# Patient Record
Sex: MALE | Race: WHITE | NOT HISPANIC OR LATINO | URBAN - METROPOLITAN AREA
[De-identification: names, ages, dates, MRNs, and addresses within clinical notes are randomized per-mention and may not be internally consistent; named-entity substitution may affect disease eponyms.]

---

## 2017-03-23 ENCOUNTER — APPOINTMENT (RX ONLY)
Dept: URBAN - METROPOLITAN AREA CLINIC 24 | Facility: CLINIC | Age: 77
Setting detail: DERMATOLOGY
End: 2017-03-23

## 2017-03-23 DIAGNOSIS — D22 MELANOCYTIC NEVI: ICD-10-CM

## 2017-03-23 DIAGNOSIS — L57.0 ACTINIC KERATOSIS: ICD-10-CM

## 2017-03-23 DIAGNOSIS — L82.1 OTHER SEBORRHEIC KERATOSIS: ICD-10-CM

## 2017-03-23 DIAGNOSIS — L81.4 OTHER MELANIN HYPERPIGMENTATION: ICD-10-CM

## 2017-03-23 DIAGNOSIS — D18.0 HEMANGIOMA: ICD-10-CM

## 2017-03-23 PROBLEM — E13.9 OTHER SPECIFIED DIABETES MELLITUS WITHOUT COMPLICATIONS: Status: ACTIVE | Noted: 2017-03-23

## 2017-03-23 PROBLEM — H91.90 UNSPECIFIED HEARING LOSS, UNSPECIFIED EAR: Status: ACTIVE | Noted: 2017-03-23

## 2017-03-23 PROBLEM — I25.10 ATHEROSCLEROTIC HEART DISEASE OF NATIVE CORONARY ARTERY WITHOUT ANGINA PECTORIS: Status: ACTIVE | Noted: 2017-03-23

## 2017-03-23 PROBLEM — D22.5 MELANOCYTIC NEVI OF TRUNK: Status: ACTIVE | Noted: 2017-03-23

## 2017-03-23 PROBLEM — I10 ESSENTIAL (PRIMARY) HYPERTENSION: Status: ACTIVE | Noted: 2017-03-23

## 2017-03-23 PROBLEM — E78.5 HYPERLIPIDEMIA, UNSPECIFIED: Status: ACTIVE | Noted: 2017-03-23

## 2017-03-23 PROBLEM — L85.3 XEROSIS CUTIS: Status: ACTIVE | Noted: 2017-03-23

## 2017-03-23 PROBLEM — D18.01 HEMANGIOMA OF SKIN AND SUBCUTANEOUS TISSUE: Status: ACTIVE | Noted: 2017-03-23

## 2017-03-23 PROCEDURE — 17003 DESTRUCT PREMALG LES 2-14: CPT

## 2017-03-23 PROCEDURE — 99214 OFFICE O/P EST MOD 30 MIN: CPT | Mod: 25

## 2017-03-23 PROCEDURE — ? LIQUID NITROGEN

## 2017-03-23 PROCEDURE — ? COUNSELING

## 2017-03-23 PROCEDURE — 17000 DESTRUCT PREMALG LESION: CPT

## 2017-03-23 ASSESSMENT — LOCATION DETAILED DESCRIPTION DERM
LOCATION DETAILED: LEFT SUPERIOR HELIX
LOCATION DETAILED: LEFT MID-UPPER BACK
LOCATION DETAILED: RIGHT TRIANGULAR FOSSA
LOCATION DETAILED: RIGHT SUPERIOR HELIX
LOCATION DETAILED: LEFT CENTRAL MALAR CHEEK
LOCATION DETAILED: RIGHT SUPERIOR CENTRAL MALAR CHEEK
LOCATION DETAILED: LEFT INFERIOR UPPER BACK
LOCATION DETAILED: EPIGASTRIC SKIN
LOCATION DETAILED: LEFT LATERAL ZYGOMA
LOCATION DETAILED: LEFT POSTERIOR SHOULDER
LOCATION DETAILED: LEFT SUPERIOR CENTRAL MALAR CHEEK
LOCATION DETAILED: RIGHT MID-UPPER BACK
LOCATION DETAILED: PERIUMBILICAL SKIN
LOCATION DETAILED: LEFT MEDIAL INFERIOR CHEST
LOCATION DETAILED: RIGHT POSTERIOR SHOULDER

## 2017-03-23 ASSESSMENT — LOCATION SIMPLE DESCRIPTION DERM
LOCATION SIMPLE: ABDOMEN
LOCATION SIMPLE: LEFT UPPER BACK
LOCATION SIMPLE: LEFT CHEEK
LOCATION SIMPLE: RIGHT EAR
LOCATION SIMPLE: RIGHT UPPER BACK
LOCATION SIMPLE: CHEST
LOCATION SIMPLE: LEFT ZYGOMA
LOCATION SIMPLE: LEFT EAR
LOCATION SIMPLE: RIGHT SHOULDER
LOCATION SIMPLE: LEFT SHOULDER
LOCATION SIMPLE: RIGHT CHEEK

## 2017-03-23 ASSESSMENT — LOCATION ZONE DERM
LOCATION ZONE: TRUNK
LOCATION ZONE: EAR
LOCATION ZONE: ARM
LOCATION ZONE: FACE

## 2017-05-08 ENCOUNTER — HOSPITAL ENCOUNTER (OUTPATIENT)
Dept: PHYSICAL THERAPY | Age: 77
Discharge: HOME | End: 2017-05-08
Payer: MEDICARE

## 2017-05-08 ENCOUNTER — HOSPITAL ENCOUNTER (OUTPATIENT)
Dept: SURGERY | Age: 77
Discharge: HOME | End: 2017-05-08
Payer: MEDICARE

## 2017-05-08 PROBLEM — R06.83 SNORING: Status: ACTIVE | Noted: 2017-05-08

## 2017-05-08 PROBLEM — R06.81 WITNESSED EPISODE OF APNEA: Status: ACTIVE | Noted: 2017-05-08

## 2017-05-08 LAB
ANION GAP BLD CALC-SCNC: 6 MMOL/L (ref 7–16)
APPEARANCE UR: CLEAR
APTT PPP: 25.7 SEC (ref 23.5–31.7)
BACTERIA SPEC CULT: ABNORMAL
BACTERIA URNS QL MICRO: 0 /HPF
BASOPHILS # BLD AUTO: 0.1 K/UL (ref 0–0.2)
BASOPHILS # BLD: 1 % (ref 0–2)
BILIRUB UR QL: NEGATIVE
BUN SERPL-MCNC: 20 MG/DL (ref 8–23)
CALCIUM SERPL-MCNC: 9.2 MG/DL (ref 8.3–10.4)
CASTS URNS QL MICRO: 0 /LPF
CHLORIDE SERPL-SCNC: 104 MMOL/L (ref 98–107)
CO2 SERPL-SCNC: 32 MMOL/L (ref 21–32)
COLOR UR: YELLOW
CREAT SERPL-MCNC: 1.38 MG/DL (ref 0.8–1.5)
DIFFERENTIAL METHOD BLD: ABNORMAL
EOSINOPHIL # BLD: 0.5 K/UL (ref 0–0.8)
EOSINOPHIL NFR BLD: 4 % (ref 0.5–7.8)
EPI CELLS #/AREA URNS HPF: 0 /HPF
ERYTHROCYTE [DISTWIDTH] IN BLOOD BY AUTOMATED COUNT: 13.2 % (ref 11.9–14.6)
GLUCOSE SERPL-MCNC: 115 MG/DL (ref 65–100)
GLUCOSE UR STRIP.AUTO-MCNC: NEGATIVE MG/DL
HCT VFR BLD AUTO: 51.1 % (ref 41.1–50.3)
HGB BLD-MCNC: 16.7 G/DL (ref 13.6–17.2)
HGB UR QL STRIP: NEGATIVE
IMM GRANULOCYTES # BLD: 0 K/UL (ref 0–0.5)
IMM GRANULOCYTES NFR BLD AUTO: 0.4 % (ref 0–5)
INR PPP: 0.9 (ref 0.9–1.2)
KETONES UR QL STRIP.AUTO: NEGATIVE MG/DL
LEUKOCYTE ESTERASE UR QL STRIP.AUTO: NEGATIVE
LYMPHOCYTES # BLD AUTO: 23 % (ref 13–44)
LYMPHOCYTES # BLD: 2.6 K/UL (ref 0.5–4.6)
MCH RBC QN AUTO: 29.6 PG (ref 26.1–32.9)
MCHC RBC AUTO-ENTMCNC: 32.7 G/DL (ref 31.4–35)
MCV RBC AUTO: 90.6 FL (ref 79.6–97.8)
MONOCYTES # BLD: 1.3 K/UL (ref 0.1–1.3)
MONOCYTES NFR BLD AUTO: 12 % (ref 4–12)
NEUTS SEG # BLD: 6.7 K/UL (ref 1.7–8.2)
NEUTS SEG NFR BLD AUTO: 60 % (ref 43–78)
NITRITE UR QL STRIP.AUTO: NEGATIVE
PH UR STRIP: 5 [PH] (ref 5–9)
PLATELET # BLD AUTO: 189 K/UL (ref 150–450)
PMV BLD AUTO: 11.2 FL (ref 10.8–14.1)
POTASSIUM SERPL-SCNC: 4.4 MMOL/L (ref 3.5–5.1)
PROT UR STRIP-MCNC: 100 MG/DL
PROTHROMBIN TIME: 10 SEC (ref 9.6–12)
RBC # BLD AUTO: 5.64 M/UL (ref 4.23–5.67)
RBC #/AREA URNS HPF: 0 /HPF
SERVICE CMNT-IMP: ABNORMAL
SODIUM SERPL-SCNC: 142 MMOL/L (ref 136–145)
SP GR UR REFRACTOMETRY: 1.01 (ref 1–1.02)
UROBILINOGEN UR QL STRIP.AUTO: 0.2 EU/DL (ref 0.2–1)
WBC # BLD AUTO: 11.2 K/UL (ref 4.3–11.1)
WBC URNS QL MICRO: 0 /HPF

## 2017-05-08 PROCEDURE — 81001 URINALYSIS AUTO W/SCOPE: CPT | Performed by: PHYSICIAN ASSISTANT

## 2017-05-08 PROCEDURE — 85025 COMPLETE CBC W/AUTO DIFF WBC: CPT | Performed by: PHYSICIAN ASSISTANT

## 2017-05-08 PROCEDURE — 85610 PROTHROMBIN TIME: CPT | Performed by: PHYSICIAN ASSISTANT

## 2017-05-08 PROCEDURE — 97161 PT EVAL LOW COMPLEX 20 MIN: CPT

## 2017-05-08 PROCEDURE — G8979 MOBILITY GOAL STATUS: HCPCS

## 2017-05-08 PROCEDURE — G8978 MOBILITY CURRENT STATUS: HCPCS

## 2017-05-08 PROCEDURE — G8980 MOBILITY D/C STATUS: HCPCS

## 2017-05-08 PROCEDURE — 87641 MR-STAPH DNA AMP PROBE: CPT | Performed by: PHYSICIAN ASSISTANT

## 2017-05-08 PROCEDURE — 80048 BASIC METABOLIC PNL TOTAL CA: CPT | Performed by: PHYSICIAN ASSISTANT

## 2017-05-08 PROCEDURE — 85730 THROMBOPLASTIN TIME PARTIAL: CPT | Performed by: PHYSICIAN ASSISTANT

## 2017-05-08 PROCEDURE — 36415 COLL VENOUS BLD VENIPUNCTURE: CPT | Performed by: PHYSICIAN ASSISTANT

## 2017-05-08 PROCEDURE — 77030027138 HC INCENT SPIROMETER -A

## 2017-05-08 RX ORDER — AMOXICILLIN 500 MG/1
500 CAPSULE ORAL
COMMUNITY
End: 2017-05-30

## 2017-05-08 NOTE — PERIOP NOTES
Recent Results (from the past 12 hour(s))   CBC WITH AUTOMATED DIFF    Collection Time: 05/08/17 10:45 AM   Result Value Ref Range    WBC 11.2 (H) 4.3 - 11.1 K/uL    RBC 5.64 4.23 - 5.67 M/uL    HGB 16.7 13.6 - 17.2 g/dL    HCT 51.1 (H) 41.1 - 50.3 %    MCV 90.6 79.6 - 97.8 FL    MCH 29.6 26.1 - 32.9 PG    MCHC 32.7 31.4 - 35.0 g/dL    RDW 13.2 11.9 - 14.6 %    PLATELET 081 744 - 326 K/uL    MPV 11.2 10.8 - 14.1 FL    DF AUTOMATED      NEUTROPHILS 60 43 - 78 %    LYMPHOCYTES 23 13 - 44 %    MONOCYTES 12 4.0 - 12.0 %    EOSINOPHILS 4 0.5 - 7.8 %    BASOPHILS 1 0.0 - 2.0 %    IMMATURE GRANULOCYTES 0.4 0.0 - 5.0 %    ABS. NEUTROPHILS 6.7 1.7 - 8.2 K/UL    ABS. LYMPHOCYTES 2.6 0.5 - 4.6 K/UL    ABS. MONOCYTES 1.3 0.1 - 1.3 K/UL    ABS. EOSINOPHILS 0.5 0.0 - 0.8 K/UL    ABS. BASOPHILS 0.1 0.0 - 0.2 K/UL    ABS. IMM.  GRANS. 0.0 0.0 - 0.5 K/UL   PROTHROMBIN TIME + INR    Collection Time: 05/08/17 10:45 AM   Result Value Ref Range    Prothrombin time 10.0 9.6 - 12.0 sec    INR 0.9 0.9 - 1.2     PTT    Collection Time: 05/08/17 10:45 AM   Result Value Ref Range    aPTT 25.7 23.5 - 04.1 SEC   METABOLIC PANEL, BASIC    Collection Time: 05/08/17 10:45 AM   Result Value Ref Range    Sodium 142 136 - 145 mmol/L    Potassium 4.4 3.5 - 5.1 mmol/L    Chloride 104 98 - 107 mmol/L    CO2 32 21 - 32 mmol/L    Anion gap 6 (L) 7 - 16 mmol/L    Glucose 115 (H) 65 - 100 mg/dL    BUN 20 8 - 23 MG/DL    Creatinine 1.38 0.8 - 1.5 MG/DL    GFR est AA >60 >60 ml/min/1.73m2    GFR est non-AA 53 (L) >60 ml/min/1.73m2    Calcium 9.2 8.3 - 10.4 MG/DL   URINALYSIS W/ RFLX MICROSCOPIC    Collection Time: 05/08/17 10:45 AM   Result Value Ref Range    Color YELLOW      Appearance CLEAR      Specific gravity 1.011 1.001 - 1.023      pH (UA) 5.0 5.0 - 9.0      Protein 100 (A) NEG mg/dL    Glucose NEGATIVE  mg/dL    Ketone NEGATIVE  NEG mg/dL    Bilirubin NEGATIVE  NEG      Blood NEGATIVE  NEG      Urobilinogen 0.2 0.2 - 1.0 EU/dL    Nitrites NEGATIVE NEG      Leukocyte Esterase NEGATIVE  NEG      WBC 0 0 /hpf    RBC 0 0 /hpf    Epithelial cells 0 0 /hpf    Bacteria 0 0 /hpf    Casts 0 0 /lpf

## 2017-05-08 NOTE — PROGRESS NOTES
Marion Barroso  : (73 y.o.) [address] [facility] [place] [address]  [phone]       Physical Therapy Prehab Plan of Treatment and Evaluation Summary:2017    ICD-10: Treatment Diagnosis:   · Pain in Right Knee (M25.561)  · Stiffness of Right Knee, Not elsewhere classified (M25.661)  · Difficulty in walking, Not elsewhere classified (R26.2)  · Other abnormalities of gait and mobility (R26.89)  Precautions/Allergies:   Poison ivy extract  MEDICAL/REFERRING DIAGNOSIS:  Unilateral primary osteoarthritis, right knee [M17.11]  REFERRING PHYSICIAN: Vale Luke., *  DATE OF SURGERY: 17   Assessment:   Comments:  Pain in right knee joint with decreased independence with functional mobility. Pt plans to return home following hospital stay. Pt's wife present and supportive. PROBLEM LIST (Impacting functional limitations):  Mr. Isatu Yeager presents with the following right lower extremity(s) problems:  1. Transfers  2. Gait  3. Strength  4. Range of Motion  5. Pain   INTERVENTIONS PLANNED:  1. Home Exercise Program  2. Educational Discussion     TREATMENT PLAN: Effective Dates: 2017 TO 2017. Frequency/Duration: Patient to continue to perform home exercise program at least twice per day up until his surgery. GOALS: (Goals have been discussed and agreed upon with patient.)  Discharge Goals: Time Frame: 1 Day  1. Patient will demonstrate independence with a home exercise program designed to increase strength, range of motion and pain control to minimize functional deficits and optimize patient for total joint replacement. Rehabilitation Potential For Stated Goals: Good  Regarding Nathalie Desir's therapy, I certify that the treatment plan above will be carried out by a therapist or under their direction.   Thank you for this referral,  Josh Bryant PT               HISTORY:   Present Symptoms:  Pain Intensity 1: 0   History of Present Injury/Illness (Reason for Referral):  Medical/Referring Diagnosis: Unilateral primary osteoarthritis, right knee [M17.11]   Past Medical History/Comorbidities:   Mr. Elliott Jin  has a past medical history of Aortic valve replaced (12/2014); Arrhythmia; Arthritis; CAD (coronary artery disease); Carotid artery stenosis; Chronic kidney disease; Chronic pain; Coagulation disorder ([address] Coronary atherosclerosis of native coronary vessel; Dyslipidemia (12/5/2016); Former smoker; GERD (gastroesophageal reflux disease); Hypertension; Left anterior fascicular block; Platelet inhibition due to Plavix; and Pre-diabetes. He also has no past medical history of Adverse effect of anesthesia; Aneurysm ([address] Asthma; Autoimmune disease ([address] Cancer ([address] Chronic obstructive pulmonary disease ([address] Diabetes ([address] Difficult intubation; Heart failure ([address] Liver disease; Malignant hyperthermia due to anesthesia; Nausea & vomiting; Pseudocholinesterase deficiency; Psychiatric disorder; PUD (peptic ulcer disease); Seizures ([address] Stroke [facility]); Thromboembolus ([address] Thyroid disease; Unspecified adverse effect of anesthesia; or Unspecified sleep apnea. Mr. Elliott Jin  has a past surgical history that includes bunionectomy (Bilateral); vascular surgery procedure unlist; carpal tunnel release (Bilateral); cataract removal (Bilateral); coronary artery bypass graft (10/14/2014); heart catheterization; and colonoscopy.   Social History/Living Environment:   Home Environment: Private residence  # Steps to Enter: 2  One/Two Story Residence: Two story, live on 1st floor  # of Interior Steps: 14  Living Alone: No  Support Systems: Spouse/Significant Other/Partner  Patient Expects to be Discharged to[de-identified] Private residence  Current DME Used/Available at Home: Shower chair  Tub or Shower Type: Shower  Work/Activity:  retired  Dominant Side:  RIGHT  Current Medications:  See Pre-assessment nursing note   Number of Personal Factors/Comorbidities that affect the Plan of Care: 0: LOW COMPLEXITY   EXAMINATION:   ADLs (Current Functional Status):   Ambulation:  [x] Independent  [] Walk Indoors Only  [] Walk Outdoors  [] Use Assistive Device  [] Use Wheelchair Only Dressing:  [x] Independent  Requires Assistance from Someone for:  [] Sock/Shoes  [] Pants  [] Everything   Bathing/Showering:   [x] Independent  [] Requires Assistance from Someone  [] Sponge Bath Only Household Activities:  [x] Routine house and yard work  [] Light Housework Only  [] None   Observation/Orthostatic Postural Assessment:   Within defined limits   ROM/Flexibility:   Gross Assessment: Yes  AROM: Generally decreased, functional                LLE Assessment  LLE Assessment (WDL): Within defined limits      RLE AROM  R Knee Flexion: 85  R Knee Extension: 15   Strength:   Gross Assessment: Yes  Strength: Generally decreased, functional                  Functional Mobility:    Gross Assessment: Yes  Coordination: Generally decreased, functional    Stand to Sit: Independent  Sit to Stand: Independent  Distance (ft): 1000 Feet (ft)  Ambulation - Level of Assistance: Independent  Gait Abnormalities: Antalgic          Balance:    Sitting: Intact  Standing: Intact   Body Structures Involved:  1. Bones  2. Joints  3. Muscles Body Functions Affected:  1. Neuromusculoskeletal  2. Movement Related Activities and Participation Affected:  1. Mobility   Number of elements that affect the Plan of Care: 4+: HIGH COMPLEXITY   CLINICAL PRESENTATION:   Presentation: Stable and uncomplicated: LOW COMPLEXITY   CLINICAL DECISION MAKING:   Outcome Measure: Tool Used: Lower Extremity Functional Scale (LEFS)  Score:  Initial: 36/80 Most Recent: X/80 (Date: -- )   Interpretation of Score: 20 questions each scored on a 5 point scale with 0 representing \"extreme difficulty or unable to perform\" and 4 representing \"no difficulty\". The lower the score, the greater the functional disability. 80/80 represents no disability. Minimal detectable change is 9 points. Score 80 79-65 64-49 48-33 32-17 16-1 0   Modifier CH CI CJ CK CL CM CN     ? Mobility - Walking and Moving Around:     - CURRENT STATUS: CK - 40%-59% impaired, limited or restricted    - GOAL STATUS: CK - 40%-59% impaired, limited or restricted    - D/C STATUS:  CK - 40%-59% impaired, limited or restricted  Medical Necessity:   · Mr. Desir is expected to optimize his lower extremity strength and ROM in preparation for joint replacement surgery. Reason for Services/Other Comments:  · Achieve baseline assesment of musculoskeletal system, functional mobility and home environment. , educate in PT HEP in preparation for surgery, educate in hospital plan of care. Use of outcome tool(s) and clinical judgement create a POC that gives a: Clear prediction of patient's progress: LOW COMPLEXITY   TREATMENT:   Treatment/Session Assessment:  Patient was instructed in PT- HEP to increase strength and ROM in LEs. Answered all questions. · Post session pain:  3  · Compliance with Program/Exercises: Will assess as treatment progresses.   Total Treatment Duration:  PT Patient Time In/Time Out  Time In: 1100  Time Out: Lux Biggs PT

## 2017-05-08 NOTE — PERIOP NOTES
Patient verified name, , and surgery as listed in [facility]. Type 3 surgery, PAT joint assessment complete. Labs per surgeon: cbc, bmp, ua, pt/inr, ptt, mrsa/mssa swab, T&S DOS; results within anesthesia limits. Abnormal WBC and UA results routed via [facility] to Dr. Gerald Murray, no new orders received at this time. Bob Vázquez NP made aware via phone of abnormal lab results; no new orders received at this time. Labs per anesthesia protocol: All required lab work included in surgeon's orders. EKG: completed 16; results within anesthesia limits. Previous Echo (17), Cardiology note (16), and Telephone encounter with clearance to hold Plavix (17), and Previous Vascular note (16) placed on chart for anesthesia reference. Hibiclens and instructions given per hospital policy. Nasal Swab collected per MD order and instructions for Mupirocin nasal ointment if required. Patient provided with handouts including Guide to Surgery, Pain Management, Hand Hygiene, Blood Transfusion Education, and [place] Anesthesia Brochure. Patient answered medical/surgical history questions at their best of ability. All prior to admission medications documented in [facility]. Original medication prescription bottle NOT visualized during patient appointment. Patient instructed to hold all vitamins 7 days prior to surgery and NSAIDS 5 days prior to surgery. Medications to be held prior to surgery: Glucosamine, Aleve, and Plavix. Patient instructed to continue previous medications as prescribed prior to surgery and to take the following medications the day of surgery according to anesthesia guidelines with a small sip of water: Zantac. Patient taught back and verbalized understanding.

## 2017-05-08 NOTE — ADVANCED PRACTICE NURSE
Total Joint Surgery Preoperative Chart Review      Patient ID:  Franky Ramos  [id number]  21 y.o.  1940  Surgeon: Dr. Chandrika Wen  Date of Surgery: 5/26/2017  Procedure: Total Right Knee Arthroplasty  Primary Care Physician: [address], [phone]  Specialty Physician(s):      Subjective: Franky Ramos is a 76 y.o. WHITE OR  male who presents for preoperative evaluation for Total Right Knee arthroplasty. This is a preoperative chart review note based on data collected by the nurse at the surgical Pre-Assessment visit. Past Medical History:   Diagnosis Date    Aortic valve replaced 12/2014    Daily Plavix and 81 mg ASA  nightly     Arrhythmia     Hx of Atrial fibrillation-Post op after AVR/CABG. Treated with Amiodarone and Coumadin. NO recurrence. Medication stopped 2/2/15    Arthritis     OA    CAD (coronary artery disease)     Carotid artery stenosis     without cerebral infarction. Left carotid stent in May 2014 as part of the [place] Trial.     Chronic kidney disease     Chronic pain     knee    Coagulation disorder ([address]     Coronary atherosclerosis of native coronary vessel     Cardiac cath times 2 with 1 stent. 1) PCI of OM with 2.5 mm Cypher MARIUM (1/2008) and 2) 2 vessel CABG 10/14/14: LIMA to LAD. SVG to OM.      Dyslipidemia 12/5/2016    GERD (gastroesophageal reflux disease)     PRN Zantac     Hypertension     Left anterior fascicular block     Per Dr. Danae Carlos note (12/8/16) \"Chronic\"     Platelet inhibition due to Plavix     Pre-diabetes     Last A1C 6.4 on 3/22/17      Past Surgical History:   Procedure Laterality Date    HX BUNIONECTOMY Bilateral     HX CARPAL TUNNEL RELEASE Bilateral     HX CATARACT REMOVAL Bilateral     HX COLONOSCOPY      HX CORONARY ARTERY BYPASS GRAFT  10/14/2014    x2 - Dr. Noa Wood Aortic valve replacement     HX HEART CATHETERIZATION      x2-one stent placed in 2014   [address] left carotid stent in May 2014 as part of the [place] Trial     Family History   Problem Relation Age of Onset    Cancer Father 80     LIVER CANCER     Heart Disease Mother     Arthritis-osteo Sister     Heart Disease Brother     Arthritis-osteo Brother       Social History   Substance Use Topics    Smoking status: Former Smoker     Packs/day: 1.00     Years: 30.00     Types: Cigarettes     Quit date: 3/25/1980    Smokeless tobacco: Never Used    Alcohol use 3.5 oz/week     4 Glasses of wine, 3 Cans of beer per week       Prior to Admission medications    Medication Sig Start Date End Date Taking? Authorizing Provider   amoxicillin (AMOXIL) 500 mg capsule Take 500 mg by mouth. Prior to dental appointment   Yes Historical Provider   clopidogrel (PLAVIX) 75 mg tab Take 1 Tab by mouth every morning. 12/8/16  Yes Brinda Espinal MD   losartan-hydroCHLOROthiazide [facility]) 100-12.5 mg per tablet Take 1 Tab by mouth every morning. Indications: Hypertension 12/8/16  Yes Brinda Espinal MD   metoprolol succinate (TOPROL XL) 25 mg XL tablet Take 1 Tab by mouth every morning. Indications: unknown why takes  Patient taking differently: Take 25 mg by mouth nightly. Indications: unknown why takes 12/8/16  Yes Brinda Espinal MD   rosuvastatin (CRESTOR) 20 mg tablet Take 1 Tab by mouth nightly. Indications: hypercholesterolemia 12/8/16  Yes Brinda Espinal MD   aspirin delayed-release 81 mg tablet Take 81 mg by mouth nightly. Yes Historical Provider   Glucosamine &Chondroit-MV-Min3 [id number] mg tab Take  by mouth every morning. 2 tablets every morning     Stop seven days prior to surgery per anesthesia protocol. Yes Historical Provider   naproxen sodium (ALEVE) 220 mg cap Take 440 mg by mouth every morning. Yes Historical Provider   ranitidine (ZANTAC) 150 mg tablet Take 150 mg by mouth as needed for Indigestion. Take day of surgery per anesthesia protocol. Non-Formulary.  Instructed to bring to the hospital on the DOS, in the original bottle, and give to nurse. Indications: gastroesophageal reflux disease   Yes Historical Provider     Allergies   Allergen Reactions    Poison Ivy Extract Hives and Itching          Objective:     Physical Exam:   Patient Vitals for the past 24 hrs:   Temp Pulse Resp SpO2   05/08/17 1202 96.9 °F (36.1 °C) (!) 55 18 95 %       ECG:    EKG Results     None          Data Review:   Labs:   Recent Results (from the past 24 hour(s))   CBC WITH AUTOMATED DIFF    Collection Time: 05/08/17 10:45 AM   Result Value Ref Range    WBC 11.2 (H) 4.3 - 11.1 K/uL    RBC 5.64 4.23 - 5.67 M/uL    HGB 16.7 13.6 - 17.2 g/dL    HCT 51.1 (H) 41.1 - 50.3 %    MCV 90.6 79.6 - 97.8 FL    MCH 29.6 26.1 - 32.9 PG    MCHC 32.7 31.4 - 35.0 g/dL    RDW 13.2 11.9 - 14.6 %    PLATELET 708 529 - 306 K/uL    MPV 11.2 10.8 - 14.1 FL    DF AUTOMATED      NEUTROPHILS 60 43 - 78 %    LYMPHOCYTES 23 13 - 44 %    MONOCYTES 12 4.0 - 12.0 %    EOSINOPHILS 4 0.5 - 7.8 %    BASOPHILS 1 0.0 - 2.0 %    IMMATURE GRANULOCYTES 0.4 0.0 - 5.0 %    ABS. NEUTROPHILS 6.7 1.7 - 8.2 K/UL    ABS. LYMPHOCYTES 2.6 0.5 - 4.6 K/UL    ABS. MONOCYTES 1.3 0.1 - 1.3 K/UL    ABS. EOSINOPHILS 0.5 0.0 - 0.8 K/UL    ABS. BASOPHILS 0.1 0.0 - 0.2 K/UL    ABS. IMM.  GRANS. 0.0 0.0 - 0.5 K/UL   PROTHROMBIN TIME + INR    Collection Time: 05/08/17 10:45 AM   Result Value Ref Range    Prothrombin time 10.0 9.6 - 12.0 sec    INR 0.9 0.9 - 1.2     PTT    Collection Time: 05/08/17 10:45 AM   Result Value Ref Range    aPTT 25.7 23.5 - 96.0 SEC   METABOLIC PANEL, BASIC    Collection Time: 05/08/17 10:45 AM   Result Value Ref Range    Sodium 142 136 - 145 mmol/L    Potassium 4.4 3.5 - 5.1 mmol/L    Chloride 104 98 - 107 mmol/L    CO2 32 21 - 32 mmol/L    Anion gap 6 (L) 7 - 16 mmol/L    Glucose 115 (H) 65 - 100 mg/dL    BUN 20 8 - 23 MG/DL    Creatinine 1.38 0.8 - 1.5 MG/DL    GFR est AA >60 >60 ml/min/1.73m2    GFR est non-AA 53 (L) >60 ml/min/1.73m2    Calcium 9.2 8.3 - 10.4 MG/DL   URINALYSIS W/ RFLX MICROSCOPIC    Collection Time: 05/08/17 10:45 AM   Result Value Ref Range    Color YELLOW      Appearance CLEAR      Specific gravity 1.011 1.001 - 1.023      pH (UA) 5.0 5.0 - 9.0      Protein 100 (A) NEG mg/dL    Glucose NEGATIVE  mg/dL    Ketone NEGATIVE  NEG mg/dL    Bilirubin NEGATIVE  NEG      Blood NEGATIVE  NEG      Urobilinogen 0.2 0.2 - 1.0 EU/dL    Nitrites NEGATIVE  NEG      Leukocyte Esterase NEGATIVE  NEG      WBC 0 0 /hpf    RBC 0 0 /hpf    Epithelial cells 0 0 /hpf    Bacteria 0 0 /hpf    Casts 0 0 /lpf         Problem List:  )  Patient Active Problem List   Diagnosis Code    Carotid artery stenosis without cerebral infarction I65.29    Coronary atherosclerosis of native coronary vessel I25.10    S/P AVR Z95.2    S/P CABG (coronary artery bypass graft) Z95.1    H/O aortic valve replacement Z95.2    CKD (chronic kidney disease) stage 3, GFR 30-59 ml/min N18.3    Elevated hemoglobin A1c R73.09    Thrombocytopenia due to extra corporeal by-pass circulation D69.59    Arthritis M19.90    Chronic pain G89.29    Atrial fibrillation (HCC) I48.91    Dyslipidemia E78.5    Benign hypertensive heart disease without CHF I11.9    Abnormal EKG R94.31    Snoring R06.83    Witnessed episode of apnea R06.81       Total Joint Surgery Pre-Assessment Recommendations:           Patient reports snoring and fatigue. Recommend overnight oximetry study during hospitalization. Renal protocol initiated. The patient's chart will be flagged renal risk. Renal RisK Alerts:  1. Caution with use of muscle relaxants and sedatives with reduced renal function  2. Caution with total amount of narcotics used   3. Avoid morphine if patient has reduced renal function due to accumulations of the highly active metabolite, morphine-6-glucuronide, which can lead to sedation and respiratory depression  4. Avoid nephrotoxic drugs such as GALVIN inhibitors  5. Consider volume status monitoring in addition to Enciso  6.  Ensure hand-off to hospitalist for appropriate perioperative IV fluid management        Signed By: Duc Simons NP-C    May 8, 2017

## 2017-05-08 NOTE — PERIOP NOTES
WBC 11.2 (H) 4.3 - 11.1 K/uL Final     5/8/2017 11:53 AM - Joe, [facility]   Component Results   Component Value Flag Ref Range Units Status   Color YELLOW     Final   Appearance CLEAR     Final   Specific gravity 1.011  1.001 - 1.023   Final   pH (UA) 5.0  5.0 - 9.0   Final   Protein 100 (A) NEG mg/dL Final   Glucose NEGATIVE    mg/dL Final   Ketone NEGATIVE   NEG mg/dL Final   Bilirubin NEGATIVE   NEG   Final   Blood NEGATIVE   NEG   Final   Urobilinogen 0.2  0.2 - 1.0 EU/dL Final   Nitrites NEGATIVE   NEG   Final   Leukocyte Esterase NEGATIVE   NEG   Final   WBC 0  0 /hpf Final   RBC 0  0 /hpf Final   Epithelial cells 0  0 /hpf Final   Bacteria 0  0 /hpf Final   Casts 0  0 /lpf Final

## 2017-05-08 NOTE — PERIOP NOTES
Labs dated 5/8/17 routed via [facility] to patients PCP, Dr. Mio Olson, per Dr. iDlan Bloom' request.

## 2017-05-09 VITALS
RESPIRATION RATE: 18 BRPM | TEMPERATURE: 96.9 F | SYSTOLIC BLOOD PRESSURE: 142 MMHG | DIASTOLIC BLOOD PRESSURE: 77 MMHG | HEIGHT: 71 IN | OXYGEN SATURATION: 95 % | WEIGHT: 267 LBS | HEART RATE: 55 BPM | BODY MASS INDEX: 37.38 KG/M2

## 2017-05-09 RX ORDER — MUPIROCIN 20 MG/G
OINTMENT TOPICAL 2 TIMES DAILY
COMMUNITY
Start: 2017-05-21 | End: 2017-05-28

## 2017-05-09 NOTE — PROGRESS NOTES
17 1030   Oxygen Therapy   O2 Sat (%) 96 %   Pulse via Oximetry 56 beats per minute   O2 Device Room air   Pre-Treatment   Breath Sounds Bilateral Clear   Pre FEV1 (liters) 2.1 liters   % Predicted 69     Initial respiratory Assessment completed with pt. Pt was interviewed and evaluated in Joint camp prior to surgery. Patient ID:  Akbar Pittman  [id number]  76 y.o.  1940  Surgeon: Dr. Ervin Abbott  Date of Surgery: The linked surgery was not found. Please check manually. Procedure: Total Right Knee Arthroplasty  Primary Care Physician: Lelo Llanos [phone]  Specialists:                                  Pt instructed in the use of Incentive Spirometry. Pt instructed to bring Incentive Spirometer back on date of surgery & to start using Is upon return to pt room. Pt taught proper cough technique      History of smokin PPD FOR 30 YEARS     Quit date:    Secondhand smoke:      Past procedures with Oxygen desaturation:HX OF POST OP HYPOXIA     Past Medical History:   Diagnosis Date    Aortic valve replaced 2014    Daily Plavix and 81 mg ASA  nightly     Arrhythmia     Hx of Atrial fibrillation-Post op after AVR/CABG. Treated with Amiodarone and Coumadin. NO recurrence. Medication stopped 2/2/15    Arthritis     OA    CAD (coronary artery disease)     Carotid artery stenosis     without cerebral infarction. Left carotid stent in May 2014 as part of the [address] Trial. Per cardiology note (16) \"CT of Carotids (24/3/73): LICA 68%. RAISA 30-40%. Carotid stenting of left carotid (). \"    Chronic kidney disease     per cardiology note (16) \"stage 3, GFR 30-59 ml/min. \" GFR 53 on 17.  Chronic pain     knee    Coagulation disorder (HCC)     Daily Plavix     Coronary atherosclerosis of native coronary vessel     Cardiac cath times 2 with 1 stent. 1) PCI of OM with 2.5 mm Cypher MARIUM (2008) and 2) 2 vessel CABG 10/14/14: LIMA to LAD. SVG to OM.      Dyslipidemia 2016    Former smoker     GERD (gastroesophageal reflux disease)     PRN Zantac     Hypertension     Left anterior fascicular block     Per Dr. Daniel Just note (16) \"Chronic\"     Platelet inhibition due to Plavix     Pre-diabetes     Last A1C 6.4 on 3/22/17                                    ENT:SINUS                                                                                                                      Respiratory history:POST OP HYPOXIA, ATELECTASIS, PNA                                 SOB  ON EXERTION  DENIES SOB                                  Respiratory meds:                                                         FAMILY PRESENT:             WIFE                                        PAST SLEEP STUDY:               NO  HX OF KAYCEE:                          NO                                     KAYCEE assessment:     HX OF A-FIB                                          SLEEPS ON SIDE                                                    PHYSICAL EXAM   Body mass index is 37.24 kg/(m^2).    Visit Vitals    /77    Pulse (!) 55    Temp 96.9 °F (36.1 °C)    Resp 18    Ht 5' 11\" (1.803 m)    Wt 121.1 kg (267 lb)    SpO2 95%    BMI 37.24 kg/m2     Neck circumference: 46.5     cm    Loud snoring:YES      Witnessed apnea or wakening gasping or choking:, APNEA    Awakens with headaches:YES    Morning or daytime tiredness/ sleepiness: DENIES  - BUT NAPS WHEN HE SITS DOWN    Dry mouth or sore throat in morning: DENIES    Freidman stage:3    SACS score:58    STOP/BAN      Sleepiness Scale:     Sitting and reading 2    Watching TV 2    Sitting inactive in a public place 1    As a passenger in a car for an hour without a break 0    Lying down to rest in the afternoon when circumstances Permits 1    Sitting and talking to someone 0    Sitting quietly after lunch without alcohol 2    In a car, while stopped for a few minutes 0    Total :  8 CPAP:NONE               CONT SAT HS      JAMIL- PT AGREEABLE TO FOLLOW UP IF DESATS  AGGRESSIVE IS  Referrals:    Pt. Phone Number:

## 2017-05-09 NOTE — PERIOP NOTES
Nasal swab results reviewed:   Culture result:  (A)    Final   MRSA target DNA not detected, SA target DNA detected.   A MRSA negative, SA positive test result does not preclude MRSA nasal colonization         Called pt, left message to return call to PAT re: lab results    Instructed:Called Mupirocin Rx to Loc.  Pt to apply to ea nostril intranasally twice a day for 5 days beginning :5/21/17

## 2017-05-24 RX ORDER — HYDROMORPHONE HYDROCHLORIDE 1 MG/ML
1 INJECTION, SOLUTION INTRAMUSCULAR; INTRAVENOUS; SUBCUTANEOUS
Status: CANCELLED | OUTPATIENT
Start: 2017-05-24

## 2017-05-24 RX ORDER — HYDROMORPHONE HYDROCHLORIDE 2 MG/1
2 TABLET ORAL
Status: CANCELLED | OUTPATIENT
Start: 2017-05-24

## 2017-05-24 RX ORDER — SODIUM CHLORIDE 0.9 % (FLUSH) 0.9 %
5-10 SYRINGE (ML) INJECTION EVERY 8 HOURS
Status: CANCELLED | OUTPATIENT
Start: 2017-05-24

## 2017-05-24 RX ORDER — DEXAMETHASONE SODIUM PHOSPHATE 100 MG/10ML
10 INJECTION INTRAMUSCULAR; INTRAVENOUS ONCE
Status: CANCELLED | OUTPATIENT
Start: 2017-05-25 | End: 2017-05-25

## 2017-05-24 RX ORDER — SODIUM CHLORIDE 9 MG/ML
100 INJECTION, SOLUTION INTRAVENOUS CONTINUOUS
Status: CANCELLED | OUTPATIENT
Start: 2017-05-24 | End: 2017-05-25

## 2017-05-24 RX ORDER — ACETAMINOPHEN 500 MG
1000 TABLET ORAL EVERY 6 HOURS
Status: CANCELLED | OUTPATIENT
Start: 2017-05-25

## 2017-05-24 RX ORDER — DIPHENHYDRAMINE HCL 25 MG
25 CAPSULE ORAL
Status: CANCELLED | OUTPATIENT
Start: 2017-05-24

## 2017-05-24 RX ORDER — CELECOXIB 200 MG/1
200 CAPSULE ORAL EVERY 12 HOURS
Status: CANCELLED | OUTPATIENT
Start: 2017-05-24

## 2017-05-24 RX ORDER — SODIUM CHLORIDE 0.9 % (FLUSH) 0.9 %
5-10 SYRINGE (ML) INJECTION AS NEEDED
Status: CANCELLED | OUTPATIENT
Start: 2017-05-24

## 2017-05-24 RX ORDER — NALOXONE HYDROCHLORIDE 0.4 MG/ML
.2-.4 INJECTION, SOLUTION INTRAMUSCULAR; INTRAVENOUS; SUBCUTANEOUS
Status: CANCELLED | OUTPATIENT
Start: 2017-05-24

## 2017-05-24 RX ORDER — CEFAZOLIN SODIUM IN 0.9 % NACL 2 G/50 ML
2 INTRAVENOUS SOLUTION, PIGGYBACK (ML) INTRAVENOUS EVERY 8 HOURS
Status: CANCELLED | OUTPATIENT
Start: 2017-05-24 | End: 2017-05-24

## 2017-05-24 RX ORDER — ACETAMINOPHEN 10 MG/ML
1000 INJECTION, SOLUTION INTRAVENOUS ONCE
Status: CANCELLED | OUTPATIENT
Start: 2017-05-24 | End: 2017-05-24

## 2017-05-24 RX ORDER — AMOXICILLIN 250 MG
2 CAPSULE ORAL DAILY
Status: CANCELLED | OUTPATIENT
Start: 2017-05-25

## 2017-05-24 RX ORDER — ASPIRIN 325 MG
325 TABLET, DELAYED RELEASE (ENTERIC COATED) ORAL EVERY 12 HOURS
Status: CANCELLED | OUTPATIENT
Start: 2017-05-24

## 2017-05-24 RX ORDER — ONDANSETRON 2 MG/ML
4 INJECTION INTRAMUSCULAR; INTRAVENOUS
Status: CANCELLED | OUTPATIENT
Start: 2017-05-24

## 2017-05-25 ENCOUNTER — ANESTHESIA EVENT (OUTPATIENT)
Dept: SURGERY | Age: 77
DRG: 470 | End: 2017-05-25
Payer: MEDICARE

## 2017-05-25 NOTE — H&P
[id number] [facility]  Pre Operative History and Physical Exam    Patient ID:  Reinaldo Georges  [id number]  74 y.o.  1940    Today: May 25, 2017       Assessment:   1. Arthritis of the right knee      Plan:    1. Proceed with scheduled Procedure(s) (LRB):  RIGHT KNEE ARTHROPLASTY TOTAL / FNB / MADI (Right)            CC:  Right knee pain    HPI:   The patient has end stage arthritis of the right knee. The patient was evaluated and examined during a consultation prior to this office visit. There have been no changes to the patient's orthopedic condition since the initial consultation. The patient has failed previous conservative treatment for this condition including antiinflammatories , and lifestyle modifications. The necessity for joint replacement is present. The patient will be admitted the day of surgery for Procedure(s) (LRB):  RIGHT KNEE ARTHROPLASTY TOTAL / FNB / MADI (Right)      Past Medical/Surgical History:  Past Medical History:   Diagnosis Date    Aortic valve replaced 12/2014    Daily Plavix and 81 mg ASA  nightly     Arrhythmia     Hx of Atrial fibrillation-Post op after AVR/CABG. Treated with Amiodarone and Coumadin. NO recurrence. Medication stopped 2/2/15    Arthritis     OA    CAD (coronary artery disease)     Carotid artery stenosis     without cerebral infarction. Left carotid stent in May 2014 as part of the [address] Trial. Per cardiology note (12/8/16) \"CT of Carotids (70/9/15): LICA 15%. RAISA 30-40%. Carotid stenting of left carotid (5/14). \"    Chronic kidney disease     per cardiology note (12/8/16) \"stage 3, GFR 30-59 ml/min. \" GFR 53 on 5/8/17.  Chronic pain     knee    Coagulation disorder (HCC)     Daily Plavix     Coronary atherosclerosis of native coronary vessel     Cardiac cath times 2 with 1 stent. 1) PCI of OM with 2.5 mm Cypher MARIUM (1/2008) and 2) 2 vessel CABG 10/14/14: LIMA to LAD. SVG to OM.      Dyslipidemia 12/5/2016    Former smoker     GERD (gastroesophageal reflux disease)     PRN Zantac     Hypertension     Left anterior fascicular block     Per Dr. Moscoso So note (12/8/16) \"Chronic\"     Platelet inhibition due to Plavix     Pre-diabetes     Last A1C 6.4 on 3/22/17     Past Surgical History:   Procedure Laterality Date    HX BUNIONECTOMY Bilateral     HX CARPAL TUNNEL RELEASE Bilateral     HX CATARACT REMOVAL Bilateral     HX COLONOSCOPY      HX CORONARY ARTERY BYPASS GRAFT  10/14/2014    x2 - Dr. Matt Cuba Aortic valve replacement     HX HEART CATHETERIZATION      x2-one stent placed in 2014   [address]      left carotid stent in May 2014 as part of the [facility] Trial        Allergies: Allergies   Allergen Reactions    Poison Ivy Extract Hives and Itching        Objective:                    HEENT: NC/AT                   Lungs:  Unlabored respirations, clear breath sounds                    Heart:   RRR                    Abdomen: soft                   Extremities:  Pain with ROM of the right knee    Meds:   No current facility-administered medications for this encounter. Current Outpatient Prescriptions   Medication Sig    mupirocin (BACTROBAN) 2 % ointment by Both Nostrils route two (2) times a day. 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 Prior to dental appointment    clopidogrel (PLAVIX) 75 mg tab Take 1 Tab by mouth every morning.  losartan-hydroCHLOROthiazide (HYZAAR) 100-12.5 mg per tablet Take 1 Tab by mouth every morning. Indications: Hypertension    metoprolol succinate (TOPROL XL) 25 mg XL tablet Take 1 Tab by mouth every morning. Indications: unknown why takes (Patient taking differently: Take 25 mg by mouth nightly. Indications: unknown why takes)    rosuvastatin (CRESTOR) 20 mg tablet Take 1 Tab by mouth nightly.  Indications: hypercholesterolemia    aspirin delayed-release 81 mg tablet Take 81 mg by mouth nightly.  Glucosamine &Chondroit-MV-Min3 243-257-94-0.5 mg tab Take  by mouth every morning. 2 tablets every morning     Stop seven days prior to surgery per anesthesia protocol.  naproxen sodium (ALEVE) 220 mg cap Take 440 mg by mouth every morning.  ranitidine (ZANTAC) 150 mg tablet Take 150 mg by mouth as needed for Indigestion. Take day of surgery per anesthesia protocol. Non-Formulary. Instructed to bring to the hospital on the DOS, in the original bottle, and give to nurse. Indications: gastroesophageal reflux disease         Labs:  Hospital Outpatient Visit on 05/08/2017   Component Date Value Ref Range Status    WBC 05/08/2017 11.2* 4.3 - 11.1 K/uL Final    CONFIRMED ON TWO INSTRUMENTS    RBC 05/08/2017 5.64  4.23 - 5.67 M/uL Final    HGB 05/08/2017 16.7  13.6 - 17.2 g/dL Final    HCT 05/08/2017 51.1* 41.1 - 50.3 % Final    MCV 05/08/2017 90.6  79.6 - 97.8 FL Final    MCH 05/08/2017 29.6  26.1 - 32.9 PG Final    MCHC 05/08/2017 32.7  31.4 - 35.0 g/dL Final    RDW 05/08/2017 13.2  11.9 - 14.6 % Final    PLATELET 16/73/3567 517  150 - 450 K/uL Final    MPV 05/08/2017 11.2  10.8 - 14.1 FL Final    DF 05/08/2017 AUTOMATED    Final    NEUTROPHILS 05/08/2017 60  43 - 78 % Final    LYMPHOCYTES 05/08/2017 23  13 - 44 % Final    MONOCYTES 05/08/2017 12  4.0 - 12.0 % Final    EOSINOPHILS 05/08/2017 4  0.5 - 7.8 % Final    BASOPHILS 05/08/2017 1  0.0 - 2.0 % Final    IMMATURE GRANULOCYTES 05/08/2017 0.4  0.0 - 5.0 % Final    ABS. NEUTROPHILS 05/08/2017 6.7  1.7 - 8.2 K/UL Final    ABS. LYMPHOCYTES 05/08/2017 2.6  0.5 - 4.6 K/UL Final    ABS. MONOCYTES 05/08/2017 1.3  0.1 - 1.3 K/UL Final    ABS. EOSINOPHILS 05/08/2017 0.5  0.0 - 0.8 K/UL Final    ABS. BASOPHILS 05/08/2017 0.1  0.0 - 0.2 K/UL Final    ABS. IMM.  GRANS. 05/08/2017 0.0  0.0 - 0.5 K/UL Final    Prothrombin time 05/08/2017 10.0  9.6 - 12.0 sec Final    INR 05/08/2017 0.9  0.9 - 1.2   Final    Comment: Suggested therapeutic INR range:  Venous thrombosis and embolus  2.0-3.0  Prosthetic heart valve         2.5-3.5      aPTT 05/08/2017 25.7  23.5 - 31.7 SEC Final    Heparin Therapeutic Range = 56.6-81.7 secs    Sodium 05/08/2017 142  136 - 145 mmol/L Final    Potassium 05/08/2017 4.4  3.5 - 5.1 mmol/L Final    Chloride 05/08/2017 104  98 - 107 mmol/L Final    CO2 05/08/2017 32  21 - 32 mmol/L Final    Anion gap 05/08/2017 6* 7 - 16 mmol/L Final    Glucose 05/08/2017 115* 65 - 100 mg/dL Final    Comment: 47 - 60 mg/dl Consistent with, but not fully diagnostic of hypoglycemia. 101 - 125 mg/dl Impaired fasting glucose/consistent with pre-diabetes mellitus  > 126 mg/dl Fasting glucose consistent with overt diabetes mellitus      BUN 05/08/2017 20  8 - 23 MG/DL Final    Creatinine 05/08/2017 1.38  0.8 - 1.5 MG/DL Final    GFR est AA 05/08/2017 >60  >60 ml/min/1.73m2 Final    GFR est non-AA 05/08/2017 53* >60 ml/min/1.73m2 Final    Comment: (NOTE)  Estimated GFR is calculated using the Modification of Diet in Renal   Disease (MDRD) Study equation, reported for both  Americans   (GFRAA) and non- Americans (GFRNA), and normalized to 1.73m2   body surface area. The physician must decide which value applies to   the patient. The MDRD study equation should only be used in   individuals age 25 or older. It has not been validated for the   following: pregnant women, patients with serious comorbid conditions,   or on certain medications, or persons with extremes of body size,   muscle mass, or nutritional status.       Calcium 05/08/2017 9.2  8.3 - 10.4 MG/DL Final    Color 05/08/2017 YELLOW    Final    Appearance 05/08/2017 CLEAR    Final    Specific gravity 05/08/2017 1.011  1.001 - 1.023   Final    pH (UA) 05/08/2017 5.0  5.0 - 9.0   Final    Protein 05/08/2017 100* NEG mg/dL Final    Glucose 05/08/2017 NEGATIVE   mg/dL Final    Ketone 05/08/2017 NEGATIVE   NEG mg/dL Final    Bilirubin 05/08/2017 NEGATIVE   NEG   Final    Blood 05/08/2017 NEGATIVE   NEG   Final    Urobilinogen 05/08/2017 0.2  0.2 - 1.0 EU/dL Final    Nitrites 05/08/2017 NEGATIVE   NEG   Final    Leukocyte Esterase 05/08/2017 NEGATIVE   NEG   Final    WBC 05/08/2017 0  0 /hpf Final    RBC 05/08/2017 0  0 /hpf Final    Epithelial cells 05/08/2017 0  0 /hpf Final    Bacteria 05/08/2017 0  0 /hpf Final    Casts 05/08/2017 0  0 /lpf Final    Special Requests: 05/08/2017 NO SPECIAL REQUESTS    Final    Culture result: 05/08/2017 MRSA target DNA not detected, SA target DNA detected. A MRSA negative, SA positive test result does not preclude MRSA nasal colonization. *   Final                 Patient Active Problem List   Diagnosis Code    Carotid artery stenosis without cerebral infarction I65.29    Coronary atherosclerosis of native coronary vessel I25.10    S/P AVR Z95.2    S/P CABG (coronary artery bypass graft) Z95.1    H/O aortic valve replacement Z95.2    CKD (chronic kidney disease) stage 3, GFR 30-59 ml/min N18.3    Elevated hemoglobin A1c R73.09    Thrombocytopenia due to extra corporeal by-pass circulation D69.59    Arthritis M19.90    Chronic pain G89.29    Atrial fibrillation (HCC) I48.91    Dyslipidemia E78.5    Benign hypertensive heart disease without CHF I11.9    Abnormal EKG R94.31    Snoring R06.83    Witnessed episode of apnea R06.81         Signed By: Ludwig Dunham MD  May 25, 2017

## 2017-05-25 NOTE — H&P
Date of Service: 2017-01-05  Work Status:  ????? Allergies:????? Medications:Clopidogrel Bisulfate (75 MG); Losartan Potassium-HCTZ (50-12.5 MG);Metoprolol Succinate ER (50 MG); Rosuvastatin Calcium (20 MG)    CC: Right Knee Pain    History:  The patient presents today as a new patient complaining of Right knee pain that has been going on for 3 years but has gradually gotten worse. They describe the pain as a general ache with periods of sharp pains. They have pain going up and down stairs and pain with prolong walking. They rate the pain as a 4 out of 10. The patient is limited in their activities because of this knee pain. They have tried OTC anti-inflammatories with minimal relief. He has been using a knee brace as he knee gives way with hip at times. He takes Plavix for a carotid artery bypass and has a filter placed. He sees Dr. Judith Moncada as his cardiologist. He does not use a walker or cane. He has no groin pain and no back pain. They have no other complaints or concerns. PMH/ROS/FH/MEDS/ALLERGIES: POA Patient Health Form has been filled out, reviewed, signed and included in the chart. Pertinent positive and negative findings on ROS related to musculoskeletal problems were discussed with the patient. Patient advised to discuss non-orthopedic complaints with primary care physician. Physical Exam:   General:  On exam the patient is a pleasant 64 yr. old in no acute distress, A&O x 3. Ambulates with a  mild antalgic gait. Psych: Normal affect and mood. HEENT: Normal cephalic, Atraumatic. PERRL  Lungs: Respirations are even and unlabored  Back: On upright standing, pelvis is level. There is notenderness to palpation over the lower lumbar spine. SLR is negative bilaterally. Hips: On exam of both hips, skin is intact bilaterally. No lymphadenopathy, No redness, No rashes bilaterally. No effusion. No point tenderness bilaterally. Good Strength with both hip flexors.   ROM both hips are good with no pain on ROM.  Knees:  No lower extremity redness, rashes or lymphadenopathy with either lower extremity. No effusion. ROM is 0-125 degrees on the Right and 0-125 degrees on the Left. No instability to varus/valgus stress, no A/P instability on either extremity. Quad strength is Good on both extremities. There is no extensor lag. Negative Dick's test. Calves are soft and non-tender. Vascular: No distal edema or clubbing, Distal pulses are intact  Neurologic: Normal. Normal reflexes bilateral lower extremities. X-rays: A/P, Lateral, Sunrise and Skiers views of the Right Knee demonstrate severe bone on bone narrowing of the medial compartment. There are periarticular osteophytes present and eburnation of bone. No acute bony abnormality. X-ray Diagnosis: Severe osteoarthritis of the Right Knee  Impression and Plan:  The patient was seen and examined with Dr. Robe Christensen who feels that the patient has severe OA of the right knee. His recommendation is a total knee replacement. He discussed the risks and benefits of surgery with the patient and answered all of their questions. The patient has elected to proceed with surgery. This will be scheduled. The patient will be sent for preoperative lab work and to joint camp. The patient will be given a total knee information booklet. They will be given a handicap parking sticker if they do not already have one. Will follow up as scheduled or sooner if needed.               Tor Cabello PA-C      [employer]

## 2017-05-26 ENCOUNTER — ANESTHESIA (OUTPATIENT)
Dept: SURGERY | Age: 77
DRG: 470 | End: 2017-05-26
Payer: MEDICARE

## 2017-05-26 ENCOUNTER — HOSPITAL ENCOUNTER (INPATIENT)
Age: 77
LOS: 2 days | Discharge: HOME HEALTH | DRG: 470 | End: 2017-05-28
Attending: ORTHOPAEDIC SURGERY | Admitting: ORTHOPAEDIC SURGERY
Payer: MEDICARE

## 2017-05-26 DIAGNOSIS — Z96.651 STATUS POST TOTAL RIGHT KNEE REPLACEMENT: Primary | ICD-10-CM

## 2017-05-26 LAB
ABO + RH BLD: NORMAL
BLOOD GROUP ANTIBODIES SERPL: NORMAL
GLUCOSE BLD STRIP.AUTO-MCNC: 162 MG/DL (ref 65–100)
HGB BLD-MCNC: 15.3 G/DL (ref 13.6–17.2)
SPECIMEN EXP DATE BLD: NORMAL

## 2017-05-26 PROCEDURE — 77030018836 HC SOL IRR NACL ICUM -A: Performed by: ORTHOPAEDIC SURGERY

## 2017-05-26 PROCEDURE — 74011250636 HC RX REV CODE- 250/636

## 2017-05-26 PROCEDURE — 74011000250 HC RX REV CODE- 250: Performed by: ORTHOPAEDIC SURGERY

## 2017-05-26 PROCEDURE — 77030013727 HC IRR FAN PULSVC ZIMM -B: Performed by: ORTHOPAEDIC SURGERY

## 2017-05-26 PROCEDURE — 77030007880 HC KT SPN EPDRL BBMI -B: Performed by: NURSE ANESTHETIST, CERTIFIED REGISTERED

## 2017-05-26 PROCEDURE — 77030012935 HC DRSG AQUACEL BMS -B: Performed by: ORTHOPAEDIC SURGERY

## 2017-05-26 PROCEDURE — 76210000016 HC OR PH I REC 1 TO 1.5 HR: Performed by: ORTHOPAEDIC SURGERY

## 2017-05-26 PROCEDURE — 77030011640 HC PAD GRND REM COVD -A: Performed by: ORTHOPAEDIC SURGERY

## 2017-05-26 PROCEDURE — 86580 TB INTRADERMAL TEST: CPT | Performed by: ORTHOPAEDIC SURGERY

## 2017-05-26 PROCEDURE — 74011250637 HC RX REV CODE- 250/637: Performed by: ANESTHESIOLOGY

## 2017-05-26 PROCEDURE — 77030002912 HC SUT ETHBND J&J -A: Performed by: ORTHOPAEDIC SURGERY

## 2017-05-26 PROCEDURE — 74011000302 HC RX REV CODE- 302: Performed by: ORTHOPAEDIC SURGERY

## 2017-05-26 PROCEDURE — 77030020782 HC GWN BAIR PAWS FLX 3M -B: Performed by: NURSE ANESTHETIST, CERTIFIED REGISTERED

## 2017-05-26 PROCEDURE — 86900 BLOOD TYPING SEROLOGIC ABO: CPT | Performed by: PHYSICIAN ASSISTANT

## 2017-05-26 PROCEDURE — 82962 GLUCOSE BLOOD TEST: CPT

## 2017-05-26 PROCEDURE — 77030006789 HC BLD SAW OSC STRY -C: Performed by: ORTHOPAEDIC SURGERY

## 2017-05-26 PROCEDURE — 74011250636 HC RX REV CODE- 250/636: Performed by: ORTHOPAEDIC SURGERY

## 2017-05-26 PROCEDURE — 94762 N-INVAS EAR/PLS OXIMTRY CONT: CPT

## 2017-05-26 PROCEDURE — 77030018846 HC SOL IRR STRL H20 ICUM -A: Performed by: ORTHOPAEDIC SURGERY

## 2017-05-26 PROCEDURE — 36415 COLL VENOUS BLD VENIPUNCTURE: CPT | Performed by: ORTHOPAEDIC SURGERY

## 2017-05-26 PROCEDURE — 77010033678 HC OXYGEN DAILY

## 2017-05-26 PROCEDURE — 76942 ECHO GUIDE FOR BIOPSY: CPT | Performed by: ORTHOPAEDIC SURGERY

## 2017-05-26 PROCEDURE — 0SRC0J9 REPLACEMENT OF RIGHT KNEE JOINT WITH SYNTHETIC SUBSTITUTE, CEMENTED, OPEN APPROACH: ICD-10-PCS | Performed by: ORTHOPAEDIC SURGERY

## 2017-05-26 PROCEDURE — 77030003665 HC NDL SPN BBMI -A: Performed by: NURSE ANESTHETIST, CERTIFIED REGISTERED

## 2017-05-26 PROCEDURE — 97161 PT EVAL LOW COMPLEX 20 MIN: CPT

## 2017-05-26 PROCEDURE — 74011000250 HC RX REV CODE- 250

## 2017-05-26 PROCEDURE — 76010010054 HC POST OP PAIN BLOCK: Performed by: ORTHOPAEDIC SURGERY

## 2017-05-26 PROCEDURE — 77030019557 HC ELECTRD VES SEAL MEDT -F: Performed by: ORTHOPAEDIC SURGERY

## 2017-05-26 PROCEDURE — 76010000171 HC OR TIME 2 TO 2.5 HR INTENSV-TIER 1: Performed by: ORTHOPAEDIC SURGERY

## 2017-05-26 PROCEDURE — 94760 N-INVAS EAR/PLS OXIMETRY 1: CPT

## 2017-05-26 PROCEDURE — 77030003602 HC NDL NRV BLK BBMI -B: Performed by: NURSE ANESTHETIST, CERTIFIED REGISTERED

## 2017-05-26 PROCEDURE — 77030006720 HC BLD PAT RMR ZIMM -B: Performed by: ORTHOPAEDIC SURGERY

## 2017-05-26 PROCEDURE — C1776 JOINT DEVICE (IMPLANTABLE): HCPCS | Performed by: ORTHOPAEDIC SURGERY

## 2017-05-26 PROCEDURE — 76060000035 HC ANESTHESIA 2 TO 2.5 HR: Performed by: ORTHOPAEDIC SURGERY

## 2017-05-26 PROCEDURE — 65270000029 HC RM PRIVATE

## 2017-05-26 PROCEDURE — 85018 HEMOGLOBIN: CPT | Performed by: ORTHOPAEDIC SURGERY

## 2017-05-26 PROCEDURE — 74011000258 HC RX REV CODE- 258: Performed by: ORTHOPAEDIC SURGERY

## 2017-05-26 PROCEDURE — 74011000250 HC RX REV CODE- 250: Performed by: ANESTHESIOLOGY

## 2017-05-26 PROCEDURE — 74011250636 HC RX REV CODE- 250/636: Performed by: ANESTHESIOLOGY

## 2017-05-26 PROCEDURE — 77030032490 HC SLV COMPR SCD KNE COVD -B

## 2017-05-26 PROCEDURE — 77030008467 HC STPLR SKN COVD -B: Performed by: ORTHOPAEDIC SURGERY

## 2017-05-26 PROCEDURE — 77030031139 HC SUT VCRL2 J&J -A: Performed by: ORTHOPAEDIC SURGERY

## 2017-05-26 PROCEDURE — 74011250637 HC RX REV CODE- 250/637: Performed by: ORTHOPAEDIC SURGERY

## 2017-05-26 PROCEDURE — 77030012890

## 2017-05-26 PROCEDURE — 77030034849: Performed by: ORTHOPAEDIC SURGERY

## 2017-05-26 PROCEDURE — 97165 OT EVAL LOW COMPLEX 30 MIN: CPT

## 2017-05-26 PROCEDURE — 77030002966 HC SUT PDS J&J -A: Performed by: ORTHOPAEDIC SURGERY

## 2017-05-26 DEVICE — COMPONENT PAT SZ A40 W44MM DIA40MM THK11MM MED LAT KNEE: Type: IMPLANTABLE DEVICE | Site: KNEE | Status: FUNCTIONAL

## 2017-05-26 DEVICE — BASEPLT TIB PC TRITNM SZ 7 -- TRIATHLON: Type: IMPLANTABLE DEVICE | Site: KNEE | Status: FUNCTIONAL

## 2017-05-26 DEVICE — IMPLANTABLE DEVICE: Type: IMPLANTABLE DEVICE | Site: KNEE | Status: FUNCTIONAL

## 2017-05-26 RX ORDER — SODIUM CHLORIDE 9 MG/ML
100 INJECTION, SOLUTION INTRAVENOUS CONTINUOUS
Status: DISPENSED | OUTPATIENT
Start: 2017-05-26 | End: 2017-05-27

## 2017-05-26 RX ORDER — NALOXONE HYDROCHLORIDE 0.4 MG/ML
0.1 INJECTION, SOLUTION INTRAMUSCULAR; INTRAVENOUS; SUBCUTANEOUS
Status: DISCONTINUED | OUTPATIENT
Start: 2017-05-26 | End: 2017-05-26 | Stop reason: HOSPADM

## 2017-05-26 RX ORDER — OXYCODONE HYDROCHLORIDE 5 MG/1
10 TABLET ORAL
Status: DISCONTINUED | OUTPATIENT
Start: 2017-05-26 | End: 2017-05-26 | Stop reason: HOSPADM

## 2017-05-26 RX ORDER — PROCHLORPERAZINE EDISYLATE 5 MG/ML
10 INJECTION INTRAMUSCULAR; INTRAVENOUS
Status: DISCONTINUED | OUTPATIENT
Start: 2017-05-26 | End: 2017-05-28 | Stop reason: HOSPADM

## 2017-05-26 RX ORDER — MIDAZOLAM HYDROCHLORIDE 1 MG/ML
INJECTION, SOLUTION INTRAMUSCULAR; INTRAVENOUS AS NEEDED
Status: DISCONTINUED | OUTPATIENT
Start: 2017-05-26 | End: 2017-05-26 | Stop reason: HOSPADM

## 2017-05-26 RX ORDER — PROPOFOL 10 MG/ML
INJECTION, EMULSION INTRAVENOUS
Status: DISCONTINUED | OUTPATIENT
Start: 2017-05-26 | End: 2017-05-26 | Stop reason: HOSPADM

## 2017-05-26 RX ORDER — FENTANYL CITRATE 50 UG/ML
100 INJECTION, SOLUTION INTRAMUSCULAR; INTRAVENOUS ONCE
Status: COMPLETED | OUTPATIENT
Start: 2017-05-26 | End: 2017-05-26

## 2017-05-26 RX ORDER — CELECOXIB 200 MG/1
200 CAPSULE ORAL ONCE
Status: COMPLETED | OUTPATIENT
Start: 2017-05-26 | End: 2017-05-26

## 2017-05-26 RX ORDER — MORPHINE SULFATE 10 MG/ML
INJECTION, SOLUTION INTRAMUSCULAR; INTRAVENOUS AS NEEDED
Status: DISCONTINUED | OUTPATIENT
Start: 2017-05-26 | End: 2017-05-26 | Stop reason: HOSPADM

## 2017-05-26 RX ORDER — BUPIVACAINE HYDROCHLORIDE 7.5 MG/ML
INJECTION, SOLUTION INTRASPINAL AS NEEDED
Status: DISCONTINUED | OUTPATIENT
Start: 2017-05-26 | End: 2017-05-26 | Stop reason: HOSPADM

## 2017-05-26 RX ORDER — NEOMYCIN AND POLYMYXIN B SULFATES 40; 200000 MG/ML; [USP'U]/ML
SOLUTION IRRIGATION AS NEEDED
Status: DISCONTINUED | OUTPATIENT
Start: 2017-05-26 | End: 2017-05-26 | Stop reason: HOSPADM

## 2017-05-26 RX ORDER — METOPROLOL SUCCINATE 25 MG/1
25 TABLET, EXTENDED RELEASE ORAL
Status: CANCELLED | OUTPATIENT
Start: 2017-05-26

## 2017-05-26 RX ORDER — DIPHENHYDRAMINE HYDROCHLORIDE 50 MG/ML
INJECTION, SOLUTION INTRAMUSCULAR; INTRAVENOUS AS NEEDED
Status: DISCONTINUED | OUTPATIENT
Start: 2017-05-26 | End: 2017-05-26 | Stop reason: HOSPADM

## 2017-05-26 RX ORDER — MIDAZOLAM HYDROCHLORIDE 1 MG/ML
2 INJECTION, SOLUTION INTRAMUSCULAR; INTRAVENOUS
Status: DISCONTINUED | OUTPATIENT
Start: 2017-05-26 | End: 2017-05-26 | Stop reason: HOSPADM

## 2017-05-26 RX ORDER — ONDANSETRON 2 MG/ML
INJECTION INTRAMUSCULAR; INTRAVENOUS AS NEEDED
Status: DISCONTINUED | OUTPATIENT
Start: 2017-05-26 | End: 2017-05-26 | Stop reason: HOSPADM

## 2017-05-26 RX ORDER — ONDANSETRON 8 MG/1
4 TABLET, ORALLY DISINTEGRATING ORAL
Status: DISCONTINUED | OUTPATIENT
Start: 2017-05-26 | End: 2017-05-28 | Stop reason: HOSPADM

## 2017-05-26 RX ORDER — OXYCODONE HYDROCHLORIDE 5 MG/1
5 TABLET ORAL
Status: DISCONTINUED | OUTPATIENT
Start: 2017-05-26 | End: 2017-05-26 | Stop reason: HOSPADM

## 2017-05-26 RX ORDER — SODIUM CHLORIDE, SODIUM LACTATE, POTASSIUM CHLORIDE, CALCIUM CHLORIDE 600; 310; 30; 20 MG/100ML; MG/100ML; MG/100ML; MG/100ML
75 INJECTION, SOLUTION INTRAVENOUS CONTINUOUS
Status: DISCONTINUED | OUTPATIENT
Start: 2017-05-26 | End: 2017-05-26 | Stop reason: HOSPADM

## 2017-05-26 RX ORDER — SODIUM CHLORIDE, SODIUM LACTATE, POTASSIUM CHLORIDE, CALCIUM CHLORIDE 600; 310; 30; 20 MG/100ML; MG/100ML; MG/100ML; MG/100ML
INJECTION, SOLUTION INTRAVENOUS
Status: DISCONTINUED | OUTPATIENT
Start: 2017-05-26 | End: 2017-05-26 | Stop reason: HOSPADM

## 2017-05-26 RX ORDER — ASPIRIN 325 MG
325 TABLET, DELAYED RELEASE (ENTERIC COATED) ORAL EVERY 12 HOURS
Status: DISCONTINUED | OUTPATIENT
Start: 2017-05-26 | End: 2017-05-28 | Stop reason: HOSPADM

## 2017-05-26 RX ORDER — CEFAZOLIN SODIUM IN 0.9 % NACL 2 G/50 ML
2 INTRAVENOUS SOLUTION, PIGGYBACK (ML) INTRAVENOUS EVERY 8 HOURS
Status: COMPLETED | OUTPATIENT
Start: 2017-05-26 | End: 2017-05-26

## 2017-05-26 RX ORDER — ACETAMINOPHEN 500 MG
1000 TABLET ORAL ONCE
Status: DISCONTINUED | OUTPATIENT
Start: 2017-05-26 | End: 2017-05-26 | Stop reason: HOSPADM

## 2017-05-26 RX ORDER — ACETAMINOPHEN 500 MG
1000 TABLET ORAL EVERY 6 HOURS
Status: DISCONTINUED | OUTPATIENT
Start: 2017-05-27 | End: 2017-05-28 | Stop reason: HOSPADM

## 2017-05-26 RX ORDER — DIPHENHYDRAMINE HYDROCHLORIDE 50 MG/ML
12.5 INJECTION, SOLUTION INTRAMUSCULAR; INTRAVENOUS
Status: DISCONTINUED | OUTPATIENT
Start: 2017-05-26 | End: 2017-05-26 | Stop reason: HOSPADM

## 2017-05-26 RX ORDER — HYDROMORPHONE HYDROCHLORIDE 1 MG/ML
1 INJECTION, SOLUTION INTRAMUSCULAR; INTRAVENOUS; SUBCUTANEOUS
Status: DISCONTINUED | OUTPATIENT
Start: 2017-05-26 | End: 2017-05-28 | Stop reason: HOSPADM

## 2017-05-26 RX ORDER — AMOXICILLIN 250 MG
2 CAPSULE ORAL DAILY
Status: DISCONTINUED | OUTPATIENT
Start: 2017-05-27 | End: 2017-05-28 | Stop reason: HOSPADM

## 2017-05-26 RX ORDER — HYDROMORPHONE HYDROCHLORIDE 2 MG/1
2 TABLET ORAL
Status: DISCONTINUED | OUTPATIENT
Start: 2017-05-26 | End: 2017-05-28 | Stop reason: HOSPADM

## 2017-05-26 RX ORDER — ROPIVACAINE HYDROCHLORIDE 2 MG/ML
INJECTION, SOLUTION EPIDURAL; INFILTRATION; PERINEURAL AS NEEDED
Status: DISCONTINUED | OUTPATIENT
Start: 2017-05-26 | End: 2017-05-26 | Stop reason: HOSPADM

## 2017-05-26 RX ORDER — SODIUM CHLORIDE 0.9 % (FLUSH) 0.9 %
5-10 SYRINGE (ML) INJECTION EVERY 8 HOURS
Status: DISCONTINUED | OUTPATIENT
Start: 2017-05-26 | End: 2017-05-28 | Stop reason: HOSPADM

## 2017-05-26 RX ORDER — DIPHENHYDRAMINE HCL 25 MG
25 CAPSULE ORAL
Status: DISCONTINUED | OUTPATIENT
Start: 2017-05-26 | End: 2017-05-28 | Stop reason: HOSPADM

## 2017-05-26 RX ORDER — ENOXAPARIN SODIUM 100 MG/ML
40 INJECTION SUBCUTANEOUS DAILY
Status: DISCONTINUED | OUTPATIENT
Start: 2017-05-27 | End: 2017-05-26

## 2017-05-26 RX ORDER — SODIUM CHLORIDE 0.9 % (FLUSH) 0.9 %
5-10 SYRINGE (ML) INJECTION AS NEEDED
Status: DISCONTINUED | OUTPATIENT
Start: 2017-05-26 | End: 2017-05-28 | Stop reason: HOSPADM

## 2017-05-26 RX ORDER — CELECOXIB 200 MG/1
200 CAPSULE ORAL EVERY 12 HOURS
Status: DISCONTINUED | OUTPATIENT
Start: 2017-05-26 | End: 2017-05-26

## 2017-05-26 RX ORDER — DEXAMETHASONE SODIUM PHOSPHATE 100 MG/10ML
10 INJECTION INTRAMUSCULAR; INTRAVENOUS ONCE
Status: ACTIVE | OUTPATIENT
Start: 2017-05-27 | End: 2017-05-28

## 2017-05-26 RX ORDER — LIDOCAINE HYDROCHLORIDE 10 MG/ML
0.1 INJECTION INFILTRATION; PERINEURAL AS NEEDED
Status: DISCONTINUED | OUTPATIENT
Start: 2017-05-26 | End: 2017-05-26 | Stop reason: HOSPADM

## 2017-05-26 RX ORDER — DEXAMETHASONE SODIUM PHOSPHATE 4 MG/ML
INJECTION, SOLUTION INTRA-ARTICULAR; INTRALESIONAL; INTRAMUSCULAR; INTRAVENOUS; SOFT TISSUE AS NEEDED
Status: DISCONTINUED | OUTPATIENT
Start: 2017-05-26 | End: 2017-05-26 | Stop reason: HOSPADM

## 2017-05-26 RX ORDER — HYDROMORPHONE HYDROCHLORIDE 2 MG/ML
0.5 INJECTION, SOLUTION INTRAMUSCULAR; INTRAVENOUS; SUBCUTANEOUS
Status: DISCONTINUED | OUTPATIENT
Start: 2017-05-26 | End: 2017-05-26 | Stop reason: HOSPADM

## 2017-05-26 RX ORDER — MIDAZOLAM HYDROCHLORIDE 1 MG/ML
2 INJECTION, SOLUTION INTRAMUSCULAR; INTRAVENOUS ONCE
Status: COMPLETED | OUTPATIENT
Start: 2017-05-26 | End: 2017-05-26

## 2017-05-26 RX ORDER — NALOXONE HYDROCHLORIDE 0.4 MG/ML
.2-.4 INJECTION, SOLUTION INTRAMUSCULAR; INTRAVENOUS; SUBCUTANEOUS
Status: DISCONTINUED | OUTPATIENT
Start: 2017-05-26 | End: 2017-05-28 | Stop reason: HOSPADM

## 2017-05-26 RX ORDER — FLUMAZENIL 0.1 MG/ML
0.2 INJECTION INTRAVENOUS AS NEEDED
Status: DISCONTINUED | OUTPATIENT
Start: 2017-05-26 | End: 2017-05-26 | Stop reason: HOSPADM

## 2017-05-26 RX ORDER — HYDROMORPHONE HYDROCHLORIDE 2 MG/1
2 TABLET ORAL
Qty: 40 TAB | Refills: 0 | Status: SHIPPED | OUTPATIENT
Start: 2017-05-26 | End: 2017-08-23

## 2017-05-26 RX ADMIN — PROCHLORPERAZINE EDISYLATE 10 MG: 5 INJECTION INTRAMUSCULAR; INTRAVENOUS at 15:41

## 2017-05-26 RX ADMIN — CEFAZOLIN 2 G: 1 INJECTION, POWDER, FOR SOLUTION INTRAMUSCULAR; INTRAVENOUS; PARENTERAL at 14:00

## 2017-05-26 RX ADMIN — SODIUM CHLORIDE, SODIUM LACTATE, POTASSIUM CHLORIDE, AND CALCIUM CHLORIDE 1000 ML: 600; 310; 30; 20 INJECTION, SOLUTION INTRAVENOUS at 06:09

## 2017-05-26 RX ADMIN — SODIUM CHLORIDE 100 ML/HR: 900 INJECTION, SOLUTION INTRAVENOUS at 11:00

## 2017-05-26 RX ADMIN — SODIUM CHLORIDE, SODIUM LACTATE, POTASSIUM CHLORIDE, CALCIUM CHLORIDE: 600; 310; 30; 20 INJECTION, SOLUTION INTRAVENOUS at 07:31

## 2017-05-26 RX ADMIN — ONDANSETRON 4 MG: 8 TABLET, ORALLY DISINTEGRATING ORAL at 14:52

## 2017-05-26 RX ADMIN — HYDROMORPHONE HYDROCHLORIDE 2 MG: 2 TABLET ORAL at 17:47

## 2017-05-26 RX ADMIN — FENTANYL CITRATE 50 MCG: 50 INJECTION, SOLUTION INTRAMUSCULAR; INTRAVENOUS at 06:59

## 2017-05-26 RX ADMIN — HYDROMORPHONE HYDROCHLORIDE 1 MG: 1 INJECTION, SOLUTION INTRAMUSCULAR; INTRAVENOUS; SUBCUTANEOUS at 12:39

## 2017-05-26 RX ADMIN — CEFAZOLIN 3 G: 1 INJECTION, POWDER, FOR SOLUTION INTRAMUSCULAR; INTRAVENOUS; PARENTERAL at 07:31

## 2017-05-26 RX ADMIN — SODIUM CHLORIDE 100 ML/HR: 900 INJECTION, SOLUTION INTRAVENOUS at 21:47

## 2017-05-26 RX ADMIN — HYDROMORPHONE HYDROCHLORIDE 2 MG: 2 TABLET ORAL at 21:34

## 2017-05-26 RX ADMIN — TUBERCULIN PURIFIED PROTEIN DERIVATIVE 5 UNITS: 5 INJECTION, SOLUTION INTRADERMAL at 06:09

## 2017-05-26 RX ADMIN — MIDAZOLAM HYDROCHLORIDE 2 MG: 1 INJECTION, SOLUTION INTRAMUSCULAR; INTRAVENOUS at 07:31

## 2017-05-26 RX ADMIN — DEXAMETHASONE SODIUM PHOSPHATE 10 MG: 4 INJECTION, SOLUTION INTRA-ARTICULAR; INTRALESIONAL; INTRAMUSCULAR; INTRAVENOUS; SOFT TISSUE at 07:46

## 2017-05-26 RX ADMIN — DIPHENHYDRAMINE HYDROCHLORIDE 12.5 MG: 50 INJECTION, SOLUTION INTRAMUSCULAR; INTRAVENOUS at 08:01

## 2017-05-26 RX ADMIN — ASPIRIN 325 MG: 325 TABLET, DELAYED RELEASE ORAL at 21:34

## 2017-05-26 RX ADMIN — CEFAZOLIN 2 G: 1 INJECTION, POWDER, FOR SOLUTION INTRAMUSCULAR; INTRAVENOUS; PARENTERAL at 21:34

## 2017-05-26 RX ADMIN — BUPIVACAINE HYDROCHLORIDE 2 ML: 7.5 INJECTION, SOLUTION INTRASPINAL at 07:40

## 2017-05-26 RX ADMIN — LIDOCAINE HYDROCHLORIDE 0.1 ML: 10 INJECTION, SOLUTION INFILTRATION; PERINEURAL at 06:09

## 2017-05-26 RX ADMIN — ONDANSETRON 4 MG: 2 INJECTION INTRAMUSCULAR; INTRAVENOUS at 07:46

## 2017-05-26 RX ADMIN — PROPOFOL 25 MCG/KG/MIN: 10 INJECTION, EMULSION INTRAVENOUS at 07:38

## 2017-05-26 RX ADMIN — MIDAZOLAM HYDROCHLORIDE 2 MG: 1 INJECTION, SOLUTION INTRAMUSCULAR; INTRAVENOUS at 06:59

## 2017-05-26 RX ADMIN — CELECOXIB 200 MG: 200 CAPSULE ORAL at 06:14

## 2017-05-26 NOTE — PERIOP NOTES
BETADINE LAVAGE: 17.5cc of betadine lot # [id number]  , exp 09/18  In 500cc 0.9NS lot # [id number] , exp [id number]

## 2017-05-26 NOTE — ANESTHESIA PROCEDURE NOTES
Peripheral Block    Start time: 5/26/2017 7:00 AM  End time: 5/26/2017 7:04 AM  Performed by: Bunny De Souza  Authorized by: Bunny De Souza       Pre-procedure: Indications: at surgeon's request and post-op pain management    Preanesthetic Checklist: patient identified, risks and benefits discussed, site marked, timeout performed, anesthesia consent given and patient being monitored    Timeout Time: 06:59          Block Type:   Block Type:   Adductor canal  Laterality:  Right  Monitoring:  Standard ASA monitoring, continuous pulse ox, frequent vital sign checks, heart rate, oxygen and responsive to questions  Injection Technique:  Single shot  Procedures: ultrasound guided    Patient Position: supine  Prep: chlorhexidine    Location:  Mid thigh  Needle Type:  Stimuplex  Needle Gauge:  22 G  Needle Localization:  Ultrasound guidance  Medication Injected:  0.2%  Adds:  Epi 1:200K  Volume (mL):  20    Assessment:    Injection Assessment:  Incremental injection every 5 mL, local visualized surrounding nerve on ultrasound, negative aspiration for blood, no intravascular symptoms, no paresthesia and ultrasound image on chart  Patient tolerance:  Patient tolerated the procedure well with no immediate complications

## 2017-05-26 NOTE — CONSULTS
HOSPITALIST Consult  NAME:  Ines Chance   Age:  76 y.o.  :   1940   MRN:   [id number]  PCP: Shena Wright MD  Treatment Team: Attending Provider: Frank Lyn MD; Consulting Provider: Lord Mehdi MD; Consulting Provider: Madison Cardenas MD; Consulting Provider: Roro Hernandez MD  HPI:   Mr. Britt Glover is a 77 yo male who underwent a right TKA. Hospitalist consulted for medical management. Pt has hx of HTN, afib on plavix. Denies hx of DVT, PE. Denies CP, SOB. Results summary of Diagnostic Studies/Procedures copied from within [facility] EMR:       Complete ROS done and is as stated in HPI or otherwise negative  Past Medical History:   Diagnosis Date    Aortic valve replaced 2014    Daily Plavix and 81 mg ASA  nightly     Arrhythmia     Hx of Atrial fibrillation-Post op after AVR/CABG. Treated with Amiodarone and Coumadin. NO recurrence. Medication stopped 2/2/15    Arthritis     OA    CAD (coronary artery disease)     Carotid artery stenosis     without cerebral infarction. Left carotid stent in May 2014 as part of the [address] Trial. Per cardiology note (16) \"CT of Carotids (): LICA 02%. RAISA 30-40%. Carotid stenting of left carotid (). \"    Chronic kidney disease     per cardiology note (16) \"stage 3, GFR 30-59 ml/min. \" GFR 53 on 17.  Chronic pain     knee    Coagulation disorder (HCC)     Daily Plavix     Coronary atherosclerosis of native coronary vessel     Cardiac cath times 2 with 1 stent. 1) PCI of OM with 2.5 mm Cypher MARIUM (2008) and 2) 2 vessel CABG 10/14/14: LIMA to LAD. SVG to OM.      Dyslipidemia 2016    Former smoker     GERD (gastroesophageal reflux disease)     PRN Zantac     Hypertension     Left anterior fascicular block     Per Dr. Larios Ricardo note (16) \"Chronic\"     Platelet inhibition due to Plavix     Pre-diabetes     Last A1C 6.4 on 3/22/17      Past Surgical History:   Procedure Laterality Date    HX BUNIONECTOMY Bilateral     HX CARPAL TUNNEL RELEASE Bilateral     HX CATARACT REMOVAL Bilateral     HX COLONOSCOPY      HX CORONARY ARTERY BYPASS GRAFT  10/14/2014    x2 - Dr. Liam Duncan Aortic valve replacement     HX HEART CATHETERIZATION      x2-one stent placed in     VASCULAR SURGERY PROCEDURE UNLIST      left carotid stent in May 2014 as part of the [facility] Trial      Allergies   Allergen Reactions    Poison Ivy Extract Hives and Itching      Social History   Substance Use Topics    Smoking status: Former Smoker     Packs/day: 1.00     Years: 30.00     Types: Cigarettes     Quit date: 3/25/1980    Smokeless tobacco: Never Used    Alcohol use 3.5 oz/week     4 Glasses of wine, 3 Cans of beer per week      Family History   Problem Relation Age of Onset    Cancer Father 80     LIVER CANCER     Heart Disease Mother     Arthritis-osteo Sister     Heart Disease Brother     Arthritis-osteo Brother         Objective:     Visit Vitals    /58    Pulse 69    Temp 96.7 °F (35.9 °C)    Resp 16    Ht 5' 11\" (1.803 m)    Wt 121.1 kg (267 lb)    SpO2 97%    BMI 37.24 kg/m2      Temp (24hrs), Av.6 °F (36.4 °C), Min:96.7 °F (35.9 °C), Max:98.2 °F (36.8 °C)    Oxygen Therapy  O2 Sat (%): 97 % (17 1314)  Pulse via Oximetry: 82 beats per minute (17 1314)  O2 Device: Nasal cannula (weaned to RA.) (17 1314)  O2 Flow Rate (L/min): 2 l/min (17 1036)  Physical Exam:  General:    Alert, cooperative, no distress, appears stated age. Head:   Normocephalic, without obvious abnormality, atraumatic. Nose:  Nares normal. No drainage or sinus tenderness. Lungs:   CTA, resp even and nonlabored  Heart:  S1S2 present without murmurs rubs gallops. RRR. No edema  Abdomen:   Soft, non-tender. Not distended. Bowel sounds normal. No masses  Extremities: No cyanosis. Limited ROM RLE  Skin:     Texture, turgor normal. No rashes or lesions.   Not Jaundiced  Neurologic: Alert and oriented X4. No focal deficits    Data Review:   Recent Results (from the past 24 hour(s))   TYPE & SCREEN    Collection Time: 05/26/17  6:00 AM   Result Value Ref Range    Crossmatch Expiration 05/29/2017     ABO/Rh(D) Katya Suggs POSITIVE     Antibody screen NEG    GLUCOSE, POC    Collection Time: 05/26/17  6:31 AM   Result Value Ref Range    Glucose (POC) 162 (H) 65 - 100 mg/dL       Assessment and Plan: Active Hospital Problems    Diagnosis Date Noted    Status post total right knee replacement 05/26/2017         HTN: chronic, controlled, continue current regimen    Afib: continue current regimen, defer to Ortho to restart Plavix    Notes, labs, VS, diagnostic testing reviewed  Case discussed with pt, care team, Dr. Lucila Ivey  Time spent with pt 30 min  Thank you for this consult. We will sign off. Call with questions.      Solis Haynes NP

## 2017-05-26 NOTE — IP AVS SNAPSHOT
[address] 
[phone] Patient: Jaimie Estrada MRN: [id number] IHK:[id number]/45/0075 You are allergic to the following Allergen Reactions Poison Ivy Extract Hives Itching Immunizations Administered for This Admission Name Date  
 TB Skin Test (PPD) Intradermal 5/26/2017 Recent Documentation Height Weight BMI Smoking Status 1.803 m 121.1 kg 37.24 kg/m2 Former Smoker Unresulted Labs Order Current Status PLEASE READ & DOCUMENT PPD TEST IN 24 HRS Preliminary result Emergency Contacts Name Discharge Info Relation Home Work Mobile Tora Scheuermann  Spouse [3] [phone]  [phone] About your hospitalization You were admitted on:  May 26, 2017 You last received care in the:  [facility] You were discharged on:  May 28, 2017 Unit phone number:  [phone] Why you were hospitalized Your primary diagnosis was:  Status Post Total Right Knee Replacement Providers Seen During Your Hospitalizations Provider Role Specialty Primary office phone Vega Lowe MD Attending Provider Orthopedic Surgery [phone] Your Primary Care Physician (PCP) Primary Care Physician Office Phone Office Fax [address] [employer] [place] [address] [phone] Follow-up Information Follow up With Details Comments Contact Info [address]  Tita Fox 
[phone] [address]  Will change dressing, and help with therapy  [facility] [address] [facility] [address] 
[phone] Vega Lowe MD  Keep follow up appointment  [address] [employer] [facility] [address] [phone] Current Discharge Medication List  
  
 START taking these medications Dose & Instructions Dispensing Information Comments Morning Noon Evening Bedtime HYDROmorphone 2 mg tablet Commonly known as:  DILAUDID Your last dose was: Your next dose is:    
   
   
 Dose:  2 mg Take 1 Tab by mouth every four (4) hours as needed. Max Daily Amount: 12 mg. Quantity:  40 Tab Refills:  0 CONTINUE these medications which have CHANGED Dose & Instructions Dispensing Information Comments Morning Noon Evening Bedtime  
 metoprolol succinate 25 mg XL tablet Commonly known as:  TOPROL XL What changed:  when to take this Your last dose was: Your next dose is:    
   
   
 Dose:  25 mg Take 1 Tab by mouth every morning. Indications: unknown why takes Quantity:  90 Tab Refills:  3 CONTINUE these medications which have NOT CHANGED Dose & Instructions Dispensing Information Comments Morning Noon Evening Bedtime  
 amoxicillin 500 mg capsule Commonly known as:  AMOXIL Your last dose was: Your next dose is:    
   
   
 Dose:  500 mg Take 500 mg by mouth. Prior to dental appointment Refills:  0  
     
   
   
   
  
 aspirin delayed-release 81 mg tablet Your last dose was: Your next dose is:    
   
   
 Dose:  81 mg Take 81 mg by mouth nightly. Refills:  0  
     
   
   
   
  
 clopidogrel 75 mg Tab Commonly known as:  PLAVIX Your last dose was: Your next dose is:    
   
   
 Dose:  75 mg Take 1 Tab by mouth every morning. Quantity:  90 Tab Refills:  3 Glucosamine &Chondroit-MV-Min3 [id number] mg Tab Your last dose was: Your next dose is: Take  by mouth every morning. 2 tablets every morning   Stop seven days prior to surgery per anesthesia protocol. Refills:  0 losartan-hydroCHLOROthiazide 100-12.5 mg per tablet Commonly known as:  HYZAAR Your last dose was: Your next dose is:    
   
   
 Dose:  1 Tab Take 1 Tab by mouth every morning. Indications: Hypertension Quantity:  90 Tab Refills:  3  
     
   
   
   
  
 rosuvastatin 20 mg tablet Commonly known as:  CRESTOR Your last dose was: Your next dose is:    
   
   
 Dose:  20 mg Take 1 Tab by mouth nightly. Indications: hypercholesterolemia Quantity:  90 Tab Refills:  3 ZANTAC 150 mg tablet Generic drug:  raNITIdine Your last dose was: Your next dose is:    
   
   
 Dose:  150 mg Take 150 mg by mouth as needed for Indigestion. Take day of surgery per anesthesia protocol. Non-Formulary. Instructed to bring to the hospital on the DOS, in the original bottle, and give to nurse. Indications: gastroesophageal reflux disease Refills:  0 STOP taking these medications ALEVE 220 mg Cap Generic drug:  naproxen sodium  
   
  
 mupirocin 2 % ointment Commonly known as:  [facility] Where to Get Your Medications Information on where to get these meds will be given to you by the nurse or doctor. ! Ask your nurse or doctor about these medications HYDROmorphone 2 mg tablet Discharge Instructions Total Knee Replacement: What to Expect at Home Your Recovery When you leave the hospital, you should be able to move around with a walker or crutches. But you will need someone to help you at home for the next few weeks or until you have more energy and can move around better. If there is no one to help you at home, you may go to a rehabilitation center. You will go home with a bandage and stitches or staples. Change the bandage as your doctor tells you to.  Your doctor will remove your stitches or staples 10 to 21 days after your surgery. You may still have some mild pain, and the area may be swollen for 3 to 6 months after surgery. Your knee will continue to improve for 6 to 12 months. You will probably use a walker for 1 to 3 weeks and then use crutches. When you are ready, you can use a cane. You will probably be able to walk on your own in 4 to 8 weeks. You will need to do months of physical rehabilitation (rehab) after a knee replacement. Rehab will help you strengthen the muscles of the knee and help you regain movement. After you recover, your artificial knee will allow you to do normal daily activities with less pain or no pain at all. You may be able to hike, dance, ride a bike, and play golf. Talk to your doctor about whether you can do more strenuous activities. Always tell your caregivers that you have an artificial knee. How long it will take to walk on your own, return to normal activities, and go back to work depends on your health and how well your rehabilitation (rehab) program goes. The better you do with your rehab exercises, the quicker you will get your strength and movement back. This care sheet gives you a general idea about how long it will take for you to recover. But each person recovers at a different pace. Follow the steps below to get better as quickly as possible. How can you care for yourself at home? Activity · Rest when you feel tired. You may take a nap, but do not stay in bed all day. When you sit, use a chair with arms. You can use the arms to help you stand up. · Work with your physical therapist to find the best way to exercise. You may be able to take frequent, short walks using crutches or a walker. What you can do as your knee heals will depend on whether your new knee is cemented or uncemented. You may not be able to do certain things for a while if your new knee is uncemented.  
· After your knee has healed enough, you can do more strenuous activities with caution. ¨ You can golf, but use a golf cart, and do not wear shoes with spikes. ¨ You can bike on a flat road or on a stationary bike. Avoid biking up hills. ¨ Your doctor may suggest that you stay away from activities that put stress on your knee. These include tennis or badminton, squash or racquetball, contact sports like football, jumping (such as in basketball), jogging, or running. ¨ Avoid activities where you might fall. These include horseback riding, skiing, and mountain biking. · Do not sit for more than 1 hour at a time. Get up and walk around for a while before you sit again. If you must sit for a long time, prop up your leg with a chair or footstool. This will help you avoid swelling. · Ask your doctor when you can shower. You may need to take sponge baths until your stitches or staples have been removed. · Ask your doctor when you can drive again. It may take up to 8 weeks after knee replacement surgery before it is safe for you to drive. · When you get into a car, sit on the edge of the seat. Then pull in your legs, and turn to face the front. · You should be able to do many everyday activities 3 to 6 weeks after your surgery. You will probably need to take 4 to 16 weeks off from work. When you can go back to work depends on the type of work you do and how you feel. · Ask your doctor when it is okay for you to have sex. · Do not lift anything heavier than 10 pounds and do not lift weights for 12 weeks. Diet · By the time you leave the hospital, you should be eating your normal diet. If your stomach is upset, try bland, low-fat foods like plain rice, broiled chicken, toast, and yogurt. Your doctor may suggest that you take iron and vitamin supplements. · Drink plenty of fluids (unless your doctor tells you not to). · Eat healthy foods, and watch your portion sizes. Try to stay at your ideal weight. Too much weight puts more stress on your new knee. · You may notice that your bowel movements are not regular right after your surgery. This is common. Try to avoid constipation and straining with bowel movements. You may want to take a fiber supplement every day. If you have not had a bowel movement after a couple of days, ask your doctor about taking a mild laxative. Medicines · Your doctor will tell you if and when you can restart your medicines. He or she will also give you instructions about taking any new medicines. · If you take blood thinners, such as warfarin (Coumadin), clopidogrel (Plavix), or aspirin, be sure to talk to your doctor. He or she will tell you if and when to start taking those medicines again. Make sure that you understand exactly what your doctor wants you to do. · Your doctor may give you a blood-thinning medicine to prevent blood clots. If you take a blood thinner, be sure you get instructions about how to take your medicine safely. Blood thinners can cause serious bleeding problems. This medicine could be in pill form or as a shot (injection). If a shot is necessary, your doctor will tell you how to do this. · Be safe with medicines. Take pain medicines exactly as directed. ¨ If the doctor gave you a prescription medicine for pain, take it as prescribed. ¨ If you are not taking a prescription pain medicine, ask your doctor if you can take an over-the-counter medicine. ¨ Plan to take your pain medicine 30 minutes before exercises. It is easier to prevent pain before it starts than to stop it once it has started. · If you think your pain medicine is making you sick to your stomach: 
¨ Take your medicine after meals (unless your doctor has told you not to). ¨ Ask your doctor for a different pain medicine. · If your doctor prescribed antibiotics, take them as directed. Do not stop taking them just because you feel better. You need to take the full course of antibiotics. Incision care · You will have a bandage over the cut (incision) and staples or stitches. Take the bandage off when your doctor says it is okay. · Your doctor will remove the staples or stitches 10 days to 3 weeks after the surgery and replace them with strips of tape. Leave the tape on for a week or until it falls off. Exercise · Your rehab program will give you a number of exercises to do to help you get back your knee's range of motion and strength. Always do them as your therapist tells you. Ice and elevation · For pain and swelling, put ice or a cold pack on the area for 10 to 20 minutes at a time. Put a thin cloth between the ice and your skin. Other instructions · Continue to wear your support stockings as your doctor says. These help to prevent blood clots. The length of time that you will have to wear them depends on your activity level and the amount of swelling. · Wear medical alert jewelry that says you may need antibiotics before any procedure, including dental work. You can buy this at most [employer]. · You have metal pieces in your knee. These may set off some airport metal detectors. Carry a medical alert card that says you have an artificial joint, just in case. Follow-up care is a key part of your treatment and safety. Be sure to make and go to all appointments, and call your doctor if you are having problems. It's also a good idea to know your test results and keep a list of the medicines you take. When should you call for help? Call 911 anytime you think you may need emergency care. For example, call if: 
· You passed out (lost consciousness). · You have severe trouble breathing. · You have sudden chest pain and shortness of breath, or you cough up blood. Call your doctor now or seek immediate medical care if: 
· You have signs of infection, such as: 
¨ Increased pain, swelling, warmth, or redness. ¨ Red streaks leading from the incision. ¨ Pus draining from the incision. ¨ A fever. · You have signs of a blood clot, such as: 
¨ Pain in your calf, back of the knee, thigh, or groin. ¨ Redness and swelling in your leg or groin. · Your incision comes open and begins to bleed, or the bleeding increases. · You have pain that does not get better after you take pain medicine. Watch closely for changes in your health, and be sure to contact your doctor if: 
· You do not have a bowel movement after taking a laxative. Where can you learn more? Go to http://joann-drake.info/. Enter E051 in the search box to learn more about \"Total Knee Replacement: What to Expect at Home. \" Current as of: 2016 Content Version: 11.2 © 1867-6357 [employer]. Care instructions adapted under license by [employer] (which disclaims liability or warranty for this information). If you have questions about a medical condition or this instruction, always ask your healthcare professional. [place] [address] any warranty or liability for your use of this information. [address] [place] [address] Patient Discharge Instructions Fito Paget / [id number] : 1940 Admitted 2017 Discharged: 2017 IF YOU HAVE ANY PROBLEMS ONCE YOU ARE AT HOME CALL THE FOLLOWING NUMBERS:  
Main office number: [phone] Medications · The medications you are to continue on are listed on the medication reconciliation sheet. · Narcotic pain medications as well as supplemental iron can cause constipation. If this occurs try stopping the narcotic pain medication and/or the iron. · It is important that you take the medication exactly as they are prescribed. · Medications which increase your risk of blood clots are listed to stop for 5 weeks after surgery as well as medications or supplements which increase your risk of bleeding complications.   
· Keep your medication in the bottles provided by the pharmacist and keep a list of the medication names, dosages, and times to be taken in your wallet. · Do not take other medications without consulting your doctor. Important Information Do NOT smoke as this will greatly increase your risk of infection! Resume your prehospital diet. If you have excessive nausea or vomitting call your doctor's office Leg swelling and warmth is normal for 6 months after surgery. If you experience swelling in your leg elevate you leg while laying down with your toes above your heart. If you have sudden onset severe swelling with leg pain call our office. Use [place]o Hose stockings until we see you in the office for your follow up appointment. The stitches deep inside take approximately 6 months to dissolve. There will be sharp shooting, stinging and burning pain. This is normal and will resolve between 3-6 months after surgery. Difficulty sleeping is normal following total Knee and Hip replacement. You may try melatonin, an over-the-counter sleep aid or benadryl to help with sleep. Most patients will resume sleeping through the night 8 weeks after surgery. Home Physical Therapy is arranged. Home Health will contact you within 48 hrs of discharge that you have chosen. If you have not received a call within this time frame please contact that provider you chose. You should be given this information before you leave the hospital.  
 
You are at a risk for falls. Use the rolling walker when walking. Patients who have had a joint replacement should not drive if they are still taking narcotic pain mediation during the daytime hours. Most patients wean themselves off of pain medication within 2-5 weeks after surgery. When to Call the office - If you have a temperature greater then 101 
- Uncontrolled vomiting - Loose control of your bladder or bowel function - Are unable to bear any wieght  
- Need a pain medication refill Information obtained by : 
 I understand that if any problems occur once I am at home I am to contact my physician. I understand and acknowledge receipt of the instructions indicated above. Physician's or R.N.'s Signature                                                                  Date/Time Patient or Representative Signature                                                          Date/Time Discharge Orders Procedure Order Date Status Priority Quantity Spec Type Associated Dx CALL YOUR DOCTOR For: Temperature greater than 100.4., Severe uncontrolled pain. , Persistant nausea and vomiting., Persistant dizziness or light-headedness. , Hives, Difficulty breathing, headache, or visual disturbances. , Redness, tenderness, or s. .. 05/26/17 1434 Normal Routine 1  Status post total right knee replacement [[id number]] Questions: For:  Temperature greater than 100.4. For:  Severe uncontrolled pain. For:  Persistant nausea and vomiting. For:  Persistant dizziness or light-headedness. For:  Jennyfer Juniper For:  Difficulty breathing, headache, or visual disturbances. For:  Redness, tenderness, or signs of infection. ACTIVITY AFTER DISCHARGE Patient should: Restrict driving, Restrict lifting, Other (specify) 05/26/17 1434 Normal Routine 1  Status post total right knee replacement [[id number]] Questions: Patient should:  Restrict driving Patient should:  Restrict lifting Patient should: Other (specify) DRESSING, CHANGE SPECIFY 05/26/17 1434 Normal Routine 1  Status post total right knee replacement [[id number]] Comments:  Routine dressing changes. Notify if excessive drainage.  If staples are present they are to be removed 10 days post surgery and steri strips applied. REFERRAL TO HOME HEALTH 05/26/17 1434 Normal Routine 1  Status post total right knee replacement [[id number]] REFERRAL TO PHYSICAL THERAPY 05/26/17 1434 Normal Routine 1  Status post total right knee replacement [[id number]] Comments:  Referral to Home PT Introducing [facility]! Dear Oralia Matamoros: 
Thank you for requesting a [employer] account. Our records indicate that you already have an active [employer] account. You can access your account anytime at [email or web address] Did you know that you can access your hospital and ER discharge instructions at any time in [employer]? You can also review all of your test results from your hospital stay or ER visit. Additional Information If you have questions, please visit the Frequently Asked Questions section of the [employer] website at [email or web address] Remember, [employer] is NOT to be used for urgent needs. For medical emergencies, dial 911. Now available from your iPhone and Android! General Information Please provide this summary of care documentation to your next provider. Patient Signature:  ____________________________________________________________ Date:  ____________________________________________________________  
  
Manan Black Provider Signature:  ____________________________________________________________ Date:  ____________________________________________________________

## 2017-05-26 NOTE — CONSULTS
MD Lion   Medical Director  [address] [place] [address], [address] [facility]  Tel: [phone]     Physical Medicine & Rehabilitation Note-consult    Patient: Davia Gaucher MRN: [id number]  SSN: [id number]    YOB: 1940  Age: 76 y.o. Sex: male      Admit Date: 5/26/2017  Admitting Physician: Gael Parker MD    Medical Decision Making/Plan/Recommend: Gait impairment s/p R TKA. Patient is progressing well. He has no major barriers to progress. He is to continue PT, OT for active/assisted/passive ROM, strengthening, mobility, transfers, gait training. Will follow progress. Patient plans for home discharge. Continued rehab at home via [facility] PT would be reasonable and necessary. Chief Complaint : Gait dysfunction secondary to below. Admit Diagnosis: Primary osteoarthritis of right knee [M17.11]  right total knee arthroplasty  5/26/2017  Pain  DVT risk  Post op hemorrhagic anemia  Hx of Atrial fibrillation-Post op after AVR/CABG. CAD (coronary artery disease)  Arthritis  Acute Rehab Dx:  Gait impairment  Mobility and ambulation deficits  Self Care/ADL deficits    Medical Dx:  Past Medical History:   Diagnosis Date    Aortic valve replaced 12/2014    Daily Plavix and 81 mg ASA  nightly     Arrhythmia     Hx of Atrial fibrillation-Post op after AVR/CABG. Treated with Amiodarone and Coumadin. NO recurrence. Medication stopped 2/2/15    Arthritis     OA    CAD (coronary artery disease)     Carotid artery stenosis     without cerebral infarction. Left carotid stent in May 2014 as part of the [address] Trial. Per cardiology note (12/8/16) \"CT of Carotids (63/9/02): LICA 37%. RAISA 30-40%. Carotid stenting of left carotid (5/14). \"    Chronic kidney disease     per cardiology note (12/8/16) \"stage 3, GFR 30-59 ml/min. \" GFR 53 on 5/8/17.      Chronic pain     knee    Coagulation disorder (HCC)     Daily Plavix     Coronary atherosclerosis of native coronary vessel     Cardiac cath times 2 with 1 stent. 1) PCI of OM with 2.5 mm Cypher MARIUM (1/2008) and 2) 2 vessel CABG 10/14/14: LIMA to LAD. SVG to OM.  Dyslipidemia 12/5/2016    Former smoker     GERD (gastroesophageal reflux disease)     PRN Zantac     Hypertension     Left anterior fascicular block     Per Dr. Piter Olmedo note (12/8/16) \"Chronic\"     Platelet inhibition due to Plavix     Pre-diabetes     Last A1C 6.4 on 3/22/17     Subjective:     Date of Evaluation:  May 27, 2017    HPI: Erin Mcgraw is a 76 y.o. male patient at [address] [place] [address] who was admitted on 5/26/2017  by Rayne Gresham MD with below mentioned medical history, is being seen for Physical Medicine and Rehabilitation consult. Erin Mcgraw who had debilitating right knee pain and discomfort due to end stage DJD is s/p a right total knee arthroplasty per Dr. Rayne Gresham MD on 5/26/2017 after fasiling conservative treatment efforts. The patient's post operative course has been uncomplicated. We are consulted to assist with rehab needs and placement. Patient's weight bearing status is to be WBAT RLE. Patient is  Tolerating post op acute PT and OT well, and has no unusual barriers. He is at minimum assist level with ambulation and transfers presently. Erin Mcgraw is seen and examined today. Medical Records reviewed. He states his left knee is also with moderate DJD. Pt denies any other pre morbid functional deficits. He has been independent with ambulation, prior to admission, limited by right knee pain. Prior Level of Function/Work/Activity:     independent      Current Level of Function:  bed mobility - min A, transfers - min A, decreased balance , ambulation -  3 < 10' with RW and min A.          Family History   Problem Relation Age of Onset    Cancer Father 80     LIVER CANCER     Heart Disease Mother     Arthritis-osteo Sister     Heart Disease Brother     Arthritis-osteo Brother       Social History   Substance Use Topics    Smoking status: Former Smoker     Packs/day: 1.00     Years: 30.00     Types: Cigarettes     Quit date: 3/25/1980    Smokeless tobacco: Never Used    Alcohol use 3.5 oz/week     4 Glasses of wine, 3 Cans of beer per week     Past Surgical History:   Procedure Laterality Date    HX BUNIONECTOMY Bilateral     HX CARPAL TUNNEL RELEASE Bilateral     HX CATARACT REMOVAL Bilateral     HX COLONOSCOPY      HX CORONARY ARTERY BYPASS GRAFT  10/14/2014    x2 - Dr. Caden Ta Aortic valve replacement     HX HEART CATHETERIZATION      x2-one stent placed in 2014   [address]      left carotid stent in May 2014 as part of the [facility] Trial      Prior to Admission medications    Medication Sig Start Date End Date Taking? Authorizing Provider   HYDROmorphone (DILAUDID) 2 mg tablet Take 1 Tab by mouth every four (4) hours as needed. Max Daily Amount: 12 mg. 5/26/17  Yes SHILA Campos   losartan-hydroCHLOROthiazide [facility]) 100-12.5 mg per tablet Take 1 Tab by mouth every morning. Indications: Hypertension 12/8/16  Yes Nicholas Red MD   metoprolol succinate (TOPROL XL) 25 mg XL tablet Take 1 Tab by mouth every morning. Indications: unknown why takes  Patient taking differently: Take 25 mg by mouth nightly. Indications: unknown why takes 12/8/16  Yes Nicholas Red MD   rosuvastatin (CRESTOR) 20 mg tablet Take 1 Tab by mouth nightly. Indications: hypercholesterolemia 12/8/16  Yes Nicholas Red MD   aspirin delayed-release 81 mg tablet Take 81 mg by mouth nightly. Yes Historical Provider   Glucosamine &Chondroit-MV-Min3 205-121-75-0.5 mg tab Take  by mouth every morning. 2 tablets every morning     Stop seven days prior to surgery per anesthesia protocol. Yes Historical Provider   naproxen sodium (ALEVE) 220 mg cap Take 440 mg by mouth every morning.    Yes Historical Provider   ranitidine (ZANTAC) 150 mg tablet Take 150 mg by mouth as needed for Indigestion. Take day of surgery per anesthesia protocol. Non-Formulary. Instructed to bring to the hospital on the DOS, in the original bottle, and give to nurse. Indications: gastroesophageal reflux disease   Yes Historical Provider   amoxicillin (AMOXIL) 500 mg capsule Take 500 mg by mouth. Prior to dental appointment    Historical Provider   clopidogrel (PLAVIX) 75 mg tab Take 1 Tab by mouth every morning. 16   Nikhil Zavala MD     Allergies   Allergen Reactions    Poison Ivy Extract Hives and Itching        Review of Systems: +right knee pain, +antalgic gait. Denies chest pain, shortness of breath, cough, headache, visual problems, abdominal pain, dysurea, calf pain. Pertinent positives are as noted in the medical records and unremarkable otherwise. Objective:     Vitals:  Blood pressure 147/80, pulse 84, temperature 97.1 °F (36.2 °C), resp. rate 16, height 5' 11\" (1.803 m), weight 267 lb (121.1 kg), SpO2 94 %. Temp (24hrs), Av.1 °F (35.6 °C), Min:95.2 °F (35.1 °C), Max:97.1 °F (36.2 °C)    BMI (calculated): 37.3 (17 0646)   Intake and Output:   1901 -  0700  In: 5910 [P.O.:720; K.B.:0512]  Out: 1970 [Urine:1670]    Physical Exam:   General: Alert and age appropriately oriented. No acute cardio respiratory distress. HEENT: Normocephalic, no conjunctival pallor, no scleral icterus  Oral mucosa moist without cyanosis, no JVD   Lungs: Clear to auscultation bilaterally. Respiration even and unlabored   Heart: Regular rate and rhythm, S1, S2   No  murmurs, clicks, rub or gallops   Abdomen: Soft, non-tender, non-distended. Genitourinary: defered   Neuromuscular:      Grossly no focal motor deficits. Right knee extension strong  Right ankle dorsiflexion 5/5  Right ankle plantarflexion 5/5  No sensory deficits distally BLE to soft touch. Skin/extremity: No calf tenderness BLE. No rashes, no marginal erythema. Labs/Studies:  Recent Results (from the past 72 hour(s))   TYPE & SCREEN    Collection Time: 05/26/17  6:00 AM   Result Value Ref Range    Crossmatch Expiration 05/29/2017     ABO/Rh(D) O POSITIVE     Antibody screen NEG    GLUCOSE, POC    Collection Time: 05/26/17  6:31 AM   Result Value Ref Range    Glucose (POC) 162 (H) 65 - 100 mg/dL   HEMOGLOBIN    Collection Time: 05/26/17  6:53 PM   Result Value Ref Range    HGB 15.3 13.6 - 17.2 g/dL   HEMOGLOBIN    Collection Time: 05/27/17  5:15 AM   Result Value Ref Range    HGB 13.4 (L) 13.6 - 86.3 g/dL   METABOLIC PANEL, BASIC    Collection Time: 05/27/17  5:15 AM   Result Value Ref Range    Sodium 142 136 - 145 mmol/L    Potassium 5.1 3.5 - 5.1 mmol/L    Chloride 106 98 - 107 mmol/L    CO2 31 21 - 32 mmol/L    Anion gap 5 (L) 7 - 16 mmol/L    Glucose 166 (H) 65 - 100 mg/dL    BUN 27 (H) 8 - 23 MG/DL    Creatinine 1.40 0.8 - 1.5 MG/DL    GFR est AA >60 >60 ml/min/1.73m2    GFR est non-AA 52 (L) >60 ml/min/1.73m2    Calcium 8.1 (L) 8.3 - 10.4 MG/DL       Functional Assessment:  Reviewed participation and progress in therapies  Gross Assessment  AROM: Within functional limits (left LE) (05/26/17 1406)  Strength: Generally decreased, functional (left LE) (05/26/17 1406)   Bed Mobility  Supine to Sit: Minimum assistance; Additional time (05/26/17 1406)   Balance  Sitting: Intact (05/27/17 1123)  Standing: Pull to stand; With support (05/27/17 1123)   Grooming  Grooming Assistance: Supervision/set up (05/27/17 0945)  Washing Face: Supervision/set-up (05/27/17 0945)  Washing Hands: Supervision/set-up (05/27/17 0945)  Brushing Teeth: Supervision/set-up (05/27/17 0945)           Bed/Mat Mobility  Supine to Sit: Minimum assistance; Additional time (05/26/17 1406)  Sit to Stand: Minimum assistance (05/27/17 1123)  Bed to Chair: Minimum assistance (05/27/17 1123)     Ambulation:  Gait  Speed/Dinorah: Delayed (05/26/17 1406)  Step Length: Left shortened;Right shortened (05/26/17 1406)  Stance: Right decreased (05/26/17 1406)  Gait Abnormalities: Antalgic (05/27/17 1123)  Ambulation - Level of Assistance: Minimal assistance (05/27/17 1123)  Distance (ft): 25 Feet (ft) (05/27/17 1123)  Assistive Device: Walker, rolling (05/27/17 1123)  Interventions: Verbal cues (05/27/17 1123)    Impression/Plan:     Principal Problem:    Status post total right knee replacement (5/26/2017)        Current Facility-Administered Medications   Medication Dose Route Frequency Provider Last Rate Last Dose    0.9% sodium chloride infusion  100 mL/hr IntraVENous CONTINUOUS Kristina Kerr  mL/hr at 05/26/17 2147 100 mL/hr at 05/26/17 2147    sodium chloride (NS) flush 5-10 mL  5-10 mL IntraVENous Q8H Kristina Kerr MD        sodium chloride (NS) flush 5-10 mL  5-10 mL IntraVENous PRN Kristina Kerr MD        acetaminophen (TYLENOL) tablet 1,000 mg  1,000 mg Oral Q6H Kristina Kerr MD   1,000 mg at 05/27/17 0531    HYDROmorphone (DILAUDID) tablet 2 mg  2 mg Oral Q4H PRN Kristina Kerr MD   2 mg at 05/27/17 0531    HYDROmorphone (PF) (DILAUDID) injection 1 mg  1 mg IntraVENous Q3H PRN Kristina Kerr MD   1 mg at 05/26/17 1239    naloxone [facility]) injection 0.2-0.4 mg  0.2-0.4 mg IntraVENous Q10MIN PRN Kristina Kerr MD        dexamethasone (DECADRON) injection 10 mg  10 mg IntraVENous ONCE Kristina Kerr MD        ondansetron (ZOFRAN ODT) tablet 4 mg  4 mg Oral Q4H PRN Kristina Kerr MD   4 mg at 05/27/17 1982    diphenhydrAMINE (BENADRYL) capsule 25 mg  25 mg Oral Q4H PRN Kristina Kerr MD        senna-docusate (PERICOLACE) 8.6-50 mg per tablet 2 Tab  2 Tab Oral DAILY Kristina Kerr MD   2 Tab at 05/27/17 0840    aspirin delayed-release tablet 325 mg  325 mg Oral Q12H Kristina Kerr MD   325 mg at 05/27/17 0853    prochlorperazine (COMPAZINE) injection 10 mg  10 mg IntraVENous Q6H PRN Zbigniew Osborne MD   10 mg at 05/26/17 1541        Recommendations:  Plan for home discharge with [facility] PT. Continue Acute Rehab Program.  Coordination of rehab/medical care. Counseling of Physical Medicine & Rehab care issues management. Monitoring and management of rehab conditions per the plan of care/orders. Rehabilitation Management/ Medical Management: 1. Devices:Walkers, Type: Rolling Walker  2. Consult:Rehab team including PT, OT,  and . 3. Disposition Rehab-discussed with patient. 4. Thigh-high or knee-high ZAC's when out of bed. 5. DVT Prophylaxis - resume plavix. Aspirin bid x 30days at reduced dose? .  6. Incentive spirometer Q1H while awake  7. Post op hemorrhagic anemia-   hgb 13.4, monitor. 8. Activity: WBAT RLE  9. Planned Labs: CBC,BMP  10. Pain Control: Adequately controlled with scheduled tylenol, celebrex and  PRN meds. 11. Wound Care: Keep right TKA wound clean and dry and reinforce dressing PRN. May remove Aquacel 1 week post op ad replace with new one. Remove staples 12-14 post surgery, when incision appears appropriately closed and apply benzoin and 1/2\" steristrips. Follow up with Dr Rosaura Davalos  2 weeks after discharge from rehab. Follow up with ORTHO per instructions. Thank you for the opportunity to participate in the care of this patient.     Signed By: Meena Cook MD     May 27, 2017

## 2017-05-26 NOTE — PERIOP NOTES
TRANSFER - OUT REPORT:    Verbal report given to receiving nurse Chelsea(name) on Palmer Hendrickson  being transferred to [facility](unit) for routine progression of care       Report consisted of patients Situation, Background, Assessment and   Recommendations(SBAR). Information from the following report(s) OR Summary, Procedure Summary, Intake/Output and MAR was reviewed with the receiving nurse. Opportunity for questions and clarification was provided.       Patient transported with:   O2 @ 2 liters  Tech

## 2017-05-26 NOTE — PERIOP NOTES
TRANSFER - IN REPORT:    Verbal report received from Ayo Benoit RN on Prabhu Kang  being received from [facility] for routine progression of care      Report consisted of patients Situation, Background, Assessment and   Recommendations(SBAR). Information from the following report(s) SBAR, Kardex, Intake/Output, MAR and Recent Results was reviewed with the receiving nurse. Opportunity for questions and clarification was provided. Assessment completed upon patients arrival to unit and care assumed.

## 2017-05-26 NOTE — PROGRESS NOTES
Problem: Self Care Deficits Care Plan (Adult)  Goal: *Acute Goals and Plan of Care (Insert Text)  GOALS:   DISCHARGE GOALS (in preparation for going home/rehab): 3 days  1. Mr. Miri Darnell will perform one lower body dressing activity with minimal assistance required to demonstrate improved functional mobility and safety. 2. Mr. Miri Darnell will perform one lower body bathing activity with minimal assistance required to demonstrate improved functional mobility and safety. 3. Mr. Miri Darnell will perform toileting/toilet transfer with contact guard assistance to demonstrate improved functional mobility and safety. 4. Mr. Miri Darnell will perform shower transfer with contact guard assistance to demonstrate improved functional mobility and safety. JOINT CAMP OCCUPATIONAL THERAPY TKA: Initial Assessment and PM 5/26/2017  INPATIENT: Hospital Day: 1  [place] / [place] / Product Type: Medicare /      NAME/AGE/GENDER: Luzmaria Padilla is a 76 y.o. male    PRIMARY DIAGNOSIS:  Primary osteoarthritis of right knee [M17.11]              Procedure(s) and Anesthesia Type:     * RIGHT KNEE ARTHROPLASTY TOTAL - Spinal (Right)  ICD-10: Treatment Diagnosis:        · Pain in Right Knee (M25.561)  · Stiffness of Right Knee, Not elsewhere classified (M25.661)  · Other lack of cordination (R27.8)       ASSESSMENT:      Mr. Miri Darnell is s/p right TKA and presents with decreased weight bearing on right LE and decreased independence with functional mobility and activities of daily living. Patient would benefit from skilled Occupational Therapy to maximize independence and safety with self-care task and functional mobility. Pt would also benefit from education on adaptive equipment and safety precautions in preparation for going home or for recommendations for post-hospital rehab program.  Patient plans for further rehab at home with home health services and good family support .   OT reviewed therapy schedule and plan of care with patient. Patient was able to transfer and preform self care skills as charted below. Patient instructed to call for assistance when needing to get up from the bed and all needs in reach. Patient verbalized understanding of call light. This section established at most recent assessment   PROBLEM LIST (Impairments causing functional limitations):  1. Decreased Strength  2. Decreased ADL/Functional Activities  3. Decreased Transfer Abilities  4. Increased Pain  5. Increased Fatigue  6. Decreased Flexibility/Joint Mobility  7. Decreased Knowledge of Precautions    INTERVENTIONS PLANNED: (Benefits and precautions of occupational therapy have been discussed with the patient.)  1. Activities of daily living training  2. Adaptive equipment training  3. Balance training  4. Clothing management  5. Donning&doffing training  6. Theraputic activity      TREATMENT PLAN: Frequency/Duration: Follow patient 1 time to address above goals. Rehabilitation Potential For Stated Goals: GOOD      RECOMMENDED REHABILITATION/EQUIPMENT: (at time of discharge pending progress): Continue Skilled Therapy and Home Health: Physical Therapy. OCCUPATIONAL PROFILE AND HISTORY:   History of Present Injury/Illness (Reason for Referral): Pt presents this date s/p (right) TKA. Past Medical History/Comorbidities:   Mr. Britt Glover  has a past medical history of Aortic valve replaced (12/2014); Arrhythmia; Arthritis; CAD (coronary artery disease); Carotid artery stenosis; Chronic kidney disease; Chronic pain; Coagulation disorder ([address] Coronary atherosclerosis of native coronary vessel; Dyslipidemia (12/5/2016); Former smoker; GERD (gastroesophageal reflux disease); Hypertension; Left anterior fascicular block; Platelet inhibition due to Plavix; and Pre-diabetes. He also has no past medical history of Adverse effect of anesthesia; Aneurysm ([address] Asthma; Autoimmune disease ([address]  Cancer ([address] Chronic obstructive pulmonary disease ([facility] [facility] Diabetes [address] [address] [facility] Difficult intubation; Heart failure ([facility] [address] [facility] Liver disease; Malignant hyperthermia due to anesthesia; Nausea & vomiting; Pseudocholinesterase deficiency; Psychiatric disorder; PUD (peptic ulcer disease); Seizures ([facility] [address] Stroke [facility]); Thromboembolus ([facility] [address] Thyroid disease; Unspecified adverse effect of anesthesia; or Unspecified sleep apnea. Mr. Katie Alvarado  has a past surgical history that includes bunionectomy (Bilateral); vascular surgery procedure unlist; carpal tunnel release (Bilateral); cataract removal (Bilateral); coronary artery bypass graft (10/14/2014); heart catheterization; and colonoscopy. Social History/Living Environment:   Home Environment: Private residence  # Steps to Enter: 2 (2 steps)  One/Two Story Residence: Two story, live on 1st floor  # of Interior Steps: 14  Living Alone: No  Support Systems: Spouse/Significant Other/Partner  Patient Expects to be Discharged to[de-identified] Private residence  Current DME Used/Available at Home: Shower chair  Tub or Shower Type: Shower  Prior Level of Function/Work/Activity:  Modified indep with ADLs      Number of Personal Factors/Comorbidities that affect the Plan of Care: Brief history (0):  LOW COMPLEXITY   ASSESSMENT OF OCCUPATIONAL PERFORMANCE[de-identified]   Most Recent Physical Functioning:            Patient Vitals for the past 6 hrs:       BP BP Patient Position SpO2 O2 Flow Rate (L/min) Pulse   05/26/17 0936 110/56 Supine 96 % - 82   05/26/17 0941 131/59 - 99 % 3 l/min 79   05/26/17 0946 121/59 - 99 % 3 l/min 78   05/26/17 0951 134/58 - 98 % 3 l/min 61   05/26/17 1006 111/55 - 97 % 3 l/min 69   05/26/17 1021 121/58 - 98 % 3 l/min 82   05/26/17 1036 106/66 - 97 % 2 l/min 76   05/26/17 1105 111/58 - 95 % - 69   05/26/17 1314 - - 97 % - -                       Coordination  Fine Motor Skills-Upper: Left Intact; Right Intact  Gross Motor Skills-Upper: Left Intact; Right Intact           Mental Status  Neurologic State: Alert  Orientation Level: Oriented X4  Cognition: Appropriate decision making; Appropriate for age attention/concentration; Appropriate safety awareness; Follows commands  Perception: Appears intact  Perseveration: No perseveration noted  Safety/Judgement: Awareness of environment; Fall prevention                    Basic ADLs (From Assessment) Complex ADLs (From Assessment)   Basic ADL  Feeding: Setup  Oral Facial Hygiene/Grooming: Setup  Bathing: Moderate assistance  Upper Body Dressing: Supervision, Setup  Lower Body Dressing: Minimum assistance  Toileting: Total assistance     Grooming/Bathing/Dressing Activities of Daily Living     Cognitive Retraining  Safety/Judgement: Awareness of environment; Fall prevention                 Functional Transfers  Toilet Transfer : Minimum assistance;Assist x2  Shower Transfer: Minimum assistance;Assist x2                 Physical Skills Involved:  1. Range of Motion  2. Balance  3. Strength Cognitive Skills Affected (resulting in the inability to perform in a timely and safe manner): 1. none Psychosocial Skills Affected:  1. none   Number of elements that affect the Plan of Care: 1-3:  LOW COMPLEXITY   CLINICAL DECISION MAKIN [facility] Mobility Inpatient Short Form  How much help from another person does the patient currently need. .. Total A Lot A Little None   1. Putting on and taking off regular lower body clothing?   [ ] 1   [X] 2   [ ] 3   [ ] 4   2. Bathing (including washing, rinsing, drying)? [ ] 1   [X] 2   [ ] 3   [ ] 4   3. Toileting, which includes using toilet, bedpan or urinal?   [X] 1   [ ] 2   [ ] 3   [ ] 4   4. Putting on and taking off regular upper body clothing?   [ ] 1   [ ] 2   [X] 3   [ ] 4   5. Taking care of personal grooming such as brushing teeth? [ ] 1   [ ] 2   [X] 3   [ ] 4   6. Eating meals? [ ] 1   [ ] 2   [ ] 3   [X] 4   © , Trustees of [address] [place] [address] [address] [address], under license to [employer]. All rights reserved   Score:  Initial: 15 Most Recent: X (Date: -- )     Interpretation of Tool:  Represents activities that are increasingly more difficult (i.e. Bed mobility, Transfers, Gait). Score 24 23 22-20 19-15 14-10 9-7 6       Modifier CH CI CJ CK CL CM CN         · Self Care:               - CURRENT STATUS:    CK - 40%-59% impaired, limited or restricted               - GOAL STATUS:           CJ - 20%-39% impaired, limited or restricted               - D/C STATUS:                       ---------------To be determined---------------  [place] / [place] / Product Type: Medicare /       Medical Necessity:     · Patient is expected to demonstrate progress in self care and functional mobility to decrease assistance required with self care and functional mobility. Reason for Services/Other Comments:  · Patient continues to require skilled intervention due to decreased self care and functional mobility. Use of outcome tool(s) and clinical judgement create a POC that gives a: LOW COMPLEXITY                 TREATMENT:   (In addition to Assessment/Re-Assessment sessions the following treatments were rendered)      Pre-treatment Symptoms/Complaints:  none  Pain: Initial:   Pain Intensity 1: 0 0 Post Session:  0/10 iceman in place      Assessment/Reassessment only, no treatment provided today     Treatment/Session Assessment:         Response to Treatment:  Tolerated well.      Education:  [ ] Home Exercises  [X] Fall Precautions  [ ] Hip Precautions [ ] Going Home Video  [X] Knee/Hip Prosthesis Review  [X] Walker Management/Safety [X] Adaptive Equipment as Needed         Interdisciplinary Collaboration:   · Physical Therapist  · Occupational Therapist  · Registered Nurse     After treatment position/precautions:   · Up in chair  · Bed/Chair-wheels locked  · Caregiver at bedside  · Call light within reach  · RN notified  · Family at bedside     Compliance with Program/Exercises: compliant all of the time. Recommendations/Intent for next treatment session:  Treatment next visit will focus on increasing Mr. Desir's independence with self care and functional mobility modalities for pain, and patient education.        Total Treatment Duration:  OT Patient Time In/Time Out  Time In: 1320  Time Out: 100 Christina Guerra OT

## 2017-05-26 NOTE — PROGRESS NOTES
05/26/17 1314   Oxygen Therapy   O2 Sat (%) 97 %   Pulse via Oximetry 82 beats per minute   O2 Device Nasal cannula  (weaned to RA.)   Patient achieved    3000  Ml/sec on IS. Patient encouraged to do every hour while awake-patient agreed and demonstrated. No shortness of breath or distress noted. BS are clear b/l. Joint Camp notes reviewed- continuous sat ordered HS, alexandruinet # 6 placed in pt's room.

## 2017-05-26 NOTE — OP NOTES
[id number] [facility]  Total Knee Arthroplasty: Posterior Cruciate Retaining   Patient:Ty Desir   : 1940  Medical Record SHBOO  Pre-operative Diagnosis:  Primary osteoarthritis of right knee [M17.11]  Post-operative Diagnosis: <principal problem not specified>  Location: [address] [place] [address]  Surgeon: Nima Aaron MD   Assistant: Mallorie Breen    Anesthesia: Spinal and FNB    Procedure:Procedure(s) (LRB):  RIGHT KNEE ARTHROPLASTY TOTAL (Right)   The complexity of the total joint surgery requires the use of a first assistant for positioning, retraction and expertise in closure. Tourniquet Time: 0 minutes  EBL: 250 cc  Findings: severe degenerative arthritis, patellar osteophytes, posterior femoral osteophytes   BMI: Body mass index is 37.24 kg/(m^2). Akbar Pittman was brought to the operating room and positioned on the operating table. He was anesthestized with anesthesia. A turner catheter was placed preoperatively and IV antibiotics was administered. Prior to the incision being made a timeout was called identifying the patient, procedure ,operative side and surgeon The operative leg was prepped and draped in the usual sterile manner. An anterior longitudinal incision was accomplished just medial to the tibial tubercle and extending approximal 6 centimeters proximal to the superior pole of the patella. A medial parapatellar capsular incision was performed. The medial capsular flap was elevated around to the insertion of the semimembranous tendon. The patella was everted and the knee flexed and externally rotated. The medial and external menisci were excised. The lateral half of the fat pad excised and the patella femoral ligament was released. The anterior cruciate ligament was resect and the posterior cruciate ligament was retained. Using extramedullary instrumentation, the tibial cut was accomplished with appropriate posterior slope.       The distal femur was addressed. A drill hole was made above the intracondylar notch. Using appropriate intramedullary instrumentation,a five degree valgus distal cut was accomplished. The femur was sized. The anterior and posterior cuts were then made about the distal femur. The osteophytes were removed from the tibial and femoral surfaces. The flexion and extension gaps were assessed with the appropriate spacer blocks. Additional surgical procedures included: none. The flexion and extension gaps were deemed appropriately balanced. The appropriate cutting blocks were then utilized to perform the anterior, posterior and chamfer cuts, with appropriate lateral translation accomplished for the patellofemoral groove. Approximately 9 mm of bone was removed from the high side of the tibia. The tibia was sized. The tibial base plate was pinned into place with the appropriate external rotation and stem site prepared. A preliminary range of motion was accomplished with the trial components. Tthe patient was found to obtain full extension as well as appropriate flexion. The patient's ligaments were stable in flexion and extension to medial and lateral stressing and the alignment was through the appropriate mechanical axis. The patella was then everted. The bone was resect to accommodate the three peg patellar button. A trial reduction revealed appropriate tracking through the patellofemoral groove with no lateral retinacular release being accomplished. All trial components were removed. The real implants were opened: Sizes listed below. The knee was irrigated. There were no femoral deficiencies. There were no tibial deficiencies. No augmentation was utilized. Two packages of cement were mixed and the permanent Tibial and Femoral components were cemented into place. The patella component was then  cemented in place.     Davia Gaucher knee was placed through range of motion and noted to be stable as mentioned above with the trail components. The wound was dry, therefore no drain was used. The operative knee was injected with 60 cc of Naropin, 10 cc's of morphine and 1 cc of 30 mg of Toradol. The knee was then soaked with a diluted betadine solution for approximately 3 min. This was then thoroughly irrigated. The capsular layer was closed using a #1 PDS suture. Then, 1 gram (100 mg/ml) of Transexamic Acid was injected into the joint space. The subcutaneous layers were closed using 2-0 Stratafix. Finally the skin was closed using 3-0 Vicryl and skin staples, which were applied in occlusive fashion and sterile bandage applied. An Iceman cryo pad was applied on the operative leg. Sponge count and needle counts were correct. Aleta Guadalupe left the operating room     Implants:   Implant Name Type Inv.  Item Serial No.  Lot No. LRB No. Used   PAT ASYM MTL-BK 11MM SZ A40 -- TRIATHLON - [id number]  PAT ASYM MTL-BK 11MM SZ A40 -- TRIATHLON [id number] [employer] HOW [id number] Right 1   BASEPLT TIB PC TRITNM SZ 7 -- TRIATHLON - [id number]  BASEPLT TIB PC TRITNM SZ 7 -- TRIATHLON [id number] [employer] HOW [id number] Right 1   COMPNT FEM CR TRIATHLN 7 R PA --  - [id number]  COMPNT FEM CR TRIATHLN 7 R PA --  [id number] [employer] HOW [id number] Right 1   INSERT TIB CR TRIATHLN 7 13MM --  - [id number]   INSERT TIB CR TRIATHLN 7 13MM --  [id number] [employer] HOW [id number] Right 1         Signed By: Keron Vale MD   5/26/2017,  9:04 AM

## 2017-05-26 NOTE — PROGRESS NOTES
Pt had episode of vomitting and sweating after IV dilaudid and eating lunch. Zofran given, gown changed.  Pt taking sips of gingerale

## 2017-05-26 NOTE — ROUTINE PROCESS
TRANSFER - IN REPORT:    Verbal report received from Ayo RN(name) on [facility]  being received from AB Tasty) for routine post - op      Report consisted of patients Situation, Background, Assessment and   Recommendations(SBAR). Information from the following report(s) SBAR, Kardex, Procedure Summary, Intake/Output, MAR and Recent Results was reviewed with the receiving nurse. Opportunity for questions and clarification was provided. Assessment completed upon patients arrival to unit and care assumed.

## 2017-05-26 NOTE — ANESTHESIA PROCEDURE NOTES
Spinal Block    Start time: 5/26/2017 7:36 AM  End time: 5/26/2017 7:41 AM  Performed by: Berenice Abarca  Authorized by: Berenice Abarca     Pre-procedure:   Indications: primary anesthetic  Preanesthetic Checklist: patient identified, risks and benefits discussed, anesthesia consent, site marked, patient being monitored and timeout performed    Timeout Time: 07:35          Spinal Block:   Patient Position:  Seated  Prep Region:  Lumbar  Prep: chlorhexidine      Location:  L3-4  Technique:  Single shot  Local:  Lidocaine 1%  Local Dose (mL):  3    Needle:   Needle Type:  Quincke  Needle Gauge:  22 G  Attempts:  1      Events: CSF confirmed, no blood with aspiration and no paresthesia        Assessment:  Insertion:  Uncomplicated  Patient tolerance:  Patient tolerated the procedure well with no immediate complications

## 2017-05-26 NOTE — PERIOP NOTES
TRANSFER - OUT REPORT:    Verbal report given to Monalisa Lopez RN  on Penny Farmland  being transferred to block holding area for routine progression of care       Report consisted of patients Situation, Background, Assessment and   Recommendations(SBAR). Information from the following report(s) Kardex, MAR, Recent Results and Med Rec Status was reviewed with the receiving nurse. Lines:   Peripheral IV 05/26/17 Left Hand (Active)   Site Assessment Clean, dry, & intact 5/26/2017  6:22 AM   Phlebitis Assessment 0 5/26/2017  6:22 AM   Infiltration Assessment 0 5/26/2017  6:22 AM   Dressing Status Clean, dry, & intact 5/26/2017  6:22 AM   Dressing Type Tape;Transparent 5/26/2017  6:22 AM   Hub Color/Line Status Infusing 5/26/2017  6:22 AM   Action Taken Blood drawn 5/26/2017  6:22 AM        Opportunity for questions and clarification was provided.       Patient transported with:   Smart Voicemail

## 2017-05-26 NOTE — ANESTHESIA POSTPROCEDURE EVALUATION
Post-Anesthesia Evaluation and Assessment    Patient: Avel Paget MRN: [id number]  SSN: [id number]    YOB: 1940  Age: 76 y.o. Sex: male       Cardiovascular Function/Vital Signs  Visit Vitals    /66    Pulse 76    Temp 36.6 °C (97.9 °F)    Resp 15    Ht 5' 11\" (1.803 m)    Wt 121.1 kg (267 lb)    SpO2 97%    BMI 37.24 kg/m2       Patient is status post spinal anesthesia for Procedure(s):  RIGHT KNEE ARTHROPLASTY TOTAL. Nausea/Vomiting: None    Postoperative hydration reviewed and adequate. Pain:  Pain Scale 1: Numeric (0 - 10) (05/26/17 1021)  Pain Intensity 1: 0 (05/26/17 1021)   Managed    Neurological Status:   Neuro (WDL): Exceptions to WDL (05/26/17 0936)  Neuro  Neurologic State: Drowsy (05/26/17 4630)  Orientation Level: Oriented to person;Oriented to place;Oriented to situation (05/26/17 1021)  Speech: Clear (05/26/17 1021)  LUE Motor Response: Purposeful (05/26/17 0946)  LLE Motor Response: Pharmacologically paralyzed (05/26/17 0946)  RUE Motor Response: Purposeful (05/26/17 0946)  RLE Motor Response: Pharmacologically paralyzed (05/26/17 0946)   At baseline    Mental Status and Level of Consciousness: Arousable    Pulmonary Status:   O2 Device: Nasal cannula (05/26/17 0936)   Adequate oxygenation and airway patent    Complications related to anesthesia: None    Post-anesthesia assessment completed.  No concerns    Signed By: Vianca Crockett MD     May 26, 2017

## 2017-05-26 NOTE — PROGRESS NOTES
Problem: Mobility Impaired (Adult and Pediatric)  Goal: *Acute Goals and Plan of Care (Insert Text)  GOALS (1-4 days):  (1.)Mr. Desir will move from supine to sit and sit to supine in bed with STAND BY ASSIST.  (2.)Mr. Desir will transfer from bed to chair and chair to bed with STAND BY ASSIST using the least restrictive device. (3.)Mr. Desir will ambulate with STAND BY ASSIST for 200 feet with the least restrictive device. (4.)Mr. Desir will ambulate up/down 2 steps with left railing with CONTACT GUARD ASSIST with device if needed. (5.)Mr. Desir will increase right knee ROM to 5°-80°.   ________________________________________________________________________________________________      PHYSICAL THERAPY JOINT CAMP TKA: INITIAL ASSESSMENT, PM 5/26/2017  INPATIENT: Hospital Day: 1  [place] / Product Type: Medicare /      NAME/AGE/GENDER: Ellie Schmidt is a 76 y.o. male    PRIMARY DIAGNOSIS:  Primary osteoarthritis of right knee [M17.11]              Procedure(s) and Anesthesia Type:     * RIGHT KNEE ARTHROPLASTY TOTAL - Spinal (Right)  ICD-10: Treatment Diagnosis:        · Pain in Right Knee (M25.561)  · Stiffness of Right Knee, Not elsewhere classified (M25.661)  · Difficulty in walking, Not elsewhere classified (R26.2)       ASSESSMENT:      Mr. Dario Mcdonald presents with limited rom and strength of right LE as well as decreased functional mobility and gait s/p right tka. He plans to go home with HHPT. This section established at most recent assessment   PROBLEM LIST (Impairments causing functional limitations):  1. Decreased Strength  2. Decreased ADL/Functional Activities  3. Decreased Transfer Abilities  4. Decreased Ambulation Ability/Technique  5. Decreased Balance  6. Increased Pain  7. Decreased Activity Tolerance  8.  Decreased [place] with Home Exercise Program    INTERVENTIONS PLANNED: (Benefits and precautions of physical therapy have been discussed with the patient.)  1. Bed Mobility  2. Gait Training  3. Home Exercise Program (HEP)  4. Therapeutic Exercise/Strengthening  5. Transfer Training  6. Range of Motion: active/assisted/passive  7. Therapeutic Activities  8. Group Therapy      TREATMENT PLAN: Frequency/Duration: Follow patient BID   to address above goals. Rehabilitation Potential For Stated Goals: GOOD      RECOMMENDED REHABILITATION/EQUIPMENT: (at time of discharge pending progress): Continue Skilled Therapy and Home Health: Physical Therapy. HISTORY:   History of Present Injury/Illness (Reason for Referral):  S/p right tka  Past Medical History/Comorbidities:   Mr. Kathy Paige  has a past medical history of Aortic valve replaced (12/2014); Arrhythmia; Arthritis; CAD (coronary artery disease); Carotid artery stenosis; Chronic kidney disease; Chronic pain; Coagulation disorder ([address] Coronary atherosclerosis of native coronary vessel; Dyslipidemia (12/5/2016); Former smoker; GERD (gastroesophageal reflux disease); Hypertension; Left anterior fascicular block; Platelet inhibition due to Plavix; and Pre-diabetes. He also has no past medical history of Adverse effect of anesthesia; Aneurysm ([address] Asthma; Autoimmune disease ([address] Cancer ([address] Chronic obstructive pulmonary disease ([address] Diabetes ([address] Difficult intubation; Heart failure ([address] Liver disease; Malignant hyperthermia due to anesthesia; Nausea & vomiting; Pseudocholinesterase deficiency; Psychiatric disorder; PUD (peptic ulcer disease); Seizures ([address] Stroke [facility]); Thromboembolus ([address] Thyroid disease; Unspecified adverse effect of anesthesia; or Unspecified sleep apnea. Mr. Kathy Paige  has a past surgical history that includes bunionectomy (Bilateral); vascular surgery procedure unlist; carpal tunnel release (Bilateral); cataract removal (Bilateral); coronary artery bypass graft (10/14/2014); heart catheterization; and colonoscopy.   Social History/Living Environment:   Home Environment: Private residence  # Steps to Enter: 2 (2 steps)  One/Two Story Residence: Two story, live on 1st floor  # of Interior Steps: 14  Living Alone: No  Support Systems: Spouse/Significant Other/Partner  Patient Expects to be Discharged to[de-identified] Private residence  Current DME Used/Available at Home: [id number] chair  Tub or Shower Type: Shower  Prior Level of Function/Work/Activity:  independent   Number of Personal Factors/Comorbidities that affect the Plan of Care: 1-2: MODERATE COMPLEXITY   EXAMINATION:   Most Recent Physical Functioning:      Gross Assessment  AROM: Within functional limits (left LE)  Strength: Generally decreased, functional (left LE)         RLE AROM  R Knee Flexion: 60  R Knee Extension: 15              Bed Mobility  Supine to Sit: Minimum assistance; Additional time     Transfers  Sit to Stand: Additional time;Minimum assistance  Stand to Sit: Additional time;Minimum assistance  Bed to Chair: Additional time;Minimum assistance     Balance  Sitting: Intact  Standing: Pull to stand; With support    Posture  Posture (WDL): Exceptions to WDL  Posture Assessment: Rounded shoulders           Weight Bearing Status  Right Side Weight Bearing: As tolerated  Distance (ft): 20 Feet (ft)  Ambulation - Level of Assistance: Additional time;Minimal assistance  Assistive Device: Walker, rolling  Speed/Dinorah: Delayed  Step Length: Left shortened;Right shortened  Stance: Right decreased  Gait Abnormalities: Antalgic  Interventions: Verbal cues; Tactile cues; Safety awareness training;Manual cues      Braces/Orthotics:      Right Knee Cold  Type: Cryocuff       Body Structures Involved:  1. Bones  2. Joints  3. Muscles  4. Ligaments Body Functions Affected:  1. Movement Related Activities and Participation Affected:  1.  Mobility   Number of elements that affect the Plan of Care: 1-2: LOW COMPLEXITY   CLINICAL PRESENTATION:   Presentation: Stable and uncomplicated: LOW COMPLEXITY   CLINICAL DECISION MAKIN[address] [place] [address] AM-PAC 6 Clicks   Basic Mobility Inpatient Short Form  How much difficulty does the patient currently have. .. Unable A Lot A Little None   1. Turning over in bed (including adjusting bedclothes, sheets and blankets)? [ ] 1   [ ] 2   [X] 3   [ ] 4   2. Sitting down on and standing up from a chair with arms ( e.g., wheelchair, bedside commode, etc.)   [ ] 1   [ ] 2   [X] 3   [ ] 4   3. Moving from lying on back to sitting on the side of the bed? [ ] 1   [ ] 2   [X] 3   [ ] 4   How much help from another person does the patient currently need. .. Total A Lot A Little None   4. Moving to and from a bed to a chair (including a wheelchair)? [ ] 1   [ ] 2   [X] 3   [ ] 4   5. Need to walk in hospital room? [ ] 1   [ ] 2   [X] 3   [ ] 4   6. Climbing 3-5 steps with a railing? [ ] 1   [ ] 2   [X] 3   [ ] 4   © 2007, Trustees of [address] [place] [id number], under license to [employer]. All rights reserved       Score:  Initial: 18 Most Recent: X (Date: -- )     Interpretation of Tool:  Represents activities that are increasingly more difficult (i.e. Bed mobility, Transfers, Gait). Score 24 23 22-20 19-15 14-10 9-7 6       Modifier CH CI CJ CK CL CM CN         · Mobility - Walking and Moving Around:               - CURRENT STATUS:    CK - 40%-59% impaired, limited or restricted               - GOAL STATUS:           CJ - 20%-39% impaired, limited or restricted               - D/C STATUS:                       ---------------To be determined---------------  [place] / Product Type: Medicare /       Medical Necessity:     · Patient is expected to demonstrate progress in strength, range of motion and balance to decrease assistance required with theraputic exercises and functional mobility.   Reason for Services/Other Comments:  · Patient continues to require present interventions due to patient's inability to perform theraputic exercises and functional mobility independently. Use of outcome tool(s) and clinical judgement create a POC that gives a: Clear prediction of patient's progress: LOW COMPLEXITY                 TREATMENT:   (In addition to Assessment/Re-Assessment sessions the following treatments were rendered)      Pre-treatment Symptoms/Complaints:  none  Pain: Initial:      Post Session:  0      Assessment/Reassessment only, no treatment provided today        Date:    Date:    Date:      ACTIVITY/EXERCISE AM PM AM PM AM PM   GROUP THERAPY  [ ]  [ ]  [ ]  [ ]  [ ]  [ ]   Ankle Pumps               Quad Sets               Gluteal Sets               Hip ABd/ADduction               Straight Leg Raises               Knee Slides               Short Arc Quads               Long Arc Quads               Chair Slides                               B = bilateral; AA = active assistive; A = active; P = passive       Treatment/Session Assessment:         Response to Treatment:  Pt. Did well. He ambulated in the room and got in the chair. Education:  [X] Home Exercises  [X] Fall Precautions  [ ] Hip Precautions [ ] Going Home Video  [X] Knee/Hip Prosthesis Review  [X] Walker Management/Safety [ ] Adaptive Equipment as Needed         Interdisciplinary Collaboration:   · Occupational Therapist  · Registered Nurse     After treatment position/precautions:   · Up in chair  · Bed/Chair-wheels locked  · Bed in low position  · Caregiver at bedside  · Call light within reach  · Family at bedside     Compliance with Program/Exercises: Will assess as treatment progresses. No questions. Recommendations/Intent for next treatment session:  Treatment next visit will focus on increasing Mr. Desir's independence with bed mobility, transfers, gait training, strength/ROM exercises, modalities for pain, and patient education.        Total Treatment Duration:  PT Patient Time In/Time Out  Time In: 1310  Time Out: 1320 Carlos Thompson, PT

## 2017-05-26 NOTE — PERIOP NOTES
Akanksha Gonzalez, wife is here with patient this AM. Best phone number to reach her at is [phone].

## 2017-05-26 NOTE — ANESTHESIA PREPROCEDURE EVALUATION
Anesthetic History   No history of anesthetic complications            Review of Systems / Medical History  Patient summary reviewed, nursing notes reviewed and pertinent labs reviewed    Pulmonary  Within defined limits                 Neuro/Psych   Within defined limits           Cardiovascular    Hypertension: well controlled        Dysrhythmias (LAFB, RBBB)   CAD, cardiac stents ([facility] 2014 - Plavix held x 7d, remains on bASA), CABG (s/p CABG 2014) and hyperlipidemia    Exercise tolerance: >4 METS     GI/Hepatic/Renal     GERD: well controlled    Renal disease (Cr 1.4): CRI       Endo/Other        Arthritis  Pertinent negatives: Diabetes: \"Prediabetic\"   Other Findings              Physical Exam    Airway  Mallampati: II  TM Distance: > 6 cm  Neck ROM: normal range of motion   Mouth opening: Normal     Cardiovascular    Rhythm: regular  Rate: normal         Dental    Dentition: Full lower dentures and Full upper dentures     Pulmonary  Breath sounds clear to auscultation               Abdominal         Other Findings            Anesthetic Plan    ASA: 3  Anesthesia type: spinal      Post-op pain plan if not by surgeon: peripheral nerve block single      Anesthetic plan and risks discussed with: Patient

## 2017-05-26 NOTE — H&P
The patient has end stage arthritis of the right knee. The patient was see and examined and there are no changes to the patient's orthopedic condition. They have tried conservative treatment for this condition; including antiinflammatories and lifestyle modifications and have failed. The necessity for the joint replacement is still present, and the H&P from the office is still current.  The patient will be admitted today for Procedure(s) (LRB):  RIGHT KNEE ARTHROPLASTY TOTAL (Right)

## 2017-05-27 LAB
ANION GAP BLD CALC-SCNC: 5 MMOL/L (ref 7–16)
BUN SERPL-MCNC: 27 MG/DL (ref 8–23)
CALCIUM SERPL-MCNC: 8.1 MG/DL (ref 8.3–10.4)
CHLORIDE SERPL-SCNC: 106 MMOL/L (ref 98–107)
CO2 SERPL-SCNC: 31 MMOL/L (ref 21–32)
CREAT SERPL-MCNC: 1.4 MG/DL (ref 0.8–1.5)
GLUCOSE SERPL-MCNC: 166 MG/DL (ref 65–100)
HGB BLD-MCNC: 13.4 G/DL (ref 13.6–17.2)
MM INDURATION POC: 0 MM (ref 0–5)
POTASSIUM SERPL-SCNC: 5.1 MMOL/L (ref 3.5–5.1)
PPD POC: NORMAL NEGATIVE
SODIUM SERPL-SCNC: 142 MMOL/L (ref 136–145)

## 2017-05-27 PROCEDURE — 77010033678 HC OXYGEN DAILY

## 2017-05-27 PROCEDURE — 97110 THERAPEUTIC EXERCISES: CPT

## 2017-05-27 PROCEDURE — 65270000029 HC RM PRIVATE

## 2017-05-27 PROCEDURE — 36415 COLL VENOUS BLD VENIPUNCTURE: CPT | Performed by: ORTHOPAEDIC SURGERY

## 2017-05-27 PROCEDURE — 97535 SELF CARE MNGMENT TRAINING: CPT

## 2017-05-27 PROCEDURE — 97116 GAIT TRAINING THERAPY: CPT

## 2017-05-27 PROCEDURE — 94762 N-INVAS EAR/PLS OXIMTRY CONT: CPT

## 2017-05-27 PROCEDURE — 74011250637 HC RX REV CODE- 250/637: Performed by: ORTHOPAEDIC SURGERY

## 2017-05-27 PROCEDURE — 80048 BASIC METABOLIC PNL TOTAL CA: CPT | Performed by: ORTHOPAEDIC SURGERY

## 2017-05-27 PROCEDURE — 85018 HEMOGLOBIN: CPT | Performed by: ORTHOPAEDIC SURGERY

## 2017-05-27 RX ADMIN — HYDROMORPHONE HYDROCHLORIDE 2 MG: 2 TABLET ORAL at 21:13

## 2017-05-27 RX ADMIN — ONDANSETRON 4 MG: 8 TABLET, ORALLY DISINTEGRATING ORAL at 08:53

## 2017-05-27 RX ADMIN — ACETAMINOPHEN 1000 MG: 500 TABLET, FILM COATED ORAL at 12:28

## 2017-05-27 RX ADMIN — ASPIRIN 325 MG: 325 TABLET, DELAYED RELEASE ORAL at 08:53

## 2017-05-27 RX ADMIN — ACETAMINOPHEN 1000 MG: 500 TABLET, FILM COATED ORAL at 01:35

## 2017-05-27 RX ADMIN — SENNOSIDES AND DOCUSATE SODIUM 2 TABLET: 8.6; 5 TABLET ORAL at 08:53

## 2017-05-27 RX ADMIN — HYDROMORPHONE HYDROCHLORIDE 2 MG: 2 TABLET ORAL at 17:01

## 2017-05-27 RX ADMIN — HYDROMORPHONE HYDROCHLORIDE 2 MG: 2 TABLET ORAL at 01:35

## 2017-05-27 RX ADMIN — ACETAMINOPHEN 1000 MG: 500 TABLET, FILM COATED ORAL at 17:01

## 2017-05-27 RX ADMIN — HYDROMORPHONE HYDROCHLORIDE 2 MG: 2 TABLET ORAL at 12:28

## 2017-05-27 RX ADMIN — HYDROMORPHONE HYDROCHLORIDE 2 MG: 2 TABLET ORAL at 05:31

## 2017-05-27 RX ADMIN — ASPIRIN 325 MG: 325 TABLET, DELAYED RELEASE ORAL at 21:13

## 2017-05-27 RX ADMIN — ACETAMINOPHEN 1000 MG: 500 TABLET, FILM COATED ORAL at 05:31

## 2017-05-27 RX ADMIN — ACETAMINOPHEN 1000 MG: 500 TABLET, FILM COATED ORAL at 23:38

## 2017-05-27 NOTE — PROGRESS NOTES
Problem: Mobility Impaired (Adult and Pediatric)  Goal: *Acute Goals and Plan of Care (Insert Text)  GOALS (1-4 days):  (1.)Mr. Desir will move from supine to sit and sit to supine in bed with STAND BY ASSIST.  (2.)Mr. Desir will transfer from bed to chair and chair to bed with STAND BY ASSIST using the least restrictive device. (3.)Mr. Desir will ambulate with STAND BY ASSIST for 200 feet with the least restrictive device. (4.)Mr. Deisr will ambulate up/down 2 steps with left railing with CONTACT GUARD ASSIST with device if needed. (5.)Mr. Desir will increase right knee ROM to 5°-80°.   ________________________________________________________________________________________________      PHYSICAL THERAPY JOINT CAMP TKA: Daily Note, Treatment Day: 1st and PM 5/27/2017  INPATIENT: Hospital Day: 2  [place] / Product Type: Medicare /      NAME/AGE/GENDER: Janetta Sandifer is a 76 y.o. male    PRIMARY DIAGNOSIS:  Primary osteoarthritis of right knee [M17.11]              Procedure(s) and Anesthesia Type:     * RIGHT KNEE ARTHROPLASTY TOTAL - Spinal (Right)  ICD-10: Treatment Diagnosis:        · Pain in Right Knee (M25.561)  · Stiffness of Right Knee, Not elsewhere classified (M25.661)  · Difficulty in walking, Not elsewhere classified (R26.2)       ASSESSMENT:      Mr. Elliot Stockton presents with limited rom and strength of right LE as well as decreased functional mobility and gait s/p right tka. He plans to go home with HHPT. Pt progressing with ambulation. Pt progressing with out of bed activity. This section established at most recent assessment   PROBLEM LIST (Impairments causing functional limitations):  1. Decreased Strength  2. Decreased ADL/Functional Activities  3. Decreased Transfer Abilities  4. Decreased Ambulation Ability/Technique  5. Decreased Balance  6. Increased Pain  7. Decreased Activity Tolerance  8.  Decreased [place] with Home Exercise Program    INTERVENTIONS PLANNED: (Benefits and precautions of physical therapy have been discussed with the patient.)  1. Bed Mobility  2. Gait Training  3. Home Exercise Program (HEP)  4. Therapeutic Exercise/Strengthening  5. Transfer Training  6. Range of Motion: active/assisted/passive  7. Therapeutic Activities  8. Group Therapy      TREATMENT PLAN: Frequency/Duration: Follow patient BID   to address above goals. Rehabilitation Potential For Stated Goals: GOOD      RECOMMENDED REHABILITATION/EQUIPMENT: (at time of discharge pending progress): Continue Skilled Therapy and Home Health: Physical Therapy. HISTORY:   History of Present Injury/Illness (Reason for Referral):  S/p right tka  Past Medical History/Comorbidities:   Mr. Ivette Lane  has a past medical history of Aortic valve replaced (12/2014); Arrhythmia; Arthritis; CAD (coronary artery disease); Carotid artery stenosis; Chronic kidney disease; Chronic pain; Coagulation disorder ([address] Coronary atherosclerosis of native coronary vessel; Dyslipidemia (12/5/2016); Former smoker; GERD (gastroesophageal reflux disease); Hypertension; Left anterior fascicular block; Platelet inhibition due to Plavix; and Pre-diabetes. He also has no past medical history of Adverse effect of anesthesia; Aneurysm ([address] Asthma; Autoimmune disease ([address] Cancer ([address] Chronic obstructive pulmonary disease ([address] Diabetes ([address] Difficult intubation; Heart failure ([address] Liver disease; Malignant hyperthermia due to anesthesia; Nausea & vomiting; Pseudocholinesterase deficiency; Psychiatric disorder; PUD (peptic ulcer disease); Seizures ([address] Stroke [facility]); Thromboembolus ([address] Thyroid disease; Unspecified adverse effect of anesthesia; or Unspecified sleep apnea.   Mr. Ivette Lane  has a past surgical history that includes bunionectomy (Bilateral); vascular surgery procedure unlist; carpal tunnel release (Bilateral); cataract removal (Bilateral); coronary artery bypass graft (10/14/2014); heart catheterization; and colonoscopy. Social History/Living Environment:   Home Environment: Private residence  # Steps to Enter: 2 (2 steps)  One/Two Story Residence: Two story, live on 1st floor  # of Interior Steps: 14  Living Alone: No  Support Systems: Spouse/Significant Other/Partner  Patient Expects to be Discharged to[de-identified] Private residence  Current DME Used/Available at Home: Shower chair  Tub or Shower Type: Shower  Prior Level of Function/Work/Activity:  independent   Number of Personal Factors/Comorbidities that affect the Plan of Care: 1-2: MODERATE COMPLEXITY   EXAMINATION:   Most Recent Physical Functioning:                RLE AROM  R Knee Flexion: 83  R Knee Extension: 10                    Transfers  Sit to Stand: Minimum assistance  Stand to Sit: Minimum assistance  Bed to Chair: Minimum assistance     Balance  Sitting: Intact  Standing: With support;Pull to stand                Weight Bearing Status  Right Side Weight Bearing: As tolerated  Distance (ft): 104 Feet (ft) (walked distance twice)  Ambulation - Level of Assistance: Minimal assistance  Assistive Device: Walker, rolling  Gait Abnormalities: Antalgic  Interventions: Verbal cues      Braces/Orthotics:      Right Knee Cold  Type: Cryocuff       Body Structures Involved:  1. Bones  2. Joints  3. Muscles  4. Ligaments Body Functions Affected:  1. Movement Related Activities and Participation Affected:  1. Mobility   Number of elements that affect the Plan of Care: 1-2: LOW COMPLEXITY   CLINICAL PRESENTATION:   Presentation: Stable and uncomplicated: LOW COMPLEXITY   CLINICAL DECISION MAKIN [facility] [address] [address] AM-PAC 6 Clicks   Basic Mobility Inpatient Short Form  How much difficulty does the patient currently have. .. Unable A Lot A Little None   1. Turning over in bed (including adjusting bedclothes, sheets and blankets)? [ ] 1   [ ] 2   [X] 3   [ ] 4   2.   Sitting down on and standing up from a chair with arms ( e.g., wheelchair, bedside commode, etc.)   [ ] 1   [ ] 2   [X] 3   [ ] 4   3. Moving from lying on back to sitting on the side of the bed? [ ] 1   [ ] 2   [X] 3   [ ] 4   How much help from another person does the patient currently need. .. Total A Lot A Little None   4. Moving to and from a bed to a chair (including a wheelchair)? [ ] 1   [ ] 2   [X] 3   [ ] 4   5. Need to walk in hospital room? [ ] 1   [ ] 2   [X] 3   [ ] 4   6. Climbing 3-5 steps with a railing? [ ] 1   [ ] 2   [X] 3   [ ] 4   © 2007, Trustees of [address] [place] [address] [address] [address], under license to [employer]. All rights reserved       Score:  Initial: 18 Most Recent: X (Date: -- )     Interpretation of Tool:  Represents activities that are increasingly more difficult (i.e. Bed mobility, Transfers, Gait). Score 24 23 22-20 19-15 14-10 9-7 6       Modifier CH CI CJ CK CL CM CN         · Mobility - Walking and Moving Around:               - CURRENT STATUS:    CK - 40%-59% impaired, limited or restricted               - GOAL STATUS:           CJ - 20%-39% impaired, limited or restricted               - D/C STATUS:                       ---------------To be determined---------------  [place] / Product Type: Medicare /       Medical Necessity:     · Patient is expected to demonstrate progress in strength, range of motion and balance to decrease assistance required with theraputic exercises and functional mobility. Reason for Services/Other Comments:  · Patient continues to require present interventions due to patient's inability to perform theraputic exercises and functional mobility independently.    Use of outcome tool(s) and clinical judgement create a POC that gives a: Clear prediction of patient's progress: LOW COMPLEXITY                 TREATMENT:   (In addition to Assessment/Re-Assessment sessions the following treatments were rendered)      Pre-treatment Symptoms/Complaints:  none  Pain: Initial:   Pain Intensity 1: 4  Pain Location 1: Knee  Pain Orientation 1: Right  Post Session:  0      Gait Training (  15 minutes):  Gait training to improve and/or restore physical functioning as related to mobility. Ambulated 104 Feet (ft) (walked distance twice) with Minimal assistance using a Walker, rolling     Therapeutic Exercise: (  15 minutes):  Exercises per grid below to improve mobility. Date:   5/27/17 Date:    Date:      ACTIVITY/EXERCISE AM PM AM PM AM PM   GROUP THERAPY  [ ]  [ ]  [ ]  [ ]  [ ]  [ ]   Ankle Pumps  10  10           Quad Sets  10  10           Gluteal Sets  10 10            Hip ABd/ADduction  10 10            Straight Leg Raises  10  10           Knee Slides  10  10           Short Arc Quads    10           Long Arc Quads               Chair Slides    10                           B = bilateral; AA = active assistive; A = active; P = passive       Treatment/Session Assessment:         Response to Treatment:  Pt progressing with ambulation. Education:  [X] Home Exercises  [X] Fall Precautions  [ ] Hip Precautions [ ] Going Home Video  [X] Knee/Hip Prosthesis Review  [X] Walker Management/Safety [ ] Adaptive Equipment as Needed         Interdisciplinary Collaboration:   · Physical Therapist, Registered Nurse and Rehabilitation Attendant     After treatment position/precautions:   · Up in chair, Bed/Chair-wheels locked, Bed in low position, Caregiver at bedside, Call light within reach and RN notified     Compliance with Program/Exercises: Will assess as treatment progresses. No questions. Recommendations/Intent for next treatment session:  Treatment next visit will focus on increasing Mr. Biggss independence with bed mobility, transfers, gait training, strength/ROM exercises, modalities for pain, and patient education.        Total Treatment Duration:  PT Patient Time In/Time Out  Time In: 1400  Time Out: [id number] Christy, PT

## 2017-05-27 NOTE — PROGRESS NOTES
Pt has small cut on hand from walker pinching his skin while trying to fold it.  Bandaid placed over cut

## 2017-05-27 NOTE — PROGRESS NOTES
Problem: Mobility Impaired (Adult and Pediatric)  Goal: *Acute Goals and Plan of Care (Insert Text)  GOALS (1-4 days):  (1.)Mr. Desir will move from supine to sit and sit to supine in bed with STAND BY ASSIST.  (2.)Mr. Desir will transfer from bed to chair and chair to bed with STAND BY ASSIST using the least restrictive device. (3.)Mr. Desir will ambulate with STAND BY ASSIST for 200 feet with the least restrictive device. (4.)Mr. Desir will ambulate up/down 2 steps with left railing with CONTACT GUARD ASSIST with device if needed. (5.)Mr. Desir will increase right knee ROM to 5°-80°.   ________________________________________________________________________________________________      PHYSICAL THERAPY JOINT CAMP TKA: Daily Note, Treatment Day: 1st and AM 5/27/2017  INPATIENT: Hospital Day: 2  [place] / Product Type: Medicare /      NAME/AGE/GENDER: Palmer Hendrickson is a 76 y.o. male    PRIMARY DIAGNOSIS:  Primary osteoarthritis of right knee [M17.11]              Procedure(s) and Anesthesia Type:     * RIGHT KNEE ARTHROPLASTY TOTAL - Spinal (Right)  ICD-10: Treatment Diagnosis:        · Pain in Right Knee (M25.561)  · Stiffness of Right Knee, Not elsewhere classified (M25.661)  · Difficulty in walking, Not elsewhere classified (R26.2)       ASSESSMENT:      Mr. Garry Hurd presents with limited rom and strength of right LE as well as decreased functional mobility and gait s/p right tka. He plans to go home with HHPT. Pt progressing with ambulation. This section established at most recent assessment   PROBLEM LIST (Impairments causing functional limitations):  1. Decreased Strength  2. Decreased ADL/Functional Activities  3. Decreased Transfer Abilities  4. Decreased Ambulation Ability/Technique  5. Decreased Balance  6. Increased Pain  7. Decreased Activity Tolerance  8.  Decreased [place] with Home Exercise Program    INTERVENTIONS PLANNED: (Benefits and precautions of physical therapy have been discussed with the patient.)  1. Bed Mobility  2. Gait Training  3. Home Exercise Program (HEP)  4. Therapeutic Exercise/Strengthening  5. Transfer Training  6. Range of Motion: active/assisted/passive  7. Therapeutic Activities  8. Group Therapy      TREATMENT PLAN: Frequency/Duration: Follow patient BID   to address above goals. Rehabilitation Potential For Stated Goals: GOOD      RECOMMENDED REHABILITATION/EQUIPMENT: (at time of discharge pending progress): Continue Skilled Therapy and Home Health: Physical Therapy. HISTORY:   History of Present Injury/Illness (Reason for Referral):  S/p right tka  Past Medical History/Comorbidities:   Mr. Jorje Wall  has a past medical history of Aortic valve replaced (12/2014); Arrhythmia; Arthritis; CAD (coronary artery disease); Carotid artery stenosis; Chronic kidney disease; Chronic pain; Coagulation disorder ([address] Coronary atherosclerosis of native coronary vessel; Dyslipidemia (12/5/2016); Former smoker; GERD (gastroesophageal reflux disease); Hypertension; Left anterior fascicular block; Platelet inhibition due to Plavix; and Pre-diabetes. He also has no past medical history of Adverse effect of anesthesia; Aneurysm ([address] Asthma; Autoimmune disease ([address] Cancer ([address] Chronic obstructive pulmonary disease ([address] Diabetes ([address] Difficult intubation; Heart failure ([address] Liver disease; Malignant hyperthermia due to anesthesia; Nausea & vomiting; Pseudocholinesterase deficiency; Psychiatric disorder; PUD (peptic ulcer disease); Seizures ([address] Stroke [facility]); Thromboembolus ([address] Thyroid disease; Unspecified adverse effect of anesthesia; or Unspecified sleep apnea.   Mr. Jorje Wall  has a past surgical history that includes bunionectomy (Bilateral); vascular surgery procedure unlist; carpal tunnel release (Bilateral); cataract removal (Bilateral); coronary artery bypass graft (10/14/2014); heart catheterization; and colonoscopy. Social History/Living Environment:   Home Environment: Private residence  # Steps to Enter: 2 (2 steps)  One/Two Story Residence: Two story, live on 1st floor  # of Interior Steps: 14  Living Alone: No  Support Systems: Spouse/Significant Other/Partner  Patient Expects to be Discharged to[de-identified] Private residence  Current DME Used/Available at Home: Shower chair  Tub or Shower Type: Shower  Prior Level of Function/Work/Activity:  independent   Number of Personal Factors/Comorbidities that affect the Plan of Care: 1-2: MODERATE COMPLEXITY   EXAMINATION:   Most Recent Physical Functioning:                                     Transfers  Sit to Stand: Minimum assistance  Stand to Sit: Minimum assistance  Bed to Chair: Minimum assistance     Balance  Sitting: Intact  Standing: Pull to stand; With support                Weight Bearing Status  Right Side Weight Bearing: As tolerated  Distance (ft): 25 Feet (ft)  Ambulation - Level of Assistance: Minimal assistance  Assistive Device: Walker, rolling  Gait Abnormalities: Antalgic  Interventions: Verbal cues      Braces/Orthotics:              Body Structures Involved:  1. Bones  2. Joints  3. Muscles  4. Ligaments Body Functions Affected:  1. Movement Related Activities and Participation Affected:  1. Mobility   Number of elements that affect the Plan of Care: 1-2: LOW COMPLEXITY   CLINICAL PRESENTATION:   Presentation: Stable and uncomplicated: LOW COMPLEXITY   CLINICAL DECISION MAKIN [facility] [address] [id number] AM-PAC 6 Clicks   Basic Mobility Inpatient Short Form  How much difficulty does the patient currently have. .. Unable A Lot A Little None   1. Turning over in bed (including adjusting bedclothes, sheets and blankets)? [ ] 1   [ ] 2   [X] 3   [ ] 4   2. Sitting down on and standing up from a chair with arms ( e.g., wheelchair, bedside commode, etc.)   [ ] 1   [ ] 2   [X] 3   [ ] 4   3.   Moving from lying on back to sitting on the side of the bed? [ ] 1   [ ] 2   [X] 3   [ ] 4   How much help from another person does the patient currently need. .. Total A Lot A Little None   4. Moving to and from a bed to a chair (including a wheelchair)? [ ] 1   [ ] 2   [X] 3   [ ] 4   5. Need to walk in hospital room? [ ] 1   [ ] 2   [X] 3   [ ] 4   6. Climbing 3-5 steps with a railing? [ ] 1   [ ] 2   [X] 3   [ ] 4   © 2007, Trustees of [address] [place] [address] [address] [id number], under license to [employer]. All rights reserved       Score:  Initial: 18 Most Recent: X (Date: -- )     Interpretation of Tool:  Represents activities that are increasingly more difficult (i.e. Bed mobility, Transfers, Gait). Score 24 23 22-20 19-15 14-10 9-7 6       Modifier CH CI CJ CK CL CM CN         · Mobility - Walking and Moving Around:               - CURRENT STATUS:    CK - 40%-59% impaired, limited or restricted               - GOAL STATUS:           CJ - 20%-39% impaired, limited or restricted               - D/C STATUS:                       ---------------To be determined---------------  [place] / [place] / Product Type: Medicare /       Medical Necessity:     · Patient is expected to demonstrate progress in strength, range of motion and balance to decrease assistance required with theraputic exercises and functional mobility. Reason for Services/Other Comments:  · Patient continues to require present interventions due to patient's inability to perform theraputic exercises and functional mobility independently.    Use of outcome tool(s) and clinical judgement create a POC that gives a: Clear prediction of patient's progress: LOW COMPLEXITY                 TREATMENT:   (In addition to Assessment/Re-Assessment sessions the following treatments were rendered)      Pre-treatment Symptoms/Complaints:  none  Pain: Initial:      Post Session:  0      Gait Training (  15 minutes):  Gait training to improve and/or restore physical functioning as related to mobility. Ambulated 25 Feet (ft) with Minimal assistance using a Walker, rolling     Therapeutic Exercise: (  8 minutes):  Exercises per grid below to improve mobility. Date:   5/27/17 Date:    Date:      ACTIVITY/EXERCISE AM PM AM PM AM PM   GROUP THERAPY  [ ]  [ ]  [ ]  [ ]  [ ]  [ ]   Ankle Pumps  10             Quad Sets  10             Gluteal Sets  10             Hip ABd/ADduction  10             Straight Leg Raises  10             Knee Slides  10             Short Arc Quads               Long Arc Quads               Chair Slides                               B = bilateral; AA = active assistive; A = active; P = passive       Treatment/Session Assessment:         Response to Treatment:  Pt progressing with ambulation. Education:  [X] Home Exercises  [X] Fall Precautions  [ ] Hip Precautions [ ] Going Home Video  [X] Knee/Hip Prosthesis Review  [X] Walker Management/Safety [ ] Adaptive Equipment as Needed         Interdisciplinary Collaboration:   · Physical Therapist, Occupational Therapist and Registered Nurse     After treatment position/precautions:   · Up in chair, Bed/Chair-wheels locked, Bed in low position, Caregiver at bedside, Call light within reach and RN notified     Compliance with Program/Exercises: Will assess as treatment progresses. No questions. Recommendations/Intent for next treatment session:  Treatment next visit will focus on increasing Mr. Desir's independence with bed mobility, transfers, gait training, strength/ROM exercises, modalities for pain, and patient education.        Total Treatment Duration:  PT Patient Time In/Time Out  Time In: 0910  Time Out: 0933     Deangelo Noble PT

## 2017-05-27 NOTE — PROGRESS NOTES
Shift assessment complete. Pt is post op today from a RTKA. A&O x 4. Purposeful movement in all 4 extremities. Good push/pulls. Dressing clean dry and intact. PIV infusing. Pt voiding straw, yellow urine from turner. No complaints or pain at this time. Call light with in reach. Bed low and locked.  Side rails x 3

## 2017-05-27 NOTE — PROGRESS NOTES
Orthopedic Joint Progress Note    May 27, 2017  Admit Date: 2017  Admit Diagnosis: Primary osteoarthritis of right knee [M17.11]    1 Day Post-Op    Subjective: Jaime Desir DOING WELL    Review of Systems:     Objective:     PT/OT:     PATIENT MOBILITY    Bed Mobility  Supine to Sit: Minimum assistance, Additional time  Transfers  Sit to Stand: Additional time, Minimum assistance  Stand to Sit: Additional time, Minimum assistance  Bed to Chair: Additional time, Minimum assistance      Gait  Speed/D[place]or[place]: Delayed  Step Length: Left shortened, Right shortened  Stance: Right decreased  Gait Abnormalities: Antalgic  Ambulation - Level of Assistance: Additional time, Minimal assistance  Distance (ft): 20 Feet (ft)  Assistive Device: Walker, rolling  Interventions: Verbal cues, Tactile cues, Safety awareness training, Manual cues   Weight Bearing Status  Right Side Weight Bearing: As tolerated        Vital Signs:    Blood pressure 127/69, pulse 73, temperature 96.2 °F (35.7 °C), resp. rate 16, height 5' 11\" (1.803 m), weight 121.1 kg (267 lb), SpO2 95 %.   Temp (24hrs), Av.3 °F (35.7 °C), Min:95.2 °F (35.1 °C), Max:97.9 °F (36.6 °C)      Pain Control:   Pain Assessment  Pain Scale 1: Numeric (0 - 10)  Pain Intensity 1: 2  Pain Onset 1: 4 yrs   Pain Location 1: Knee  Pain Orientation 1: Right  Pain Description 1: Sore  Pain Intervention(s) 1: Medication (see MAR)    Meds:  Current Facility-Administered Medications   Medication Dose Route Frequency    0.9% sodium chloride infusion  100 mL/hr IntraVENous CONTINUOUS    sodium chloride (NS) flush 5-10 mL  5-10 mL IntraVENous Q8H    sodium chloride (NS) flush 5-10 mL  5-10 mL IntraVENous PRN    acetaminophen (TYLENOL) tablet 1,000 mg  1,000 mg Oral Q6H    HYDROmorphone (DILAUDID) tablet 2 mg  2 mg Oral Q4H PRN    HYDROmorphone (PF) (DILAUDID) injection 1 mg  1 mg IntraVENous Q3H PRN    naloxone (NARCAN) injection 0.2-0.4 mg  0.2-0.4 mg IntraVENous Q10MIN PRN    dexamethasone (DECADRON) injection 10 mg  10 mg IntraVENous ONCE    ondansetron (ZOFRAN ODT) tablet 4 mg  4 mg Oral Q4H PRN    diphenhydrAMINE (BENADRYL) capsule 25 mg  25 mg Oral Q4H PRN    senna-docusate (PERICOLACE) 8.6-50 mg per tablet 2 Tab  2 Tab Oral DAILY    aspirin delayed-release tablet 325 mg  325 mg Oral Q12H    prochlorperazine (COMPAZINE) injection 10 mg  10 mg IntraVENous Q6H PRN        LAB:    Lab Results   Component Value Date/Time    INR 0.9 05/08/2017 10:45 AM    INR 1.8 11/13/2014 04:45 PM    INR 1.0 10/20/2014 04:00 AM     Lab Results   Component Value Date/Time    HGB 13.4 05/27/2017 05:15 AM    HGB 15.3 05/26/2017 06:53 PM    HGB 16.7 05/08/2017 10:45 AM       Wound Knee Right (Active)   DRESSING STATUS Clean, dry, and intact 5/26/2017  7:54 PM   DRESSING TYPE Other (Comment) 5/26/2017  7:54 PM   Drainage Amount  None 5/26/2017  7:54 PM   Number of days:1             Physical Exam:  No significant changes  Calves supple  Toes up and down  NVI distally  Dressing C/D/I    Assessment:      Principal Problem:    Status post total right knee replacement (5/26/2017)         Plan:     Continue PT/OT/Rehab  Consult:     Patient Expects to be Discharged to[de-identified] Private residence     Signed By: Jazmyn Mijares MD

## 2017-05-27 NOTE — PROGRESS NOTES
05/27/17 1145   Oxygen Therapy   O2 Sat (%) 92 %   Pulse via Oximetry 87 beats per minute   O2 Device Room air   Patient encouraged to do IS every hour while awake-patient agreed and demonstrated. No shortness of breath or distress noted. BS are clear b/l.

## 2017-05-27 NOTE — PROGRESS NOTES
Problem: Self Care Deficits Care Plan (Adult)  Goal: *Acute Goals and Plan of Care (Insert Text)  GOALS:   DISCHARGE GOALS (in preparation for going home/rehab): 3 days  1. Mr. Jorje Wall will perform one lower body dressing activity with minimal assistance required to demonstrate improved functional mobility and safety. GOAL MET 5/27/2017    2. Mr. Jorje Wall will perform one lower body bathing activity with minimal assistance required to demonstrate improved functional mobility and safety. GOAL MET 5/27/2017    3. Mr. Jorje Wall will perform toileting/toilet transfer with contact guard assistance to demonstrate improved functional mobility and safety. GOAL MET 5/27/2017    4. Mr. Jorje Wall will perform shower transfer with contact guard assistance to demonstrate improved functional mobility and safety. GOAL MET 5/27/2017        JOINT CAMP OCCUPATIONAL THERAPY TKA: Daily Note and AM 5/27/2017  INPATIENT: Hospital Day: 2  [place] / Product Type: Medicare /      NAME/AGE/GENDER: Fish Rodriguez is a 76 y.o. male    PRIMARY DIAGNOSIS:  Primary osteoarthritis of right knee [M17.11]              Procedure(s) and Anesthesia Type:     * RIGHT KNEE ARTHROPLASTY TOTAL - Spinal (Right)  ICD-10: Treatment Diagnosis:        · Pain in Right Knee (M25.561)  · Stiffness of Right Knee, Not elsewhere classified (M25.661)  · Other lack of cordination (R27.8)       ASSESSMENT:      Mr. Jorje Wall is s/p right TKA and presents with decreased weight bearing on right LE and decreased independence with functional mobility and activities of daily living. Patient completed shower and dressing as charter below in ADL grid and is ambulating with rolling walker and CGA to stand by assist.  Patient has met 4/4 goals and plans to return home with good family support. Family able to provide patient with appropriate level of assistance at this time.   OT reviewed safety precautions throughout session and therapy schedule for the remainder of today. Patient instructed to call for assistance when needing to get up from recliner and all needs in reach. Patient verbalized understanding of call light. This section established at most recent assessment   PROBLEM LIST (Impairments causing functional limitations):  1. Decreased Strength  2. Decreased ADL/Functional Activities  3. Decreased Transfer Abilities  4. Increased Pain  5. Increased Fatigue  6. Decreased Flexibility/Joint Mobility  7. Decreased Knowledge of Precautions    INTERVENTIONS PLANNED: (Benefits and precautions of occupational therapy have been discussed with the patient.)  1. Activities of daily living training  2. Adaptive equipment training  3. Balance training  4. Clothing management  5. Donning&doffing training  6. Theraputic activity      TREATMENT PLAN: Frequency/Duration: Follow patient 1 time to address above goals. Rehabilitation Potential For Stated Goals: GOOD      RECOMMENDED REHABILITATION/EQUIPMENT: (at time of discharge pending progress): Continue Skilled Therapy and Home Health: Physical Therapy. OCCUPATIONAL PROFILE AND HISTORY:   History of Present Injury/Illness (Reason for Referral): Pt presents this date s/p (right) TKA. Past Medical History/Comorbidities:   Mr. Jeanie Clark  has a past medical history of Aortic valve replaced (12/2014); Arrhythmia; Arthritis; CAD (coronary artery disease); Carotid artery stenosis; Chronic kidney disease; Chronic pain; Coagulation disorder ([address] Coronary atherosclerosis of native coronary vessel; Dyslipidemia (12/5/2016); Former smoker; GERD (gastroesophageal reflux disease); Hypertension; Left anterior fascicular block; Platelet inhibition due to Plavix; and Pre-diabetes. He also has no past medical history of Adverse effect of anesthesia; Aneurysm ([address] Asthma; Autoimmune disease ([address] Cancer ([address] Chronic obstructive pulmonary disease ([address]  Diabetes ([address] Difficult intubation; Heart failure ([facility] Liver disease; Malignant hyperthermia due to anesthesia; Nausea & vomiting; Pseudocholinesterase deficiency; Psychiatric disorder; PUD (peptic ulcer disease); Seizures ([facility] Stroke [facility]); Thromboembolus ([facility] Thyroid disease; Unspecified adverse effect of anesthesia; or Unspecified sleep apnea. Mr. Godfrey Pena  has a past surgical history that includes bunionectomy (Bilateral); vascular surgery procedure unlist; carpal tunnel release (Bilateral); cataract removal (Bilateral); coronary artery bypass graft (10/14/2014); heart catheterization; and colonoscopy. Social History/Living Environment:   Home Environment: Private residence  # Steps to Enter: 2 (2 steps)  One/Two Story Residence: Two story, live on 1st floor  # of Interior Steps: 14  Living Alone: No  Support Systems: Spouse/Significant Other/Partner  Patient Expects to be Discharged to[de-identified] Private residence  Current DME Used/Available at Home: Shower chair  Tub or Shower Type: Shower  Prior Level of Function/Work/Activity:  Modified indep with ADLs      Number of Personal Factors/Comorbidities that affect the Plan of Care: Brief history (0):  LOW COMPLEXITY   ASSESSMENT OF OCCUPATIONAL PERFORMANCE[de-identified]   Most Recent Physical Functioning:            Patient Vitals for the past 6 hrs:   BP BP Patient Position SpO2 Pulse   05/27/17 0736 127/69 At rest 95 % 73                                   Mental Status  Neurologic State: Alert  Orientation Level: Oriented X4  Cognition: Appropriate decision making; Appropriate for age attention/concentration; Appropriate safety awareness; Follows commands  Perception: Appears intact  Perseveration: No perseveration noted  Safety/Judgement: Awareness of environment; Fall prevention                    Basic ADLs (From Assessment) Complex ADLs (From Assessment)   Basic ADL  Feeding: Setup  Oral Facial Hygiene/Grooming: Setup  Bathing:  Moderate assistance  Upper Body Dressing: Supervision, Setup  Lower Body Dressing: Minimum assistance  Toileting: Total assistance     Grooming/Bathing/Dressing Activities of Daily Living   Grooming  Grooming Assistance: Supervision/set up  Washing Face: Supervision/set-up  Washing Hands: Supervision/set-up  Brushing Teeth: Supervision/set-up Cognitive Retraining  Safety/Judgement: Awareness of environment; Fall prevention   Upper Body Bathing  Bathing Assistance: Supervision/set-up  Position Performed: Seated in chair;Standing  Adaptive Equipment: Grab bar; Shower chair     Lower Body Bathing  Bathing Assistance: Minimum assistance  Perineal  : Supervision/set-up  Position Performed: Standing  Lower Body : Minimum assistance  Position Performed: Seated in chair     Upper Body Dressing Assistance  Dressing Assistance: Supervision/set-up  Pullover Shirt: Supervision/set-up Functional Transfers  Toilet Transfer : Stand-by asssistance;Contact guard assistance  Shower Transfer: Stand-by asssistance;Contact guard assistance   Lower Body Dressing Assistance  Dressing Assistance: Minimum assistance  Underpants: Minimum assistance  Pants With Elastic Waist: Minimum assistance  Socks: Moderate assistance  Antiembolitic Stockings: Maximum assistance             Physical Skills Involved:  1. Range of Motion  2. Balance  3. Strength Cognitive Skills Affected (resulting in the inability to perform in a timely and safe manner): 1. none Psychosocial Skills Affected:  1. none   Number of elements that affect the Plan of Care: 1-3:  LOW COMPLEXITY   CLINICAL DECISION MAKIN [facility] Mobility Inpatient Short Form  How much help from another person does the patient currently need. .. Total A Lot A Little None   1. Putting on and taking off regular lower body clothing?   [ ] 1   [X] 2   [ ] 3   [ ] 4   2. Bathing (including washing, rinsing, drying)? [ ] 1   [X] 2   [ ] 3   [ ] 4   3.   Toileting, which includes using toilet, bedpan or urinal?   [X] 1   [ ] 2   [ ] 3   [ ] 4   4. Putting on and taking off regular upper body clothing?   [ ] 1   [ ] 2   [X] 3   [ ] 4   5. Taking care of personal grooming such as brushing teeth? [ ] 1   [ ] 2   [X] 3   [ ] 4   6. Eating meals? [ ] 1   [ ] 2   [ ] 3   [X] 4   © 2007, Trustees of [address] [place] [address] [address] [address], under license to [employer]. All rights reserved   Score:  Initial: 15 Most Recent: X (Date: -- )     Interpretation of Tool:  Represents activities that are increasingly more difficult (i.e. Bed mobility, Transfers, Gait). Score 24 23 22-20 19-15 14-10 9-7 6       Modifier CH CI CJ CK CL CM CN         · Self Care:               - CURRENT STATUS:    CK - 40%-59% impaired, limited or restricted               - GOAL STATUS:           CJ - 20%-39% impaired, limited or restricted               - D/C STATUS:                       ---------------To be determined---------------  [place] / Product Type: Medicare /       Medical Necessity:     · Patient is expected to demonstrate progress in self care and functional mobility to decrease assistance required with self care and functional mobility. Reason for Services/Other Comments:  · Patient continues to require skilled intervention due to decreased self care and functional mobility. Use of outcome tool(s) and clinical judgement create a POC that gives a: LOW COMPLEXITY                 TREATMENT:   (In addition to Assessment/Re-Assessment sessions the following treatments were rendered)      Pre-treatment Symptoms/Complaints:  none  Pain: Initial:   Pain Intensity 1: 3  Pain Location 1: Knee  Pain Orientation 1: Right  Pain Intervention(s) 1: Shower  Post Session:  3/10 iceman in place      Self Care: (35): Procedure(s) (per grid) utilized to improve and/or restore self-care/home management as related to dressing, bathing, toileting and grooming.  Required minimal visual, verbal and tactile cueing to facilitate activities of daily living skills and compensatory activities. Treatment/Session Assessment:         Response to Treatment:  Tolerated well, plans to go home tomorrow     Education:  [ ] Home Exercises  [X] Fall Precautions  [ ] Hip Precautions [ ] [address] Video  [X] Knee/Hip Prosthesis Review  [X] Walker Management/Safety [X] Adaptive Equipment as Needed         Interdisciplinary Collaboration:   · Physical Therapist, Occupational Therapist and Registered Nurse     After treatment position/precautions:   · Up in chair, Bed/Chair-wheels locked, Bed in low position, Call light within reach, RN notified and Family at bedside     Compliance with Program/Exercises: compliant all of the time. Recommendations/Intent for next treatment session:  Treatment next visit will focus on increasing Mr. Desir's independence with self care and functional mobility modalities for pain, and patient education.        Total Treatment Duration:35  OT Patient Time In/Time Out  Time In: 0945  Time Out: [address], OT

## 2017-05-28 ENCOUNTER — HOME HEALTH ADMISSION (OUTPATIENT)
Dept: HOME HEALTH SERVICES | Facility: HOME HEALTH | Age: 77
End: 2017-05-28
Payer: MEDICARE

## 2017-05-28 VITALS
SYSTOLIC BLOOD PRESSURE: 142 MMHG | WEIGHT: 267 LBS | HEART RATE: 86 BPM | OXYGEN SATURATION: 91 % | DIASTOLIC BLOOD PRESSURE: 87 MMHG | BODY MASS INDEX: 37.38 KG/M2 | HEIGHT: 71 IN | RESPIRATION RATE: 18 BRPM | TEMPERATURE: 98.1 F

## 2017-05-28 LAB — HGB BLD-MCNC: 13.1 G/DL (ref 13.6–17.2)

## 2017-05-28 PROCEDURE — 85018 HEMOGLOBIN: CPT | Performed by: ORTHOPAEDIC SURGERY

## 2017-05-28 PROCEDURE — 74011250637 HC RX REV CODE- 250/637: Performed by: ORTHOPAEDIC SURGERY

## 2017-05-28 PROCEDURE — 97116 GAIT TRAINING THERAPY: CPT

## 2017-05-28 PROCEDURE — 36415 COLL VENOUS BLD VENIPUNCTURE: CPT | Performed by: ORTHOPAEDIC SURGERY

## 2017-05-28 PROCEDURE — 97150 GROUP THERAPEUTIC PROCEDURES: CPT

## 2017-05-28 RX ADMIN — SENNOSIDES AND DOCUSATE SODIUM 2 TABLET: 8.6; 5 TABLET ORAL at 08:08

## 2017-05-28 RX ADMIN — ACETAMINOPHEN 1000 MG: 500 TABLET, FILM COATED ORAL at 12:14

## 2017-05-28 RX ADMIN — HYDROMORPHONE HYDROCHLORIDE 2 MG: 2 TABLET ORAL at 12:14

## 2017-05-28 RX ADMIN — ASPIRIN 325 MG: 325 TABLET, DELAYED RELEASE ORAL at 08:08

## 2017-05-28 RX ADMIN — HYDROMORPHONE HYDROCHLORIDE 2 MG: 2 TABLET ORAL at 02:44

## 2017-05-28 RX ADMIN — HYDROMORPHONE HYDROCHLORIDE 2 MG: 2 TABLET ORAL at 08:08

## 2017-05-28 RX ADMIN — ACETAMINOPHEN 1000 MG: 500 TABLET, FILM COATED ORAL at 05:12

## 2017-05-28 NOTE — PROGRESS NOTES
Shift assessment complete. Pt is post op day 1 from a RTKA. A&O x 4. Purposeful movement in all 4 extremities. Good push/pulls. Dressing clean dry and intact. PIV capped. Pt voiding straw, yellow urine. No complaints at this time. Call light with in reach. Bed low and locked.  Side rails x 3

## 2017-05-28 NOTE — PROGRESS NOTES
Discharge instructions reviewed. Prescriptions given to pt. Pt discharged home with wife, taken down by wheelchair.

## 2017-05-28 NOTE — PROGRESS NOTES
Shift assessment complete. Bed low to ground, Bed rails up x 3. Pt resting in bed. Pt able to dorsi/plantar flex bilaterally with + 2 pedal pulses. Pain well controlled. No neurovascular problems at this time.

## 2017-05-28 NOTE — PROGRESS NOTES
[facility] - INPATIENT  Face to Face Encounter    Patients Name: Fish Rodriguez    YOB: 1940    Ordering Physician: Joselyn Gordon      Primary Diagnosis: RTKA    Date of Face to Face:   5/28/2017                                  Face to Face Encounter findings are related to primary reason for home care:   yes. 1. I certify that the patient needs intermittent care as follows: physical therapy: strengthening    2. I certify that this patient is homebound, that is: 1) patient requires the use of a walker device, special transportation, or assistance of another to leave the home; or 2) patient's condition makes leaving the home medically contraindicated; and 3) patient has a normal inability to leave the home and leaving the home requires considerable and taxing effort. Patient may leave the home for infrequent and short duration for medical reasons, and occasional absences for non-medical reasons. Homebound status is due to the following functional limitations: Patient with strength deficits limiting the performance of all ADL's without caregiver assistance or the use of an assistive device. 3. I certify that this patient is under my care and that I, or a nurse practitioner or  [id number], or clinical nurse specialist, or certified nurse midwife, working with me, had a Face-to-Face Encounter that meets the physician Face-to-Face Encounter requirements. The following are the clinical findings from the [address] [place] [address] encounter that support the need for skilled services and is a summary of the encounter: See hospital chart. See attached progess note      José Miguel Najera LMSW  5/28/2017      THE FOLLOWING TO BE COMPLETED BY THE COMMUNITY PHYSICIAN:    I concur with the findings described above from the [facility] encounter that this patient is homebound and in need of a skilled service.     Certifying Physician: _____________________________________      Printed Certifying Physician Name: _____________________________________    Date: _________________

## 2017-05-28 NOTE — PROGRESS NOTES
Problem: Mobility Impaired (Adult and Pediatric)  Goal: *Acute Goals and Plan of Care (Insert Text)  GOALS (1-4 days):  (1.)Mr. Desir will move from supine to sit and sit to supine in bed with STAND BY ASSIST.  (2.)Mr. Desir will transfer from bed to chair and chair to bed with STAND BY ASSIST using the least restrictive device. (3.)Mr. Desir will ambulate with STAND BY ASSIST for 200 feet with the least restrictive device. (4.)Mr. Desir will ambulate up/down 2 steps with left railing with CONTACT GUARD ASSIST with device if needed. (5.)Mr. Desir will increase right knee ROM to 5°-80°.   ________________________________________________________________________________________________      PHYSICAL THERAPY JOINT CAMP TKA: Daily Note, Treatment Day: 1st and AM 5/28/2017  INPATIENT: Hospital Day: 3  [place] / Product Type: Medicare /      NAME/AGE/GENDER: Jaimie Estrada is a 76 y.o. male    PRIMARY DIAGNOSIS:  Primary osteoarthritis of right knee [M17.11]              Procedure(s) and Anesthesia Type:     * RIGHT KNEE ARTHROPLASTY TOTAL - Spinal (Right)  ICD-10: Treatment Diagnosis:        · Pain in Right Knee (M25.561)  · Stiffness of Right Knee, Not elsewhere classified (M25.661)  · Difficulty in walking, Not elsewhere classified (R26.2)       ASSESSMENT:      Mr. Shy Christiansen presents with limited rom and strength of right LE as well as decreased functional mobility and gait s/p right tka. He plans to go home with HHPT. Pt progressing with ambulation. Pt progressing with out of bed activity. Pt ascends/descends three steps with min A. This section established at most recent assessment   PROBLEM LIST (Impairments causing functional limitations):  1. Decreased Strength  2. Decreased ADL/Functional Activities  3. Decreased Transfer Abilities  4. Decreased Ambulation Ability/Technique  5. Decreased Balance  6. Increased Pain  7.  Decreased Activity Tolerance  8. Decreased [place] with Home Exercise Program    INTERVENTIONS PLANNED: (Benefits and precautions of physical therapy have been discussed with the patient.)  1. Bed Mobility  2. Gait Training  3. Home Exercise Program (HEP)  4. Therapeutic Exercise/Strengthening  5. Transfer Training  6. Range of Motion: active/assisted/passive  7. Therapeutic Activities  8. Group Therapy      TREATMENT PLAN: Frequency/Duration: Follow patient BID   to address above goals. Rehabilitation Potential For Stated Goals: GOOD      RECOMMENDED REHABILITATION/EQUIPMENT: (at time of discharge pending progress): Continue Skilled Therapy and Home Health: Physical Therapy. HISTORY:   History of Present Injury/Illness (Reason for Referral):  S/p right tka  Past Medical History/Comorbidities:   Mr. Kathy Paige  has a past medical history of Aortic valve replaced (12/2014); Arrhythmia; Arthritis; CAD (coronary artery disease); Carotid artery stenosis; Chronic kidney disease; Chronic pain; Coagulation disorder ([address] Coronary atherosclerosis of native coronary vessel; Dyslipidemia (12/5/2016); Former smoker; GERD (gastroesophageal reflux disease); Hypertension; Left anterior fascicular block; Platelet inhibition due to Plavix; and Pre-diabetes. He also has no past medical history of Adverse effect of anesthesia; Aneurysm ([address] Asthma; Autoimmune disease ([address] Cancer ([address] Chronic obstructive pulmonary disease ([address] Diabetes ([address] Difficult intubation; Heart failure ([address] Liver disease; Malignant hyperthermia due to anesthesia; Nausea & vomiting; Pseudocholinesterase deficiency; Psychiatric disorder; PUD (peptic ulcer disease); Seizures ([address] Stroke [facility]); Thromboembolus ([address] Thyroid disease; Unspecified adverse effect of anesthesia; or Unspecified sleep apnea.   Mr. Kathy Paige  has a past surgical history that includes bunionectomy (Bilateral); vascular surgery procedure unlist; carpal tunnel release (Bilateral); cataract removal (Bilateral); coronary artery bypass graft (10/14/2014); heart catheterization; and colonoscopy. Social History/Living Environment:   Home Environment: Private residence  # Steps to Enter: 2 (2 steps)  One/Two Story Residence: Two story, live on 1st floor  # of Interior Steps: 14  Living Alone: No  Support Systems: Spouse/Significant Other/Partner  Patient Expects to be Discharged to[de-identified] Private residence  Current DME Used/Available at Home: Shower chair  Tub or Shower Type: Shower  Prior Level of Function/Work/Activity:  independent   Number of Personal Factors/Comorbidities that affect the Plan of Care: 1-2: MODERATE COMPLEXITY   EXAMINATION:   Most Recent Physical Functioning:                RLE AROM  R Knee Flexion: 85  R Knee Extension: 10                                           Weight Bearing Status  Right Side Weight Bearing: As tolerated            Braces/Orthotics:      Right Knee Cold  Type: Cryocuff       Body Structures Involved:  1. Bones  2. Joints  3. Muscles  4. Ligaments Body Functions Affected:  1. Movement Related Activities and Participation Affected:  1. Mobility   Number of elements that affect the Plan of Care: 1-2: LOW COMPLEXITY   CLINICAL PRESENTATION:   Presentation: Stable and uncomplicated: LOW COMPLEXITY   CLINICAL DECISION MAKIN [place] [address] AM-PAC 6 Clicks   Basic Mobility Inpatient Short Form  How much difficulty does the patient currently have. .. Unable A Lot A Little None   1. Turning over in bed (including adjusting bedclothes, sheets and blankets)? [ ] 1   [ ] 2   [X] 3   [ ] 4   2. Sitting down on and standing up from a chair with arms ( e.g., wheelchair, bedside commode, etc.)   [ ] 1   [ ] 2   [X] 3   [ ] 4   3. Moving from lying on back to sitting on the side of the bed? [ ] 1   [ ] 2   [X] 3   [ ] 4   How much help from another person does the patient currently need. .. Total A Lot A Little None   4.   Moving to and from a bed to a chair (including a wheelchair)? [ ] 1   [ ] 2   [X] 3   [ ] 4   5. Need to walk in hospital room? [ ] 1   [ ] 2   [X] 3   [ ] 4   6. Climbing 3-5 steps with a railing? [ ] 1   [ ] 2   [X] 3   [ ] 4   © 2007, Trustees of [address] [place] [address] [address] [address], under license to [employer]. All rights reserved       Score:  Initial: 18 Most Recent: X (Date: -- )     Interpretation of Tool:  Represents activities that are increasingly more difficult (i.e. Bed mobility, Transfers, Gait). Score 24 23 22-20 19-15 14-10 9-7 6       Modifier CH CI CJ CK CL CM CN         · Mobility - Walking and Moving Around:               - CURRENT STATUS:    CK - 40%-59% impaired, limited or restricted               - GOAL STATUS:           CJ - 20%-39% impaired, limited or restricted               - D/C STATUS:                       ---------------To be determined---------------  [place] / Product Type: Medicare /       Medical Necessity:     · Patient is expected to demonstrate progress in strength, range of motion and balance to decrease assistance required with theraputic exercises and functional mobility. Reason for Services/Other Comments:  · Patient continues to require present interventions due to patient's inability to perform theraputic exercises and functional mobility independently. Use of outcome tool(s) and clinical judgement create a POC that gives a: Clear prediction of patient's progress: LOW COMPLEXITY                 TREATMENT:   (In addition to Assessment/Re-Assessment sessions the following treatments were rendered)      Pre-treatment Symptoms/Complaints:  none  Pain: Initial:   Pain Intensity 1: 5  Pain Location 1: Knee  Pain Orientation 1: Right  Post Session:  0      Gait Training (  15 minutes):  Gait training to improve and/or restore physical functioning as related to mobility.   Ambulated   with   using a       Therapeutic Exercise: (  15 minutes): Exercises per grid below to improve mobility. Date:   5/27/17 Date:   5/28/17 Date:      ACTIVITY/EXERCISE AM PM AM PM AM PM   GROUP THERAPY  [ ]  [ ]  Marie.Gaurang ]  [ ]  [ ]  [ ]   Ankle Pumps  10  15  20         Quad Sets  10  15  20         Gluteal Sets  10 15   20         Hip ABd/ADduction  10 15   20         Straight Leg Raises  10  15  20         Knee Slides  10  15  20         Short Arc Quads    15  20         Long Arc Quads               Chair Slides    15  20                         B = bilateral; AA = active assistive; A = active; P = passive       Treatment/Session Assessment:         Response to Treatment:  Pt agreeable to therapy. Education:  [X] Home Exercises  [X] Fall Precautions  [ ] Hip Precautions [ ] Going Home Video  [] Knee/Hip Prosthesis Review  [X] Walker Management/Safety [ ] Adaptive Equipment as Needed         Interdisciplinary Collaboration:   · Physical Therapist, Registered Nurse and Rehabilitation Attendant     After treatment position/precautions:   · Up in chair, Bed/Chair-wheels locked, Bed in low position, Caregiver at bedside, Call light within reach and RN notified     Compliance with Program/Exercises: Will assess as treatment progresses. No questions. Recommendations/Intent for next treatment session:  Treatment next visit will focus on increasing Mr. Desir's independence with bed mobility, transfers, gait training, strength/ROM exercises, modalities for pain, and patient education.        Total Treatment Duration:  PT Patient Time In/Time Out  Time In: 1030  Time Out: [address] [facility], PT

## 2017-05-28 NOTE — DISCHARGE INSTRUCTIONS
Total Knee Replacement: What to Expect at [address] [place] [address]    When you leave the hospital, you should be able to move around with a walker or crutches. But you will need someone to help you at home for the next few weeks or until you have more energy and can move around better. If there is no one to help you at home, you may go to a rehabilitation center. You will go home with a bandage and stitches or staples. Change the bandage as your doctor tells you to. Your doctor will remove your stitches or staples 10 to 21 days after your surgery. You may still have some mild pain, and the area may be swollen for 3 to 6 months after surgery. Your knee will continue to improve for 6 to 12 months. You will probably use a walker for 1 to 3 weeks and then use crutches. When you are ready, you can use a cane. You will probably be able to walk on your own in 4 to 8 weeks. You will need to do months of physical rehabilitation (rehab) after a knee replacement. Rehab will help you strengthen the muscles of the knee and help you regain movement. After you recover, your artificial knee will allow you to do normal daily activities with less pain or no pain at all. You may be able to hike, dance, ride a bike, and play golf. Talk to your doctor about whether you can do more strenuous activities. Always tell your caregivers that you have an artificial knee. How long it will take to walk on your own, return to normal activities, and go back to work depends on your health and how well your rehabilitation (rehab) program goes. The better you do with your rehab exercises, the quicker you will get your strength and movement back. This care sheet gives you a general idea about how long it will take for you to recover. But each person recovers at a different pace. Follow the steps below to get better as quickly as possible. How can you care for yourself at home? Activity  · Rest when you feel tired.  You may take a nap, but do not stay in bed all day. When you sit, use a chair with arms. You can use the arms to help you stand up. · Work with your physical therapist to find the best way to exercise. You may be able to take frequent, short walks using crutches or a walker. What you can do as your knee heals will depend on whether your new knee is cemented or uncemented. You may not be able to do certain things for a while if your new knee is uncemented. · After your knee has healed enough, you can do more strenuous activities with caution. ¨ You can golf, but use a golf cart, and do not wear shoes with spikes. ¨ You can bike on a flat road or on a stationary bike. Avoid biking up hills. ¨ Your doctor may suggest that you stay away from activities that put stress on your knee. These include tennis or badminton, squash or racquetball, contact sports like football, jumping (such as in basketball), jogging, or running. ¨ Avoid activities where you might fall. These include horseback riding, skiing, and mountain biking. · Do not sit for more than 1 hour at a time. Get up and walk around for a while before you sit again. If you must sit for a long time, prop up your leg with a chair or footstool. This will help you avoid swelling. · Ask your doctor when you can shower. You may need to take sponge baths until your stitches or staples have been removed. · Ask your doctor when you can drive again. It may take up to 8 weeks after knee replacement surgery before it is safe for you to drive. · When you get into a car, sit on the edge of the seat. Then pull in your legs, and turn to face the front. · You should be able to do many everyday activities 3 to 6 weeks after your surgery. You will probably need to take 4 to 16 weeks off from work. When you can go back to work depends on the type of work you do and how you feel. · Ask your doctor when it is okay for you to have sex.   · Do not lift anything heavier than 10 pounds and do not lift weights for 12 weeks. Diet  · By the time you leave the hospital, you should be eating your normal diet. If your stomach is upset, try bland, low-fat foods like plain rice, broiled chicken, toast, and yogurt. Your doctor may suggest that you take iron and vitamin supplements. · Drink plenty of fluids (unless your doctor tells you not to). · Eat healthy foods, and watch your portion sizes. Try to stay at your ideal weight. Too much weight puts more stress on your new knee. · You may notice that your bowel movements are not regular right after your surgery. This is common. Try to avoid constipation and straining with bowel movements. You may want to take a fiber supplement every day. If you have not had a bowel movement after a couple of days, ask your doctor about taking a mild laxative. Medicines  · Your doctor will tell you if and when you can restart your medicines. He or she will also give you instructions about taking any new medicines. · If you take blood thinners, such as warfarin (Coumadin), clopidogrel (Plavix), or aspirin, be sure to talk to your doctor. He or she will tell you if and when to start taking those medicines again. Make sure that you understand exactly what your doctor wants you to do. · Your doctor may give you a blood-thinning medicine to prevent blood clots. If you take a blood thinner, be sure you get instructions about how to take your medicine safely. Blood thinners can cause serious bleeding problems. This medicine could be in pill form or as a shot (injection). If a shot is necessary, your doctor will tell you how to do this. · Be safe with medicines. Take pain medicines exactly as directed. ¨ If the doctor gave you a prescription medicine for pain, take it as prescribed. ¨ If you are not taking a prescription pain medicine, ask your doctor if you can take an over-the-counter medicine. ¨ Plan to take your pain medicine 30 minutes before exercises.  It is easier to prevent pain before it starts than to stop it once it has started. · If you think your pain medicine is making you sick to your stomach:  ¨ Take your medicine after meals (unless your doctor has told you not to). ¨ Ask your doctor for a different pain medicine. · If your doctor prescribed antibiotics, take them as directed. Do not stop taking them just because you feel better. You need to take the full course of antibiotics. Incision care  · You will have a bandage over the cut (incision) and staples or stitches. Take the bandage off when your doctor says it is okay. · Your doctor will remove the staples or stitches 10 days to 3 weeks after the surgery and replace them with strips of tape. Leave the tape on for a week or until it falls off. Exercise  · Your rehab program will give you a number of exercises to do to help you get back your knee's range of motion and strength. Always do them as your therapist tells you. Ice and elevation  · For pain and swelling, put ice or a cold pack on the area for 10 to 20 minutes at a time. Put a thin cloth between the ice and your skin. Other instructions  · Continue to wear your support stockings as your doctor says. These help to prevent blood clots. The length of time that you will have to wear them depends on your activity level and the amount of swelling. · Wear medical alert jewelry that says you may need antibiotics before any procedure, including dental work. You can buy this at most drugstores. · You have metal pieces in your knee. These may set off some airport metal detectors. Carry a medical alert card that says you have an artificial joint, just in case. Follow-up care is a key part of your treatment and safety. Be sure to make and go to all appointments, and call your doctor if you are having problems. It's also a good idea to know your test results and keep a list of the medicines you take. When should you call for help?   Call 911 anytime you think you may need emergency care. For example, call if:  · You passed out (lost consciousness). · You have severe trouble breathing. · You have sudden chest pain and shortness of breath, or you cough up blood. Call your doctor now or seek immediate medical care if:  · You have signs of infection, such as:  ¨ Increased pain, swelling, warmth, or redness. ¨ Red streaks leading from the incision. ¨ Pus draining from the incision. ¨ A fever. · You have signs of a blood clot, such as:  ¨ Pain in your calf, back of the knee, thigh, or groin. ¨ Redness and swelling in your leg or groin. · Your incision comes open and begins to bleed, or the bleeding increases. · You have pain that does not get better after you take pain medicine. Watch closely for changes in your health, and be sure to contact your doctor if:  · You do not have a bowel movement after taking a laxative. Where can you learn more? Go to http://joann-drake.info/. Enter T786 in the search box to learn more about \"Total Knee Replacement: What to Expect at Home. \"  Current as of: 2016  Content Version: 11.2  © 8061-9196 [employer]. Care instructions adapted under license by [employer] (which disclaims liability or warranty for this information). If you have questions about a medical condition or this instruction, always ask your healthcare professional. [place] [address] any warranty or liability for your use of this information. [facility]   Patient Discharge Instructions    Rodrigo Villavicencio / [id number] : 1940    Admitted 2017 Discharged: 2017     IF YOU HAVE ANY PROBLEMS ONCE YOU ARE AT HOME CALL THE FOLLOWING NUMBERS:   Main office number: [phone]      Medications    · The medications you are to continue on are listed on the medication reconciliation sheet. · Narcotic pain medications as well as supplemental iron can cause constipation.  If this occurs try stopping the narcotic pain medication and/or the iron. · It is important that you take the medication exactly as they are prescribed. · Medications which increase your risk of blood clots are listed to stop for 5 weeks after surgery as well as medications or supplements which increase your risk of bleeding complications. · Keep your medication in the bottles provided by the pharmacist and keep a list of the medication names, dosages, and times to be taken in your wallet. · Do not take other medications without consulting your doctor. Important Information    Do NOT smoke as this will greatly increase your risk of infection! Resume your prehospital diet. If you have excessive nausea or vomitting call your doctor's office     Leg swelling and warmth is normal for 6 months after surgery. If you experience swelling in your leg elevate you leg while laying down with your toes above your heart. If you have sudden onset severe swelling with leg pain call our office. Use [employer] Hose stockings until we see you in the office for your follow up appointment. The stitches deep inside take approximately 6 months to dissolve. There will be sharp shooting, stinging and burning pain. This is normal and will resolve between 3-6 months after surgery. Difficulty sleeping is normal following total Knee and Hip replacement. You may try melatonin, an over-the-counter sleep aid or benadryl to help with sleep. Most patients will resume sleeping through the night 8 weeks after surgery. Home Physical Therapy is arranged. Home Health will contact you within 48 hrs of discharge that you have chosen. If you have not received a call within this time frame please contact that provider you chose. You should be given this information before you leave the hospital.     You are at a risk for falls. Use the rolling walker when walking.      Patients who have had a joint replacement should not drive if they are still taking narcotic pain mediation during the daytime hours. Most patients wean themselves off of pain medication within 2-5 weeks after surgery. When to Call the office    - If you have a temperature greater then 101  - Uncontrolled vomiting   - Loose control of your bladder or bowel function  - Are unable to bear any wieght   - Need a pain medication refill     Information obtained by :  I understand that if any problems occur once I am at home I am to contact my physician. I understand and acknowledge receipt of the instructions indicated above.                                                                                                                                            Physician's or R.N.'s Signature                                                                  Date/Time                                                                                                                                              Patient or Representative Signature                                                          Date/Time

## 2017-05-28 NOTE — PROGRESS NOTES
May 28, 2017         Post Op day: 2 Days Post-Op     Admit Date: 2017  Admit Diagnosis: Primary osteoarthritis of right knee [M17.11]    LAB:    Recent Results (from the past 24 hour(s))   PLEASE READ & DOCUMENT PPD TEST IN 24 HRS    Collection Time: 17  2:08 PM   Result Value Ref Range    PPD NEG Negative    mm Induration 0 mm   HEMOGLOBIN    Collection Time: 17  4:57 AM   Result Value Ref Range    HGB 13.1 (L) 13.6 - 17.2 g/dL     Vital Signs:    Patient Vitals for the past 8 hrs:   BP Temp Pulse Resp SpO2   17 0704 142/87 98.1 °F (36.7 °C) 86 18 91 %   17 0500 129/78 97.8 °F (36.6 °C) 86 16 90 %     Temp (24hrs), Av.6 °F (36.4 °C), Min:97.1 °F (36.2 °C), Max:98.1 °F (36.7 °C)    Body mass index is 37.24 kg/(m^2).   Pain Control:   Pain Assessment  Pain Scale 1: Numeric (0 - 10)  Pain Intensity 1: 5  Pain Onset 1: at rest  Pain Location 1: Knee  Pain Orientation 1: Right  Pain Description 1: Sore  Pain Intervention(s) 1: Medication (see MAR)    Subjective: Doing well, No complaints, No SOB, No Chest Pain, No Nausea or Vomitting     Objective: Vital Signs are Stable, No Acute Distress, Alert and Oriented, Dressing is Dry,  Neurovascular exam is normal.       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  Dispo       Signed By: Juan Castro MD

## 2017-05-30 ENCOUNTER — HOME CARE VISIT (OUTPATIENT)
Dept: SCHEDULING | Facility: HOME HEALTH | Age: 77
End: 2017-05-30
Payer: MEDICARE

## 2017-05-30 VITALS
HEART RATE: 90 BPM | TEMPERATURE: 100.4 F | OXYGEN SATURATION: 96 % | RESPIRATION RATE: 17 BRPM | SYSTOLIC BLOOD PRESSURE: 130 MMHG | DIASTOLIC BLOOD PRESSURE: 80 MMHG

## 2017-05-30 PROCEDURE — 3331090002 HH PPS REVENUE DEBIT

## 2017-05-30 PROCEDURE — 400013 HH SOC

## 2017-05-30 PROCEDURE — G0151 HHCP-SERV OF PT,EA 15 MIN: HCPCS

## 2017-05-30 PROCEDURE — 3331090001 HH PPS REVENUE CREDIT

## 2017-05-30 NOTE — ADVANCED PRACTICE NURSE
The patients overnight sat study reviewed with significant desaturations. Spoke with patient and he is willing to participate in sleep study at this time. Will order [facility] in near future.

## 2017-05-31 PROCEDURE — 3331090002 HH PPS REVENUE DEBIT

## 2017-05-31 PROCEDURE — 3331090001 HH PPS REVENUE CREDIT

## 2017-06-01 ENCOUNTER — HOME CARE VISIT (OUTPATIENT)
Dept: SCHEDULING | Facility: HOME HEALTH | Age: 77
End: 2017-06-01
Payer: MEDICARE

## 2017-06-01 PROCEDURE — 3331090002 HH PPS REVENUE DEBIT

## 2017-06-01 PROCEDURE — 3331090001 HH PPS REVENUE CREDIT

## 2017-06-01 PROCEDURE — G0157 HHC PT ASSISTANT EA 15: HCPCS

## 2017-06-02 VITALS
RESPIRATION RATE: 18 BRPM | HEART RATE: 68 BPM | TEMPERATURE: 97.5 F | SYSTOLIC BLOOD PRESSURE: 150 MMHG | DIASTOLIC BLOOD PRESSURE: 94 MMHG

## 2017-06-02 PROCEDURE — 3331090002 HH PPS REVENUE DEBIT

## 2017-06-02 PROCEDURE — 3331090001 HH PPS REVENUE CREDIT

## 2017-06-03 PROCEDURE — 3331090002 HH PPS REVENUE DEBIT

## 2017-06-03 PROCEDURE — 3331090001 HH PPS REVENUE CREDIT

## 2017-06-04 PROCEDURE — 3331090001 HH PPS REVENUE CREDIT

## 2017-06-04 PROCEDURE — 3331090002 HH PPS REVENUE DEBIT

## 2017-06-05 ENCOUNTER — HOME CARE VISIT (OUTPATIENT)
Dept: SCHEDULING | Facility: HOME HEALTH | Age: 77
End: 2017-06-05
Payer: MEDICARE

## 2017-06-05 VITALS
SYSTOLIC BLOOD PRESSURE: 144 MMHG | DIASTOLIC BLOOD PRESSURE: 82 MMHG | TEMPERATURE: 95.9 F | HEART RATE: 92 BPM | RESPIRATION RATE: 17 BRPM

## 2017-06-05 PROCEDURE — G0157 HHC PT ASSISTANT EA 15: HCPCS

## 2017-06-05 PROCEDURE — 3331090002 HH PPS REVENUE DEBIT

## 2017-06-05 PROCEDURE — 3331090001 HH PPS REVENUE CREDIT

## 2017-06-06 PROCEDURE — 3331090001 HH PPS REVENUE CREDIT

## 2017-06-06 PROCEDURE — 3331090002 HH PPS REVENUE DEBIT

## 2017-06-07 ENCOUNTER — HOME CARE VISIT (OUTPATIENT)
Dept: SCHEDULING | Facility: HOME HEALTH | Age: 77
End: 2017-06-07
Payer: MEDICARE

## 2017-06-07 VITALS
DIASTOLIC BLOOD PRESSURE: 78 MMHG | SYSTOLIC BLOOD PRESSURE: 120 MMHG | RESPIRATION RATE: 17 BRPM | TEMPERATURE: 96.8 F | HEART RATE: 66 BPM

## 2017-06-07 PROCEDURE — 3331090002 HH PPS REVENUE DEBIT

## 2017-06-07 PROCEDURE — G0157 HHC PT ASSISTANT EA 15: HCPCS

## 2017-06-07 PROCEDURE — 3331090001 HH PPS REVENUE CREDIT

## 2017-06-08 PROCEDURE — 3331090002 HH PPS REVENUE DEBIT

## 2017-06-08 PROCEDURE — 3331090001 HH PPS REVENUE CREDIT

## 2017-06-09 ENCOUNTER — HOME CARE VISIT (OUTPATIENT)
Dept: SCHEDULING | Facility: HOME HEALTH | Age: 77
End: 2017-06-09
Payer: MEDICARE

## 2017-06-09 PROCEDURE — G0157 HHC PT ASSISTANT EA 15: HCPCS

## 2017-06-09 PROCEDURE — 3331090001 HH PPS REVENUE CREDIT

## 2017-06-09 PROCEDURE — 3331090002 HH PPS REVENUE DEBIT

## 2017-06-10 PROCEDURE — 3331090001 HH PPS REVENUE CREDIT

## 2017-06-10 PROCEDURE — 3331090002 HH PPS REVENUE DEBIT

## 2017-06-11 VITALS
RESPIRATION RATE: 17 BRPM | DIASTOLIC BLOOD PRESSURE: 82 MMHG | HEART RATE: 71 BPM | SYSTOLIC BLOOD PRESSURE: 130 MMHG | TEMPERATURE: 97 F

## 2017-06-11 PROCEDURE — 3331090002 HH PPS REVENUE DEBIT

## 2017-06-11 PROCEDURE — 3331090001 HH PPS REVENUE CREDIT

## 2017-06-12 ENCOUNTER — HOME CARE VISIT (OUTPATIENT)
Dept: SCHEDULING | Facility: HOME HEALTH | Age: 77
End: 2017-06-12
Payer: MEDICARE

## 2017-06-12 VITALS
OXYGEN SATURATION: 98 % | TEMPERATURE: 97 F | SYSTOLIC BLOOD PRESSURE: 166 MMHG | HEART RATE: 76 BPM | RESPIRATION RATE: 16 BRPM | DIASTOLIC BLOOD PRESSURE: 99 MMHG

## 2017-06-12 PROCEDURE — G0151 HHCP-SERV OF PT,EA 15 MIN: HCPCS

## 2017-06-12 PROCEDURE — 3331090001 HH PPS REVENUE CREDIT

## 2017-06-12 PROCEDURE — 3331090002 HH PPS REVENUE DEBIT

## 2017-06-13 PROCEDURE — 3331090002 HH PPS REVENUE DEBIT

## 2017-06-13 PROCEDURE — 3331090001 HH PPS REVENUE CREDIT

## 2017-06-14 ENCOUNTER — HOME CARE VISIT (OUTPATIENT)
Dept: SCHEDULING | Facility: HOME HEALTH | Age: 77
End: 2017-06-14
Payer: MEDICARE

## 2017-06-14 VITALS
RESPIRATION RATE: 16 BRPM | SYSTOLIC BLOOD PRESSURE: 150 MMHG | TEMPERATURE: 97 F | DIASTOLIC BLOOD PRESSURE: 79 MMHG | OXYGEN SATURATION: 98 % | HEART RATE: 81 BPM

## 2017-06-14 PROCEDURE — 3331090001 HH PPS REVENUE CREDIT

## 2017-06-14 PROCEDURE — A4649 SURGICAL SUPPLIES: HCPCS

## 2017-06-14 PROCEDURE — 3331090002 HH PPS REVENUE DEBIT

## 2017-06-14 PROCEDURE — G0151 HHCP-SERV OF PT,EA 15 MIN: HCPCS

## 2017-06-14 PROCEDURE — A6258 TRANSPARENT FILM >16<=48 IN: HCPCS

## 2017-06-15 PROCEDURE — 3331090002 HH PPS REVENUE DEBIT

## 2017-06-15 PROCEDURE — 3331090001 HH PPS REVENUE CREDIT

## 2017-06-16 PROCEDURE — 3331090001 HH PPS REVENUE CREDIT

## 2017-06-16 PROCEDURE — 3331090002 HH PPS REVENUE DEBIT

## 2017-06-17 PROCEDURE — 3331090002 HH PPS REVENUE DEBIT

## 2017-06-17 PROCEDURE — 3331090001 HH PPS REVENUE CREDIT

## 2017-06-18 PROCEDURE — 3331090002 HH PPS REVENUE DEBIT

## 2017-06-18 PROCEDURE — 3331090001 HH PPS REVENUE CREDIT

## 2017-06-19 ENCOUNTER — HOSPITAL ENCOUNTER (OUTPATIENT)
Dept: PHYSICAL THERAPY | Age: 77
Discharge: HOME | End: 2017-06-19
Payer: MEDICARE

## 2017-06-19 PROCEDURE — A4649 SURGICAL SUPPLIES: HCPCS

## 2017-06-19 PROCEDURE — 3331090001 HH PPS REVENUE CREDIT

## 2017-06-19 PROCEDURE — A6258 TRANSPARENT FILM >16<=48 IN: HCPCS

## 2017-06-19 PROCEDURE — G8978 MOBILITY CURRENT STATUS: HCPCS

## 2017-06-19 PROCEDURE — 97161 PT EVAL LOW COMPLEX 20 MIN: CPT

## 2017-06-19 PROCEDURE — G8979 MOBILITY GOAL STATUS: HCPCS

## 2017-06-19 PROCEDURE — 97140 MANUAL THERAPY 1/> REGIONS: CPT

## 2017-06-19 PROCEDURE — 3331090002 HH PPS REVENUE DEBIT

## 2017-06-19 NOTE — PROGRESS NOTES
Tereso Perea  : [facility], [facility]  [address], [address], [place]  [phone]   [phone]       OUTPATIENT PHYSICAL THERAPY:Initial Assessment 2017    ICD-10: Treatment Diagnosis: Stiffness of right knee, not elsewhere classified (M25.661), Effusion, left knee (M25.462), Pain in right knee (M25.561), Difficulty in walking, not elsewhere classified (R26.2), Aftercare following joint replacement surgery (Z47.1)  Precautions/Allergies:   Poison ivy extract   Fall Risk Score: 2 (? 5 = High Risk)  MD Orders: PT eval and treat MEDICAL/REFERRING DIAGNOSIS:  total right knee replacement    DATE OF ONSET: 17  REFERRING PHYSICIAN: Gregory Simmons., *  RETURN PHYSICIAN APPOINTMENT: 17     ASSESSMENT:  Mr. Florentino Chan presents approx 3.5 weeks s/p R TKA. Pt demonstrates postural dysfunction and has a leg length discrepancy with R LE longer than the L. He has visible joint edema, decreased range of motion and strength, gait impairments, decreased ability to perform functional transfers such as sit<>stand, and balance impairments. His pain and impairments are causing difficulty standing, walking and sitting long periods of time, difficulty with navigating stairs. Pt will benefit from skilled PT services to address impairments and maximize his function. PROBLEM LIST (Impacting functional limitations):  1. Decreased Strength  2. Decreased ADL/Functional Activities  3. Decreased Transfer Abilities  4. Decreased Ambulation Ability/Technique  5. Decreased Balance  6. Increased Pain  7. Decreased Activity Tolerance  8. Decreased Flexibility/Joint Mobility  9. Edema/Girth INTERVENTIONS PLANNED:  1. Balance Exercise  2. Cold  3. Heat  4. Home Exercise Program (HEP)  5. Manual Therapy  6. Neuromuscular Re-education/Strengthening  7. Range of Motion (ROM)  8.  Therapeutic Exercise/Strengthening   TREATMENT PLAN:  Effective Dates: 17 TO 8/25/17. Frequency/Duration: 2 times a week for 8 weeks  GOALS: (Goals have been discussed and agreed upon with patient.)  Short-Term Functional Goals: Time Frame: 4 weeks  1. Pt will be I and compliant with initial HEP  2. Pt will demo R knee AROM of at least 8-110 degrees  3. Pt will demo increased B LE muscle strength by at least 1/2 grade where needed  4. Pt will demo improved symmetrical weight bearing with sit<>stand  5. Pt will walk with no cane at least 75% of the time  Discharge Goals: Time Frame: 8 weeks  1. Pt will demo R knee AROM of at least 3-120 for normal function and gait mechanics  2. Pt will demo WFL B LE strength for improved transfers and ability to squat/bend  3. Pt will walk with no cane 100% of the time with symmetrical weight bearing and appropriate mechanics  4. Pt will navigate stairs with reciprocal pattern and minimal use of UEs 100% of the time  5. Pt will perform single leg stance for at least 10s bilaterally to minimize fall risk  6. Pt will perform house and yardwork as needed with maximum 10% restriction or modification  7. Pt will understand long-term HEP and maintenance of L LE strength for future L TKA  Rehabilitation Potential For Stated Goals: Good  Regarding Oralia Desir's therapy, I certify that the treatment plan above will be carried out by a therapist or under their direction. Thank you for this referral,  Johnney Osgood, PT     Referring Physician Signature: Arash Jeana, *              Date                    The information in this section was collected on 6/19/17 (except where otherwise noted). HISTORY:   History of Present Injury/Illness (Reason for Referral): Davia Gaucher presents s/p R TKA on 5/26/17. Pt states he has been having episodes of knee buckling for about 5 years and went to see Dr. Jason Pablo, and it was decided upon to have TKA. Pt was in the hospital for 2 days, had home health for ~3 weeks.   Pt states he has an appointment coming up for his L knee will likely have it replaced this fall. Pain location: R knee, quads, sore in adductor area  Pain levels - Current: 3/10  At worst: 4/10  Description: Discomfort, sore   Increases pain:  Laying supine or sidelying, extension stretch, sitting long periods of time   Decreases pain: Medication    Past Medical History/Comorbidities:   Mr. Elliot Stockton  has a past medical history of Aortic valve replaced (12/2014); Arrhythmia; Arthritis; CAD (coronary artery disease); Carotid artery stenosis; Chronic kidney disease; Chronic pain; Coagulation disorder ([facility] [address] Coronary atherosclerosis of native coronary vessel; Dyslipidemia (12/5/2016); Former smoker; GERD (gastroesophageal reflux disease); Hypertension; Left anterior fascicular block; Platelet inhibition due to Plavix; and Pre-diabetes. Mr. Elliot Stockton  has a past surgical history that includes bunionectomy (Bilateral); vascular surgery procedure unlist; carpal tunnel release (Bilateral); cataract removal (Bilateral); coronary artery bypass graft (10/14/2014); heart catheterization; and colonoscopy. Social History/Living Environment:   Lives with wife, single-story house with stairs to [facility] room above garage, 2 VANCE from garage with railing, 5 VANCE in front of house    Prior Level of Function/Work/Activity: Retired , enjoys working out at [employer], enjoys golf    Functional Limitations: Not driving, sitting long periods of time, difficulty with stairs and walking long distances    Current Medications:       Current Outpatient Prescriptions:     metoprolol succinate (TOPROL XL) 25 mg XL tablet, Take 1 Tab by mouth every morning. Indications: unknown why takes, Disp: 7 Tab, Rfl: 0    amoxicillin (AMOXIL) 500 mg capsule, Take 500 mg by mouth. Prior to dental appointment, Disp: , Rfl:     acetaminophen 500 mg cap, Take 1,000 mg by mouth every six (6) hours.  Indications: Fever, Pain, Disp: , Rfl:     HYDROmorphone (DILAUDID) 2 mg tablet, Take 1 Tab by mouth every four (4) hours as needed. Max Daily Amount: 12 mg. (Patient taking differently: Take 1 Tab by mouth every four (4) hours as needed for Pain.), Disp: 40 Tab, Rfl: 0    clopidogrel (PLAVIX) 75 mg tab, Take 1 Tab by mouth every morning., Disp: 90 Tab, Rfl: 3    losartan-hydroCHLOROthiazide (HYZAAR) 100-12.5 mg per tablet, Take 1 Tab by mouth every morning. Indications: Hypertension, Disp: 90 Tab, Rfl: 3    rosuvastatin (CRESTOR) 20 mg tablet, Take 1 Tab by mouth nightly. Indications: hypercholesterolemia, Disp: 90 Tab, Rfl: 3    aspirin delayed-release 81 mg tablet, Take 81 mg by mouth nightly., Disp: , Rfl:     Glucosamine &Chondroit-MV-Min3 [id number] mg tab, Take  by mouth every morning. 2 tablets every morning   Stop seven days prior to surgery per anesthesia protocol., Disp: , Rfl:     ranitidine (ZANTAC) 150 mg tablet, Take 150 mg by mouth as needed for Indigestion. Take day of surgery per anesthesia protocol. Non-Formulary. Instructed to bring to the hospital on the DOS, in the original bottle, and give to nurse.   Indications: gastroesophageal reflux disease, Disp: , Rfl:    Date Last Reviewed:  6/19/2017     Number of Personal Factors/Comorbidities that affect the Plan of Care: 0: LOW COMPLEXITY   EXAMINATION:     Observation: Visible joint edema R knee, incision healing well, many steri-strips still in place    Postural Assessment: Genu varus on L, leg length discrepancy can be noted in standing with R longer than L, decreased weight bearing on R LE     Palpation:  Tightness and some tenderness lateral quads and ITB on R    ROM:   Knee AROM R L   Flexion 100 126   Extension (12) 0     Strength:   LE MMT R L   Hip Flexion 4 5   Hip ER 4+ 4+   Hip IR 5 5   Hip Abduction 4 4+   Hip Extension 4- 4   Knee Flexion 4- 5   Knee Extension 5 5   Ankle DF 5 5   Ankle PF 2+ -     Functional Measures:   Gait: Walks with cane, lateral lean to the L, good daja and step length  Sit<>stand: Extends R LE out and decreases weight bearing  Stairs:  Step-to leading L up and leading R down with 1 railing and cane in L hand    Special Tests:   Patellar mobility: mild restriction lateral/medial; unable to assess superior/inferior    Balance:  SLS: R: 7s L: 3s   Body Structures Involved:  1. Joints  2. Muscles Body Functions Affected:  1. Neuromusculoskeletal  2. Movement Related Activities and Participation Affected:  1. General Tasks and Demands  2. Mobility  3. Domestic Life  4. Community, Social and [employer] [place]   Number of elements that affect the Plan of Care: 4+: HIGH COMPLEXITY   CLINICAL PRESENTATION:   Presentation: Stable and uncomplicated: LOW COMPLEXITY   CLINICAL DECISION MAKING:   Outcome Measure: Tool Used: Lower Extremity Functional Scale (LEFS)  Score:  Initial: 32/80 Most Recent: X/80 (Date: -- )   Interpretation of Score: 20 questions each scored on a 5 point scale with 0 representing \"extreme difficulty or unable to perform\" and 4 representing \"no difficulty\". The lower the score, the greater the functional disability. 80/80 represents no disability. Minimal detectable change is 9 points. Score 80 79-63 62-48 47-32 31-16 15-1 0   Modifier CH CI CJ CK CL CM CN         Tool Used: Timed Up and Go (TUG)  Score:  Initial: 15 seconds Most Recent: X seconds (Date: -- )   Interpretation of Score: The test measures, in seconds, the time taken by an individual to stand up from a standard arm chair (seat height 46 cm [18 in], arm height 65 cm [25.6 in]), walk a distance of 3 meters (118 in, approx 10 ft), turn, walk back to the chair and sit down. If the individual takes longer than 14 seconds to complete TUG, this indicates risk for falls. Score 7 7.5-10.5 11-14 14.5-17.5 18-21 21.5-24.5 25+   Modifier CH CI CJ CK CL CM CN     ?  Mobility - Walking and Moving Around:     - CURRENT STATUS: CK - 40%-59% impaired, limited or restricted    - GOAL STATUS: CI - 1%-19% impaired, limited or restricted    - D/C STATUS:  ---------------To be determined---------------      Medical Necessity:   · Patient demonstrates good rehab potential due to higher previous functional level. Reason for Services/Other Comments:  · Patient continues to require skilled intervention due to decreased functional status. Use of outcome tool(s) and clinical judgement create a POC that gives a: Clear prediction of patient's progress: LOW COMPLEXITY            TREATMENT:   (In addition to Assessment/Re-Assessment sessions the following treatments were rendered)      Present Symptoms/Complaints - 6/19/2017:  See hx  Pain: Initial:   Pain Intensity 1: 0  Post Session:  unchanged       Therapeutic Exercise: (2 minutes):  Exercises per grid below to improve mobility, strength and balance. Required moderate visual, verbal and tactile cues to promote proper body alignment, promote proper body posture and promote proper body mechanics. Progressed resistance, range and repetitions as indicated. Date:  6/19/17 Date:   Date:     Activity/Exercise Parameters Parameters Parameters   Pt education Findings, anatomy/pathology, POC, HEP                                             Manual Therapy: (10 minutes): was utilized and necessary because of the patient's restricted joint motion and restricted motion of soft tissue.   - STM R quads, ITB area    Treatment/Session Assessment:  Pt understood educational interventions and agreed with plan. Gave heel lift to try on the L side. · Compliance with Program/Exercises: Will assess as treatment progresses. · Recommendations/Intent for next treatment session: Next visit will focus on advancements to more challenging activities.     Total Treatment Duration:  PT Patient Time In/Time Out  Time In: 1020  Time Out: 1115    Sarthak Marvin, PT

## 2017-06-19 NOTE — PROGRESS NOTES
Ambulatory/Rehab Services H2 Model Falls Risk Assessment    Risk Factor Pts. ·   Confusion/Disorientation/Impulsivity  []    4 ·   Symptomatic Depression  []   2 ·   Altered Elimination  []   1 ·   Dizziness/Vertigo  []   1 ·   Gender (Male)  [x]   1 ·   Any administered antiepileptics (anticonvulsants):  []   2 ·   Any administered benzodiazepines:  []   1 ·   Visual Impairment (specify):  []   1 ·   Portable Oxygen Use  []   1 ·   Orthostatic ? BP  []   1 ·   History of Recent Falls (within 3 mos.)  []   5     Ability to Rise from Chair (choose one) Pts. ·   Ability to rise in a single movement  []   0 ·   Pushes up, successful in one attempt  [x]   1 ·   Multiple attempts, but successful  []   3 ·   Unable to rise without assistance  []   4   Total: (5 or greater = High Risk) 2     Falls Prevention Plan:   []                Physical Limitations to Exercise (specify):   []                Mobility Assistance Device (type):   []                Exercise/Equipment Adaptation (specify):    ©2010 [facility] of [address] Patent #[id number].  Federal Law prohibits the replication, distribution or use without written permission from [facility] Travel Notes

## 2017-06-21 ENCOUNTER — HOSPITAL ENCOUNTER (OUTPATIENT)
Dept: PHYSICAL THERAPY | Age: 77
Discharge: HOME | End: 2017-06-21
Payer: MEDICARE

## 2017-06-21 PROCEDURE — 97140 MANUAL THERAPY 1/> REGIONS: CPT

## 2017-06-21 PROCEDURE — 97110 THERAPEUTIC EXERCISES: CPT

## 2017-06-21 NOTE — PROGRESS NOTES
Akbar Pittman  : 1940 [facility]  [address], [address], [place]  [phone]   [phone]       OUTPATIENT PHYSICAL THERAPY:Daily Note 2017    ICD-10: Treatment Diagnosis: Stiffness of right knee, not elsewhere classified (M25.661), Effusion, left knee (M25.462), Pain in right knee (M25.561), Difficulty in walking, not elsewhere classified (R26.2), Aftercare following joint replacement surgery (Z47.1)  Precautions/Allergies:   Poison ivy extract   Fall Risk Score: 2 (? 5 = High Risk)  MD Orders: PT eval and treat MEDICAL/REFERRING DIAGNOSIS:  total right knee replacement    DATE OF ONSET: 17  REFERRING PHYSICIAN: Savanna Gudino., *  RETURN PHYSICIAN APPOINTMENT: 17     ASSESSMENT:  Mr. Celso Pacheco presents approx 3.5 weeks s/p R TKA. Pt demonstrates postural dysfunction and has a leg length discrepancy with R LE longer than the L. He has visible joint edema, decreased range of motion and strength, gait impairments, decreased ability to perform functional transfers such as sit<>stand, and balance impairments. His pain and impairments are causing difficulty standing, walking and sitting long periods of time, difficulty with navigating stairs. Pt will benefit from skilled PT services to address impairments and maximize his function. PROBLEM LIST (Impacting functional limitations):  1. Decreased Strength  2. Decreased ADL/Functional Activities  3. Decreased Transfer Abilities  4. Decreased Ambulation Ability/Technique  5. Decreased Balance  6. Increased Pain  7. Decreased Activity Tolerance  8. Decreased Flexibility/Joint Mobility  9. Edema/Girth INTERVENTIONS PLANNED:  1. Balance Exercise  2. Cold  3. Heat  4. Home Exercise Program (HEP)  5. Manual Therapy  6. Neuromuscular Re-education/Strengthening  7. Range of Motion (ROM)  8. Therapeutic Exercise/Strengthening   TREATMENT PLAN:  Effective Dates: 17 TO 17. Frequency/Duration: 2 times a week for 8 weeks  GOALS: (Goals have been discussed and agreed upon with patient.)  Short-Term Functional Goals: Time Frame: 4 weeks  1. Pt will be I and compliant with initial HEP  2. Pt will demo R knee AROM of at least 8-110 degrees  3. Pt will demo increased B LE muscle strength by at least 1/2 grade where needed  4. Pt will demo improved symmetrical weight bearing with sit<>stand  5. Pt will walk with no cane at least 75% of the time  Discharge Goals: Time Frame: 8 weeks  1. Pt will demo R knee AROM of at least 3-120 for normal function and gait mechanics  2. Pt will demo WFL B LE strength for improved transfers and ability to squat/bend  3. Pt will walk with no cane 100% of the time with symmetrical weight bearing and appropriate mechanics  4. Pt will navigate stairs with reciprocal pattern and minimal use of UEs 100% of the time  5. Pt will perform single leg stance for at least 10s bilaterally to minimize fall risk  6. Pt will perform house and yardwork as needed with maximum 10% restriction or modification  7. Pt will understand long-term HEP and maintenance of L LE strength for future L TKA  Rehabilitation Potential For Stated Goals: Good  Regarding Debbie Desir's therapy, I certify that the treatment plan above will be carried out by a therapist or under their direction. Thank you for this referral,  Ritu Cho, PT                 The information in this section was collected on 6/19/17 (except where otherwise noted). HISTORY:   History of Present Injury/Illness (Reason for Referral): Akbar Pittman presents s/p R TKA on 5/26/17. Pt states he has been having episodes of knee buckling for about 5 years and went to see Dr. Ervin Abbott, and it was decided upon to have TKA. Pt was in the hospital for 2 days, had home health for ~3 weeks. Pt states he has an appointment coming up for his L knee will likely have it replaced this fall.     Pain location: R knee, quads, sore in adductor area  Pain levels - Current: 3/10  At worst: 4/10  Description: Discomfort, sore   Increases pain:  Laying supine or sidelying, extension stretch, sitting long periods of time   Decreases pain: Medication    Past Medical History/Comorbidities:   Mr. Alma Hutchinson  has a past medical history of Aortic valve replaced (12/2014); Arrhythmia; Arthritis; CAD (coronary artery disease); Carotid artery stenosis; Chronic kidney disease; Chronic pain; Coagulation disorder ([facility] [address] Coronary atherosclerosis of native coronary vessel; Dyslipidemia (12/5/2016); Former smoker; GERD (gastroesophageal reflux disease); Hypertension; Left anterior fascicular block; Platelet inhibition due to Plavix; and Pre-diabetes. Mr. Alma Hutchinson  has a past surgical history that includes bunionectomy (Bilateral); vascular surgery procedure unlist; carpal tunnel release (Bilateral); cataract removal (Bilateral); coronary artery bypass graft (10/14/2014); heart catheterization; and colonoscopy. Social History/Living Environment:   Lives with wife, single-story [facility], 2 VANCE from garage with railing, 5 VANCE in front of house    Prior Level of Function/Work/Activity: Retired , enjoys working out at "[employer] enjoys golf    Functional Limitations: Not driving, sitting long periods of time, difficulty with stairs and walking long distances    Current Medications:       Current Outpatient Prescriptions:     metoprolol succinate (TOPROL XL) 25 mg XL tablet, Take 1 Tab by mouth every morning. Indications: unknown why takes, Disp: 7 Tab, Rfl: 0    amoxicillin (AMOXIL) 500 mg capsule, Take 500 mg by mouth. Prior to dental appointment, Disp: , Rfl:     acetaminophen 500 mg cap, Take 1,000 mg by mouth every six (6) hours. Indications: Fever, Pain, Disp: , Rfl:     HYDROmorphone (DILAUDID) 2 mg tablet, Take 1 Tab by mouth every four (4) hours as needed. Max Daily Amount: 12 mg. (Patient taking differently: Take 1 Tab by mouth every four (4) hours as needed for Pain.), Disp: 40 Tab, Rfl: 0    clopidogrel (PLAVIX) 75 mg tab, Take 1 Tab by mouth every morning., Disp: 90 Tab, Rfl: 3    losartan-hydroCHLOROthiazide (HYZAAR) 100-12.5 mg per tablet, Take 1 Tab by mouth every morning. Indications: Hypertension, Disp: 90 Tab, Rfl: 3    rosuvastatin (CRESTOR) 20 mg tablet, Take 1 Tab by mouth nightly. Indications: hypercholesterolemia, Disp: 90 Tab, Rfl: 3    aspirin delayed-release 81 mg tablet, Take 81 mg by mouth nightly., Disp: , Rfl:     Glucosamine &Chondroit-MV-Min3 510-132-84-0.5 mg tab, Take  by mouth every morning. 2 tablets every morning   Stop seven days prior to surgery per anesthesia protocol., Disp: , Rfl:     ranitidine (ZANTAC) 150 mg tablet, Take 150 mg by mouth as needed for Indigestion. Take day of surgery per anesthesia protocol. Non-Formulary. Instructed to bring to the hospital on the DOS, in the original bottle, and give to nurse.   Indications: gastroesophageal reflux disease, Disp: , Rfl:    Date Last Reviewed:  6/21/2017     EXAMINATION:     Observation: Visible joint edema R knee, incision healing well, many steri-strips still in place    Postural Assessment: Genu varus on L, leg length discrepancy can be noted in standing with R longer than L, decreased weight bearing on R LE     Palpation:  Tightness and some tenderness lateral quads and ITB on R    ROM:   Knee AROM R L   Flexion 100 126   Extension (12) 0     Strength:   LE MMT R L   Hip Flexion 4 5   Hip ER 4+ 4+   Hip IR 5 5   Hip Abduction 4 4+   Hip Extension 4- 4   Knee Flexion 4- 5   Knee Extension 5 5   Ankle DF 5 5   Ankle PF 2+ -     Functional Measures:   Gait: Walks with cane, lateral lean to the L, good daja and step length  Sit<>stand: Extends R LE out and decreases weight bearing  Stairs:  Step-to leading L up and leading R down with 1 railing and cane in L hand    Special Tests:   Patellar mobility: mild restriction lateral/medial; unable to assess superior/inferior    Balance:  SLS: R: 7s L: 3s   CLINICAL DECISION MAKING:   Outcome Measure: Tool Used: Lower Extremity Functional Scale (LEFS)  Score:  Initial: 32/80 Most Recent: X/80 (Date: -- )   Interpretation of Score: 20 questions each scored on a 5 point scale with 0 representing \"extreme difficulty or unable to perform\" and 4 representing \"no difficulty\". The lower the score, the greater the functional disability. 80/80 represents no disability. Minimal detectable change is 9 points. Score 80 79-63 62-48 47-32 31-16 15-1 0   Modifier CH CI CJ CK CL CM CN         Tool Used: Timed Up and Go (TUG)  Score:  Initial: 15 seconds Most Recent: X seconds (Date: -- )   Interpretation of Score: The test measures, in seconds, the time taken by an individual to stand up from a standard arm chair (seat height 46 cm [18 in], arm height 65 cm [25.6 in]), walk a distance of 3 meters (118 in, approx 10 ft), turn, walk back to the chair and sit down. If the individual takes longer than 14 seconds to complete TUG, this indicates risk for falls. Score 7 7.5-10.5 11-14 14.5-17.5 18-21 21.5-24.5 25+   Modifier CH CI CJ CK CL CM CN     ? Mobility - Walking and Moving Around:     - CURRENT STATUS: CK - 40%-59% impaired, limited or restricted    - GOAL STATUS: CI - 1%-19% impaired, limited or restricted    - D/C STATUS:  ---------------To be determined---------------      Medical Necessity:   · Patient demonstrates good rehab potential due to higher previous functional level. Reason for Services/Other Comments:  · Patient continues to require skilled intervention due to decreased functional status. TREATMENT:   (In addition to Assessment/Re-Assessment sessions the following treatments were rendered)      Present Symptoms/Complaints - 6/21/2017:  Pt states he feels fine, has been wearing his heel lift.   Pain: Initial:   Pain Intensity 1: 0  Post Session:  unchanged       Therapeutic Exercise: (30 minutes):  Exercises per grid below to improve mobility, strength and balance. Required moderate visual, verbal and tactile cues to promote proper body alignment, promote proper body posture and promote proper body mechanics. Progressed resistance, range and repetitions as indicated. Date:  6/19/17 Date:  6/21/17 Date:     Activity/Exercise Parameters Parameters Parameters   Pt education Findings, anatomy/pathology, POC, HEP     Recumbent bike      Prone knee flexion stretch  Long holds x3 and short holds x5 from therapist; 1x with belt for HEP    SLR  1.5# 3 x 10    LAQ  15x 5#, 15x 7.5#    Sit<>Stand  30x focus on symmetrical weight bearing              Manual Therapy: (15 minutes): was utilized and necessary because of the patient's restricted joint motion and restricted motion of soft tissue.   - STM R quads, ITB area    Treatment/Session Assessment:  Pt was able to get a good knee stretch in prone and showed pt how to do this with a belt at home. Therapeutic exercises were minimally challenging and weight was added; he will likely be able to progress very quickly with strengthening. · Compliance with Program/Exercises: Good  · Recommendations/Intent for next treatment session: Next visit will focus on advancements to more challenging activities.   · Variance from plan of care: None    Total Treatment Duration:  PT Patient Time In/Time Out  Time In: 1675  Time Out: [address] [place], PT

## 2017-06-26 ENCOUNTER — HOSPITAL ENCOUNTER (OUTPATIENT)
Dept: PHYSICAL THERAPY | Age: 77
Discharge: HOME | End: 2017-06-26
Payer: MEDICARE

## 2017-06-26 PROCEDURE — 97110 THERAPEUTIC EXERCISES: CPT

## 2017-06-26 NOTE — PROGRESS NOTES
Nitza Puckett  : 1940 [facility] at [facility]  [address], [address], [place] [address]  [phone]   [phone]       OUTPATIENT PHYSICAL THERAPY:Daily Note 2017    ICD-10: Treatment Diagnosis: Stiffness of right knee, not elsewhere classified (M25.661), Effusion, left knee (M25.462), Pain in right knee (M25.561), Difficulty in walking, not elsewhere classified (R26.2), Aftercare following joint replacement surgery (Z47.1)  Precautions/Allergies:   Poison ivy extract   Fall Risk Score: 2 (? 5 = High Risk)  MD Orders: PT eval and treat MEDICAL/REFERRING DIAGNOSIS:  total right knee replacement    DATE OF ONSET: 17  REFERRING PHYSICIAN: Kendrick Potts., *  RETURN PHYSICIAN APPOINTMENT: 17     ASSESSMENT:  Mr. Sunni Ryder presents approx 3.5 weeks s/p R TKA. Pt demonstrates postural dysfunction and has a leg length discrepancy with R LE longer than the L. He has visible joint edema, decreased range of motion and strength, gait impairments, decreased ability to perform functional transfers such as sit<>stand, and balance impairments. His pain and impairments are causing difficulty standing, walking and sitting long periods of time, difficulty with navigating stairs. Pt will benefit from skilled PT services to address impairments and maximize his function. PROBLEM LIST (Impacting functional limitations):  1. Decreased Strength  2. Decreased ADL/Functional Activities  3. Decreased Transfer Abilities  4. Decreased Ambulation Ability/Technique  5. Decreased Balance  6. Increased Pain  7. Decreased Activity Tolerance  8. Decreased Flexibility/Joint Mobility  9. Edema/Girth INTERVENTIONS PLANNED:  1. Balance Exercise  2. Cold  3. Heat  4. Home Exercise Program (HEP)  5. Manual Therapy  6. Neuromuscular Re-education/Strengthening  7. Range of Motion (ROM)  8. Therapeutic Exercise/Strengthening   TREATMENT PLAN:  Effective Dates: 17 TO 17. Frequency/Duration: 2 times a week for 8 weeks  GOALS: (Goals have been discussed and agreed upon with patient.)  Short-Term Functional Goals: Time Frame: 4 weeks  1. Pt will be I and compliant with initial HEP  2. Pt will demo R knee AROM of at least 8-110 degrees  3. Pt will demo increased B LE muscle strength by at least 1/2 grade where needed  4. Pt will demo improved symmetrical weight bearing with sit<>stand  5. Pt will walk with no cane at least 75% of the time  Discharge Goals: Time Frame: 8 weeks  1. Pt will demo R knee AROM of at least 3-120 for normal function and gait mechanics  2. Pt will demo WFL B LE strength for improved transfers and ability to squat/bend  3. Pt will walk with no cane 100% of the time with symmetrical weight bearing and appropriate mechanics  4. Pt will navigate stairs with reciprocal pattern and minimal use of UEs 100% of the time  5. Pt will perform single leg stance for at least 10s bilaterally to minimize fall risk  6. Pt will perform house and yardwork as needed with maximum 10% restriction or modification  7. Pt will understand long-term HEP and maintenance of L LE strength for future L TKA  Rehabilitation Potential For Stated Goals: Good  Regarding Saad Desir's therapy, I certify that the treatment plan above will be carried out by a therapist or under their direction. Thank you for this referral,  Joanne Cotton PT                 The information in this section was collected on 6/19/17 (except where otherwise noted). HISTORY:   History of Present Injury/Illness (Reason for Referral): Tereso Perea presents s/p R TKA on 5/26/17. Pt states he has been having episodes of knee buckling for about 5 years and went to see Dr. Gerald Murray, and it was decided upon to have TKA. Pt was in the hospital for 2 days, had home health for ~3 weeks. Pt states he has an appointment coming up for his L knee will likely have it replaced this fall.     Pain location: R knee, quads, sore in adductor area  Pain levels - Current: 3/10  At worst: 4/10  Description: Discomfort, sore   Increases pain:  Laying supine or sidelying, extension stretch, sitting long periods of time   Decreases pain: Medication    Past Medical History/Comorbidities:   Mr. Amber Aguirre  has a past medical history of Aortic valve replaced (12/2014); Arrhythmia; Arthritis; CAD (coronary artery disease); Carotid artery stenosis; Chronic kidney disease; Chronic pain; Coagulation disorder ([facility] [address] Coronary atherosclerosis of native coronary vessel; Dyslipidemia (12/5/2016); Former smoker; GERD (gastroesophageal reflux disease); Hypertension; Left anterior fascicular block; Platelet inhibition due to Plavix; and Pre-diabetes. Mr. Amber Aguirre  has a past surgical history that includes bunionectomy (Bilateral); vascular surgery procedure unlist; carpal tunnel release (Bilateral); cataract removal (Bilateral); coronary artery bypass graft (10/14/2014); heart catheterization; and colonoscopy. Social History/Living Environment:   Lives with wife, single-story [facility], 2 VANCE from garage with railing, 5 [address] in front of house    Prior Level of Function/Work/Activity: Retired , enjoys working out at [employer], enjoys golf    Functional Limitations: Not driving, sitting long periods of time, difficulty with stairs and walking long distances    Current Medications:       Current Outpatient Prescriptions:     metoprolol succinate (TOPROL XL) 25 mg XL tablet, Take 1 Tab by mouth every morning. Indications: unknown why takes, Disp: 7 Tab, Rfl: 0    amoxicillin (AMOXIL) 500 mg capsule, Take 500 mg by mouth. Prior to dental appointment, Disp: , Rfl:     acetaminophen 500 mg cap, Take 1,000 mg by mouth every six (6) hours. Indications: Fever, Pain, Disp: , Rfl:     HYDROmorphone (DILAUDID) 2 mg tablet, Take 1 Tab by mouth every four (4) hours as needed. Max Daily Amount: 12 mg. (Patient taking differently: Take 1 Tab by mouth every four (4) hours as needed for Pain.), Disp: 40 Tab, Rfl: 0    clopidogrel (PLAVIX) 75 mg tab, Take 1 Tab by mouth every morning., Disp: 90 Tab, Rfl: 3    losartan-hydroCHLOROthiazide (HYZAAR) 100-12.5 mg per tablet, Take 1 Tab by mouth every morning. Indications: Hypertension, Disp: 90 Tab, Rfl: 3    rosuvastatin (CRESTOR) 20 mg tablet, Take 1 Tab by mouth nightly. Indications: hypercholesterolemia, Disp: 90 Tab, Rfl: 3    aspirin delayed-release 81 mg tablet, Take 81 mg by mouth nightly., Disp: , Rfl:     Glucosamine &Chondroit-MV-Min3 866-079-31-0.5 mg tab, Take  by mouth every morning. 2 tablets every morning   Stop seven days prior to surgery per anesthesia protocol., Disp: , Rfl:     ranitidine (ZANTAC) 150 mg tablet, Take 150 mg by mouth as needed for Indigestion. Take day of surgery per anesthesia protocol. Non-Formulary. Instructed to bring to the hospital on the DOS, in the original bottle, and give to nurse.   Indications: gastroesophageal reflux disease, Disp: , Rfl:    Date Last Reviewed:  6/26/2017     EXAMINATION:     Observation: Visible joint edema R knee, incision healing well, many steri-strips still in place    Postural Assessment: Genu varus on L, leg length discrepancy can be noted in standing with R longer than L, decreased weight bearing on R LE     Palpation:  Tightness and some tenderness lateral quads and ITB on R    ROM:   Knee AROM R L   Flexion 100 126   Extension (12) 0     Strength:   LE MMT R L   Hip Flexion 4 5   Hip ER 4+ 4+   Hip IR 5 5   Hip Abduction 4 4+   Hip Extension 4- 4   Knee Flexion 4- 5   Knee Extension 5 5   Ankle DF 5 5   Ankle PF 2+ -     Functional Measures:   Gait: Walks with cane, lateral lean to the L, good daja and step length  Sit<>stand: Extends R LE out and decreases weight bearing  Stairs:  Step-to leading L up and leading R down with 1 railing and cane in L hand    Special Tests:   Patellar mobility: mild restriction lateral/medial; unable to assess superior/inferior    Balance:  SLS: R: 7s L: 3s   CLINICAL DECISION MAKING:   Outcome Measure: Tool Used: Lower Extremity Functional Scale (LEFS)  Score:  Initial: 32/80 Most Recent: X/80 (Date: -- )   Interpretation of Score: 20 questions each scored on a 5 point scale with 0 representing \"extreme difficulty or unable to perform\" and 4 representing \"no difficulty\". The lower the score, the greater the functional disability. 80/80 represents no disability. Minimal detectable change is 9 points. Score 80 79-63 62-48 47-32 31-16 15-1 0   Modifier CH CI CJ CK CL CM CN         Tool Used: Timed Up and Go (TUG)  Score:  Initial: 15 seconds Most Recent: X seconds (Date: -- )   Interpretation of Score: The test measures, in seconds, the time taken by an individual to stand up from a standard arm chair (seat height 46 cm [18 in], arm height 65 cm [25.6 in]), walk a distance of 3 meters (118 in, approx 10 ft), turn, walk back to the chair and sit down. If the individual takes longer than 14 seconds to complete TUG, this indicates risk for falls. Score 7 7.5-10.5 11-14 14.5-17.5 18-21 21.5-24.5 25+   Modifier CH CI CJ CK CL CM CN     ? Mobility - Walking and Moving Around:     - CURRENT STATUS: CK - 40%-59% impaired, limited or restricted    - GOAL STATUS: CI - 1%-19% impaired, limited or restricted    - D/C STATUS:  ---------------To be determined---------------      Medical Necessity:   · Patient demonstrates good rehab potential due to higher previous functional level. Reason for Services/Other Comments:  · Patient continues to require skilled intervention due to decreased functional status. TREATMENT:   (In addition to Assessment/Re-Assessment sessions the following treatments were rendered)      Present Symptoms/Complaints - 6/26/2017:  Pt states he has been sore in his knee joint since this weekend.   He states the knee flexion stretch w/ the belt is tough. Pain: Initial:   Pain Intensity 1: 3  Post Session:  unchanged       Therapeutic Exercise: (40 minutes):  Exercises per grid below to improve mobility, strength and balance. Required moderate visual, verbal and tactile cues to promote proper body alignment, promote proper body posture and promote proper body mechanics. Progressed resistance, range and repetitions as indicated. Date:  6/19/17 Date:  6/21/17 Date:  6/26/17   Activity/Exercise Parameters Parameters Parameters   Pt education Findings, anatomy/pathology, POC, HEP     Recumbent bike   L2 10 min   Prone knee flexion stretch  Long holds x3 and short holds x5 from therapist; 1x with belt for HEP HEP   SLR  1.5# 3 x 10 3# x20   LAQ  15x 5#, 15x 7.5# 10# x30   Sit<>Stand  30x focus on symmetrical weight bearing    Static balance exercise/ proprioception      Dynamic balance exercise/ propriopception   Standing on airex, tapping 8\" step with L x12   Wall slides   15x with 10x of them R foot back for more weight bearing   Extension stretch   W/ 10# weight on thigh for 5 min hold     Manual Therapy: (0 minutes - NOT TODAY): was utilized and necessary because of the patient's restricted joint motion and restricted motion of soft tissue.   - STM R quads, ITB area    Treatment/Session Assessment:  Pt's gait was much improved today after performing balance and proprioceptive exercises. He required contact guard for these exercises. Wall slides were difficult and required a lot of effort. Instructed to keep up stretching at home. · Compliance with Program/Exercises: Good  · Recommendations/Intent for next treatment session: Next visit will focus on advancements to more challenging activities.   · Variance from plan of care: None    Total Treatment Duration:  PT Patient Time In/Time Out  Time In: 1350  Time Out: [address], PT

## 2017-06-29 ENCOUNTER — HOSPITAL ENCOUNTER (OUTPATIENT)
Dept: PHYSICAL THERAPY | Age: 77
Discharge: HOME | End: 2017-06-29
Payer: MEDICARE

## 2017-06-29 PROCEDURE — 97110 THERAPEUTIC EXERCISES: CPT

## 2017-06-29 NOTE — PROGRESS NOTES
Cha Ballesteros  : 1940 [facility], [address], [place] [address]  [phone]   [phone]       OUTPATIENT PHYSICAL THERAPY:Daily Note 2017    ICD-10: Treatment Diagnosis: Stiffness of right knee, not elsewhere classified (M25.661), Effusion, left knee (M25.462), Pain in right knee (M25.561), Difficulty in walking, not elsewhere classified (R26.2), Aftercare following joint replacement surgery (Z47.1)  Precautions/Allergies:   Poison ivy extract   Fall Risk Score: 2 (? 5 = High Risk)  MD Orders: PT eval and treat MEDICAL/REFERRING DIAGNOSIS:  total right knee replacement    DATE OF ONSET: 17  REFERRING PHYSICIAN: Sagar Barros, *  RETURN PHYSICIAN APPOINTMENT: 17     ASSESSMENT:  Mr. Marlo Apley presents approx 3.5 weeks s/p R TKA. Pt demonstrates postural dysfunction and has a leg length discrepancy with R LE longer than the L. He has visible joint edema, decreased range of motion and strength, gait impairments, decreased ability to perform functional transfers such as sit<>stand, and balance impairments. His pain and impairments are causing difficulty standing, walking and sitting long periods of time, difficulty with navigating stairs. Pt will benefit from skilled PT services to address impairments and maximize his function. PROBLEM LIST (Impacting functional limitations):  1. Decreased Strength  2. Decreased ADL/Functional Activities  3. Decreased Transfer Abilities  4. Decreased Ambulation Ability/Technique  5. Decreased Balance  6. Increased Pain  7. Decreased Activity Tolerance  8. Decreased Flexibility/Joint Mobility  9. Edema/Girth INTERVENTIONS PLANNED:  1. Balance Exercise  2. Cold  3. Heat  4. Home Exercise Program (HEP)  5. Manual Therapy  6. Neuromuscular Re-education/Strengthening  7. Range of Motion (ROM)  8. Therapeutic Exercise/Strengthening   TREATMENT PLAN:  Effective Dates: 17 TO 17. Frequency/Duration: 2 times a week for 8 weeks  GOALS: (Goals have been discussed and agreed upon with patient.)  Short-Term Functional Goals: Time Frame: 4 weeks  1. Pt will be I and compliant with initial HEP  2. Pt will demo R knee AROM of at least 8-110 degrees  3. Pt will demo increased B LE muscle strength by at least 1/2 grade where needed  4. Pt will demo improved symmetrical weight bearing with sit<>stand  5. Pt will walk with no cane at least 75% of the time  Discharge Goals: Time Frame: 8 weeks  1. Pt will demo R knee AROM of at least 3-120 for normal function and gait mechanics  2. Pt will demo WFL B LE strength for improved transfers and ability to squat/bend  3. Pt will walk with no cane 100% of the time with symmetrical weight bearing and appropriate mechanics  4. Pt will navigate stairs with reciprocal pattern and minimal use of UEs 100% of the time  5. Pt will perform single leg stance for at least 10s bilaterally to minimize fall risk  6. Pt will perform house and yardwork as needed with maximum 10% restriction or modification  7. Pt will understand long-term HEP and maintenance of L LE strength for future L TKA  Rehabilitation Potential For Stated Goals: Good  Regarding Trey Desir's therapy, I certify that the treatment plan above will be carried out by a therapist or under their direction. Thank you for this referral,  Aarti Rocha, PT                 The information in this section was collected on 6/19/17 (except where otherwise noted). HISTORY:   History of Present Injury/Illness (Reason for Referral): Meredith Bateman presents s/p R TKA on 5/26/17. Pt states he has been having episodes of knee buckling for about 5 years and went to see Dr. Leopoldo Montiel, and it was decided upon to have TKA. Pt was in the hospital for 2 days, had home health for ~3 weeks. Pt states he has an appointment coming up for his L knee will likely have it replaced this fall.     Pain location: R knee, quads, sore in adductor area  Pain levels - Current: 3/10  At worst: 4/10  Description: Discomfort, sore   Increases pain:  Laying supine or sidelying, extension stretch, sitting long periods of time   Decreases pain: Medication    Past Medical History/Comorbidities:   Mr. Carly Bowman  has a past medical history of Aortic valve replaced (12/2014); Arrhythmia; Arthritis; CAD (coronary artery disease); Carotid artery stenosis; Chronic kidney disease; Chronic pain; Coagulation disorder ([facility] [address] Coronary atherosclerosis of native coronary vessel; Dyslipidemia (12/5/2016); Former smoker; GERD (gastroesophageal reflux disease); Hypertension; Left anterior fascicular block; Platelet inhibition due to Plavix; and Pre-diabetes. Mr. Carly Bowman  has a past surgical history that includes bunionectomy (Bilateral); vascular surgery procedure unlist; carpal tunnel release (Bilateral); cataract removal (Bilateral); coronary artery bypass graft (10/14/2014); heart catheterization; and colonoscopy. Social History/Living Environment:   Lives with wife, single-story [place] [facility], 2 VANCE from garage with railing, 5 VANCE in front of house    Prior Level of Function/Work/Activity: Retired , enjoys working out at [employer], enjoys golf    Functional Limitations: Not driving, sitting long periods of time, difficulty with stairs and walking long distances    Current Medications:       Current Outpatient Prescriptions:     metoprolol succinate (TOPROL XL) 25 mg XL tablet, Take 1 Tab by mouth every morning. Indications: unknown why takes, Disp: 7 Tab, Rfl: 0    amoxicillin (AMOXIL) 500 mg capsule, Take 500 mg by mouth. Prior to dental appointment, Disp: , Rfl:     acetaminophen 500 mg cap, Take 1,000 mg by mouth every six (6) hours. Indications: Fever, Pain, Disp: , Rfl:     HYDROmorphone (DILAUDID) 2 mg tablet, Take 1 Tab by mouth every four (4) hours as needed. Max Daily Amount: 12 mg. (Patient taking differently: Take 1 Tab by mouth every four (4) hours as needed for Pain.), Disp: 40 Tab, Rfl: 0    clopidogrel (PLAVIX) 75 mg tab, Take 1 Tab by mouth every morning., Disp: 90 Tab, Rfl: 3    losartan-hydroCHLOROthiazide (HYZAAR) 100-12.5 mg per tablet, Take 1 Tab by mouth every morning. Indications: Hypertension, Disp: 90 Tab, Rfl: 3    rosuvastatin (CRESTOR) 20 mg tablet, Take 1 Tab by mouth nightly. Indications: hypercholesterolemia, Disp: 90 Tab, Rfl: 3    aspirin delayed-release 81 mg tablet, Take 81 mg by mouth nightly., Disp: , Rfl:     Glucosamine &Chondroit-MV-Min3 473-882-41-0.5 mg tab, Take  by mouth every morning. 2 tablets every morning   Stop seven days prior to surgery per anesthesia protocol., Disp: , Rfl:     ranitidine (ZANTAC) 150 mg tablet, Take 150 mg by mouth as needed for Indigestion. Take day of surgery per anesthesia protocol. Non-Formulary. Instructed to bring to the hospital on the DOS, in the original bottle, and give to nurse.   Indications: gastroesophageal reflux disease, Disp: , Rfl:    Date Last Reviewed:  6/29/2017     EXAMINATION:     Observation: Visible joint edema R knee, incision healing well, many steri-strips still in place    Postural Assessment: Genu varus on L, leg length discrepancy can be noted in standing with R longer than L, decreased weight bearing on R LE     Palpation:  Tightness and some tenderness lateral quads and ITB on R    ROM:   Knee AROM R L   Flexion 100 126   Extension (12) 0     Strength:   LE MMT R L   Hip Flexion 4 5   Hip ER 4+ 4+   Hip IR 5 5   Hip Abduction 4 4+   Hip Extension 4- 4   Knee Flexion 4- 5   Knee Extension 5 5   Ankle DF 5 5   Ankle PF 2+ -     Functional Measures:   Gait: Walks with cane, lateral lean to the L, good daja and step length  Sit<>stand: Extends R LE out and decreases weight bearing  Stairs:  Step-to leading L up and leading R down with 1 railing and cane in L hand    Special Tests:   Patellar mobility: mild restriction lateral/medial; unable to assess superior/inferior    Balance:  SLS: R: 7s L: 3s   CLINICAL DECISION MAKING:   Outcome Measure: Tool Used: Lower Extremity Functional Scale (LEFS)  Score:  Initial: 32/80 Most Recent: X/80 (Date: -- )   Interpretation of Score: 20 questions each scored on a 5 point scale with 0 representing \"extreme difficulty or unable to perform\" and 4 representing \"no difficulty\". The lower the score, the greater the functional disability. 80/80 represents no disability. Minimal detectable change is 9 points. Score 80 79-63 62-48 47-32 31-16 15-1 0   Modifier CH CI CJ CK CL CM CN         Tool Used: Timed Up and Go (TUG)  Score:  Initial: 15 seconds Most Recent: X seconds (Date: -- )   Interpretation of Score: The test measures, in seconds, the time taken by an individual to stand up from a standard arm chair (seat height 46 cm [18 in], arm height 65 cm [25.6 in]), walk a distance of 3 meters (118 in, approx 10 ft), turn, walk back to the chair and sit down. If the individual takes longer than 14 seconds to complete TUG, this indicates risk for falls. Score 7 7.5-10.5 11-14 14.5-17.5 18-21 21.5-24.5 25+   Modifier CH CI CJ CK CL CM CN     ? Mobility - Walking and Moving Around:     - CURRENT STATUS: CK - 40%-59% impaired, limited or restricted    - GOAL STATUS: CI - 1%-19% impaired, limited or restricted    - D/C STATUS:  ---------------To be determined---------------      Medical Necessity:   · Patient demonstrates good rehab potential due to higher previous functional level. Reason for Services/Other Comments:  · Patient continues to require skilled intervention due to decreased functional status. TREATMENT:   (In addition to Assessment/Re-Assessment sessions the following treatments were rendered)      Present Symptoms/Complaints - 6/29/2017:  Pt states he saw the MD and everything went fine. He has been less sore.  He presents with improved symmetry with gait. Pain: Initial:   Pain Intensity 1: 1  Post Session:  unchanged       Therapeutic Exercise: (45 minutes):  Exercises per grid below to improve mobility, strength and balance. Required moderate visual, verbal and tactile cues to promote proper body alignment, promote proper body posture and promote proper body mechanics. Progressed resistance, range and repetitions as indicated. Date:  6/19/17 Date:  6/21/17 Date:  6/26/17 Date:  6/29/17   Activity/Exercise Parameters Parameters Parameters    Pt education Findings, anatomy/pathology, POC, HEP      Recumbent bike   L2 10 min L3 10 min   Prone knee flexion stretch  Long holds x3 and short holds x5 from therapist; 1x with belt for HEP HEP    SLR  1.5# 3 x 10 3# x20    LAQ  15x 5#, 15x 7.5# 10# x30 10# 40x   Sit<>Stand  30x focus on symmetrical weight bearing     Static balance exercise/ proprioception    - Tandem stance to fatigue, B  - Standing R foot on airex, L foot on step, for weight bearing on R   Dynamic balance exercise/ propriopception   Standing on airex, tapping 8\" step with L x12 - Marching on airex x20     Wall slides   15x with 10x of them R foot back for more weight bearing    Extension stretch   W/ 10# weight on thigh for 5 min hold Elevated for 10 min for passive stretch   Squats    15x   Stairs    1x up/down, reciprocal pattern   Gait training    In clinic 6 laps   Mini-lunge    For pre-gait training for initiation of knee flexion in early stance phase x15   Calf stretch    Incline board 10s x10     Manual Therapy: (0 minutes - NOT TODAY): was utilized and necessary because of the patient's restricted joint motion and restricted motion of soft tissue.   - STM R quads, ITB area    Treatment/Session Assessment:  Pt has much better symmetry with gait. He has difficulty with knee flexion during initial stance likely due to fatigue and weakness.   Eccentric control going down stairs was poor, however this was also done at the end of a hard session. · Compliance with Program/Exercises: Good  · Recommendations/Intent for next treatment session: Next visit will focus on advancements to more challenging activities.   · Variance from plan of care: None    Total Treatment Duration:  PT Patient Time In/Time Out  Time In: 1600  Time Out: Eriberto Pinon PT

## 2017-07-03 ENCOUNTER — APPOINTMENT (OUTPATIENT)
Dept: PHYSICAL THERAPY | Age: 77
End: 2017-07-03
Payer: MEDICARE

## 2017-07-05 ENCOUNTER — HOSPITAL ENCOUNTER (OUTPATIENT)
Dept: PHYSICAL THERAPY | Age: 77
Discharge: HOME | End: 2017-07-05
Payer: MEDICARE

## 2017-07-05 PROCEDURE — 97110 THERAPEUTIC EXERCISES: CPT

## 2017-07-05 NOTE — PROGRESS NOTES
Sesar Fall  : 1940 [facility] [address], [place] [address]  [phone]   [phone]       OUTPATIENT PHYSICAL THERAPY:Daily Note 2017    ICD-10: Treatment Diagnosis: Stiffness of right knee, not elsewhere classified (M25.661), Effusion, left knee (M25.462), Pain in right knee (M25.561), Difficulty in walking, not elsewhere classified (R26.2), Aftercare following joint replacement surgery (Z47.1)  Precautions/Allergies:   Poison ivy extract   Fall Risk Score: 2 (? 5 = High Risk)  MD Orders: PT eval and treat MEDICAL/REFERRING DIAGNOSIS:  total right knee replacement    DATE OF ONSET: 17  REFERRING PHYSICIAN: Radha Gonzalez., *  RETURN PHYSICIAN APPOINTMENT: 17     ASSESSMENT:  Mr. Erasmo Medina presents approx 3.5 weeks s/p R TKA. Pt demonstrates postural dysfunction and has a leg length discrepancy with R LE longer than the L. He has visible joint edema, decreased range of motion and strength, gait impairments, decreased ability to perform functional transfers such as sit<>stand, and balance impairments. His pain and impairments are causing difficulty standing, walking and sitting long periods of time, difficulty with navigating stairs. Pt will benefit from skilled PT services to address impairments and maximize his function. PROBLEM LIST (Impacting functional limitations):  1. Decreased Strength  2. Decreased ADL/Functional Activities  3. Decreased Transfer Abilities  4. Decreased Ambulation Ability/Technique  5. Decreased Balance  6. Increased Pain  7. Decreased Activity Tolerance  8. Decreased Flexibility/Joint Mobility  9. Edema/Girth INTERVENTIONS PLANNED:  1. Balance Exercise  2. Cold  3. Heat  4. Home Exercise Program (HEP)  5. Manual Therapy  6. Neuromuscular Re-education/Strengthening  7. Range of Motion (ROM)  8. Therapeutic Exercise/Strengthening   TREATMENT PLAN:  Effective Dates: 17 TO 17. Frequency/Duration: 2 times a week for 8 weeks  GOALS: (Goals have been discussed and agreed upon with patient.)  Short-Term Functional Goals: Time Frame: 4 weeks  1. Pt will be I and compliant with initial HEP  2. Pt will demo R knee AROM of at least 8-110 degrees  3. Pt will demo increased B LE muscle strength by at least 1/2 grade where needed  4. Pt will demo improved symmetrical weight bearing with sit<>stand  5. Pt will walk with no cane at least 75% of the time  Discharge Goals: Time Frame: 8 weeks  1. Pt will demo R knee AROM of at least 3-120 for normal function and gait mechanics  2. Pt will demo WFL B LE strength for improved transfers and ability to squat/bend  3. Pt will walk with no cane 100% of the time with symmetrical weight bearing and appropriate mechanics  4. Pt will navigate stairs with reciprocal pattern and minimal use of UEs 100% of the time  5. Pt will perform single leg stance for at least 10s bilaterally to minimize fall risk  6. Pt will perform house and yardwork as needed with maximum 10% restriction or modification  7. Pt will understand long-term HEP and maintenance of L LE strength for future L TKA  Rehabilitation Potential For Stated Goals: Good  Regarding Gennaro Desir's therapy, I certify that the treatment plan above will be carried out by a therapist or under their direction. Thank you for this referral,  Madie Morgan PT                 The information in this section was collected on 6/19/17 (except where otherwise noted). HISTORY:   History of Present Injury/Illness (Reason for Referral): Maria M Lee presents s/p R TKA on 5/26/17. Pt states he has been having episodes of knee buckling for about 5 years and went to see Dr. Toby Noble, and it was decided upon to have TKA. Pt was in the hospital for 2 days, had home health for ~3 weeks. Pt states he has an appointment coming up for his L knee will likely have it replaced this fall.     Pain location: R knee, quads, sore in adductor area  Pain levels - Current: 3/10  At worst: 4/10  Description: Discomfort, sore   Increases pain:  Laying supine or sidelying, extension stretch, sitting long periods of time   Decreases pain: Medication    Past Medical History/Comorbidities:   Mr. Sabi Bagley  has a past medical history of Aortic valve replaced (12/2014); Arrhythmia; Arthritis; CAD (coronary artery disease); Carotid artery stenosis; Chronic kidney disease; Chronic pain; Coagulation disorder ([facility] [address] Coronary atherosclerosis of native coronary vessel; Dyslipidemia (12/5/2016); Former smoker; GERD (gastroesophageal reflux disease); Hypertension; Left anterior fascicular block; Platelet inhibition due to Plavix; and Pre-diabetes. Mr. Sabi Bagley  has a past surgical history that includes bunionectomy (Bilateral); vascular surgery procedure unlist; carpal tunnel release (Bilateral); cataract removal (Bilateral); coronary artery bypass graft (10/14/2014); heart catheterization; and colonoscopy. Social History/Living Environment:   Lives with wife, single-story house with [facility] garage, 2 VANCE from garage with railing, 5 VANCE in front of house    Prior Level of Function/Work/Activity: Retired , enjoys working out at [employer], enjoys golf    Functional Limitations: Not driving, sitting long periods of time, difficulty with stairs and walking long distances    Current Medications:       Current Outpatient Prescriptions:     metoprolol succinate (TOPROL XL) 25 mg XL tablet, Take 1 Tab by mouth every morning. Indications: unknown why takes, Disp: 7 Tab, Rfl: 0    amoxicillin (AMOXIL) 500 mg capsule, Take 500 mg by mouth. Prior to dental appointment, Disp: , Rfl:     acetaminophen 500 mg cap, Take 1,000 mg by mouth every six (6) hours. Indications: Fever, Pain, Disp: , Rfl:     HYDROmorphone (DILAUDID) 2 mg tablet, Take 1 Tab by mouth every four (4) hours as needed. Max Daily Amount: 12 mg. (Patient taking differently: Take 1 Tab by mouth every four (4) hours as needed for Pain.), Disp: 40 Tab, Rfl: 0    clopidogrel (PLAVIX) 75 mg tab, Take 1 Tab by mouth every morning., Disp: 90 Tab, Rfl: 3    losartan-hydroCHLOROthiazide (HYZAAR) 100-12.5 mg per tablet, Take 1 Tab by mouth every morning. Indications: Hypertension, Disp: 90 Tab, Rfl: 3    rosuvastatin (CRESTOR) 20 mg tablet, Take 1 Tab by mouth nightly. Indications: hypercholesterolemia, Disp: 90 Tab, Rfl: 3    aspirin delayed-release 81 mg tablet, Take 81 mg by mouth nightly., Disp: , Rfl:     Glucosamine &Chondroit-MV-Min3 637-727-06-0.5 mg tab, Take  by mouth every morning. 2 tablets every morning   Stop seven days prior to surgery per anesthesia protocol., Disp: , Rfl:     ranitidine (ZANTAC) 150 mg tablet, Take 150 mg by mouth as needed for Indigestion. Take day of surgery per anesthesia protocol. Non-Formulary. Instructed to bring to the hospital on the DOS, in the original bottle, and give to nurse.   Indications: gastroesophageal reflux disease, Disp: , Rfl:    Date Last Reviewed:  7/5/2017     EXAMINATION:     Observation: Visible joint edema R knee, incision healing well, many steri-strips still in place    Postural Assessment: Genu varus on L, leg length discrepancy can be noted in standing with R longer than L, decreased weight bearing on R LE     Palpation:  Tightness and some tenderness lateral quads and ITB on R    ROM:   Knee AROM R L   Flexion 100 126   Extension (12) 0     Strength:   LE MMT R L   Hip Flexion 4 5   Hip ER 4+ 4+   Hip IR 5 5   Hip Abduction 4 4+   Hip Extension 4- 4   Knee Flexion 4- 5   Knee Extension 5 5   Ankle DF 5 5   Ankle PF 2+ -     Functional Measures:   Gait: Walks with cane, lateral lean to the L, good daja and step length  Sit<>stand: Extends R LE out and decreases weight bearing  Stairs:  Step-to leading L up and leading R down with 1 railing and cane in L hand    Special Tests:   Patellar mobility: mild restriction lateral/medial; unable to assess superior/inferior    Balance:  SLS: R: 7s L: 3s   CLINICAL DECISION MAKING:   Outcome Measure: Tool Used: Lower Extremity Functional Scale (LEFS)  Score:  Initial: 32/80 Most Recent: X/80 (Date: -- )   Interpretation of Score: 20 questions each scored on a 5 point scale with 0 representing \"extreme difficulty or unable to perform\" and 4 representing \"no difficulty\". The lower the score, the greater the functional disability. 80/80 represents no disability. Minimal detectable change is 9 points. Score 80 79-63 62-48 47-32 31-16 15-1 0   Modifier CH CI CJ CK CL CM CN         Tool Used: Timed Up and Go (TUG)  Score:  Initial: 15 seconds Most Recent: X seconds (Date: -- )   Interpretation of Score: The test measures, in seconds, the time taken by an individual to stand up from a standard arm chair (seat height 46 cm [18 in], arm height 65 cm [25.6 in]), walk a distance of 3 meters (118 in, approx 10 ft), turn, walk back to the chair and sit down. If the individual takes longer than 14 seconds to complete TUG, this indicates risk for falls. Score 7 7.5-10.5 11-14 14.5-17.5 18-21 21.5-24.5 25+   Modifier CH CI CJ CK CL CM CN     ? Mobility - Walking and Moving Around:     - CURRENT STATUS: CK - 40%-59% impaired, limited or restricted    - GOAL STATUS: CI - 1%-19% impaired, limited or restricted    - D/C STATUS:  ---------------To be determined---------------      Medical Necessity:   · Patient demonstrates good rehab potential due to higher previous functional level. Reason for Services/Other Comments:  · Patient continues to require skilled intervention due to decreased functional status. TREATMENT:   (In addition to Assessment/Re-Assessment sessions the following treatments were rendered)      Present Symptoms/Complaints - 7/5/2017:  Pt reports he has been doing his HEP.   Pain: Initial:   Pain Intensity 1: 1  Post Session: unchanged       Therapeutic Exercise: (45 minutes):  Exercises per grid below to improve mobility, strength and balance. Required moderate visual, verbal and tactile cues to promote proper body alignment, promote proper body posture and promote proper body mechanics. Progressed resistance, range and repetitions as indicated.      Date:  6/19/17 Date:  6/21/17 Date:  6/26/17 Date:  6/29/17 Date:  7/5/17   Activity/Exercise Parameters Parameters Parameters     Pt education Findings, anatomy/pathology, POC, HEP       Recumbent bike   L2 10 min L3 10 min L4 10 min   Prone knee flexion stretch  Long holds x3 and short holds x5 from therapist; 1x with belt for HEP HEP     SLR  1.5# 3 x 10 3# x20     LAQ  15x 5#, 15x 7.5# 10# x30 10# 40x    Sit<>Stand  30x focus on symmetrical weight bearing      Static balance exercise/ proprioception    - Tandem stance to fatigue, B  - Standing R foot on airex, L foot on step, for weight bearing on R - Narrow FREDRICK on foam     Dynamic balance exercise/ propriopception   Standing on airex, tapping 8\" step with L x12 - Marching on airex x20   - marching on airex 20x  - toe taps alt on cone 20x, min UE support   Wall slides   15x with 10x of them R foot back for more weight bearing     Extension stretch   W/ 10# weight on thigh for 5 min hold Elevated for 10 min for passive stretch W/ 10# weight on thigh for 5 min hold   Squats    15x    Stairs    1x up/down, reciprocal pattern    Gait training    In clinic 6 laps    Mini-lunge    For pre-gait training for initiation of knee flexion in early stance phase x15 x30   Calf stretch    Incline board 10s x10 Incline board long hold   Step up/down     4\" x30   Square fitter     S/s x30     Manual Therapy: (0 minutes - NOT TODAY): was utilized and necessary because of the patient's restricted joint motion and restricted motion of soft tissue.   - STM R quads, ITB area    Treatment/Session Assessment:  Pt has difficulty with eccentric control particularly with lunge exercise. Sheralyn Spare were difficult when lifting L LE due to impaired balance on R LE.    · Compliance with Program/Exercises: Good  · Recommendations/Intent for next treatment session: Next visit will focus on advancements to more challenging activities.   · Variance from plan of care: None    Total Treatment Duration:  PT Patient Time In/Time Out  Time In: 1005  Time Out: [address], PT

## 2017-07-07 ENCOUNTER — HOSPITAL ENCOUNTER (OUTPATIENT)
Dept: PHYSICAL THERAPY | Age: 77
Discharge: HOME | End: 2017-07-07
Payer: MEDICARE

## 2017-07-07 PROCEDURE — 97110 THERAPEUTIC EXERCISES: CPT

## 2017-07-07 NOTE — PROGRESS NOTES
Sandy Sherwood  : 1940 [facility]  [address], [address], [place]  [phone]   [phone]       OUTPATIENT PHYSICAL THERAPY:Daily Note 2017    ICD-10: Treatment Diagnosis: Stiffness of right knee, not elsewhere classified (M25.661), Effusion, left knee (M25.462), Pain in right knee (M25.561), Difficulty in walking, not elsewhere classified (R26.2), Aftercare following joint replacement surgery (Z47.1)  Precautions/Allergies:   Poison ivy extract   Fall Risk Score: 2 (? 5 = High Risk)  MD Orders: PT eval and treat MEDICAL/REFERRING DIAGNOSIS:  total right knee replacement    DATE OF ONSET: 17  REFERRING PHYSICIAN: Nyla Guerrero., *  RETURN PHYSICIAN APPOINTMENT: 17     ASSESSMENT:  Mr. Edison Sandhoff presents approx 3.5 weeks s/p R TKA. Pt demonstrates postural dysfunction and has a leg length discrepancy with R LE longer than the L. He has visible joint edema, decreased range of motion and strength, gait impairments, decreased ability to perform functional transfers such as sit<>stand, and balance impairments. His pain and impairments are causing difficulty standing, walking and sitting long periods of time, difficulty with navigating stairs. Pt will benefit from skilled PT services to address impairments and maximize his function. PROBLEM LIST (Impacting functional limitations):  1. Decreased Strength  2. Decreased ADL/Functional Activities  3. Decreased Transfer Abilities  4. Decreased Ambulation Ability/Technique  5. Decreased Balance  6. Increased Pain  7. Decreased Activity Tolerance  8. Decreased Flexibility/Joint Mobility  9. Edema/Girth INTERVENTIONS PLANNED:  1. Balance Exercise  2. Cold  3. Heat  4. Home Exercise Program (HEP)  5. Manual Therapy  6. Neuromuscular Re-education/Strengthening  7. Range of Motion (ROM)  8. Therapeutic Exercise/Strengthening   TREATMENT PLAN:  Effective Dates: 17 TO 17. Frequency/Duration: 2 times a week for 8 weeks  GOALS: (Goals have been discussed and agreed upon with patient.)  Short-Term Functional Goals: Time Frame: 4 weeks  1. Pt will be I and compliant with initial HEP  2. Pt will demo R knee AROM of at least 8-110 degrees  3. Pt will demo increased B LE muscle strength by at least 1/2 grade where needed  4. Pt will demo improved symmetrical weight bearing with sit<>stand  5. Pt will walk with no cane at least 75% of the time  Discharge Goals: Time Frame: 8 weeks  1. Pt will demo R knee AROM of at least 3-120 for normal function and gait mechanics  2. Pt will demo WFL B LE strength for improved transfers and ability to squat/bend  3. Pt will walk with no cane 100% of the time with symmetrical weight bearing and appropriate mechanics  4. Pt will navigate stairs with reciprocal pattern and minimal use of UEs 100% of the time  5. Pt will perform single leg stance for at least 10s bilaterally to minimize fall risk  6. Pt will perform house and yardwork as needed with maximum 10% restriction or modification  7. Pt will understand long-term HEP and maintenance of L LE strength for future L TKA  Rehabilitation Potential For Stated Goals: Good  Regarding Louis Desir's therapy, I certify that the treatment plan above will be carried out by a therapist or under their direction. Thank you for this referral,  Ivy Huerta, PT                 The information in this section was collected on 6/19/17 (except where otherwise noted). HISTORY:   History of Present Injury/Illness (Reason for Referral): Russ Arana presents s/p R TKA on 5/26/17. Pt states he has been having episodes of knee buckling for about 5 years and went to see Dr. Dallin Farmer, and it was decided upon to have TKA. Pt was in the hospital for 2 days, had home health for ~3 weeks. Pt states he has an appointment coming up for his L knee will likely have it replaced this fall.     Pain location: R knee, quads, sore in adductor area  Pain levels - Current: 3/10  At worst: 4/10  Description: Discomfort, sore   Increases pain:  Laying supine or sidelying, extension stretch, sitting long periods of time   Decreases pain: Medication    Past Medical History/Comorbidities:   Mr. Seth  has a past medical history of Aortic valve replaced (12/2014); Arrhythmia; Arthritis; CAD (coronary artery disease); Carotid artery stenosis; Chronic kidney disease; Chronic pain; Coagulation disorder ([facility] [address] Coronary atherosclerosis of native coronary vessel; Dyslipidemia (12/5/2016); Former smoker; GERD (gastroesophageal reflux disease); Hypertension; Left anterior fascicular block; Platelet inhibition due to Plavix; and Pre-diabetes. Mr. Seth  has a past surgical history that includes bunionectomy (Bilateral); vascular surgery procedure unlist; carpal tunnel release (Bilateral); cataract removal (Bilateral); coronary artery bypass graft (10/14/2014); heart catheterization; and colonoscopy. Social History/Living Environment:   Lives with wife, single-story house with stairs to bonus room above garage, 2 VANCE from garage with railing, 5 VANCE in front of house    Prior Level of Function/Work/Activity: Retired , enjoys working out at [employer], enjoys golf    Functional Limitations: Not driving, sitting long periods of time, difficulty with stairs and walking long distances    Current Medications:       Current Outpatient Prescriptions:     metoprolol succinate (TOPROL XL) 25 mg XL tablet, Take 1 Tab by mouth every morning. Indications: unknown why takes, Disp: 7 Tab, Rfl: 0    amoxicillin (AMOXIL) 500 mg capsule, Take 500 mg by mouth. Prior to dental appointment, Disp: , Rfl:     acetaminophen 500 mg cap, Take 1,000 mg by mouth every six (6) hours. Indications: Fever, Pain, Disp: , Rfl:     HYDROmorphone (DILAUDID) 2 mg tablet, Take 1 Tab by mouth every four (4) hours as needed. Max Daily Amount: 12 mg. (Patient taking differently: Take 1 Tab by mouth every four (4) hours as needed for Pain.), Disp: 40 Tab, Rfl: 0    clopidogrel (PLAVIX) 75 mg tab, Take 1 Tab by mouth every morning., Disp: 90 Tab, Rfl: 3    losartan-hydroCHLOROthiazide (HYZAAR) 100-12.5 mg per tablet, Take 1 Tab by mouth every morning. Indications: Hypertension, Disp: 90 Tab, Rfl: 3    rosuvastatin (CRESTOR) 20 mg tablet, Take 1 Tab by mouth nightly. Indications: hypercholesterolemia, Disp: 90 Tab, Rfl: 3    aspirin delayed-release 81 mg tablet, Take 81 mg by mouth nightly., Disp: , Rfl:     Glucosamine &Chondroit-MV-Min3 533-230-72-0.5 mg tab, Take  by mouth every morning. 2 tablets every morning   Stop seven days prior to surgery per anesthesia protocol., Disp: , Rfl:     ranitidine (ZANTAC) 150 mg tablet, Take 150 mg by mouth as needed for Indigestion. Take day of surgery per anesthesia protocol. Non-Formulary. Instructed to bring to the hospital on the DOS, in the original bottle, and give to nurse.   Indications: gastroesophageal reflux disease, Disp: , Rfl:    Date Last Reviewed:  7/7/2017     EXAMINATION:     Observation: Visible joint edema R knee, incision healing well, many steri-strips still in place    Postural Assessment: Genu varus on L, leg length discrepancy can be noted in standing with R longer than L, decreased weight bearing on R LE     Palpation:  Tightness and some tenderness lateral quads and ITB on R    ROM:   Knee AROM R L   Flexion 100 126   Extension (12) 0     Strength:   LE MMT R L   Hip Flexion 4 5   Hip ER 4+ 4+   Hip IR 5 5   Hip Abduction 4 4+   Hip Extension 4- 4   Knee Flexion 4- 5   Knee Extension 5 5   Ankle DF 5 5   Ankle PF 2+ -     Functional Measures:   Gait: Walks with cane, lateral lean to the L, good daja and step length  Sit<>stand: Extends R LE out and decreases weight bearing  Stairs:  Step-to leading L up and leading R down with 1 railing and cane in L hand    Special Tests:   Patellar mobility: mild restriction lateral/medial; unable to assess superior/inferior    Balance:  SLS: R: 7s L: 3s   CLINICAL DECISION MAKING:   Outcome Measure: Tool Used: Lower Extremity Functional Scale (LEFS)  Score:  Initial: 32/80 Most Recent: X/80 (Date: -- )   Interpretation of Score: 20 questions each scored on a 5 point scale with 0 representing \"extreme difficulty or unable to perform\" and 4 representing \"no difficulty\". The lower the score, the greater the functional disability. 80/80 represents no disability. Minimal detectable change is 9 points. Score 80 79-63 62-48 47-32 31-16 15-1 0   Modifier CH CI CJ CK CL CM CN         Tool Used: Timed Up and Go (TUG)  Score:  Initial: 15 seconds Most Recent: X seconds (Date: -- )   Interpretation of Score: The test measures, in seconds, the time taken by an individual to stand up from a standard arm chair (seat height 46 cm [18 in], arm height 65 cm [25.6 in]), walk a distance of 3 meters (118 in, approx 10 ft), turn, walk back to the chair and sit down. If the individual takes longer than 14 seconds to complete TUG, this indicates risk for falls. Score 7 7.5-10.5 11-14 14.5-17.5 18-21 21.5-24.5 25+   Modifier CH CI CJ CK CL CM CN     ? Mobility - Walking and Moving Around:     - CURRENT STATUS: CK - 40%-59% impaired, limited or restricted    - GOAL STATUS: CI - 1%-19% impaired, limited or restricted    - D/C STATUS:  ---------------To be determined---------------      Medical Necessity:   · Patient demonstrates good rehab potential due to higher previous functional level. Reason for Services/Other Comments:  · Patient continues to require skilled intervention due to decreased functional status. TREATMENT:   (In addition to Assessment/Re-Assessment sessions the following treatments were rendered)      Present Symptoms/Complaints - 7/7/2017:  Pt reports he did a lot of walking in stores the past couple days and is a bit sore.   Pain: Initial:   Pain Intensity 1: 1  Post Session:  unchanged       Therapeutic Exercise: (55 minutes):  Exercises per grid below to improve mobility, strength and balance. Required moderate visual, verbal and tactile cues to promote proper body alignment, promote proper body posture and promote proper body mechanics. Progressed resistance, range and repetitions as indicated.      Date:  6/19/17 Date:  6/21/17 Date:  6/26/17 Date:  6/29/17 Date:  7/5/17 Date:  7/7/17   Activity/Exercise Parameters Parameters Parameters      Pt education Findings, anatomy/pathology, POC, HEP        Recumbent bike   L2 10 min L3 10 min L4 10 min L4 10 min   Prone knee flexion stretch  Long holds x3 and short holds x5 from therapist; 1x with belt for HEP HEP      SLR  1.5# 3 x 10 3# x20      LAQ  15x 5#, 15x 7.5# 10# x30 10# 40x     Sit<>Stand  30x focus on symmetrical weight bearing       Static balance exercise/ proprioception    - Tandem stance to fatigue, B  - Standing R foot on airex, L foot on step, for weight bearing on R - Narrow FREDRICK on foam   Tandem stance to fatigue, B   Dynamic balance exercise/ propriopception   Standing on airex, tapping 8\" step with L x12 - Marching on airex x20   - marching on airex 20x  - toe taps alt on cone 20x, min UE support - walking up/down onto airex x30   Wall slides   15x with 10x of them R foot back for more weight bearing      Extension stretch   W/ 10# weight on thigh for 5 min hold Elevated for 10 min for passive stretch W/ 10# weight on thigh for 5 min hold W/ 15# weight for 10 min hold   Squats    15x     Stairs    1x up/down, reciprocal pattern     Gait training    In clinic 6 laps     Mini-lunge    For pre-gait training for initiation of knee flexion in early stance phase x15 x30 x30   Calf stretch    Incline board 10s x10 Incline board long hold    Step up/down     4\" x30 - Up: 6' x30  - Down: 4\" x30  - Sideways 6\" x30   Square fitter     S/s x30    Band walk      Sideways RTB 30'x2 each way     Manual Therapy: (0 minutes - NOT TODAY): was utilized and necessary because of the patient's restricted joint motion and restricted motion of soft tissue.   - STM R quads, ITB area    Treatment/Session Assessment:  Pt demonstrated improved eccentric control today with focus. Added hip abductor exercises today as patient complains of occasional soreness of the L hip. · Compliance with Program/Exercises: Good  · Recommendations/Intent for next treatment session: Next visit will focus on advancements to more challenging activities.   · Variance from plan of care: None    Total Treatment Duration:  PT Patient Time In/Time Out  Time In: 1300  Time Out: 205 Sebastián Landry, PT

## 2017-07-10 ENCOUNTER — HOSPITAL ENCOUNTER (OUTPATIENT)
Dept: PHYSICAL THERAPY | Age: 77
Discharge: HOME | End: 2017-07-10
Payer: MEDICARE

## 2017-07-10 PROCEDURE — 97110 THERAPEUTIC EXERCISES: CPT

## 2017-07-10 NOTE — PROGRESS NOTES
Pepe Hsu  : 1940 [facility]  [facility] [address], [place] [address]  [phone]   [phone]       OUTPATIENT PHYSICAL THERAPY:Daily Note 7/10/2017    ICD-10: Treatment Diagnosis: Stiffness of right knee, not elsewhere classified (M25.661), Effusion, left knee (M25.462), Pain in right knee (M25.561), Difficulty in walking, not elsewhere classified (R26.2), Aftercare following joint replacement surgery (Z47.1)  Precautions/Allergies:   Poison ivy extract   Fall Risk Score: 2 (? 5 = High Risk)  MD Orders: PT eval and treat MEDICAL/REFERRING DIAGNOSIS:  total right knee replacement    DATE OF ONSET: 17  REFERRING PHYSICIAN: Tyler Rogers, *  RETURN PHYSICIAN APPOINTMENT: 17     ASSESSMENT:  Mr. Harry Vazquez presents approx 3.5 weeks s/p R TKA. Pt demonstrates postural dysfunction and has a leg length discrepancy with R LE longer than the L. He has visible joint edema, decreased range of motion and strength, gait impairments, decreased ability to perform functional transfers such as sit<>stand, and balance impairments. His pain and impairments are causing difficulty standing, walking and sitting long periods of time, difficulty with navigating stairs. Pt will benefit from skilled PT services to address impairments and maximize his function. PROBLEM LIST (Impacting functional limitations):  1. Decreased Strength  2. Decreased ADL/Functional Activities  3. Decreased Transfer Abilities  4. Decreased Ambulation Ability/Technique  5. Decreased Balance  6. Increased Pain  7. Decreased Activity Tolerance  8. Decreased Flexibility/Joint Mobility  9. Edema/Girth INTERVENTIONS PLANNED:  1. Balance Exercise  2. Cold  3. Heat  4. Home Exercise Program (HEP)  5. Manual Therapy  6. Neuromuscular Re-education/Strengthening  7. Range of Motion (ROM)  8. Therapeutic Exercise/Strengthening   TREATMENT PLAN:  Effective Dates: 17 TO 17. Frequency/Duration: 2 times a week for 8 weeks  GOALS: (Goals have been discussed and agreed upon with patient.)  Short-Term Functional Goals: Time Frame: 4 weeks  1. Pt will be I and compliant with initial HEP  2. Pt will demo R knee AROM of at least 8-110 degrees  3. Pt will demo increased B LE muscle strength by at least 1/2 grade where needed  4. Pt will demo improved symmetrical weight bearing with sit<>stand  5. Pt will walk with no cane at least 75% of the time  Discharge Goals: Time Frame: 8 weeks  1. Pt will demo R knee AROM of at least 3-120 for normal function and gait mechanics  2. Pt will demo WFL B LE strength for improved transfers and ability to squat/bend  3. Pt will walk with no cane 100% of the time with symmetrical weight bearing and appropriate mechanics  4. Pt will navigate stairs with reciprocal pattern and minimal use of UEs 100% of the time  5. Pt will perform single leg stance for at least 10s bilaterally to minimize fall risk  6. Pt will perform house and yardwork as needed with maximum 10% restriction or modification  7. Pt will understand long-term HEP and maintenance of L LE strength for future L TKA  Rehabilitation Potential For Stated Goals: Good  Regarding Dalia Desir's therapy, I certify that the treatment plan above will be carried out by a therapist or under their direction. Thank you for this referral,  Sánchez Main, PT                 The information in this section was collected on 6/19/17 (except where otherwise noted). HISTORY:   History of Present Injury/Illness (Reason for Referral): Jennifer Falk presents s/p R TKA on 5/26/17. Pt states he has been having episodes of knee buckling for about 5 years and went to see Dr. Jessica Palencia, and it was decided upon to have TKA. Pt was in the hospital for 2 days, had home health for ~3 weeks. Pt states he has an appointment coming up for his L knee will likely have it replaced this fall.     Pain location: R knee, quads, sore in adductor area  Pain levels - Current: 3/10  At worst: 4/10  Description: Discomfort, sore   Increases pain:  Laying supine or sidelying, extension stretch, sitting long periods of time   Decreases pain: Medication    Past Medical History/Comorbidities:   Mr. Harry Vazquez  has a past medical history of Aortic valve replaced (12/2014); Arrhythmia; Arthritis; CAD (coronary artery disease); Carotid artery stenosis; Chronic kidney disease; Chronic pain; Coagulation disorder ([facility] [address] Coronary atherosclerosis of native coronary vessel; Dyslipidemia (12/5/2016); Former smoker; GERD (gastroesophageal reflux disease); Hypertension; Left anterior fascicular block; Platelet inhibition due to Plavix; and Pre-diabetes. Mr. Harry Vazquez  has a past surgical history that includes bunionectomy (Bilateral); vascular surgery procedure unlist; carpal tunnel release (Bilateral); cataract removal (Bilateral); coronary artery bypass graft (10/14/2014); heart catheterization; and colonoscopy. Social History/Living Environment:   Lives with wife, single-story house with stairs to bonus room above garage, 2 VANCE from garage with railing, 5 VANCE in front of house    Prior Level of Function/Work/Activity: Retired , enjoys working out at [employer], enjoys golf    Functional Limitations: Not driving, sitting long periods of time, difficulty with stairs and walking long distances    Current Medications:       Current Outpatient Prescriptions:     metoprolol succinate (TOPROL XL) 25 mg XL tablet, Take 1 Tab by mouth every morning. Indications: unknown why takes, Disp: 7 Tab, Rfl: 0    amoxicillin (AMOXIL) 500 mg capsule, Take 500 mg by mouth. Prior to dental appointment, Disp: , Rfl:     acetaminophen 500 mg cap, Take 1,000 mg by mouth every six (6) hours. Indications: Fever, Pain, Disp: , Rfl:     HYDROmorphone (DILAUDID) 2 mg tablet, Take 1 Tab by mouth every four (4) hours as needed. Max Daily Amount: 12 mg. (Patient taking differently: Take 1 Tab by mouth every four (4) hours as needed for Pain.), Disp: 40 Tab, Rfl: 0    clopidogrel (PLAVIX) 75 mg tab, Take 1 Tab by mouth every morning., Disp: 90 Tab, Rfl: 3    losartan-hydroCHLOROthiazide (HYZAAR) 100-12.5 mg per tablet, Take 1 Tab by mouth every morning. Indications: Hypertension, Disp: 90 Tab, Rfl: 3    rosuvastatin (CRESTOR) 20 mg tablet, Take 1 Tab by mouth nightly. Indications: hypercholesterolemia, Disp: 90 Tab, Rfl: 3    aspirin delayed-release 81 mg tablet, Take 81 mg by mouth nightly., Disp: , Rfl:     Glucosamine &Chondroit-MV-Min3 297-571-76-0.5 mg tab, Take  by mouth every morning. 2 tablets every morning   Stop seven days prior to surgery per anesthesia protocol., Disp: , Rfl:     ranitidine (ZANTAC) 150 mg tablet, Take 150 mg by mouth as needed for Indigestion. Take day of surgery per anesthesia protocol. Non-Formulary. Instructed to bring to the hospital on the DOS, in the original bottle, and give to nurse.   Indications: gastroesophageal reflux disease, Disp: , Rfl:    Date Last Reviewed:  7/10/2017     EXAMINATION:     Observation: Visible joint edema R knee, incision healing well, many steri-strips still in place    Postural Assessment: Genu varus on L, leg length discrepancy can be noted in standing with R longer than L, decreased weight bearing on R LE     Palpation:  Tightness and some tenderness lateral quads and ITB on R    ROM:                                6/19/17              7/10/17  Knee AROM R L R L   Flexion 100 126 113 -   Extension (12) 0 (9) -     Strength:   LE MMT R L   Hip Flexion 4 5   Hip ER 4+ 4+   Hip IR 5 5   Hip Abduction 4 4+   Hip Extension 4- 4   Knee Flexion 4- 5   Knee Extension 5 5   Ankle DF 5 5   Ankle PF 2+ -     Functional Measures:   Gait: Walks with cane, lateral lean to the L, good daja and step length  Sit<>stand: Extends R LE out and decreases weight bearing  Stairs:  Step-to leading L up and leading R down with 1 railing and cane in L hand    Special Tests:   Patellar mobility: mild restriction lateral/medial; unable to assess superior/inferior    Balance:  SLS: R: 7s L: 3s   CLINICAL DECISION MAKING:   Outcome Measure: Tool Used: Lower Extremity Functional Scale (LEFS)  Score:  Initial: 32/80 Most Recent: X/80 (Date: -- )   Interpretation of Score: 20 questions each scored on a 5 point scale with 0 representing \"extreme difficulty or unable to perform\" and 4 representing \"no difficulty\". The lower the score, the greater the functional disability. 80/80 represents no disability. Minimal detectable change is 9 points. Score 80 79-63 62-48 47-32 31-16 15-1 0   Modifier CH CI CJ CK CL CM CN         Tool Used: Timed Up and Go (TUG)  Score:  Initial: 15 seconds Most Recent: X seconds (Date: -- )   Interpretation of Score: The test measures, in seconds, the time taken by an individual to stand up from a standard arm chair (seat height 46 cm [18 in], arm height 65 cm [25.6 in]), walk a distance of 3 meters (118 in, approx 10 ft), turn, walk back to the chair and sit down. If the individual takes longer than 14 seconds to complete TUG, this indicates risk for falls. Score 7 7.5-10.5 11-14 14.5-17.5 18-21 21.5-24.5 25+   Modifier CH CI CJ CK CL CM CN     ? Mobility - Walking and Moving Around:     - CURRENT STATUS: CK - 40%-59% impaired, limited or restricted    - GOAL STATUS: CI - 1%-19% impaired, limited or restricted    - D/C STATUS:  ---------------To be determined---------------      Medical Necessity:   · Patient demonstrates good rehab potential due to higher previous functional level. Reason for Services/Other Comments:  · Patient continues to require skilled intervention due to decreased functional status.               TREATMENT:   (In addition to Assessment/Re-Assessment sessions the following treatments were rendered)      Present Symptoms/Complaints - 7/10/2017:  Pt reports he had a good weekend. He presents to PT today without his cane. Pain: Initial:   Pain Intensity 1: 1  Post Session:  unchanged       Therapeutic Exercise: (55 minutes):  Exercises per grid below to improve mobility, strength and balance. Required moderate visual, verbal and tactile cues to promote proper body alignment, promote proper body posture and promote proper body mechanics. Progressed resistance, range and repetitions as indicated.      Date:  6/21/17 Date:  6/26/17 Date:  6/29/17 Date:  7/5/17 Date:  7/7/17 Date:  7/10/17   Activity/Exercise Parameters Parameters       Pt education         Recumbent bike  L2 10 min L3 10 min L4 10 min L4 10 min L4 10 min   Prone knee flexion stretch Long holds x3 and short holds x5 from therapist; 1x with belt for HEP HEP       SLR 1.5# 3 x 10 3# x20       LAQ 15x 5#, 15x 7.5# 10# x30 10# 40x      Sit<>Stand 30x focus on symmetrical weight bearing        Static balance exercise/ proprioception   - Tandem stance to fatigue, B  - Standing R foot on airex, L foot on step, for weight bearing on R - Narrow FREDRICK on foam   Tandem stance to fatigue, B Standing on foam w/ ball toss   Dynamic balance exercise/ propriopception  Standing on airex, tapping 8\" step with L x12 - Marching on airex x20   - marching on airex 20x  - toe taps alt on cone 20x, min UE support - walking up/down onto airex x30 - Tandem walk 36' x2, HHA for one lap   Wall slides  15x with 10x of them R foot back for more weight bearing       Extension stretch  W/ 10# weight on thigh for 5 min hold Elevated for 10 min for passive stretch W/ 10# weight on thigh for 5 min hold W/ 15# weight for 10 min hold W/ 4# weight for 10 min hold   Squats   15x   2 x 10   Stairs   1x up/down, reciprocal pattern      Gait training   In clinic 6 laps      Mini-lunge   For pre-gait training for initiation of knee flexion in early stance phase x15 x30 x30 - 20x floor  - 20x onto bosu w/ UE support   Calf stretch   Incline board 10s x10 Incline board long hold     Step up/down    4\" x30 - Up: 6' x30  - Down: 4\" x30  - Sideways 6\" x30 Down: 6\" x30, UE support   Square fitter    S/s x30     Band walk     Sideways RTB 30'x2 each way Sideways RTB 30'x2 each way     Manual Therapy: (0 minutes - NOT TODAY): was utilized and necessary because of the patient's restricted joint motion and restricted motion of soft tissue.   - STM R quads, ITB area    Treatment/Session Assessment:  Pt is steadily improving with gait, eccentric control with stepping, proprioception and motor control with all exercises. · Compliance with Program/Exercises: Good  · Recommendations/Intent for next treatment session: Next visit will focus on advancements to more challenging activities.   · Variance from plan of care: None    Total Treatment Duration:  PT Patient Time In/Time Out  Time In: 1005  Time Out: Jenifer 43, PT

## 2017-07-12 ENCOUNTER — HOSPITAL ENCOUNTER (OUTPATIENT)
Dept: PHYSICAL THERAPY | Age: 77
Discharge: HOME | End: 2017-07-12
Payer: MEDICARE

## 2017-07-12 PROCEDURE — 97110 THERAPEUTIC EXERCISES: CPT

## 2017-07-12 NOTE — PROGRESS NOTES
Maria M Lee  : 1940 [facility]  [address], [address], [place]  [phone]   [phone]       OUTPATIENT PHYSICAL THERAPY:Daily Note 2017    ICD-10: Treatment Diagnosis: Stiffness of right knee, not elsewhere classified (M25.661), Effusion, left knee (M25.462), Pain in right knee (M25.561), Difficulty in walking, not elsewhere classified (R26.2), Aftercare following joint replacement surgery (Z47.1)  Precautions/Allergies:   Poison ivy extract   Fall Risk Score: 2 (? 5 = High Risk)  MD Orders: PT eval and treat MEDICAL/REFERRING DIAGNOSIS:  total right knee replacement    DATE OF ONSET: 17  REFERRING PHYSICIAN: Jeet Gil., *  RETURN PHYSICIAN APPOINTMENT: 17     ASSESSMENT:  Mr. Jade Matamoros presents approx 3.5 weeks s/p R TKA. Pt demonstrates postural dysfunction and has a leg length discrepancy with R LE longer than the L. He has visible joint edema, decreased range of motion and strength, gait impairments, decreased ability to perform functional transfers such as sit<>stand, and balance impairments. His pain and impairments are causing difficulty standing, walking and sitting long periods of time, difficulty with navigating stairs. Pt will benefit from skilled PT services to address impairments and maximize his function. PROBLEM LIST (Impacting functional limitations):  1. Decreased Strength  2. Decreased ADL/Functional Activities  3. Decreased Transfer Abilities  4. Decreased Ambulation Ability/Technique  5. Decreased Balance  6. Increased Pain  7. Decreased Activity Tolerance  8. Decreased Flexibility/Joint Mobility  9. Edema/Girth INTERVENTIONS PLANNED:  1. Balance Exercise  2. Cold  3. Heat  4. Home Exercise Program (HEP)  5. Manual Therapy  6. Neuromuscular Re-education/Strengthening  7. Range of Motion (ROM)  8. Therapeutic Exercise/Strengthening   TREATMENT PLAN:  Effective Dates: 17 TO 17. Frequency/Duration: 2 times a week for 8 weeks  GOALS: (Goals have been discussed and agreed upon with patient.)  Short-Term Functional Goals: Time Frame: 4 weeks  1. Pt will be I and compliant with initial HEP  2. Pt will demo R knee AROM of at least 8-110 degrees  3. Pt will demo increased B LE muscle strength by at least 1/2 grade where needed  4. Pt will demo improved symmetrical weight bearing with sit<>stand  5. Pt will walk with no cane at least 75% of the time  Discharge Goals: Time Frame: 8 weeks  1. Pt will demo R knee AROM of at least 3-120 for normal function and gait mechanics  2. Pt will demo WFL B LE strength for improved transfers and ability to squat/bend  3. Pt will walk with no cane 100% of the time with symmetrical weight bearing and appropriate mechanics  4. Pt will navigate stairs with reciprocal pattern and minimal use of UEs 100% of the time  5. Pt will perform single leg stance for at least 10s bilaterally to minimize fall risk  6. Pt will perform house and yardwork as needed with maximum 10% restriction or modification  7. Pt will understand long-term HEP and maintenance of L LE strength for future L TKA  Rehabilitation Potential For Stated Goals: Good  Regarding Radha Desir's therapy, I certify that the treatment plan above will be carried out by a therapist or under their direction. Thank you for this referral,  Kaveh Weldon PT                 The information in this section was collected on 6/19/17 (except where otherwise noted). HISTORY:   History of Present Injury/Illness (Reason for Referral): Spring Mejia presents s/p R TKA on 5/26/17. Pt states he has been having episodes of knee buckling for about 5 years and went to see Dr. Kelsy Miranda, and it was decided upon to have TKA. Pt was in the hospital for 2 days, had home health for ~3 weeks. Pt states he has an appointment coming up for his L knee will likely have it replaced this fall.     Pain location: R knee, quads, sore in adductor area  Pain levels - Current: 3/10  At worst: 4/10  Description: Discomfort, sore   Increases pain:  Laying supine or sidelying, extension stretch, sitting long periods of time   Decreases pain: Medication    Past Medical History/Comorbidities:   Mr. Lacy Avina  has a past medical history of Aortic valve replaced (12/2014); Arrhythmia; Arthritis; CAD (coronary artery disease); Carotid artery stenosis; Chronic kidney disease; Chronic pain; Coagulation disorder ([facility] [address] Coronary atherosclerosis of native coronary vessel; Dyslipidemia (12/5/2016); Former smoker; GERD (gastroesophageal reflux disease); Hypertension; Left anterior fascicular block; Platelet inhibition due to Plavix; and Pre-diabetes. Mr. Lacy Avina  has a past surgical history that includes bunionectomy (Bilateral); vascular surgery procedure unlist; carpal tunnel release (Bilateral); cataract removal (Bilateral); coronary artery bypass graft (10/14/2014); heart catheterization; and colonoscopy. Social History/Living Environment:   Lives with wife, single-story house with [facility] garage, 2 VANCE from garage with railing, 5 VANCE in front of house    Prior Level of Function/Work/Activity: Retired , enjoys working out at [employer], enjoys golf    Functional Limitations: Not driving, sitting long periods of time, difficulty with stairs and walking long distances    Current Medications:       Current Outpatient Prescriptions:     metoprolol succinate (TOPROL XL) 25 mg XL tablet, Take 1 Tab by mouth every morning. Indications: unknown why takes, Disp: 7 Tab, Rfl: 0    amoxicillin (AMOXIL) 500 mg capsule, Take 500 mg by mouth. Prior to dental appointment, Disp: , Rfl:     acetaminophen 500 mg cap, Take 1,000 mg by mouth every six (6) hours. Indications: Fever, Pain, Disp: , Rfl:     HYDROmorphone (DILAUDID) 2 mg tablet, Take 1 Tab by mouth every four (4) hours as needed. Max Daily Amount: 12 mg. (Patient taking differently: Take 1 Tab by mouth every four (4) hours as needed for Pain.), Disp: 40 Tab, Rfl: 0    clopidogrel (PLAVIX) 75 mg tab, Take 1 Tab by mouth every morning., Disp: 90 Tab, Rfl: 3    losartan-hydroCHLOROthiazide (HYZAAR) 100-12.5 mg per tablet, Take 1 Tab by mouth every morning. Indications: Hypertension, Disp: 90 Tab, Rfl: 3    rosuvastatin (CRESTOR) 20 mg tablet, Take 1 Tab by mouth nightly. Indications: hypercholesterolemia, Disp: 90 Tab, Rfl: 3    aspirin delayed-release 81 mg tablet, Take 81 mg by mouth nightly., Disp: , Rfl:     Glucosamine &Chondroit-MV-Min3 332-505-90-0.5 mg tab, Take  by mouth every morning. 2 tablets every morning   Stop seven days prior to surgery per anesthesia protocol., Disp: , Rfl:     ranitidine (ZANTAC) 150 mg tablet, Take 150 mg by mouth as needed for Indigestion. Take day of surgery per anesthesia protocol. Non-Formulary. Instructed to bring to the hospital on the DOS, in the original bottle, and give to nurse.   Indications: gastroesophageal reflux disease, Disp: , Rfl:    Date Last Reviewed:  7/12/2017     EXAMINATION:     Observation: Visible joint edema R knee, incision healing well, many steri-strips still in place    Postural Assessment: Genu varus on L, leg length discrepancy can be noted in standing with R longer than L, decreased weight bearing on R LE     Palpation:  Tightness and some tenderness lateral quads and ITB on R    ROM:                                6/19/17              7/10/17  Knee AROM R L R L   Flexion 100 126 113 -   Extension (12) 0 (9) -     Strength:   LE MMT R L   Hip Flexion 4 5   Hip ER 4+ 4+   Hip IR 5 5   Hip Abduction 4 4+   Hip Extension 4- 4   Knee Flexion 4- 5   Knee Extension 5 5   Ankle DF 5 5   Ankle PF 2+ -     Functional Measures:   Gait: Walks with cane, lateral lean to the L, good daja and step length  Sit<>stand: Extends R LE out and decreases weight bearing  Stairs:  Step-to leading L up and leading R down with 1 railing and cane in L hand    Special Tests:   Patellar mobility: mild restriction lateral/medial; unable to assess superior/inferior    Balance:  SLS: R: 7s L: 3s   CLINICAL DECISION MAKING:   Outcome Measure: Tool Used: Lower Extremity Functional Scale (LEFS)  Score:  Initial: 32/80 Most Recent: X/80 (Date: -- )   Interpretation of Score: 20 questions each scored on a 5 point scale with 0 representing \"extreme difficulty or unable to perform\" and 4 representing \"no difficulty\". The lower the score, the greater the functional disability. 80/80 represents no disability. Minimal detectable change is 9 points. Score 80 79-63 62-48 47-32 31-16 15-1 0   Modifier CH CI CJ CK CL CM CN         Tool Used: Timed Up and Go (TUG)  Score:  Initial: 15 seconds Most Recent: X seconds (Date: -- )   Interpretation of Score: The test measures, in seconds, the time taken by an individual to stand up from a standard arm chair (seat height 46 cm [18 in], arm height 65 cm [25.6 in]), walk a distance of 3 meters (118 in, approx 10 ft), turn, walk back to the chair and sit down. If the individual takes longer than 14 seconds to complete TUG, this indicates risk for falls. Score 7 7.5-10.5 11-14 14.5-17.5 18-21 21.5-24.5 25+   Modifier CH CI CJ CK CL CM CN     ? Mobility - Walking and Moving Around:     - CURRENT STATUS: CK - 40%-59% impaired, limited or restricted    - GOAL STATUS: CI - 1%-19% impaired, limited or restricted    - D/C STATUS:  ---------------To be determined---------------      Medical Necessity:   · Patient demonstrates good rehab potential due to higher previous functional level. Reason for Services/Other Comments:  · Patient continues to require skilled intervention due to decreased functional status.               TREATMENT:   (In addition to Assessment/Re-Assessment sessions the following treatments were rendered)      Present Symptoms/Complaints - 7/12/2017:  Pt reports he is a little sore on the lateral part of his R knee. Pain: Initial:   Pain Intensity 1: 1  Post Session:  unchanged       Therapeutic Exercise: (55 minutes):  Exercises per grid below to improve mobility, strength and balance. Required moderate visual, verbal and tactile cues to promote proper body alignment, promote proper body posture and promote proper body mechanics. Progressed resistance, range and repetitions as indicated.      Date:  6/29/17 Date:  7/5/17 Date:  7/7/17 Date:  7/10/17 Date:  7/12/17   Activity/Exercise        Pt education        Recumbent bike L3 10 min L4 10 min L4 10 min L4 10 min L4 10 min   LAQ 10# 40x       Static balance exercise/ proprioception - Tandem stance to fatigue, B  - Standing R foot on airex, L foot on step, for weight bearing on R - Narrow FREDRICK on foam   Tandem stance to fatigue, B Standing on foam w/ ball toss    Dynamic balance exercise/ propriopception - Marching on airex x20   - marching on airex 20x  - toe taps alt on cone 20x, min UE support - walking up/down onto airex x30 - Tandem walk 36' x2, HHA for one lap Tandem walk 40' x2   Extension stretch Elevated for 10 min for passive stretch W/ 10# weight on thigh for 5 min hold W/ 15# weight for 10 min hold W/ 4# weight for 10 min hold W/ 4# weight for 10 min hold   Squats 15x   2 x 10    Stairs 1x up/down, reciprocal pattern    6x with reciprocal pattern and 1 railing   Gait training In clinic 6 laps    5 laps for proper knee motion   Mini-lunge For pre-gait training for initiation of knee flexion in early stance phase x15 x30 x30 - 20x floor  - 20x onto bosu w/ UE support 30x onto bosu   Calf stretch Incline board 10s x10 Incline board long hold      Step up/down  4\" x30 - Up: 6' x30  - Down: 4\" x30  - Sideways 6\" x30 Down: 6\" x30, UE support    Square fitter  S/s x30   Fwd/back and s/s, min UEs 30x each DLS   Band walk   Sideways RTB 30'x2 each way Sideways RTB 30'x2 each way        Treatment/Session Assessment:  Pt has improved control on square fitter vs last time it was done. Tandem walk is difficult due. Pt is performing very well with reciprocal pattern on stairs and benefits from verbal cues to improve his eccentric control. · Compliance with Program/Exercises: Good  · Recommendations/Intent for next treatment session: Next visit will focus on advancements to more challenging activities.   · Variance from plan of care: None    Total Treatment Duration:  PT Patient Time In/Time Out  Time In: 1000  Time Out: [address], PT

## 2017-07-17 ENCOUNTER — HOSPITAL ENCOUNTER (OUTPATIENT)
Dept: PHYSICAL THERAPY | Age: 77
Discharge: HOME | End: 2017-07-17
Payer: MEDICARE

## 2017-07-17 PROCEDURE — G8978 MOBILITY CURRENT STATUS: HCPCS

## 2017-07-17 PROCEDURE — 97110 THERAPEUTIC EXERCISES: CPT

## 2017-07-17 PROCEDURE — G8979 MOBILITY GOAL STATUS: HCPCS

## 2017-07-17 NOTE — PROGRESS NOTES
Cha Ballesteros  : 1940 [facility]  [address], [address], [place]  [phone]   [phone]       OUTPATIENT PHYSICAL THERAPY:Daily Note and Progress Report 2017    ICD-10: Treatment Diagnosis: Stiffness of right knee, not elsewhere classified (M25.661), Effusion, left knee (M25.462), Pain in right knee (M25.561), Difficulty in walking, not elsewhere classified (R26.2), Aftercare following joint replacement surgery (Z47.1)  Precautions/Allergies:   Poison ivy extract   Fall Risk Score: 2 (? 5 = High Risk)  MD Orders: PT eval and treat MEDICAL/REFERRING DIAGNOSIS:  total right knee replacement    DATE OF ONSET: 17  REFERRING PHYSICIAN: Sagar Barros, *  RETURN PHYSICIAN APPOINTMENT: 17     ASSESSMENT:  Mr. Marlo Apley has participated in 9 visits of skilled PT with excellent progress towards his goals. Knee range of motion and strength are improving, as well as gait and transfers. Pt has been participating in short golf excursions and is no longer using any assistive devices. Pt will benefit from 2 more weeks of skilled PT to reach his long term goals. PROBLEM LIST (Impacting functional limitations):  1. Decreased Strength  2. Decreased ADL/Functional Activities  3. Decreased Transfer Abilities  4. Decreased Ambulation Ability/Technique  5. Decreased Balance  6. Increased Pain  7. Decreased Activity Tolerance  8. Decreased Flexibility/Joint Mobility  9. Edema/Girth INTERVENTIONS PLANNED:  1. Balance Exercise  2. Cold  3. Heat  4. Home Exercise Program (HEP)  5. Manual Therapy  6. Neuromuscular Re-education/Strengthening  7. Range of Motion (ROM)  8. Therapeutic Exercise/Strengthening   TREATMENT PLAN:  Effective Dates: 17 TO 17. Frequency/Duration: 2 times a week for 8 weeks  GOALS: (Goals have been discussed and agreed upon with patient.)  Short-Term Functional Goals: Time Frame: 4 weeks  1.  Pt will be I and compliant with initial HEP - met  2. Pt will demo R knee AROM of at least 8-110 degrees - met  3. Pt will demo increased B LE muscle strength by at least 1/2 grade where needed - met  4. Pt will demo improved symmetrical weight bearing with sit<>stand - met  5. Pt will walk with no cane at least 75% of the time - met  Discharge Goals: Time Frame: 8 weeks  1. Pt will demo R knee AROM of at least 3-120 for normal function and gait mechanics  2. Pt will demo WFL B LE strength for improved transfers and ability to squat/bend  3. Pt will walk with no cane 100% of the time with symmetrical weight bearing and appropriate mechanics  4. Pt will navigate stairs with reciprocal pattern and minimal use of UEs 100% of the time  5. Pt will perform single leg stance for at least 10s bilaterally to minimize fall risk  6. Pt will perform house and yardwork as needed with maximum 10% restriction or modification  7. Pt will understand long-term HEP and maintenance of L LE strength for future L TKA  Rehabilitation Potential For Stated Goals: Good  Regarding Louis Desir's therapy, I certify that the treatment plan above will be carried out by a therapist or under their direction. Thank you for this referral,  Ivy Huerta, PT                 The information in this section was collected on 6/19/17 (except where otherwise noted). HISTORY:   History of Present Injury/Illness (Reason for Referral): Russ Arana presents s/p R TKA on 5/26/17. Pt states he has been having episodes of knee buckling for about 5 years and went to see Dr. Dallin Farmer, and it was decided upon to have TKA. Pt was in the hospital for 2 days, had home health for ~3 weeks. Pt states he has an appointment coming up for his L knee will likely have it replaced this fall.     Pain location: R knee, quads, sore in adductor area  Pain levels - Current: 3/10  At worst: 4/10  Description: Discomfort, sore   Increases pain:  Laying supine or sidelying, extension stretch, sitting long periods of time   Decreases pain: Medication    Past Medical History/Comorbidities:   Mr. Suzanna Lamb  has a past medical history of Aortic valve replaced (12/2014); Arrhythmia; Arthritis; CAD (coronary artery disease); Carotid artery stenosis; Chronic kidney disease; Chronic pain; Coagulation disorder ([facility] [place] [address] Coronary atherosclerosis of native coronary vessel; Dyslipidemia (12/5/2016); Former smoker; GERD (gastroesophageal reflux disease); Hypertension; Left anterior fascicular block; Platelet inhibition due to Plavix; and Pre-diabetes. Mr. Suzanna Lamb  has a past surgical history that includes bunionectomy (Bilateral); vascular surgery procedure unlist; carpal tunnel release (Bilateral); cataract removal (Bilateral); coronary artery bypass graft (10/14/2014); heart catheterization; and colonoscopy. Social History/Living Environment:   Lives with wife, single-story house with stairs to bonus room above garage, 2 VANCE from garage with railing, 5 VANCE in front of house    Prior Level of Function/Work/Activity: Retired , enjoys working out at [employer], enjoys golf    Functional Limitations: Not driving, sitting long periods of time, difficulty with stairs and walking long distances    Current Medications:       Current Outpatient Prescriptions:     metoprolol succinate (TOPROL XL) 25 mg XL tablet, Take 1 Tab by mouth every morning. Indications: unknown why takes, Disp: 7 Tab, Rfl: 0    amoxicillin (AMOXIL) 500 mg capsule, Take 500 mg by mouth. Prior to dental appointment, Disp: , Rfl:     acetaminophen 500 mg cap, Take 1,000 mg by mouth every six (6) hours. Indications: Fever, Pain, Disp: , Rfl:     HYDROmorphone (DILAUDID) 2 mg tablet, Take 1 Tab by mouth every four (4) hours as needed. Max Daily Amount: 12 mg.  (Patient taking differently: Take 1 Tab by mouth every four (4) hours as needed for Pain.), Disp: 40 Tab, Rfl: 0    clopidogrel (PLAVIX) 75 mg tab, Take 1 Tab by mouth every morning., Disp: 90 Tab, Rfl: 3    losartan-hydroCHLOROthiazide (HYZAAR) 100-12.5 mg per tablet, Take 1 Tab by mouth every morning. Indications: Hypertension, Disp: 90 Tab, Rfl: 3    rosuvastatin (CRESTOR) 20 mg tablet, Take 1 Tab by mouth nightly. Indications: hypercholesterolemia, Disp: 90 Tab, Rfl: 3    aspirin delayed-release 81 mg tablet, Take 81 mg by mouth nightly., Disp: , Rfl:     Glucosamine &Chondroit-MV-Min3 005-239-58-0.5 mg tab, Take  by mouth every morning. 2 tablets every morning   Stop seven days prior to surgery per anesthesia protocol., Disp: , Rfl:     ranitidine (ZANTAC) 150 mg tablet, Take 150 mg by mouth as needed for Indigestion. Take day of surgery per anesthesia protocol. Non-Formulary. Instructed to bring to the hospital on the DOS, in the original bottle, and give to nurse.   Indications: gastroesophageal reflux disease, Disp: , Rfl:    Date Last Reviewed:  7/17/2017     EXAMINATION:     Observation: Visible joint edema R knee, incision healing well, many steri-strips still in place    Postural Assessment: Genu varus on L, leg length discrepancy can be noted in standing with R longer than L, decreased weight bearing on R LE     Palpation:  Tightness and some tenderness lateral quads and ITB on R    ROM:                                6/19/17              7/10/17  Knee AROM R L R L   Flexion 100 126 113 -   Extension (12) 0 (9) -     Strength:                      6/19/17 7/17/17  LE MMT R L R L   Hip Flexion 4 5     Hip ER 4+ 4+     Hip IR 5 5     Hip Abduction 4 4+ 4 5     Hip Extension 4- 4 4 4   Knee Flexion 4- 5     Knee Extension 5 5     Ankle DF 5 5     Ankle PF 2+ -       Functional Measures:   Gait: Walks with cane, lateral lean to the L, good daja and step length  Sit<>stand: Extends R LE out and decreases weight bearing  Stairs:  Step-to leading L up and leading R down with 1 railing and cane in L hand    Special Tests:   Patellar mobility: mild restriction lateral/medial; unable to assess superior/inferior    Balance:  SLS: R: 7s L: 3s   CLINICAL DECISION MAKING:   Outcome Measure: Tool Used: Lower Extremity Functional Scale (LEFS)  Score:  Initial: 32/80 Most Recent: 52/80 (Date: 7/17/17 )   Interpretation of Score: 20 questions each scored on a 5 point scale with 0 representing \"extreme difficulty or unable to perform\" and 4 representing \"no difficulty\". The lower the score, the greater the functional disability. 80/80 represents no disability. Minimal detectable change is 9 points. Score 80 79-63 62-48 47-32 31-16 15-1 0   Modifier CH CI CJ CK CL CM CN         Tool Used: Timed Up and Go (TUG)  Score:  Initial: 15 seconds Most Recent: X seconds (Date: -- )   Interpretation of Score: The test measures, in seconds, the time taken by an individual to stand up from a standard arm chair (seat height 46 cm [18 in], arm height 65 cm [25.6 in]), walk a distance of 3 meters (118 in, approx 10 ft), turn, walk back to the chair and sit down. If the individual takes longer than 14 seconds to complete TUG, this indicates risk for falls. Score 7 7.5-10.5 11-14 14.5-17.5 18-21 21.5-24.5 25+   Modifier CH CI CJ CK CL CM CN     ? Mobility - Walking and Moving Around:     - CURRENT STATUS: CJ - 20%-39% impaired, limited or restricted    - GOAL STATUS: CI - 1%-19% impaired, limited or restricted    - D/C STATUS:  ---------------To be determined---------------      Medical Necessity:   · Patient demonstrates good rehab potential due to higher previous functional level. Reason for Services/Other Comments:  · Patient continues to require skilled intervention due to decreased functional status. TREATMENT:   (In addition to Assessment/Re-Assessment sessions the following treatments were rendered)      Present Symptoms/Complaints - 7/17/2017:  Pt reports he is doing well and played a little bit of golf over the weekend.   Pain: Initial:   Pain Intensity 1: 0  Post Session:  unchanged       Therapeutic Exercise: (55 minutes):  Exercises per grid below to improve mobility, strength and balance. Required moderate visual, verbal and tactile cues to promote proper body alignment, promote proper body posture and promote proper body mechanics. Progressed resistance, range and repetitions as indicated.      Date:  6/29/17 Date:  7/5/17 Date:  7/7/17 Date:  7/10/17 Date:  7/12/17 Date:  7/17/17   Activity/Exercise         Pt education         Recumbent bike L3 10 min L4 10 min L4 10 min L4 10 min L4 10 min L4 10 min   LAQ 10# 40x        Static balance exercise/ proprioception - Tandem stance to fatigue, B  - Standing R foot on airex, L foot on step, for weight bearing on R - Narrow FREDRICK on foam   Tandem stance to fatigue, B Standing on foam w/ ball toss     Dynamic balance exercise/ propriopception - Marching on airex x20   - marching on airex 20x  - toe taps alt on cone 20x, min UE support - walking up/down onto airex x30 - Tandem walk 36' x2, HHA for one lap Tandem walk 40' x2    Extension stretch Elevated for 10 min for passive stretch W/ 10# weight on thigh for 5 min hold W/ 15# weight for 10 min hold W/ 4# weight for 10 min hold W/ 4# weight for 10 min hold W/ 4# weight for 10 min hold   Squats 15x   2 x 10     Stairs 1x up/down, reciprocal pattern    6x with reciprocal pattern and 1 railing    Gait training In clinic 6 laps    5 laps for proper knee motion    Mini-lunge For pre-gait training for initiation of knee flexion in early stance phase x15 x30 x30 - 20x floor  - 20x onto bosu w/ UE support 30x onto bosu    Calf stretch Incline board 10s x10 Incline board long hold       Step up/down  4\" x30 - Up: 6' x30  - Down: 4\" x30  - Sideways 6\" x30 Down: 6\" x30, UE support  8\" x30   Square fitter  S/s x30   Fwd/back and s/s, min UEs 30x each DLS    Band walk   Sideways RTB 30'x2 each way Sideways RTB 30'x2 each way  Sideways RTB 40'x2 each way Standing hip extension      RTB 3 x 10 B   Bridge w/ march      2 x 5 B   Leg press      115# x10  145# x20       Treatment/Session Assessment:  Pt did well with added exercises today. He was previously doing 115# at the gym however this did not provide a challenge and weight was increased. · Compliance with Program/Exercises: Good  · Recommendations/Intent for next treatment session: Next visit will focus on advancements to more challenging activities.   · Variance from plan of care: None    Total Treatment Duration:  PT Patient Time In/Time Out  Time In: 1000  Time Out: 75 Shade Strickland PT

## 2017-07-19 ENCOUNTER — HOSPITAL ENCOUNTER (OUTPATIENT)
Dept: PHYSICAL THERAPY | Age: 77
Discharge: HOME | End: 2017-07-19
Payer: MEDICARE

## 2017-07-19 PROCEDURE — 97110 THERAPEUTIC EXERCISES: CPT

## 2017-07-19 NOTE — PROGRESS NOTES
Tricia Ambrose  : 1940 [facility]  [address], [address], [place]  [phone]   [phone]       OUTPATIENT PHYSICAL THERAPY:Daily Note 2017    ICD-10: Treatment Diagnosis: Stiffness of right knee, not elsewhere classified (M25.661), Effusion, left knee (M25.462), Pain in right knee (M25.561), Difficulty in walking, not elsewhere classified (R26.2), Aftercare following joint replacement surgery (Z47.1)  Precautions/Allergies:   Poison ivy extract   Fall Risk Score: 2 (? 5 = High Risk)  MD Orders: PT eval and treat MEDICAL/REFERRING DIAGNOSIS:  total right knee replacement    DATE OF ONSET: 17  REFERRING PHYSICIAN: Gudelia Gan., *  RETURN PHYSICIAN APPOINTMENT: 17     ASSESSMENT:  Mr. Nate Lopez has participated in 9 visits of skilled PT with excellent progress towards his goals. Knee range of motion and strength are improving, as well as gait and transfers. Pt has been participating in short golf excursions and is no longer using any assistive devices. Pt will benefit from 2 more weeks of skilled PT to reach his long term goals. PROBLEM LIST (Impacting functional limitations):  1. Decreased Strength  2. Decreased ADL/Functional Activities  3. Decreased Transfer Abilities  4. Decreased Ambulation Ability/Technique  5. Decreased Balance  6. Increased Pain  7. Decreased Activity Tolerance  8. Decreased Flexibility/Joint Mobility  9. Edema/Girth INTERVENTIONS PLANNED:  1. Balance Exercise  2. Cold  3. Heat  4. Home Exercise Program (HEP)  5. Manual Therapy  6. Neuromuscular Re-education/Strengthening  7. Range of Motion (ROM)  8. Therapeutic Exercise/Strengthening   TREATMENT PLAN:  Effective Dates: 17 TO 17. Frequency/Duration: 2 times a week for 8 weeks  GOALS: (Goals have been discussed and agreed upon with patient.)  Short-Term Functional Goals: Time Frame: 4 weeks  1.  Pt will be I and compliant with initial HEP - met  2. Pt will demo R knee AROM of at least 8-110 degrees - met  3. Pt will demo increased B LE muscle strength by at least 1/2 grade where needed - met  4. Pt will demo improved symmetrical weight bearing with sit<>stand - met  5. Pt will walk with no cane at least 75% of the time - met  Discharge Goals: Time Frame: 8 weeks  1. Pt will demo R knee AROM of at least 3-120 for normal function and gait mechanics  2. Pt will demo WFL B LE strength for improved transfers and ability to squat/bend  3. Pt will walk with no cane 100% of the time with symmetrical weight bearing and appropriate mechanics  4. Pt will navigate stairs with reciprocal pattern and minimal use of UEs 100% of the time  5. Pt will perform single leg stance for at least 10s bilaterally to minimize fall risk  6. Pt will perform house and yardwork as needed with maximum 10% restriction or modification  7. Pt will understand long-term HEP and maintenance of L LE strength for future L TKA  Rehabilitation Potential For Stated Goals: Good  Regarding Christy Desir's therapy, I certify that the treatment plan above will be carried out by a therapist or under their direction. Thank you for this referral,  Marky Bonilla, PT                 The information in this section was collected on 6/19/17 (except where otherwise noted). HISTORY:   History of Present Injury/Illness (Reason for Referral): Rafat Randall presents s/p R TKA on 5/26/17. Pt states he has been having episodes of knee buckling for about 5 years and went to see Dr. Elan Fernandez, and it was decided upon to have TKA. Pt was in the hospital for 2 days, had home health for ~3 weeks. Pt states he has an appointment coming up for his L knee will likely have it replaced this fall.     Pain location: R knee, quads, sore in adductor area  Pain levels - Current: 3/10  At worst: 4/10  Description: Discomfort, sore   Increases pain:  Laying supine or sidelying, extension stretch, sitting long periods of time   Decreases pain: Medication    Past Medical History/Comorbidities:   Mr. Darrell Jacobo  has a past medical history of Aortic valve replaced (12/2014); Arrhythmia; Arthritis; CAD (coronary artery disease); Carotid artery stenosis; Chronic kidney disease; Chronic pain; Coagulation disorder ([facility] [address] Coronary atherosclerosis of native coronary vessel; Dyslipidemia (12/5/2016); Former smoker; GERD (gastroesophageal reflux disease); Hypertension; Left anterior fascicular block; Platelet inhibition due to Plavix; and Pre-diabetes. Mr. Darrell Jacobo  has a past surgical history that includes bunionectomy (Bilateral); vascular surgery procedure unlist; carpal tunnel release (Bilateral); cataract removal (Bilateral); coronary artery bypass graft (10/14/2014); heart catheterization; and colonoscopy. Social History/Living Environment:   Lives with wife, single-story house with stairs to bonus room above garage, 2 VANCE from garage with railing, 5 VANCE in front of house    Prior Level of Function/Work/Activity: Retired , enjoys working out at [employer], enjoys golf    Functional Limitations: Not driving, sitting long periods of time, difficulty with stairs and walking long distances    Current Medications:       Current Outpatient Prescriptions:     metoprolol succinate (TOPROL XL) 25 mg XL tablet, Take 1 Tab by mouth every morning. Indications: unknown why takes, Disp: 7 Tab, Rfl: 0    amoxicillin (AMOXIL) 500 mg capsule, Take 500 mg by mouth. Prior to dental appointment, Disp: , Rfl:     acetaminophen 500 mg cap, Take 1,000 mg by mouth every six (6) hours. Indications: Fever, Pain, Disp: , Rfl:     HYDROmorphone (DILAUDID) 2 mg tablet, Take 1 Tab by mouth every four (4) hours as needed. Max Daily Amount: 12 mg.  (Patient taking differently: Take 1 Tab by mouth every four (4) hours as needed for Pain.), Disp: 40 Tab, Rfl: 0    clopidogrel (PLAVIX) 75 mg tab, Take 1 Tab by mouth every morning., Disp: 90 Tab, Rfl: 3    losartan-hydroCHLOROthiazide (HYZAAR) 100-12.5 mg per tablet, Take 1 Tab by mouth every morning. Indications: Hypertension, Disp: 90 Tab, Rfl: 3    rosuvastatin (CRESTOR) 20 mg tablet, Take 1 Tab by mouth nightly. Indications: hypercholesterolemia, Disp: 90 Tab, Rfl: 3    aspirin delayed-release 81 mg tablet, Take 81 mg by mouth nightly., Disp: , Rfl:     Glucosamine &Chondroit-MV-Min3 477-120-15-0.5 mg tab, Take  by mouth every morning. 2 tablets every morning   Stop seven days prior to surgery per anesthesia protocol., Disp: , Rfl:     ranitidine (ZANTAC) 150 mg tablet, Take 150 mg by mouth as needed for Indigestion. Take day of surgery per anesthesia protocol. Non-Formulary. Instructed to bring to the hospital on the DOS, in the original bottle, and give to nurse.   Indications: gastroesophageal reflux disease, Disp: , Rfl:    Date Last Reviewed:  7/19/2017     EXAMINATION:     Observation: Visible joint edema R knee, incision healing well, many steri-strips still in place    Postural Assessment: Genu varus on L, leg length discrepancy can be noted in standing with R longer than L, decreased weight bearing on R LE     Palpation:  Tightness and some tenderness lateral quads and ITB on R    ROM:                                6/19/17              7/10/17  Knee AROM R L R L   Flexion 100 126 113 -   Extension (12) 0 (9) -     Strength:                      6/19/17 7/17/17  LE MMT R L R L   Hip Flexion 4 5     Hip ER 4+ 4+     Hip IR 5 5     Hip Abduction 4 4+ 4 5     Hip Extension 4- 4 4 4   Knee Flexion 4- 5     Knee Extension 5 5     Ankle DF 5 5     Ankle PF 2+ -       Functional Measures:   Gait: Walks with cane, lateral lean to the L, good daja and step length  Sit<>stand: Extends R LE out and decreases weight bearing  Stairs:  Step-to leading L up and leading R down with 1 railing and cane in L hand    Special Tests:   Patellar mobility: mild restriction lateral/medial; unable to assess superior/inferior    Balance:  SLS: R: 7s L: 3s   CLINICAL DECISION MAKING:   Outcome Measure: Tool Used: Lower Extremity Functional Scale (LEFS)  Score:  Initial: 32/80 Most Recent: 52/80 (Date: 7/17/17 )   Interpretation of Score: 20 questions each scored on a 5 point scale with 0 representing \"extreme difficulty or unable to perform\" and 4 representing \"no difficulty\". The lower the score, the greater the functional disability. 80/80 represents no disability. Minimal detectable change is 9 points. Score 80 79-63 62-48 47-32 31-16 15-1 0   Modifier CH CI CJ CK CL CM CN         Tool Used: Timed Up and Go (TUG)  Score:  Initial: 15 seconds Most Recent: X seconds (Date: -- )   Interpretation of Score: The test measures, in seconds, the time taken by an individual to stand up from a standard arm chair (seat height 46 cm [18 in], arm height 65 cm [25.6 in]), walk a distance of 3 meters (118 in, approx 10 ft), turn, walk back to the chair and sit down. If the individual takes longer than 14 seconds to complete TUG, this indicates risk for falls. Score 7 7.5-10.5 11-14 14.5-17.5 18-21 21.5-24.5 25+   Modifier CH CI CJ CK CL CM CN     ? Mobility - Walking and Moving Around:     - CURRENT STATUS: CJ - 20%-39% impaired, limited or restricted    - GOAL STATUS: CI - 1%-19% impaired, limited or restricted    - D/C STATUS:  ---------------To be determined---------------      Medical Necessity:   · Patient demonstrates good rehab potential due to higher previous functional level. Reason for Services/Other Comments:  · Patient continues to require skilled intervention due to decreased functional status. TREATMENT:   (In addition to Assessment/Re-Assessment sessions the following treatments were rendered)      Present Symptoms/Complaints - 7/19/2017:  Pt reports he is doing well and played a little bit of golf over the weekend.   Pain: Initial: Pain Intensity 1: 0  Post Session:  unchanged       Therapeutic Exercise: (55 minutes):  Exercises per grid below to improve mobility, strength and balance. Required moderate visual, verbal and tactile cues to promote proper body alignment, promote proper body posture and promote proper body mechanics. Progressed resistance, range and repetitions as indicated. Date:  7/5/17 Date:  7/7/17 Date:  7/10/17 Date:  7/12/17 Date:  7/17/17 Date:  7/19/17   Activity/Exercise         Pt education         Recumbent bike L4 10 min L4 10 min L4 10 min L4 10 min L4 10 min L4 10 min   LAQ         Static balance exercise/ proprioception - Narrow FREDRICK on foam   Tandem stance to fatigue, B Standing on foam w/ ball toss      Dynamic balance exercise/ propriopception - marching on airex 20x  - toe taps alt on cone 20x, min UE support - walking up/down onto airex x30 - Tandem walk 36' x2, HHA for one lap Tandem walk 40' x2     Extension stretch W/ 10# weight on thigh for 5 min hold W/ 15# weight for 10 min hold W/ 4# weight for 10 min hold W/ 4# weight for 10 min hold W/ 4# weight for 10 min hold W/ 4# weight for 10 min hold   Squats   2 x 10      Stairs    6x with reciprocal pattern and 1 railing     Gait training    5 laps for proper knee motion     Mini-lunge x30 x30 - 20x floor  - 20x onto bosu w/ UE support 30x onto bosu  30x B onto bosu   Calf stretch Incline board long hold        Step up/down 4\" x30 - Up: 6' x30  - Down: 4\" x30  - Sideways 6\" x30 Down: 6\" x30, UE support  8\" x30 8\" x40   Square fitter S/s x30   Fwd/back and s/s, min UEs 30x each DLS     Band walk  Sideways RTB 30'x2 each way Sideways RTB 30'x2 each way  Sideways RTB 40'x2 each way Sideways GTB 45'x2 each way   Standing hip extension     RTB 3 x 10 B GTB 3 x 10 B   Bridge w/ march     2 x 5 B 2 x 7 B   Leg press     115# x10  145# x20 160# 40x       Treatment/Session Assessment:  Pt continues to progress functional strengthening well.   160# on the leg press was moderately challenging and can be increased next session. · Compliance with Program/Exercises: Good  · Recommendations/Intent for next treatment session: Next visit will focus on advancements to more challenging activities.   · Variance from plan of care: None    Total Treatment Duration:  PT Patient Time In/Time Out  Time In: 1000  Time Out: 1100    Charissa Agarwal PT

## 2017-07-24 ENCOUNTER — HOSPITAL ENCOUNTER (OUTPATIENT)
Dept: PHYSICAL THERAPY | Age: 77
Discharge: HOME | End: 2017-07-24
Payer: MEDICARE

## 2017-07-24 PROCEDURE — 97110 THERAPEUTIC EXERCISES: CPT

## 2017-07-24 NOTE — PROGRESS NOTES
Riana Maria A  : 1940 [facility]  [address], [address], [place] [address]  [phone]   [phone]       OUTPATIENT PHYSICAL THERAPY:Daily Note 2017    ICD-10: Treatment Diagnosis: Stiffness of right knee, not elsewhere classified (M25.661), Effusion, left knee (M25.462), Pain in right knee (M25.561), Difficulty in walking, not elsewhere classified (R26.2), Aftercare following joint replacement surgery (Z47.1)  Precautions/Allergies:   Poison ivy extract   Fall Risk Score: 2 (? 5 = High Risk)  MD Orders: PT eval and treat MEDICAL/REFERRING DIAGNOSIS:  total right knee replacement    DATE OF ONSET: 17  REFERRING PHYSICIAN: Vivi Morocho., *  RETURN PHYSICIAN APPOINTMENT: 17     ASSESSMENT:  Mr. Agusto Goss has participated in 9 visits of skilled PT with excellent progress towards his goals. Knee range of motion and strength are improving, as well as gait and transfers. Pt has been participating in short golf excursions and is no longer using any assistive devices. Pt will benefit from 2 more weeks of skilled PT to reach his long term goals. PROBLEM LIST (Impacting functional limitations):  1. Decreased Strength  2. Decreased ADL/Functional Activities  3. Decreased Transfer Abilities  4. Decreased Ambulation Ability/Technique  5. Decreased Balance  6. Increased Pain  7. Decreased Activity Tolerance  8. Decreased Flexibility/Joint Mobility  9. Edema/Girth INTERVENTIONS PLANNED:  1. Balance Exercise  2. Cold  3. Heat  4. Home Exercise Program (HEP)  5. Manual Therapy  6. Neuromuscular Re-education/Strengthening  7. Range of Motion (ROM)  8. Therapeutic Exercise/Strengthening   TREATMENT PLAN:  Effective Dates: 17 TO 17. Frequency/Duration: 2 times a week for 8 weeks  GOALS: (Goals have been discussed and agreed upon with patient.)  Short-Term Functional Goals: Time Frame: 4 weeks  1.  Pt will be I and compliant with initial HEP - met  2. Pt will demo R knee AROM of at least 8-110 degrees - met  3. Pt will demo increased B LE muscle strength by at least 1/2 grade where needed - met  4. Pt will demo improved symmetrical weight bearing with sit<>stand - met  5. Pt will walk with no cane at least 75% of the time - met  Discharge Goals: Time Frame: 8 weeks  1. Pt will demo R knee AROM of at least 3-120 for normal function and gait mechanics  2. Pt will demo WFL B LE strength for improved transfers and ability to squat/bend  3. Pt will walk with no cane 100% of the time with symmetrical weight bearing and appropriate mechanics  4. Pt will navigate stairs with reciprocal pattern and minimal use of UEs 100% of the time  5. Pt will perform single leg stance for at least 10s bilaterally to minimize fall risk  6. Pt will perform house and yardwork as needed with maximum 10% restriction or modification  7. Pt will understand long-term HEP and maintenance of L LE strength for future L TKA  Rehabilitation Potential For Stated Goals: Good  Regarding Genevieve Desir's therapy, I certify that the treatment plan above will be carried out by a therapist or under their direction. Thank you for this referral,  Rodolfo Soria PT                 The information in this section was collected on 6/19/17 (except where otherwise noted). HISTORY:   History of Present Injury/Illness (Reason for Referral): Lance Tom presents s/p R TKA on 5/26/17. Pt states he has been having episodes of knee buckling for about 5 years and went to see Dr. Jaleesa Muniz, and it was decided upon to have TKA. Pt was in the hospital for 2 days, had home health for ~3 weeks. Pt states he has an appointment coming up for his L knee will likely have it replaced this fall.     Pain location: R knee, quads, sore in adductor area  Pain levels - Current: 3/10  At worst: 4/10  Description: Discomfort, sore   Increases pain:  Laying supine or sidelying, extension stretch, sitting long periods of time   Decreases pain: Medication    Past Medical History/Comorbidities:   Mr. Agusto Goss  has a past medical history of Aortic valve replaced (12/2014); Arrhythmia; Arthritis; CAD (coronary artery disease); Carotid artery stenosis; Chronic kidney disease; Chronic pain; Coagulation disorder ([facility] [address] Coronary atherosclerosis of native coronary vessel; Dyslipidemia (12/5/2016); Former smoker; GERD (gastroesophageal reflux disease); Hypertension; Left anterior fascicular block; Platelet inhibition due to Plavix; and Pre-diabetes. Mr. Agusto Goss  has a past surgical history that includes bunionectomy (Bilateral); vascular surgery procedure unlist; carpal tunnel release (Bilateral); cataract removal (Bilateral); coronary artery bypass graft (10/14/2014); heart catheterization; and colonoscopy. Social History/Living Environment:   Lives with wife, single-story house with stairs to bonus room above garage, 2 VANCE from garage with railing, 5 VANCE in front of house    Prior Level of Function/Work/Activity: Retired , enjoys working out at [employer], enjoys golf    Functional Limitations: Not driving, sitting long periods of time, difficulty with stairs and walking long distances    Current Medications:       Current Outpatient Prescriptions:     metoprolol succinate (TOPROL XL) 25 mg XL tablet, Take 1 Tab by mouth every morning. Indications: unknown why takes, Disp: 7 Tab, Rfl: 0    amoxicillin (AMOXIL) 500 mg capsule, Take 500 mg by mouth. Prior to dental appointment, Disp: , Rfl:     acetaminophen 500 mg cap, Take 1,000 mg by mouth every six (6) hours. Indications: Fever, Pain, Disp: , Rfl:     HYDROmorphone (DILAUDID) 2 mg tablet, Take 1 Tab by mouth every four (4) hours as needed. Max Daily Amount: 12 mg.  (Patient taking differently: Take 1 Tab by mouth every four (4) hours as needed for Pain.), Disp: 40 Tab, Rfl: 0    clopidogrel (PLAVIX) 75 mg tab, Take 1 Tab by mouth every morning., Disp: 90 Tab, Rfl: 3    losartan-hydroCHLOROthiazide (HYZAAR) 100-12.5 mg per tablet, Take 1 Tab by mouth every morning. Indications: Hypertension, Disp: 90 Tab, Rfl: 3    rosuvastatin (CRESTOR) 20 mg tablet, Take 1 Tab by mouth nightly. Indications: hypercholesterolemia, Disp: 90 Tab, Rfl: 3    aspirin delayed-release 81 mg tablet, Take 81 mg by mouth nightly., Disp: , Rfl:     Glucosamine &Chondroit-MV-Min3 609-354-78-0.5 mg tab, Take  by mouth every morning. 2 tablets every morning   Stop seven days prior to surgery per anesthesia protocol., Disp: , Rfl:     ranitidine (ZANTAC) 150 mg tablet, Take 150 mg by mouth as needed for Indigestion. Take day of surgery per anesthesia protocol. Non-Formulary. Instructed to bring to the hospital on the DOS, in the original bottle, and give to nurse.   Indications: gastroesophageal reflux disease, Disp: , Rfl:    Date Last Reviewed:  7/24/2017     EXAMINATION:     Observation: Visible joint edema R knee, incision healing well, many steri-strips still in place    Postural Assessment: Genu varus on L, leg length discrepancy can be noted in standing with R longer than L, decreased weight bearing on R LE     Palpation:  Tightness and some tenderness lateral quads and ITB on R    ROM:                                6/19/17              7/10/17  Knee AROM R L R L   Flexion 100 126 113 -   Extension (12) 0 (9) -     Strength:                      6/19/17 7/17/17  LE MMT R L R L   Hip Flexion 4 5     Hip ER 4+ 4+     Hip IR 5 5     Hip Abduction 4 4+ 4 5     Hip Extension 4- 4 4 4   Knee Flexion 4- 5     Knee Extension 5 5     Ankle DF 5 5     Ankle PF 2+ -       Functional Measures:   Gait: Walks with cane, lateral lean to the L, good daja and step length  Sit<>stand: Extends R LE out and decreases weight bearing  Stairs:  Step-to leading L up and leading R down with 1 railing and cane in L hand    Special Tests:   Patellar mobility: mild restriction lateral/medial; unable to assess superior/inferior    Balance:  SLS: R: 7s L: 3s   CLINICAL DECISION MAKING:   Outcome Measure: Tool Used: Lower Extremity Functional Scale (LEFS)  Score:  Initial: 32/80 Most Recent: 52/80 (Date: 7/17/17 )   Interpretation of Score: 20 questions each scored on a 5 point scale with 0 representing \"extreme difficulty or unable to perform\" and 4 representing \"no difficulty\". The lower the score, the greater the functional disability. 80/80 represents no disability. Minimal detectable change is 9 points. Score 80 79-63 62-48 47-32 31-16 15-1 0   Modifier CH CI CJ CK CL CM CN         Tool Used: Timed Up and Go (TUG)  Score:  Initial: 15 seconds Most Recent: X seconds (Date: -- )   Interpretation of Score: The test measures, in seconds, the time taken by an individual to stand up from a standard arm chair (seat height 46 cm [18 in], arm height 65 cm [25.6 in]), walk a distance of 3 meters (118 in, approx 10 ft), turn, walk back to the chair and sit down. If the individual takes longer than 14 seconds to complete TUG, this indicates risk for falls. Score 7 7.5-10.5 11-14 14.5-17.5 18-21 21.5-24.5 25+   Modifier CH CI CJ CK CL CM CN     ? Mobility - Walking and Moving Around:     - CURRENT STATUS: CJ - 20%-39% impaired, limited or restricted    - GOAL STATUS: CI - 1%-19% impaired, limited or restricted    - D/C STATUS:  ---------------To be determined---------------      Medical Necessity:   · Patient demonstrates good rehab potential due to higher previous functional level. Reason for Services/Other Comments:  · Patient continues to require skilled intervention due to decreased functional status. TREATMENT:   (In addition to Assessment/Re-Assessment sessions the following treatments were rendered)      Present Symptoms/Complaints - 7/24/2017:  Pt reports he did some work around the yard yesterday so he is sore today.   Pain: Initial:   Pain Intensity 1: 1  Post Session:  unchanged       Therapeutic Exercise: (55 minutes):  Exercises per grid below to improve mobility, strength and balance. Required moderate visual, verbal and tactile cues to promote proper body alignment, promote proper body posture and promote proper body mechanics. Progressed resistance, range and repetitions as indicated. Date:  7/10/17 Date:  7/12/17 Date:  7/17/17 Date:  7/19/17 Date:  7/24/17   Activity/Exercise        Pt education        Recumbent bike L4 10 min L4 10 min L4 10 min L4 10 min L4 10 min   LAQ        Static balance exercise/ proprioception Standing on foam w/ ball toss    - Tandem stance to fatigue, B  - Marching on foam 3s holds x20   Dynamic balance exercise/ propriopception - Tandem walk 36' x2, HHA for one lap Tandem walk 40' x2   Tandem walk 40'x2   Extension stretch W/ 4# weight for 10 min hold W/ 4# weight for 10 min hold W/ 4# weight for 10 min hold W/ 4# weight for 10 min hold    Squats 2 x 10       Stairs  6x with reciprocal pattern and 1 railing   1x reciprocal   Gait training  5 laps for proper knee motion      Mini-lunge - 20x floor  - 20x onto bosu w/ UE support 30x onto bosu  30x B onto bosu    Calf stretch        Step up/down Down: 6\" x30, UE support  8\" x30 8\" x40 Up only 8\" 30x   Square fitter  Fwd/back and s/s, min UEs 30x each DLS      Band walk Sideways RTB 30'x2 each way  Sideways RTB 40'x2 each way Sideways GTB 45'x2 each way    Standing hip extension   RTB 3 x 10 B GTB 3 x 10 B GTB 3 x 10 B   Bridge w/ march   2 x 5 B 2 x 7 B    Leg press   115# x10  145# x20 160# 40x 175# 30x   Standing hip abduction     GTB 3 x 10 B       Treatment/Session Assessment:  Pt continues to progress functional strengthening well, 175# on leg press was challenging. Eccentric control has improved very much. Step-ups were difficult today but this could also be due to increased fatigue from yardwork yesterday.   · Compliance with Program/Exercises: Good  · Recommendations/Intent for next treatment session: DC to HEP next session.   · Variance from plan of care: None    Total Treatment Duration:  PT Patient Time In/Time Out  Time In: 1000  Time Out: [address], PT

## 2017-07-26 ENCOUNTER — HOSPITAL ENCOUNTER (OUTPATIENT)
Dept: PHYSICAL THERAPY | Age: 77
Discharge: HOME | End: 2017-07-26
Payer: MEDICARE

## 2017-07-26 PROCEDURE — G8980 MOBILITY D/C STATUS: HCPCS

## 2017-07-26 PROCEDURE — 97110 THERAPEUTIC EXERCISES: CPT

## 2017-07-26 PROCEDURE — G8979 MOBILITY GOAL STATUS: HCPCS

## 2017-07-26 NOTE — PROGRESS NOTES
Pepe Hsu  : 1940 [facility] [address]  [address], [address], [place]  [phone]   [phone]       OUTPATIENT PHYSICAL THERAPY:Daily Note and Discharge 2017    ICD-10: Treatment Diagnosis: Stiffness of right knee, not elsewhere classified (M25.661), Effusion, left knee (M25.462), Pain in right knee (M25.561), Difficulty in walking, not elsewhere classified (R26.2), Aftercare following joint replacement surgery (Z47.1)  Precautions/Allergies:   Poison ivy extract   Fall Risk Score: 2 (? 5 = High Risk)  MD Orders: PT eval and treat MEDICAL/REFERRING DIAGNOSIS:  total right knee replacement    DATE OF ONSET: 17  REFERRING PHYSICIAN: Tyler Rogers, *  RETURN PHYSICIAN APPOINTMENT: 17     ASSESSMENT:  Mr. Harry Vazquez has participated in 12 visits of skilled PT and has met most of his goals. His strength, gait, range of motion and balance are all improved. Pt is very highly functioning and has been able to participate in normal activities such as yardwork and golf with some soreness in his knee but not much difficulty. Pt is an active member of [employer] and plans to continue exercising there regularly. Instructed on HEP and pt demonstrates good understanding. Pt is discharged from PT. TREATMENT PLAN:  Effective Dates: 17 TO 17. Frequency/Duration: 2 times a week for 8 weeks  GOALS: (Goals have been discussed and agreed upon with patient.)  Short-Term Functional Goals: Time Frame: 4 weeks  1. Pt will be I and compliant with initial HEP - met  2. Pt will demo R knee AROM of at least 8-110 degrees - met  3. Pt will demo increased B LE muscle strength by at least 1/2 grade where needed - met  4. Pt will demo improved symmetrical weight bearing with sit<>stand - met  5. Pt will walk with no cane at least 75% of the time - met  Discharge Goals: Time Frame: 8 weeks  1.  Pt will demo R knee AROM of at least 3-120 for normal function and gait mechanics - mostly met, measures 6-120 today  2. Pt will demo WFL B LE strength for improved transfers and ability to squat/bend - met  3. Pt will walk with no cane 100% of the time with symmetrical weight bearing and appropriate mechanics - met  4. Pt will navigate stairs with reciprocal pattern and minimal use of UEs 100% of the time - mostly met, still requires UEs at times for balance  5. Pt will perform single leg stance for at least 10s bilaterally to minimize fall risk - partially met  6. Pt will perform house and yardwork as needed with maximum 10% restriction or modification - met  7. Pt will understand long-term HEP and maintenance of L LE strength for future L TKA - met    Thank you for this referral,  Ivy Huerta, PT                 The information in this section was collected on 6/19/17 (except where otherwise noted). HISTORY:   History of Present Injury/Illness (Reason for Referral): Russ Arana presents s/p R TKA on 5/26/17. Pt states he has been having episodes of knee buckling for about 5 years and went to see Dr. Dallin Farmer, and it was decided upon to have TKA. Pt was in the hospital for 2 days, had home health for ~3 weeks. Pt states he has an appointment coming up for his L knee will likely have it replaced this fall. Pain location: R knee, quads, sore in adductor area  Pain levels - Current: 3/10  At worst: 4/10  Description: Discomfort, sore   Increases pain:  Laying supine or sidelying, extension stretch, sitting long periods of time   Decreases pain: Medication    Past Medical History/Comorbidities:   Mr. Gayatri Blackwood  has a past medical history of Aortic valve replaced (12/2014); Arrhythmia; Arthritis; CAD (coronary artery disease); Carotid artery stenosis; Chronic kidney disease; Chronic pain; Coagulation disorder ([facility] [address] Coronary atherosclerosis of native coronary vessel; Dyslipidemia (12/5/2016);  Former smoker; GERD (gastroesophageal reflux disease); Hypertension; Left anterior fascicular block; Platelet inhibition due to Plavix; and Pre-diabetes. Mr. Denisse Urbina  has a past surgical history that includes bunionectomy (Bilateral); vascular surgery procedure unlist; carpal tunnel release (Bilateral); cataract removal (Bilateral); coronary artery bypass graft (10/14/2014); heart catheterization; and colonoscopy. Social History/Living Environment:   Lives with wife, single-story house with stairs to bonus room above garage, 2 VANCE from garage with railing, 5 VANCE in front of house    Prior Level of Function/Work/Activity: Retired , enjoys working out at [employer], enjoys golf    Functional Limitations: Not driving, sitting long periods of time, difficulty with stairs and walking long distances    Current Medications:       Current Outpatient Prescriptions:     metoprolol succinate (TOPROL XL) 25 mg XL tablet, Take 1 Tab by mouth every morning. Indications: unknown why takes, Disp: 7 Tab, Rfl: 0    amoxicillin (AMOXIL) 500 mg capsule, Take 500 mg by mouth. Prior to dental appointment, Disp: , Rfl:     acetaminophen 500 mg cap, Take 1,000 mg by mouth every six (6) hours. Indications: Fever, Pain, Disp: , Rfl:     HYDROmorphone (DILAUDID) 2 mg tablet, Take 1 Tab by mouth every four (4) hours as needed. Max Daily Amount: 12 mg. (Patient taking differently: Take 1 Tab by mouth every four (4) hours as needed for Pain.), Disp: 40 Tab, Rfl: 0    clopidogrel (PLAVIX) 75 mg tab, Take 1 Tab by mouth every morning., Disp: 90 Tab, Rfl: 3    losartan-hydroCHLOROthiazide (HYZAAR) 100-12.5 mg per tablet, Take 1 Tab by mouth every morning. Indications: Hypertension, Disp: 90 Tab, Rfl: 3    rosuvastatin (CRESTOR) 20 mg tablet, Take 1 Tab by mouth nightly.  Indications: hypercholesterolemia, Disp: 90 Tab, Rfl: 3    aspirin delayed-release 81 mg tablet, Take 81 mg by mouth nightly., Disp: , Rfl:     Glucosamine &Chondroit-MV-Min3 926-120-77-0.5 mg tab, Take  by mouth every morning. 2 tablets every morning   Stop seven days prior to surgery per anesthesia protocol., Disp: , Rfl:     ranitidine (ZANTAC) 150 mg tablet, Take 150 mg by mouth as needed for Indigestion. Take day of surgery per anesthesia protocol. Non-Formulary. Instructed to bring to the hospital on the DOS, in the original bottle, and give to nurse. Indications: gastroesophageal reflux disease, Disp: , Rfl:    Date Last Reviewed:  7/26/2017     EXAMINATION:     Observation: Visible joint edema R knee, incision healing well, many steri-strips still in place    Postural Assessment: Genu varus on L, leg length discrepancy can be noted in standing with R longer than L, decreased weight bearing on R LE     Palpation:  Tightness and some tenderness lateral quads and ITB on R    ROM:                                6/19/17              7/10/17                 7/26/17  Knee AROM R L R L R L   Flexion 100 126 113 - 120 -   Extension (12) 0 (9) - (6) -     Strength:                      6/19/17 7/17/17  LE MMT R L R L   Hip Flexion 4 5 - -   Hip ER 4+ 4+ - -   Hip IR 5 5 - -   Hip Abduction 4 4+ 4 5     Hip Extension 4- 4 4 4   Knee Flexion 4- 5 - -   Knee Extension 5 5 - -   Ankle DF 5 5 - -     Functional Measures:   Gait: Walks with cane, lateral lean to the L, good daja and step length  Sit<>stand: Extends R LE out and decreases weight bearing  Stairs:  Step-to leading L up and leading R down with 1 railing and cane in L hand    Special Tests:   Patellar mobility: mild restriction lateral/medial; unable to assess superior/inferior    Balance:  SLS: R: 7s L: 3s   CLINICAL DECISION MAKING:   Outcome Measure:    Tool Used: Lower Extremity Functional Scale (LEFS)  Score:  Initial: 32/80 Most Recent: 52/80 (Date: 7/17/17 )   Interpretation of Score: 20 questions each scored on a 5 point scale with 0 representing \"extreme difficulty or unable to perform\" and 4 representing \"no difficulty\". The lower the score, the greater the functional disability. 80/80 represents no disability. Minimal detectable change is 9 points. Score 80 79-63 62-48 47-32 31-16 15-1 0   Modifier CH CI CJ CK CL CM CN         Tool Used: Timed Up and Go (TUG)  Score:  Initial: 15 seconds Most Recent: 6.83 seconds (Date: 7/26/17 )   Interpretation of Score: The test measures, in seconds, the time taken by an individual to stand up from a standard arm chair (seat height 46 cm [18 in], arm height 65 cm [25.6 in]), walk a distance of 3 meters (118 in, approx 10 ft), turn, walk back to the chair and sit down. If the individual takes longer than 14 seconds to complete TUG, this indicates risk for falls. Score 7 7.5-10.5 11-14 14.5-17.5 18-21 21.5-24.5 25+   Modifier CH CI CJ CK CL CM CN     ? Mobility - Walking and Moving Around:     - CURRENT STATUS: CH - 0% impaired, limited or restricted    - GOAL STATUS: CI - 1%-19% impaired, limited or restricted    - D/C STATUS:  CH - 0% impaired, limited or restricted                TREATMENT:   (In addition to Assessment/Re-Assessment sessions the following treatments were rendered)      Present Symptoms/Complaints - 7/26/2017:  Pt states he is about 95% better since first starting PT. He is ready to discharge today. Pain: Initial:   Pain Intensity 1: 0  Post Session:  unchanged       Therapeutic Exercise: (55 minutes):  Exercises per grid below to improve mobility, strength and balance. Required moderate visual, verbal and tactile cues to promote proper body alignment, promote proper body posture and promote proper body mechanics. Progressed resistance, range and repetitions as indicated.      Date:  7/12/17 Date:  7/17/17 Date:  7/19/17 Date:  7/24/17 Date:  7/26/17   Activity/Exercise        Pt education     HEP   Recumbent bike L4 10 min L4 10 min L4 10 min L4 10 min L4 10 min   Static balance exercise/ proprioception    - Tandem stance to fatigue, B  - Marching on foam 3s holds x20 - SLS B to fatigue   Dynamic balance exercise/ propriopception Tandem walk 40' x2   Tandem walk 40'x2    Extension stretch W/ 4# weight for 10 min hold W/ 4# weight for 10 min hold W/ 4# weight for 10 min hold  W/ 4# weight for 10 min hold   Squats     HEP   Stairs 6x with reciprocal pattern and 1 railing   1x reciprocal    Gait training 5 laps for proper knee motion       Mini-lunge 30x onto bosu  30x B onto bosu  Full lunges 20x R, 15x L   Step up/down  8\" x30 8\" x40 Up only 8\" 30x    Square fitter Fwd/back and s/s, min UEs 30x each DLS       Band walk  Sideways RTB 40'x2 each way Sideways GTB 45'x2 each way     Standing hip extension  RTB 3 x 10 B GTB 3 x 10 B GTB 3 x 10 B GTB 3 x 10   Bridge w/ march  2 x 5 B 2 x 7 B     Leg press  115# x10  145# x20 160# 40x 175# 30x 160# 30x   Standing hip abduction    GTB 3 x 10 B    Lateral squat            Treatment/Session Assessment:  Pt demonstrates good understanding of HEP. Gave coupon for month of [employer] membership.     Total Treatment Duration:  PT Patient Time In/Time Out  Time In: 0950  Time Out: [facility],

## 2017-07-27 ENCOUNTER — HOSPITAL ENCOUNTER (OUTPATIENT)
Dept: SLEEP MEDICINE | Age: 77
Discharge: HOME | End: 2017-07-27
Attending: ORTHOPAEDIC SURGERY
Payer: MEDICARE

## 2017-07-27 PROCEDURE — 95810 POLYSOM 6/> YRS 4/> PARAM: CPT

## 2017-08-07 ENCOUNTER — HOSPITAL ENCOUNTER (OUTPATIENT)
Dept: SLEEP MEDICINE | Age: 77
Discharge: HOME | End: 2017-08-07
Attending: ORTHOPAEDIC SURGERY
Payer: MEDICARE

## 2017-08-07 PROCEDURE — 95811 POLYSOM 6/>YRS CPAP 4/> PARM: CPT

## 2017-10-23 PROBLEM — Z01.810 PREOP CARDIOVASCULAR EXAM: Status: ACTIVE | Noted: 2017-10-23

## 2017-10-24 ENCOUNTER — HOSPITAL ENCOUNTER (OUTPATIENT)
Dept: SURGERY | Age: 77
Discharge: HOME | End: 2017-10-24
Attending: ORTHOPAEDIC SURGERY
Payer: MEDICARE

## 2017-10-24 VITALS
TEMPERATURE: 96.8 F | HEART RATE: 64 BPM | HEIGHT: 71 IN | OXYGEN SATURATION: 95 % | RESPIRATION RATE: 18 BRPM | WEIGHT: 269 LBS | BODY MASS INDEX: 37.66 KG/M2

## 2017-10-24 LAB
ANION GAP SERPL CALC-SCNC: 8 MMOL/L (ref 7–16)
APPEARANCE UR: CLEAR
APTT PPP: 25.9 SEC (ref 23.5–31.7)
BACTERIA SPEC CULT: NORMAL
BACTERIA URNS QL MICRO: 0 /HPF
BASOPHILS # BLD: 0 K/UL (ref 0–0.2)
BASOPHILS NFR BLD: 0 % (ref 0–2)
BILIRUB UR QL: NEGATIVE
BUN SERPL-MCNC: 22 MG/DL (ref 8–23)
CALCIUM SERPL-MCNC: 8.9 MG/DL (ref 8.3–10.4)
CASTS URNS QL MICRO: 0 /LPF
CHLORIDE SERPL-SCNC: 103 MMOL/L (ref 98–107)
CO2 SERPL-SCNC: 29 MMOL/L (ref 21–32)
COLOR UR: YELLOW
CREAT SERPL-MCNC: 1.5 MG/DL (ref 0.8–1.5)
DIFFERENTIAL METHOD BLD: NORMAL
EOSINOPHIL # BLD: 0.2 K/UL (ref 0–0.8)
EOSINOPHIL NFR BLD: 2 % (ref 0.5–7.8)
EPI CELLS #/AREA URNS HPF: 0 /HPF
ERYTHROCYTE [DISTWIDTH] IN BLOOD BY AUTOMATED COUNT: 13.5 % (ref 11.9–14.6)
GLUCOSE SERPL-MCNC: 121 MG/DL (ref 65–100)
GLUCOSE UR STRIP.AUTO-MCNC: NEGATIVE MG/DL
HCT VFR BLD AUTO: 46.9 % (ref 41.1–50.3)
HGB BLD-MCNC: 15.5 G/DL (ref 13.6–17.2)
HGB UR QL STRIP: NEGATIVE
IMM GRANULOCYTES # BLD: 0 K/UL (ref 0–0.5)
IMM GRANULOCYTES NFR BLD: 0 % (ref 0–5)
INR PPP: 1 (ref 0.9–1.2)
KETONES UR QL STRIP.AUTO: NEGATIVE MG/DL
LEUKOCYTE ESTERASE UR QL STRIP.AUTO: NEGATIVE
LYMPHOCYTES # BLD: 2.5 K/UL (ref 0.5–4.6)
LYMPHOCYTES NFR BLD: 25 % (ref 13–44)
MCH RBC QN AUTO: 30.2 PG (ref 26.1–32.9)
MCHC RBC AUTO-ENTMCNC: 33 G/DL (ref 31.4–35)
MCV RBC AUTO: 91.2 FL (ref 79.6–97.8)
MONOCYTES # BLD: 0.9 K/UL (ref 0.1–1.3)
MONOCYTES NFR BLD: 9 % (ref 4–12)
NEUTS SEG # BLD: 6.4 K/UL (ref 1.7–8.2)
NEUTS SEG NFR BLD: 64 % (ref 43–78)
NITRITE UR QL STRIP.AUTO: NEGATIVE
PH UR STRIP: 5 [PH] (ref 5–9)
PLATELET # BLD AUTO: 186 K/UL (ref 150–450)
PMV BLD AUTO: 11.5 FL (ref 10.8–14.1)
POTASSIUM SERPL-SCNC: 3.9 MMOL/L (ref 3.5–5.1)
PROT UR STRIP-MCNC: 30 MG/DL
PROTHROMBIN TIME: 10.6 SEC (ref 9.6–12)
RBC # BLD AUTO: 5.14 M/UL (ref 4.23–5.67)
RBC #/AREA URNS HPF: 0 /HPF
SERVICE CMNT-IMP: NORMAL
SODIUM SERPL-SCNC: 140 MMOL/L (ref 136–145)
SP GR UR REFRACTOMETRY: 1.01 (ref 1–1.02)
UROBILINOGEN UR QL STRIP.AUTO: 0.2 EU/DL (ref 0.2–1)
WBC # BLD AUTO: 10.1 K/UL (ref 4.3–11.1)
WBC URNS QL MICRO: 0 /HPF

## 2017-10-24 PROCEDURE — 81001 URINALYSIS AUTO W/SCOPE: CPT | Performed by: PHYSICIAN ASSISTANT

## 2017-10-24 PROCEDURE — 85730 THROMBOPLASTIN TIME PARTIAL: CPT | Performed by: PHYSICIAN ASSISTANT

## 2017-10-24 PROCEDURE — 85025 COMPLETE CBC W/AUTO DIFF WBC: CPT | Performed by: PHYSICIAN ASSISTANT

## 2017-10-24 PROCEDURE — 80048 BASIC METABOLIC PNL TOTAL CA: CPT | Performed by: PHYSICIAN ASSISTANT

## 2017-10-24 PROCEDURE — 85610 PROTHROMBIN TIME: CPT | Performed by: PHYSICIAN ASSISTANT

## 2017-10-24 PROCEDURE — 87641 MR-STAPH DNA AMP PROBE: CPT | Performed by: PHYSICIAN ASSISTANT

## 2017-10-24 NOTE — PERIOP NOTES
Patient verified name, , and surgery as listed in [facility]. Type 3 surgery, PAT joint assessment complete. Labs per surgeon: cbc, bmp, ua, pt/inr, ptt, mrsa/mssa swab, T&S DOS; results pending. Labs per anesthesia protocol: All required lab work included in surgeon's orders. EKG: completed 10/23/17 at [facility]-results have not been scanned into [facility] at this time. Charge nurse to print EKG once scanned, review, and have anesthesia review if needed. Previous EKG (16), Bilateral Vascular Carotid US (17), Echo report (17), Vascular note (17), and Cardiology note with surgical clearance (10/23/17) placed on chart for anesthesia reference. E-mail request sent to [facility] requesting clearance to hold Plavix 7 days prior to surgery per anesthesia protocol-Charge nurse to follow-up. Patient states he has incentive spirometer from previous joint replacement at home. Patient received written and verbal instructions reviewing use of incentive spirometer, patient verbalized understanding. Hibiclens and instructions to return bottle on DOS given per hospital policy. Nasal Swab collected per MD order and instructions for Mupirocin nasal ointment if required. Patient provided with handouts including Guide to Surgery, Pain Management, Hand Hygiene, Blood Transfusion Education, and [facility] Brochure. Patient answered medical/surgical history questions at their best of ability. All prior to admission medications documented in [facility]. Original medication prescription bottle NOT visualized during patient appointment. Patient instructed to hold all vitamins 7 days prior to surgery and NSAIDS 5 days prior to surgery. Medications to be held: all vitamins and Plavix.      Patient instructed to continue previous medications as prescribed prior to surgery and to take the following medications the day of surgery according to anesthesia guidelines with a small sip of water: Tylenol if needed, Metoprolol, and Zantac. Patient instructed to bring CPAP, bottle of Hibiclens, Zantac (in the original bottle), and incentive spirometer to the hospital on the DOS. Patient teach back successful and patient demonstrates knowledge of instruction.

## 2017-10-24 NOTE — PERIOP NOTES
Labs dated 10/24/17 routed via [address] [facility] to patients PCP, Dr. Gerline Aase, per Dr. Libby Collins' request. Abnormal UA routed via [facility] to Dr. Libby Collins per anesthesia protocol, no new orders received at this time. All lab results within anesthesia limits.

## 2017-10-24 NOTE — PERIOP NOTES
URINALYSIS W/ RFLX MICROSCOPIC [[id number]] (Order [id number])   Lab   Date: 10/24/2017 Department: [facility] Pre Assessment Released By: Tg Waldrop RN (auto-released) Authorizing: SHILA Morris   10/24/2017  1:35 PM - Joe, Lab In [employer]   Component Results   Component Value Flag Ref Range Units Status   Color YELLOW     Final   Appearance CLEAR     Final   Specific gravity 1.010  1.001 - 1.023   Final   pH (UA) 5.0  5.0 - 9.0   Final   Protein 30 (A) NEG mg/dL Final   Glucose NEGATIVE    mg/dL Final   Ketone NEGATIVE   NEG mg/dL Final   Bilirubin NEGATIVE   NEG   Final   Blood NEGATIVE   NEG   Final   Urobilinogen 0.2  0.2 - 1.0 EU/dL Final   Nitrites NEGATIVE   NEG   Final   Leukocyte Esterase NEGATIVE   NEG   Final   WBC 0  0 /hpf Final   RBC 0  0 /hpf Final   Epithelial cells 0  0 /hpf Final   Bacteria 0  0 /hpf Final   Casts 0  0 /lpf Final

## 2017-10-24 NOTE — PERIOP NOTES
Recent Results (from the past 12 hour(s))   CBC WITH AUTOMATED DIFF    Collection Time: 10/24/17 12:53 PM   Result Value Ref Range    WBC 10.1 4.3 - 11.1 K/uL    RBC 5.14 4.23 - 5.67 M/uL    HGB 15.5 13.6 - 17.2 g/dL    HCT 46.9 41.1 - 50.3 %    MCV 91.2 79.6 - 97.8 FL    MCH 30.2 26.1 - 32.9 PG    MCHC 33.0 31.4 - 35.0 g/dL    RDW 13.5 11.9 - 14.6 %    PLATELET 366 873 - 138 K/uL    MPV 11.5 10.8 - 14.1 FL    DF AUTOMATED      NEUTROPHILS 64 43 - 78 %    LYMPHOCYTES 25 13 - 44 %    MONOCYTES 9 4.0 - 12.0 %    EOSINOPHILS 2 0.5 - 7.8 %    BASOPHILS 0 0.0 - 2.0 %    IMMATURE GRANULOCYTES 0 0.0 - 5.0 %    ABS. NEUTROPHILS 6.4 1.7 - 8.2 K/UL    ABS. LYMPHOCYTES 2.5 0.5 - 4.6 K/UL    ABS. MONOCYTES 0.9 0.1 - 1.3 K/UL    ABS. EOSINOPHILS 0.2 0.0 - 0.8 K/UL    ABS. BASOPHILS 0.0 0.0 - 0.2 K/UL    ABS. IMM.  GRANS. 0.0 0.0 - 0.5 K/UL   PROTHROMBIN TIME + INR    Collection Time: 10/24/17 12:53 PM   Result Value Ref Range    Prothrombin time 10.6 9.6 - 12.0 sec    INR 1.0 0.9 - 1.2     PTT    Collection Time: 10/24/17 12:53 PM   Result Value Ref Range    aPTT 25.9 23.5 - 09.4 SEC   METABOLIC PANEL, BASIC    Collection Time: 10/24/17 12:53 PM   Result Value Ref Range    Sodium 140 136 - 145 mmol/L    Potassium 3.9 3.5 - 5.1 mmol/L    Chloride 103 98 - 107 mmol/L    CO2 29 21 - 32 mmol/L    Anion gap 8 7 - 16 mmol/L    Glucose 121 (H) 65 - 100 mg/dL    BUN 22 8 - 23 MG/DL    Creatinine 1.50 0.8 - 1.5 MG/DL    GFR est AA 58 (L) >60 ml/min/1.73m2    GFR est non-AA 48 (L) >60 ml/min/1.73m2    Calcium 8.9 8.3 - 10.4 MG/DL   URINALYSIS W/ RFLX MICROSCOPIC    Collection Time: 10/24/17 12:53 PM   Result Value Ref Range    Color YELLOW      Appearance CLEAR      Specific gravity 1.010 1.001 - 1.023      pH (UA) 5.0 5.0 - 9.0      Protein 30 (A) NEG mg/dL    Glucose NEGATIVE  mg/dL    Ketone NEGATIVE  NEG mg/dL    Bilirubin NEGATIVE  NEG      Blood NEGATIVE  NEG      Urobilinogen 0.2 0.2 - 1.0 EU/dL    Nitrites NEGATIVE  NEG Leukocyte Esterase NEGATIVE  NEG      WBC 0 0 /hpf    RBC 0 0 /hpf    Epithelial cells 0 0 /hpf    Bacteria 0 0 /hpf    Casts 0 0 /lpf

## 2017-10-25 NOTE — PERIOP NOTES
10/24/2017 11:53 PM - Joe, Lab In [employer]   Component Results   Component Value Flag Ref Range Units Status   Special Requests: NO SPECIAL REQUESTS     Final   Culture result:      Final   SA target not detected.                                 A MRSA NEGATIVE, SA NEGATIVE test result does not preclude MRSA or SA nasal colonization.

## 2017-10-30 NOTE — ADVANCED PRACTICE NURSE
Total Joint Surgery Preoperative Chart Review      Patient ID:  Demar Herrera  [id number]  14 y.o.  1940  Surgeon: Dr. Hi Ma  Date of Surgery: 11/15/2017  Procedure: Total Left Knee Arthroplasty  Primary Care Physician: Geovanni Summers [phone]  Specialty Physician(s):      Subjective: Demar Herrera is a 76 y.o. WHITE OR  male who presents for preoperative evaluation for Total Left Knee arthroplasty. This is a preoperative chart review note based on data collected by the nurse at the surgical Pre-Assessment visit. Past Medical History:   Diagnosis Date    Aortic valve replaced 12/2014    Daily Plavix and 81 mg ASA  nightly     Arrhythmia     Hx of Atrial fibrillation-Post op after AVR/CABG. Treated with Amiodarone and Coumadin. NO recurrence. Medication stopped 2/2/15    Arthritis     OA    CAD (coronary artery disease)     Follow[facility]-Dr. Sauceda    Carotid artery stenosis     without cerebral infarction. Left carotid stent in May 2014 as part of the [address]. Per US (09/13/17) \"Right internal carotid artery has <50% stenosis. Left internal carotid artery is status post carotid stent with no significan stenosis. \"      Chronic kidney disease     per cardiology note (12/8/16) \"stage 3, GFR 30-59 ml/min. \" GFR 53 on 5/8/17.  Chronic pain     knee    Coagulation disorder (HCC)     Daily Plavix     Coronary atherosclerosis of native coronary vessel     Cardiac cath times 2 with 1 stent. 1) PCI of OM with 2.5 mm Cypher MARIUM (1/2008) and 2) 2 vessel CABG 10/14/14: LIMA to LAD. SVG to OM.  Dyslipidemia 12/5/2016    Former smoker     GERD (gastroesophageal reflux disease)     PRN Zantac     H/O echocardiogram 01/17/2017    EF 64%, AV Mean gradient: 15 mmHG, AV Peak gradient: 30.4 mmHg.      Hypertension     Left anterior fascicular block     Per Dr. Mariajose Garcia note (12/8/16) \"Chronic\"     KAYCEE (obstructive sleep apnea)     CPAP-Followed by Dr. Arabella Greer.  Platelet inhibition due to Plavix     Pre-diabetes     Last A1C 6.3 on 17      Past Surgical History:   Procedure Laterality Date    HX BUNIONECTOMY Bilateral     HX CARPAL TUNNEL RELEASE Bilateral     HX CATARACT REMOVAL Bilateral     HX COLONOSCOPY      HX CORONARY ARTERY BYPASS GRAFT  10/14/2014    x2 - Dr. John Goodman Aortic valve replacement     HX HEART CATHETERIZATION      x2-one stent placed in     HX KNEE REPLACEMENT Right 2017    VASCULAR SURGERY PROCEDURE UNLIST      left carotid stent in May 2014 as part of the [place] [facility]     Family History   Problem Relation Age of Onset    Cancer Father 80     LIVER CANCER     Heart Disease Mother     Arthritis-osteo Sister     Heart Disease Brother     Arthritis-osteo Brother       Social History   Substance Use Topics    Smoking status: Former Smoker     Packs/day: 1.00     Years: 23.00     Types: Cigarettes     Quit date: 3/25/1980    Smokeless tobacco: Never Used    Alcohol use 3.5 oz/week     4 Glasses of wine, 3 Cans of beer per week       Prior to Admission medications    Medication Sig Start Date End Date Taking? Authorizing Provider   metoprolol succinate (TOPROL XL) 25 mg XL tablet Take 1 Tab by mouth every morning. Indications: unknown why takes 17  Yes Lawrence Araiza MD   amoxicillin (AMOXIL) 500 mg capsule Take 500 mg by mouth. Prior to dental appointment   Yes [facility], MD   acetaminophen 500 mg cap Take 1,000 mg by mouth every six (6) hours. Indications: Fever, Pain   Yes Laurence Fuentes MD   clopidogrel (PLAVIX) 75 mg tab Take 1 Tab by mouth every morning. 16  Yes Lawrence Araiza MD   losartan-hydroCHLOROthiazide [facility]) 100-12.5 mg per tablet Take 1 Tab by mouth every morning. Indications: Hypertension 16  Yes Lawrence Araiza MD   rosuvastatin (CRESTOR) 20 mg tablet Take 1 Tab by mouth nightly.  Indications: hypercholesterolemia 16  Yes Murl Mustache MD [place]   aspirin delayed-release 81 mg tablet Take 81 mg by mouth nightly. Yes Historical Provider   Glucosamine &Chondroit-MV-Min3 852-078-65-0.5 mg tab Take  by mouth every morning. 2 tablets every morning     Stop seven days prior to surgery per anesthesia protocol. Yes Historical Provider   ranitidine (ZANTAC) 150 mg tablet Take 150 mg by mouth as needed for Indigestion. Non-formulary. Instructed to bring to the hospital on the DOS, in the original bottle, and give to nurse. Indications: gastroesophageal reflux disease   Yes Historical Provider     Allergies   Allergen Reactions    Poison Ivy Extract Hives and Itching          Objective:     Physical Exam:   No data found. ECG:    EKG Results     None          Data Review:   Labs:     Results for Robin Boucher (MRN [id number]) as of 10/30/2017 14:56   Ref.  Range 10/24/2017 12:53   Sodium Latest Ref Range: 136 - 145 mmol/L 140   Potassium Latest Ref Range: 3.5 - 5.1 mmol/L 3.9   Chloride Latest Ref Range: 98 - 107 mmol/L 103   CO2 Latest Ref Range: 21 - 32 mmol/L 29   Anion gap Latest Ref Range: 7 - 16 mmol/L 8   Glucose Latest Ref Range: 65 - 100 mg/dL 121 (H)   BUN Latest Ref Range: 8 - 23 MG/DL 22   Creatinine Latest Ref Range: 0.8 - 1.5 MG/DL 1.50   Calcium Latest Ref Range: 8.3 - 10.4 MG/DL 8.9   GFR est non-AA Latest Ref Range: >60 ml/min/1.73m2 48 (L)   GFR est AA Latest Ref Range: >60 ml/min/1.73m2 58 (L)       Problem List:  )  Patient Active Problem List   Diagnosis Code    Carotid artery stenosis without cerebral infarction I65.29    Coronary atherosclerosis of native coronary vessel I25.10    S/P CABG (coronary artery bypass graft) Z95.1    H/O aortic valve replacement Z95.2    CKD (chronic kidney disease) stage 3, GFR 30-59 ml/min N18.3    Elevated hemoglobin A1c R73.09    Arthritis M19.90    Chronic pain G89.29    Atrial fibrillation (HCC) I48.91    Dyslipidemia E78.5    Benign hypertensive heart disease without CHF I11.9    Abnormal EKG R94.31    Snoring R06.83    Witnessed episode of apnea R06.81    Status post total right knee replacement Z96.651    Preop cardiovascular exam Z01.810       Total Joint Surgery Pre-Assessment Recommendations:           Renal protocol initiated. The patient's chart will be flagged renal risk. Renal RisK Alerts:  1. Caution with use of muscle relaxants and sedatives with reduced renal function  2. Caution with total amount of narcotics used   3. Avoid morphine if patient has reduced renal function due to accumulations of the highly active metabolite, morphine-6-glucuronide, which can lead to sedation and respiratory depression  4. Avoid nephrotoxic drugs such as GALVIN inhibitors  5. Consider volume status monitoring in addition to Enciso  6. Ensure hand-off to hospitalist for appropriate perioperative IV fluid management      Patient is to wear home CPAP during hospitalization.      Signed By: LUDWIN Carter    October 30, 2017

## 2017-11-02 NOTE — PERIOP NOTES
Not in EMR from cardiology with OK to hold Plavix that reads:    [address] [place] [address]       11:42 AM   Note      Pt needs cardiac clearance and medication hold for total left knee replacement on 11.15.17.     Last cath 10.2014  Medication to hold: Plavix  7 days prior.                   11:47 AM   [address] [place] [address] routed this conversation to MD Soren Woodard MD   to [address] [place] [address]       12:14 PM   Note      Low risk ok to hold

## 2017-11-09 NOTE — H&P
[address] [facility]  Pre Operative History and Physical Exam    Patient ID:  Christophe Mcmanus  [id number]  70 y.o.  1940    Today: November 9, 2017       Assessment:   1. Arthritis of the left knee        Plan:    1. Proceed with scheduled Procedure(s) (LRB):  KNEE ARTHROPLASTY TOTAL/ LEFT/ MADI/ FNB (Left)            CC: Left knee pain    HPI:   The patient has end stage arthritis of the left knee. The patient was evaluated and examined during a consultation prior to this office visit. There have been no changes to the patient's orthopedic condition since the initial consultation. The patient has failed previous conservative treatment for this condition including antiinflammatories , and lifestyle modifications. The necessity for joint replacement is present. 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 Treated with Amiodarone and Coumadin. NO recurrence. Medication stopped 2/2/15    Arthritis     OA    CAD (coronary artery disease)     Followed by [facility] Cardiology-Dr. Sauceda    Carotid artery stenosis     without cerebral infarction. Left carotid stent in May 2014 as part of the [address]. Per US (09/13/17) \"Right internal carotid artery has <50% stenosis. Left internal carotid artery is status post carotid stent with no significan stenosis. \"      Chronic kidney disease     per cardiology note (12/8/16) \"stage 3, GFR 30-59 ml/min. \" GFR 53 on 5/8/17.  Chronic pain     knee    Coagulation disorder (HCC)     Daily Plavix     Coronary atherosclerosis of native coronary vessel     Cardiac cath times 2 with 1 stent.  1) PCI of OM with 2.5 mm Cypher MARIUM (1/2008) and 2) 2 vessel CABG 10/14/14: LIMA to LAD. SVG to OM.  Dyslipidemia 12/5/2016    Former smoker     GERD (gastroesophageal reflux disease)     PRN Zantac     H/O echocardiogram 01/17/2017    EF 64%, AV Mean gradient: 15 mmHG, AV Peak gradient: 30.4 mmHg.  Hypertension     Left anterior fascicular block     Per Dr. Bay Lorenz note (12/8/16) \"Chronic\"     KAYCEE (obstructive sleep apnea)     CPAP-Followed by Dr. Kimani Reilly.  Platelet inhibition due to Plavix     Pre-diabetes     Last A1C 6.3 on 9/27/17     Past Surgical History:   Procedure Laterality Date    HX BUNIONECTOMY Bilateral     HX CARPAL TUNNEL RELEASE Bilateral     HX CATARACT REMOVAL Bilateral     HX COLONOSCOPY      HX CORONARY ARTERY BYPASS GRAFT  10/14/2014    x2 - Dr. [facility] Aortic valve replacement     HX HEART CATHETERIZATION      x2-one stent placed in 2014    HX KNEE REPLACEMENT Right 2017    VASCULAR SURGERY PROCEDURE UNLIST      left carotid stent in May 2014 as part of the [facility] Trial        Allergies: Allergies   Allergen Reactions    Poison Ivy Extract Hives and Itching        Objective:                    HEENT: NC/AT                   Lungs:  Unlabored respirations, clear breath sounds                    Heart:   RRR                    Abdomen: soft                   Extremities:  Pain with ROM of the left knee    Meds:   No current facility-administered medications for this encounter. Current Outpatient Prescriptions   Medication Sig    metoprolol succinate (TOPROL XL) 25 mg XL tablet Take 1 Tab by mouth every morning. Indications: unknown why takes    amoxicillin (AMOXIL) 500 mg capsule Take 500 mg by mouth. Prior to dental appointment    acetaminophen 500 mg cap Take 1,000 mg by mouth every six (6) hours. Indications: Fever, Pain    clopidogrel (PLAVIX) 75 mg tab Take 1 Tab by mouth every morning.  losartan-hydroCHLOROthiazide (HYZAAR) 100-12.5 mg per tablet Take 1 Tab by mouth every morning.  Indications: Hypertension    rosuvastatin (CRESTOR) 20 mg tablet Take 1 Tab by mouth nightly. Indications: hypercholesterolemia    aspirin delayed-release 81 mg tablet Take 81 mg by mouth nightly.  Glucosamine &Chondroit-MV-Min3 926-311-77-0.5 mg tab Take  by mouth every morning. 2 tablets every morning     Stop seven days prior to surgery per anesthesia protocol.  ranitidine (ZANTAC) 150 mg tablet Take 150 mg by mouth as needed for Indigestion. Non-formulary. Instructed to bring to the hospital on the DOS, in the original bottle, and give to nurse. Indications: gastroesophageal reflux disease         Labs:  Hospital Outpatient Visit on 10/24/2017   Component Date Value Ref Range Status    WBC 10/24/2017 10.1  4.3 - 11.1 K/uL Final    RBC 10/24/2017 5.14  4.23 - 5.67 M/uL Final    HGB 10/24/2017 15.5  13.6 - 17.2 g/dL Final    HCT 10/24/2017 46.9  41.1 - 50.3 % Final    MCV 10/24/2017 91.2  79.6 - 97.8 FL Final    MCH 10/24/2017 30.2  26.1 - 32.9 PG Final    MCHC 10/24/2017 33.0  31.4 - 35.0 g/dL Final    RDW 10/24/2017 13.5  11.9 - 14.6 % Final    PLATELET 36/76/7528 184  150 - 450 K/uL Final    MPV 10/24/2017 11.5  10.8 - 14.1 FL Final    DF 10/24/2017 AUTOMATED    Final    NEUTROPHILS 10/24/2017 64  43 - 78 % Final    LYMPHOCYTES 10/24/2017 25  13 - 44 % Final    MONOCYTES 10/24/2017 9  4.0 - 12.0 % Final    EOSINOPHILS 10/24/2017 2  0.5 - 7.8 % Final    BASOPHILS 10/24/2017 0  0.0 - 2.0 % Final    IMMATURE GRANULOCYTES 10/24/2017 0  0.0 - 5.0 % Final    ABS. NEUTROPHILS 10/24/2017 6.4  1.7 - 8.2 K/UL Final    ABS. LYMPHOCYTES 10/24/2017 2.5  0.5 - 4.6 K/UL Final    ABS. MONOCYTES 10/24/2017 0.9  0.1 - 1.3 K/UL Final    ABS. EOSINOPHILS 10/24/2017 0.2  0.0 - 0.8 K/UL Final    ABS. BASOPHILS 10/24/2017 0.0  0.0 - 0.2 K/UL Final    ABS. IMM. GRANS.  10/24/2017 0.0  0.0 - 0.5 K/UL Final    Prothrombin time 10/24/2017 10.6  9.6 - 12.0 sec Final    INR 10/24/2017 1.0  0.9 - 1.2   Final    Comment: Suggested therapeutic INR range:  Venous thrombosis and embolus  2.0-3.0  Prosthetic heart valve         2.5-3.5      aPTT 10/24/2017 25.9  23.5 - 31.7 SEC Final    Heparin Therapeutic Range = 56.6-81.7 secs    Sodium 10/24/2017 140  136 - 145 mmol/L Final    Potassium 10/24/2017 3.9  3.5 - 5.1 mmol/L Final    Chloride 10/24/2017 103  98 - 107 mmol/L Final    CO2 10/24/2017 29  21 - 32 mmol/L Final    Anion gap 10/24/2017 8  7 - 16 mmol/L Final    Glucose 10/24/2017 121* 65 - 100 mg/dL Final    Comment: 47 - 60 mg/dl Consistent with, but not fully diagnostic of hypoglycemia. 101 - 125 mg/dl Impaired fasting glucose/consistent with pre-diabetes mellitus  > 126 mg/dl Fasting glucose consistent with overt diabetes mellitus      BUN 10/24/2017 22  8 - 23 MG/DL Final    Creatinine 10/24/2017 1.50  0.8 - 1.5 MG/DL Final    GFR est AA 10/24/2017 58* >60 ml/min/1.73m2 Final    GFR est non-AA 10/24/2017 48* >60 ml/min/1.73m2 Final    Comment: (NOTE)  Estimated GFR is calculated using the Modification of Diet in Renal   Disease (MDRD) Study equation, reported for both  Americans   (GFRAA) and non- Americans (GFRNA), and normalized to 1.73m2   body surface area. The physician must decide which value applies to   the patient. The MDRD study equation should only be used in   individuals age 25 or older. It has not been validated for the   following: pregnant women, patients with serious comorbid conditions,   or on certain medications, or persons with extremes of body size,   muscle mass, or nutritional status.       Calcium 10/24/2017 8.9  8.3 - 10.4 MG/DL Final    Color 10/24/2017 YELLOW    Final    Appearance 10/24/2017 CLEAR    Final    Specific gravity 10/24/2017 1.010  1.001 - 1.023   Final    pH (UA) 10/24/2017 5.0  5.0 - 9.0   Final    Protein 10/24/2017 30* NEG mg/dL Final    Glucose 10/24/2017 NEGATIVE   mg/dL Final    Ketone 10/24/2017 NEGATIVE   NEG mg/dL Final    Bilirubin 10/24/2017 NEGATIVE   NEG   Final    Blood 10/24/2017 NEGATIVE   NEG   Final    Urobilinogen 10/24/2017 0.2  0.2 - 1.0 EU/dL Final    Nitrites 10/24/2017 NEGATIVE   NEG   Final    Leukocyte Esterase 10/24/2017 NEGATIVE   NEG   Final    WBC 10/24/2017 0  0 /hpf Final    RBC 10/24/2017 0  0 /hpf Final    Epithelial cells 10/24/2017 0  0 /hpf Final    Bacteria 10/24/2017 0  0 /hpf Final    Casts 10/24/2017 0  0 /lpf Final    Special Requests: 10/24/2017 NO SPECIAL REQUESTS    Final    Culture result: 10/24/2017 SA target not detected. A MRSA NEGATIVE, SA NEGATIVE test result does not preclude MRSA or SA nasal colonization.     Final                 Patient Active Problem List   Diagnosis Code    Carotid artery stenosis without cerebral infarction I65.29    Coronary atherosclerosis of native coronary vessel I25.10    S/P CABG (coronary artery bypass graft) Z95.1    H/O aortic valve replacement Z95.2    CKD (chronic kidney disease) stage 3, GFR 30-59 ml/min N18.3    Elevated hemoglobin A1c R73.09    Arthritis M19.90    Chronic pain G89.29    Atrial fibrillation (HCC) I48.91    Dyslipidemia E78.5    Benign hypertensive heart disease without CHF I11.9    Abnormal EKG R94.31    Snoring R06.83    Witnessed episode of apnea R06.81    Status post total right knee replacement Z96.651    Preop cardiovascular exam Z01.810         Signed By: SHILA Thomas  November 9, 2017

## 2017-11-09 NOTE — H&P
[address] [facility]  Pre Operative History and Physical Exam    Patient ID:  Daiva Du  [id number]  2 y.o.  1940    Today: November 9, 2017       Assessment:   1. Arthritis of the left knee        Plan:    1. Proceed with scheduled Procedure(s) (LRB):  KNEE ARTHROPLASTY TOTAL/ LEFT/ MADI/ FNB (Left)            CC: Left knee pain    HPI:   The patient has end stage arthritis of the left knee. The patient was evaluated and examined during a consultation prior to this office visit. There have been no changes to the patient's orthopedic condition since the initial consultation. The patient has failed previous conservative treatment for this condition including antiinflammatories , and lifestyle modifications. The necessity for joint replacement is present. 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 Treated with Amiodarone and Coumadin. NO recurrence. Medication stopped 2/2/15    Arthritis     OA    CAD (coronary artery disease)     Followed by [facility]-Dr. Sauceda    Carotid artery stenosis     without cerebral infarction. Left carotid stent in May 2014 as part of the [address]. Per US (09/13/17) \"Right internal carotid artery has <50% stenosis. Left internal carotid artery is status post carotid stent with no significan stenosis. \"      Chronic kidney disease     per cardiology note (12/8/16) \"stage 3, GFR 30-59 ml/min. \" GFR 53 on 5/8/17.  Chronic pain     knee    Coagulation disorder (HCC)     Daily Plavix     Coronary atherosclerosis of native coronary vessel     Cardiac cath times 2 with 1 stent.  1) PCI of OM with 2.5 mm Cypher MARIUM (1/2008) and 2) 2 vessel CABG 10/14/14: LIMA to LAD. SVG to OM.  Dyslipidemia 12/5/2016    Former smoker     GERD (gastroesophageal reflux disease)     PRN Zantac     H/O echocardiogram 01/17/2017    EF 64%, AV Mean gradient: 15 mmHG, AV Peak gradient: 30.4 mmHg.  Hypertension     Left anterior fascicular block     Per Dr. Pedro Anne note (12/8/16) \"Chronic\"     KAYCEE (obstructive sleep apnea)     CPAP-Followed by Dr. Cass Pandey.  Platelet inhibition due to Plavix     Pre-diabetes     Last A1C 6.3 on 9/27/17     Past Surgical History:   Procedure Laterality Date    HX BUNIONECTOMY Bilateral     HX CARPAL TUNNEL RELEASE Bilateral     HX CATARACT REMOVAL Bilateral     HX COLONOSCOPY      HX CORONARY ARTERY BYPASS GRAFT  10/14/2014    x2 - Dr. Roberta España Aortic valve replacement     HX HEART CATHETERIZATION      x2-one stent placed in 2014    HX KNEE REPLACEMENT Right 2017    VASCULAR SURGERY PROCEDURE UNLIST      left carotid stent in May 2014 as part of the [facility] Trial        Allergies: Allergies   Allergen Reactions    Poison Ivy Extract Hives and Itching        Objective:                    HEENT: NC/AT                   Lungs:  Unlabored respirations, clear breath sounds                    Heart:   RRR                    Abdomen: soft                   Extremities:  Pain with ROM of the left knee    Meds:   No current facility-administered medications for this encounter. Current Outpatient Prescriptions   Medication Sig    metoprolol succinate (TOPROL XL) 25 mg XL tablet Take 1 Tab by mouth every morning. Indications: unknown why takes    amoxicillin (AMOXIL) 500 mg capsule Take 500 mg by mouth. Prior to dental appointment    acetaminophen 500 mg cap Take 1,000 mg by mouth every six (6) hours. Indications: Fever, Pain    clopidogrel (PLAVIX) 75 mg tab Take 1 Tab by mouth every morning.  losartan-hydroCHLOROthiazide (HYZAAR) 100-12.5 mg per tablet Take 1 Tab by mouth every morning.  Indications: Hypertension    rosuvastatin (CRESTOR) 20 mg tablet Take 1 Tab by mouth nightly. Indications: hypercholesterolemia    aspirin delayed-release 81 mg tablet Take 81 mg by mouth nightly.  Glucosamine &Chondroit-MV-Min3 269-432-03-0.5 mg tab Take  by mouth every morning. 2 tablets every morning     Stop seven days prior to surgery per anesthesia protocol.  ranitidine (ZANTAC) 150 mg tablet Take 150 mg by mouth as needed for Indigestion. Non-formulary. Instructed to bring to the hospital on the DOS, in the original bottle, and give to nurse. Indications: gastroesophageal reflux disease         Labs:  Hospital Outpatient Visit on 10/24/2017   Component Date Value Ref Range Status    WBC 10/24/2017 10.1  4.3 - 11.1 K/uL Final    RBC 10/24/2017 5.14  4.23 - 5.67 M/uL Final    HGB 10/24/2017 15.5  13.6 - 17.2 g/dL Final    HCT 10/24/2017 46.9  41.1 - 50.3 % Final    MCV 10/24/2017 91.2  79.6 - 97.8 FL Final    MCH 10/24/2017 30.2  26.1 - 32.9 PG Final    MCHC 10/24/2017 33.0  31.4 - 35.0 g/dL Final    RDW 10/24/2017 13.5  11.9 - 14.6 % Final    PLATELET 02/43/6205 621  150 - 450 K/uL Final    MPV 10/24/2017 11.5  10.8 - 14.1 FL Final    DF 10/24/2017 AUTOMATED    Final    NEUTROPHILS 10/24/2017 64  43 - 78 % Final    LYMPHOCYTES 10/24/2017 25  13 - 44 % Final    MONOCYTES 10/24/2017 9  4.0 - 12.0 % Final    EOSINOPHILS 10/24/2017 2  0.5 - 7.8 % Final    BASOPHILS 10/24/2017 0  0.0 - 2.0 % Final    IMMATURE GRANULOCYTES 10/24/2017 0  0.0 - 5.0 % Final    ABS. NEUTROPHILS 10/24/2017 6.4  1.7 - 8.2 K/UL Final    ABS. LYMPHOCYTES 10/24/2017 2.5  0.5 - 4.6 K/UL Final    ABS. MONOCYTES 10/24/2017 0.9  0.1 - 1.3 K/UL Final    ABS. EOSINOPHILS 10/24/2017 0.2  0.0 - 0.8 K/UL Final    ABS. BASOPHILS 10/24/2017 0.0  0.0 - 0.2 K/UL Final    ABS. IMM. GRANS.  10/24/2017 0.0  0.0 - 0.5 K/UL Final    Prothrombin time 10/24/2017 10.6  9.6 - 12.0 sec Final    INR 10/24/2017 1.0  0.9 - 1.2   Final    Comment: Suggested therapeutic INR range:  Venous thrombosis and embolus  2.0-3.0  Prosthetic heart valve         2.5-3.5      aPTT 10/24/2017 25.9  23.5 - 31.7 SEC Final    Heparin Therapeutic Range = 56.6-81.7 secs    Sodium 10/24/2017 140  136 - 145 mmol/L Final    Potassium 10/24/2017 3.9  3.5 - 5.1 mmol/L Final    Chloride 10/24/2017 103  98 - 107 mmol/L Final    CO2 10/24/2017 29  21 - 32 mmol/L Final    Anion gap 10/24/2017 8  7 - 16 mmol/L Final    Glucose 10/24/2017 121* 65 - 100 mg/dL Final    Comment: 47 - 60 mg/dl Consistent with, but not fully diagnostic of hypoglycemia. 101 - 125 mg/dl Impaired fasting glucose/consistent with pre-diabetes mellitus  > 126 mg/dl Fasting glucose consistent with overt diabetes mellitus      BUN 10/24/2017 22  8 - 23 MG/DL Final    Creatinine 10/24/2017 1.50  0.8 - 1.5 MG/DL Final    GFR est AA 10/24/2017 58* >60 ml/min/1.73m2 Final    GFR est non-AA 10/24/2017 48* >60 ml/min/1.73m2 Final    Comment: (NOTE)  Estimated GFR is calculated using the Modification of Diet in Renal   Disease (MDRD) Study equation, reported for both  Americans   (GFRAA) and non- Americans (GFRNA), and normalized to 1.73m2   body surface area. The physician must decide which value applies to   the patient. The MDRD study equation should only be used in   individuals age 25 or older. It has not been validated for the   following: pregnant women, patients with serious comorbid conditions,   or on certain medications, or persons with extremes of body size,   muscle mass, or nutritional status.       Calcium 10/24/2017 8.9  8.3 - 10.4 MG/DL Final    Color 10/24/2017 YELLOW    Final    Appearance 10/24/2017 CLEAR    Final    Specific gravity 10/24/2017 1.010  1.001 - 1.023   Final    pH (UA) 10/24/2017 5.0  5.0 - 9.0   Final    Protein 10/24/2017 30* NEG mg/dL Final    Glucose 10/24/2017 NEGATIVE   mg/dL Final    Ketone 10/24/2017 NEGATIVE   NEG mg/dL Final    Bilirubin 10/24/2017 NEGATIVE   NEG   Final    Blood 10/24/2017 NEGATIVE   NEG   Final    Urobilinogen 10/24/2017 0.2  0.2 - 1.0 EU/dL Final    Nitrites 10/24/2017 NEGATIVE   NEG   Final    Leukocyte Esterase 10/24/2017 NEGATIVE   NEG   Final    WBC 10/24/2017 0  0 /hpf Final    RBC 10/24/2017 0  0 /hpf Final    Epithelial cells 10/24/2017 0  0 /hpf Final    Bacteria 10/24/2017 0  0 /hpf Final    Casts 10/24/2017 0  0 /lpf Final    Special Requests: 10/24/2017 NO SPECIAL REQUESTS    Final    Culture result: 10/24/2017 SA target not detected. A MRSA NEGATIVE, SA NEGATIVE test result does not preclude MRSA or SA nasal colonization.     Final                 Patient Active Problem List   Diagnosis Code    Carotid artery stenosis without cerebral infarction I65.29    Coronary atherosclerosis of native coronary vessel I25.10    S/P CABG (coronary artery bypass graft) Z95.1    H/O aortic valve replacement Z95.2    CKD (chronic kidney disease) stage 3, GFR 30-59 ml/min N18.3    Elevated hemoglobin A1c R73.09    Arthritis M19.90    Chronic pain G89.29    Atrial fibrillation (HCC) I48.91    Dyslipidemia E78.5    Benign hypertensive heart disease without CHF I11.9    Abnormal EKG R94.31    Snoring R06.83    Witnessed episode of apnea R06.81    Status post total right knee replacement Z96.651    Preop cardiovascular exam Z01.810         Signed By: SHILA Malcolm  November 9, 2017

## 2017-11-14 ENCOUNTER — ANESTHESIA EVENT (OUTPATIENT)
Dept: SURGERY | Age: 77
DRG: 470 | End: 2017-11-14
Payer: MEDICARE

## 2017-11-15 ENCOUNTER — ANESTHESIA (OUTPATIENT)
Dept: SURGERY | Age: 77
DRG: 470 | End: 2017-11-15
Payer: MEDICARE

## 2017-11-15 ENCOUNTER — HOSPITAL ENCOUNTER (INPATIENT)
Age: 77
LOS: 2 days | Discharge: HOME HEALTH | DRG: 470 | End: 2017-11-17
Attending: ORTHOPAEDIC SURGERY | Admitting: ORTHOPAEDIC SURGERY
Payer: MEDICARE

## 2017-11-15 DIAGNOSIS — Z96.652 STATUS POST TOTAL LEFT KNEE REPLACEMENT: Primary | ICD-10-CM

## 2017-11-15 LAB
ABO + RH BLD: NORMAL
BLOOD GROUP ANTIBODIES SERPL: NORMAL
GLUCOSE BLD STRIP.AUTO-MCNC: 124 MG/DL (ref 65–100)
GLUCOSE BLD STRIP.AUTO-MCNC: 168 MG/DL (ref 65–100)
GLUCOSE BLD STRIP.AUTO-MCNC: 169 MG/DL (ref 65–100)
HGB BLD-MCNC: 16.3 G/DL (ref 13.6–17.2)
SPECIMEN EXP DATE BLD: NORMAL

## 2017-11-15 PROCEDURE — 74011000250 HC RX REV CODE- 250

## 2017-11-15 PROCEDURE — 77030003665 HC NDL SPN BBMI -A: Performed by: NURSE ANESTHETIST, CERTIFIED REGISTERED

## 2017-11-15 PROCEDURE — 77030002912 HC SUT ETHBND J&J -A: Performed by: ORTHOPAEDIC SURGERY

## 2017-11-15 PROCEDURE — 77030006720 HC BLD PAT RMR ZIMM -B: Performed by: ORTHOPAEDIC SURGERY

## 2017-11-15 PROCEDURE — 74011000258 HC RX REV CODE- 258: Performed by: ORTHOPAEDIC SURGERY

## 2017-11-15 PROCEDURE — C1776 JOINT DEVICE (IMPLANTABLE): HCPCS | Performed by: ORTHOPAEDIC SURGERY

## 2017-11-15 PROCEDURE — 77030003602 HC NDL NRV BLK BBMI -B: Performed by: NURSE ANESTHETIST, CERTIFIED REGISTERED

## 2017-11-15 PROCEDURE — 86580 TB INTRADERMAL TEST: CPT | Performed by: ORTHOPAEDIC SURGERY

## 2017-11-15 PROCEDURE — 36416 COLLJ CAPILLARY BLOOD SPEC: CPT | Performed by: PHYSICIAN ASSISTANT

## 2017-11-15 PROCEDURE — 0SRD0JA REPLACEMENT OF LEFT KNEE JOINT WITH SYNTHETIC SUBSTITUTE, UNCEMENTED, OPEN APPROACH: ICD-10-PCS | Performed by: ORTHOPAEDIC SURGERY

## 2017-11-15 PROCEDURE — 97165 OT EVAL LOW COMPLEX 30 MIN: CPT

## 2017-11-15 PROCEDURE — 77030025452 HC KT TIB SZR TRTH DSP STRY -B: Performed by: ORTHOPAEDIC SURGERY

## 2017-11-15 PROCEDURE — 76010000162 HC OR TIME 1.5 TO 2 HR INTENSV-TIER 1: Performed by: ORTHOPAEDIC SURGERY

## 2017-11-15 PROCEDURE — 76210000006 HC OR PH I REC 0.5 TO 1 HR: Performed by: ORTHOPAEDIC SURGERY

## 2017-11-15 PROCEDURE — 76010010054 HC POST OP PAIN BLOCK: Performed by: ORTHOPAEDIC SURGERY

## 2017-11-15 PROCEDURE — 77030019557 HC ELECTRD VES SEAL MEDT -F: Performed by: ORTHOPAEDIC SURGERY

## 2017-11-15 PROCEDURE — 77030013727 HC IRR FAN PULSVC ZIMM -B: Performed by: ORTHOPAEDIC SURGERY

## 2017-11-15 PROCEDURE — 77030012890

## 2017-11-15 PROCEDURE — 77030037364 HC TIB INST CR  DISP STRY -C: Performed by: ORTHOPAEDIC SURGERY

## 2017-11-15 PROCEDURE — 74011250636 HC RX REV CODE- 250/636: Performed by: ANESTHESIOLOGY

## 2017-11-15 PROCEDURE — 65270000029 HC RM PRIVATE

## 2017-11-15 PROCEDURE — 74011000302 HC RX REV CODE- 302: Performed by: ORTHOPAEDIC SURGERY

## 2017-11-15 PROCEDURE — 77030036688 HC BLNKT CLD THER S2SG -B

## 2017-11-15 PROCEDURE — 97161 PT EVAL LOW COMPLEX 20 MIN: CPT

## 2017-11-15 PROCEDURE — 74011250636 HC RX REV CODE- 250/636

## 2017-11-15 PROCEDURE — 77030007880 HC KT SPN EPDRL BBMI -B: Performed by: NURSE ANESTHETIST, CERTIFIED REGISTERED

## 2017-11-15 PROCEDURE — 74011250636 HC RX REV CODE- 250/636: Performed by: ORTHOPAEDIC SURGERY

## 2017-11-15 PROCEDURE — 77030002966 HC SUT PDS J&J -A: Performed by: ORTHOPAEDIC SURGERY

## 2017-11-15 PROCEDURE — 77030020782 HC GWN BAIR PAWS FLX 3M -B: Performed by: NURSE ANESTHETIST, CERTIFIED REGISTERED

## 2017-11-15 PROCEDURE — 76942 ECHO GUIDE FOR BIOPSY: CPT | Performed by: ORTHOPAEDIC SURGERY

## 2017-11-15 PROCEDURE — 77030008467 HC STPLR SKN COVD -B: Performed by: ORTHOPAEDIC SURGERY

## 2017-11-15 PROCEDURE — 94760 N-INVAS EAR/PLS OXIMETRY 1: CPT

## 2017-11-15 PROCEDURE — 77030018836 HC SOL IRR NACL ICUM -A: Performed by: ORTHOPAEDIC SURGERY

## 2017-11-15 PROCEDURE — 77030035236 HC SUT PDS STRATFX BARB J&J -B: Performed by: ORTHOPAEDIC SURGERY

## 2017-11-15 PROCEDURE — 77030032490 HC SLV COMPR SCD KNE COVD -B

## 2017-11-15 PROCEDURE — 76060000034 HC ANESTHESIA 1.5 TO 2 HR: Performed by: ORTHOPAEDIC SURGERY

## 2017-11-15 PROCEDURE — 74011000250 HC RX REV CODE- 250: Performed by: ORTHOPAEDIC SURGERY

## 2017-11-15 PROCEDURE — 77030012935 HC DRSG AQUACEL BMS -B: Performed by: ORTHOPAEDIC SURGERY

## 2017-11-15 PROCEDURE — 77010033678 HC OXYGEN DAILY

## 2017-11-15 PROCEDURE — 82962 GLUCOSE BLOOD TEST: CPT

## 2017-11-15 PROCEDURE — 77030034849: Performed by: ORTHOPAEDIC SURGERY

## 2017-11-15 PROCEDURE — 74011250637 HC RX REV CODE- 250/637: Performed by: PHYSICIAN ASSISTANT

## 2017-11-15 PROCEDURE — 77030037363 HC FEM INST CR  DISP STRY -C: Performed by: ORTHOPAEDIC SURGERY

## 2017-11-15 PROCEDURE — 77030011640 HC PAD GRND REM COVD -A: Performed by: ORTHOPAEDIC SURGERY

## 2017-11-15 PROCEDURE — 86900 BLOOD TYPING SEROLOGIC ABO: CPT | Performed by: PHYSICIAN ASSISTANT

## 2017-11-15 PROCEDURE — 85018 HEMOGLOBIN: CPT | Performed by: PHYSICIAN ASSISTANT

## 2017-11-15 PROCEDURE — 77030006789 HC BLD SAW OSC STRY -C: Performed by: ORTHOPAEDIC SURGERY

## 2017-11-15 PROCEDURE — 74011250636 HC RX REV CODE- 250/636: Performed by: PHYSICIAN ASSISTANT

## 2017-11-15 PROCEDURE — 77030031139 HC SUT VCRL2 J&J -A: Performed by: ORTHOPAEDIC SURGERY

## 2017-11-15 DEVICE — BASEPLT TIB PC TRITNM SZ 7 -- TRIATHLON: Type: IMPLANTABLE DEVICE | Site: KNEE | Status: FUNCTIONAL

## 2017-11-15 DEVICE — IMPLANTABLE DEVICE: Type: IMPLANTABLE DEVICE | Site: KNEE | Status: FUNCTIONAL

## 2017-11-15 DEVICE — COMPNT FEM CR TRIATHLN 7 L PA -- MOR-KNEE: Type: IMPLANTABLE DEVICE | Site: KNEE | Status: FUNCTIONAL

## 2017-11-15 DEVICE — COMPONENT PAT SZ A40 W44MM DIA40MM THK11MM MED LAT KNEE: Type: IMPLANTABLE DEVICE | Site: KNEE | Status: FUNCTIONAL

## 2017-11-15 RX ORDER — SODIUM CHLORIDE, SODIUM LACTATE, POTASSIUM CHLORIDE, CALCIUM CHLORIDE 600; 310; 30; 20 MG/100ML; MG/100ML; MG/100ML; MG/100ML
75 INJECTION, SOLUTION INTRAVENOUS CONTINUOUS
Status: DISCONTINUED | OUTPATIENT
Start: 2017-11-15 | End: 2017-11-15 | Stop reason: HOSPADM

## 2017-11-15 RX ORDER — HYDROMORPHONE HYDROCHLORIDE 2 MG/1
2 TABLET ORAL
Status: DISCONTINUED | OUTPATIENT
Start: 2017-11-15 | End: 2017-11-17 | Stop reason: HOSPADM

## 2017-11-15 RX ORDER — ASPIRIN 325 MG
325 TABLET, DELAYED RELEASE (ENTERIC COATED) ORAL EVERY 12 HOURS
Status: DISCONTINUED | OUTPATIENT
Start: 2017-11-15 | End: 2017-11-17 | Stop reason: HOSPADM

## 2017-11-15 RX ORDER — ONDANSETRON 2 MG/ML
INJECTION INTRAMUSCULAR; INTRAVENOUS AS NEEDED
Status: DISCONTINUED | OUTPATIENT
Start: 2017-11-15 | End: 2017-11-15 | Stop reason: HOSPADM

## 2017-11-15 RX ORDER — NEOMYCIN AND POLYMYXIN B SULFATES 40; 200000 MG/ML; [USP'U]/ML
SOLUTION IRRIGATION AS NEEDED
Status: DISCONTINUED | OUTPATIENT
Start: 2017-11-15 | End: 2017-11-15 | Stop reason: HOSPADM

## 2017-11-15 RX ORDER — DIPHENHYDRAMINE HCL 25 MG
25 CAPSULE ORAL
Status: DISCONTINUED | OUTPATIENT
Start: 2017-11-15 | End: 2017-11-17 | Stop reason: HOSPADM

## 2017-11-15 RX ORDER — SODIUM CHLORIDE 0.9 % (FLUSH) 0.9 %
5-10 SYRINGE (ML) INJECTION AS NEEDED
Status: DISCONTINUED | OUTPATIENT
Start: 2017-11-15 | End: 2017-11-15 | Stop reason: HOSPADM

## 2017-11-15 RX ORDER — SODIUM CHLORIDE 9 MG/ML
100 INJECTION, SOLUTION INTRAVENOUS CONTINUOUS
Status: DISPENSED | OUTPATIENT
Start: 2017-11-15 | End: 2017-11-16

## 2017-11-15 RX ORDER — PROPOFOL 10 MG/ML
INJECTION, EMULSION INTRAVENOUS
Status: DISCONTINUED | OUTPATIENT
Start: 2017-11-15 | End: 2017-11-15 | Stop reason: HOSPADM

## 2017-11-15 RX ORDER — MORPHINE SULFATE 10 MG/ML
INJECTION, SOLUTION INTRAMUSCULAR; INTRAVENOUS AS NEEDED
Status: DISCONTINUED | OUTPATIENT
Start: 2017-11-15 | End: 2017-11-15 | Stop reason: HOSPADM

## 2017-11-15 RX ORDER — HYDROMORPHONE HYDROCHLORIDE 2 MG/ML
0.5 INJECTION, SOLUTION INTRAMUSCULAR; INTRAVENOUS; SUBCUTANEOUS
Status: DISCONTINUED | OUTPATIENT
Start: 2017-11-15 | End: 2017-11-15 | Stop reason: HOSPADM

## 2017-11-15 RX ORDER — FENTANYL CITRATE 50 UG/ML
100 INJECTION, SOLUTION INTRAMUSCULAR; INTRAVENOUS ONCE
Status: COMPLETED | OUTPATIENT
Start: 2017-11-15 | End: 2017-11-15

## 2017-11-15 RX ORDER — DEXAMETHASONE SODIUM PHOSPHATE 100 MG/10ML
10 INJECTION INTRAMUSCULAR; INTRAVENOUS ONCE
Status: ACTIVE | OUTPATIENT
Start: 2017-11-16 | End: 2017-11-17

## 2017-11-15 RX ORDER — FAMOTIDINE 20 MG/1
20 TABLET, FILM COATED ORAL 2 TIMES DAILY
Status: DISCONTINUED | OUTPATIENT
Start: 2017-11-15 | End: 2017-11-17 | Stop reason: HOSPADM

## 2017-11-15 RX ORDER — ACETAMINOPHEN 500 MG
1000 TABLET ORAL EVERY 6 HOURS
Status: DISCONTINUED | OUTPATIENT
Start: 2017-11-16 | End: 2017-11-17 | Stop reason: HOSPADM

## 2017-11-15 RX ORDER — CELECOXIB 200 MG/1
200 CAPSULE ORAL EVERY 12 HOURS
Status: DISCONTINUED | OUTPATIENT
Start: 2017-11-15 | End: 2017-11-16

## 2017-11-15 RX ORDER — METOPROLOL SUCCINATE 25 MG/1
25 TABLET, EXTENDED RELEASE ORAL DAILY
Status: DISCONTINUED | OUTPATIENT
Start: 2017-11-16 | End: 2017-11-17 | Stop reason: HOSPADM

## 2017-11-15 RX ORDER — AMOXICILLIN 250 MG
2 CAPSULE ORAL DAILY
Status: DISCONTINUED | OUTPATIENT
Start: 2017-11-16 | End: 2017-11-17 | Stop reason: HOSPADM

## 2017-11-15 RX ORDER — OXYCODONE HYDROCHLORIDE 5 MG/1
5 TABLET ORAL
Status: DISCONTINUED | OUTPATIENT
Start: 2017-11-15 | End: 2017-11-15 | Stop reason: HOSPADM

## 2017-11-15 RX ORDER — SODIUM CHLORIDE 0.9 % (FLUSH) 0.9 %
5-10 SYRINGE (ML) INJECTION AS NEEDED
Status: DISCONTINUED | OUTPATIENT
Start: 2017-11-15 | End: 2017-11-17 | Stop reason: HOSPADM

## 2017-11-15 RX ORDER — ACETAMINOPHEN 10 MG/ML
1000 INJECTION, SOLUTION INTRAVENOUS ONCE
Status: COMPLETED | OUTPATIENT
Start: 2017-11-15 | End: 2017-11-15

## 2017-11-15 RX ORDER — HYDROMORPHONE HYDROCHLORIDE 1 MG/ML
1 INJECTION, SOLUTION INTRAMUSCULAR; INTRAVENOUS; SUBCUTANEOUS
Status: DISCONTINUED | OUTPATIENT
Start: 2017-11-15 | End: 2017-11-17 | Stop reason: HOSPADM

## 2017-11-15 RX ORDER — HYDROMORPHONE HYDROCHLORIDE 2 MG/1
2 TABLET ORAL
Qty: 40 TAB | Refills: 0 | Status: SHIPPED | OUTPATIENT
Start: 2017-11-15 | End: 2018-11-02

## 2017-11-15 RX ORDER — ONDANSETRON 2 MG/ML
4 INJECTION INTRAMUSCULAR; INTRAVENOUS
Status: DISCONTINUED | OUTPATIENT
Start: 2017-11-15 | End: 2017-11-17 | Stop reason: HOSPADM

## 2017-11-15 RX ORDER — MIDAZOLAM HYDROCHLORIDE 1 MG/ML
2 INJECTION, SOLUTION INTRAMUSCULAR; INTRAVENOUS ONCE
Status: COMPLETED | OUTPATIENT
Start: 2017-11-15 | End: 2017-11-15

## 2017-11-15 RX ORDER — MIDAZOLAM HYDROCHLORIDE 1 MG/ML
INJECTION, SOLUTION INTRAMUSCULAR; INTRAVENOUS AS NEEDED
Status: DISCONTINUED | OUTPATIENT
Start: 2017-11-15 | End: 2017-11-15 | Stop reason: HOSPADM

## 2017-11-15 RX ORDER — SODIUM CHLORIDE 0.9 % (FLUSH) 0.9 %
5-10 SYRINGE (ML) INJECTION EVERY 8 HOURS
Status: DISCONTINUED | OUTPATIENT
Start: 2017-11-15 | End: 2017-11-17 | Stop reason: HOSPADM

## 2017-11-15 RX ORDER — NALOXONE HYDROCHLORIDE 0.4 MG/ML
.2-.4 INJECTION, SOLUTION INTRAMUSCULAR; INTRAVENOUS; SUBCUTANEOUS
Status: DISCONTINUED | OUTPATIENT
Start: 2017-11-15 | End: 2017-11-17 | Stop reason: HOSPADM

## 2017-11-15 RX ORDER — OXYCODONE AND ACETAMINOPHEN 10; 325 MG/1; MG/1
1 TABLET ORAL AS NEEDED
Status: DISCONTINUED | OUTPATIENT
Start: 2017-11-15 | End: 2017-11-15 | Stop reason: HOSPADM

## 2017-11-15 RX ORDER — CEFAZOLIN SODIUM IN 0.9 % NACL 2 G/50 ML
2 INTRAVENOUS SOLUTION, PIGGYBACK (ML) INTRAVENOUS EVERY 8 HOURS
Status: DISPENSED | OUTPATIENT
Start: 2017-11-15 | End: 2017-11-16

## 2017-11-15 RX ORDER — INSULIN LISPRO 100 [IU]/ML
INJECTION, SOLUTION INTRAVENOUS; SUBCUTANEOUS
Status: DISCONTINUED | OUTPATIENT
Start: 2017-11-15 | End: 2017-11-17 | Stop reason: HOSPADM

## 2017-11-15 RX ORDER — ROPIVACAINE HYDROCHLORIDE 2 MG/ML
INJECTION, SOLUTION EPIDURAL; INFILTRATION; PERINEURAL AS NEEDED
Status: DISCONTINUED | OUTPATIENT
Start: 2017-11-15 | End: 2017-11-15 | Stop reason: HOSPADM

## 2017-11-15 RX ADMIN — HYDROMORPHONE HYDROCHLORIDE 2 MG: 2 TABLET ORAL at 13:52

## 2017-11-15 RX ADMIN — MIDAZOLAM HYDROCHLORIDE 2 MG: 1 INJECTION, SOLUTION INTRAMUSCULAR; INTRAVENOUS at 08:20

## 2017-11-15 RX ADMIN — SODIUM CHLORIDE, SODIUM LACTATE, POTASSIUM CHLORIDE, AND CALCIUM CHLORIDE 75 ML/HR: 600; 310; 30; 20 INJECTION, SOLUTION INTRAVENOUS at 06:59

## 2017-11-15 RX ADMIN — MIDAZOLAM HYDROCHLORIDE 1 MG: 1 INJECTION, SOLUTION INTRAMUSCULAR; INTRAVENOUS at 08:46

## 2017-11-15 RX ADMIN — TUBERCULIN PURIFIED PROTEIN DERIVATIVE 5 UNITS: 5 INJECTION, SOLUTION INTRADERMAL at 06:59

## 2017-11-15 RX ADMIN — ASPIRIN 325 MG: 325 TABLET, DELAYED RELEASE ORAL at 21:26

## 2017-11-15 RX ADMIN — ONDANSETRON 4 MG: 2 INJECTION INTRAMUSCULAR; INTRAVENOUS at 09:50

## 2017-11-15 RX ADMIN — HYDROMORPHONE HYDROCHLORIDE 2 MG: 2 TABLET ORAL at 21:26

## 2017-11-15 RX ADMIN — CEFAZOLIN 2 G: 1 INJECTION, POWDER, FOR SOLUTION INTRAMUSCULAR; INTRAVENOUS; PARENTERAL at 15:29

## 2017-11-15 RX ADMIN — CEFAZOLIN 3 G: 1 INJECTION, POWDER, FOR SOLUTION INTRAMUSCULAR; INTRAVENOUS; PARENTERAL at 08:31

## 2017-11-15 RX ADMIN — FAMOTIDINE 20 MG: 20 TABLET, FILM COATED ORAL at 18:36

## 2017-11-15 RX ADMIN — SODIUM CHLORIDE 100 ML/HR: 900 INJECTION, SOLUTION INTRAVENOUS at 14:45

## 2017-11-15 RX ADMIN — PROPOFOL 75 MCG/KG/MIN: 10 INJECTION, EMULSION INTRAVENOUS at 08:50

## 2017-11-15 RX ADMIN — CELECOXIB 200 MG: 200 CAPSULE ORAL at 21:26

## 2017-11-15 RX ADMIN — FENTANYL CITRATE 100 MCG: 50 INJECTION INTRAMUSCULAR; INTRAVENOUS at 08:20

## 2017-11-15 RX ADMIN — SODIUM CHLORIDE, SODIUM LACTATE, POTASSIUM CHLORIDE, AND CALCIUM CHLORIDE: 600; 310; 30; 20 INJECTION, SOLUTION INTRAVENOUS at 08:15

## 2017-11-15 RX ADMIN — ONDANSETRON 4 MG: 2 INJECTION INTRAMUSCULAR; INTRAVENOUS at 20:33

## 2017-11-15 RX ADMIN — ACETAMINOPHEN 1000 MG: 10 INJECTION, SOLUTION INTRAVENOUS at 18:36

## 2017-11-15 RX ADMIN — SODIUM CHLORIDE, SODIUM LACTATE, POTASSIUM CHLORIDE, AND CALCIUM CHLORIDE: 600; 310; 30; 20 INJECTION, SOLUTION INTRAVENOUS at 08:50

## 2017-11-15 NOTE — ANESTHESIA POSTPROCEDURE EVALUATION
Post-Anesthesia Evaluation and Assessment    Patient: Josette El MRN: [id number]  SSN: [id number]    YOB: 1940  Age: 76 y.o. Sex: male       Cardiovascular Function/Vital Signs  Visit Vitals    /51    Pulse (!) 50    Temp 36.2 °C (97.2 °F)    Resp 16    Ht 5' 11\" (1.803 m)    Wt 122 kg (269 lb)    SpO2 98%    BMI 37.52 kg/m2       Patient is status post spinal anesthesia for Procedure(s):  KNEE ARTHROPLASTY TOTAL/ LEFT/ MADI/ FNB. Nausea/Vomiting: None    Postoperative hydration reviewed and adequate. Pain:  Pain Scale 1: Visual (11/15/17 1018)  Pain Intensity 1: 0 (11/15/17 1018)   Managed    Neurological Status:   Neuro (WDL): Exceptions to WDL (11/15/17 1028)  Neuro  Neurologic State: Drowsy (11/15/17 1028)  Orientation Level: Oriented X4 (11/15/17 1028)  LUE Motor Response: Purposeful (11/15/17 1028)  LLE Motor Response: Pharmacologically paralyzed (11/15/17 1028)  RUE Motor Response: Purposeful (11/15/17 1028)  RLE Motor Response: Purposeful (11/15/17 1028)   At baseline    Mental Status and Level of Consciousness: Arousable    Pulmonary Status:   O2 Device: CO2 nasal cannula (11/15/17 1018)   Adequate oxygenation and airway patent    Complications related to anesthesia: None    Post-anesthesia assessment completed.  No concerns    Signed By: Sandee Mata MD     November 15, 2017

## 2017-11-15 NOTE — H&P
The patient has end stage arthritis of the left knee. The patient was see and examined and there are no changes to the patient's orthopedic condition. They have tried conservative treatment for this condition; including antiinflammatories and lifestyle modifications and have failed. The necessity for the joint replacement is still present, and the H&P from the office is still current.  The patient will be admitted today for Procedure(s) (LRB):  KNEE ARTHROPLASTY TOTAL/ LEFT/ MADI/ FNB (Left)

## 2017-11-15 NOTE — PROGRESS NOTES
Betadine lavage:  17.5cc of betadine lot #[id number] , exp. Date:05/2019   ,  in 1cc of . 9NS Lot -[id number] , exp.  Date :[id number]

## 2017-11-15 NOTE — PROGRESS NOTES
Problem: Mobility Impaired (Adult and Pediatric)  Goal: *Acute Goals and Plan of Care (Insert Text)  GOALS (1-4 days):  (1.)Mr. Desir will move from supine to sit and sit to supine  in bed with STAND BY ASSIST.  (2.)Mr. Desir will transfer from bed to chair and chair to bed with STAND BY ASSIST using the least restrictive device. (3.)Mr. Desir will ambulate with STAND BY ASSIST for 200 feet with the least restrictive device. (4.)Mr. Desir will ambulate up/down 5 steps with left railing with CONTACT GUARD ASSIST with device as needed. (5.)Mr. Desir will increase left knee ROM to 5°-80°.   ________________________________________________________________________________________________      PHYSICAL THERAPY [facility]: Initial Assessment, PM 11/15/2017  INPATIENT: Hospital Day: 1  [place] / [place] / Product Type: Medicare /      NAME/AGE/GENDER: Yanet Felix is a 76 y.o. male   PRIMARY DIAGNOSIS:  Primary osteoarthritis of left knee [M17.12]   Procedure(s) and Anesthesia Type:     * KNEE ARTHROPLASTY TOTAL/ LEFT/ MADI/ FNB - Spinal (Left)  ICD-10: Treatment Diagnosis:    · Pain in Left Knee (M25.562)  · Stiffness of Left Knee, Not elsewhere classified (M25.662)  · Difficulty in walking, Not elsewhere classified (R26.2)      ASSESSMENT:     Mr. Leobardo Larry presents with limited rom and strength of left LE as well as decreased functional mobility and gait s/p left tka. He plans to go home with HHPT but did inquire about rehab. This section established at most recent assessment   PROBLEM LIST (Impairments causing functional limitations):  1. Decreased Strength  2. Decreased ADL/Functional Activities  3. Decreased Transfer Abilities  4. Decreased Ambulation Ability/Technique  5. Decreased Balance  6. Increased Pain  7. Decreased Activity Tolerance  8.  Decreased [place] with Home Exercise Program   INTERVENTIONS PLANNED: (Benefits and precautions of physical therapy have been discussed with the patient.)  1. Bed Mobility  2. Gait Training  3. Home Exercise Program (HEP)  4. Therapeutic Exercise/Strengthening  5. Transfer Training  6. Range of Motion: active/assisted/passive  7. Therapeutic Activities  8. Group Therapy     TREATMENT PLAN: Frequency/Duration: Follow patient BID   to address above goals. Rehabilitation Potential For Stated Goals: Good     RECOMMENDED REHABILITATION/EQUIPMENT: (at time of discharge pending progress): Continue Skilled Therapy and Home Health: Physical Therapy. HISTORY:   History of Present Injury/Illness (Reason for Referral):  S/p left tka  Past Medical History/Comorbidities:   Mr. 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 He also has no past medical history of Adverse effect of anesthesia; Aneurysm ([address] Asthma; Autoimmune disease ([address] Cancer ([address] Chronic obstructive pulmonary disease ([address] Diabetes ([address] Difficult intubation; Heart failure ([address] Liver disease; Malignant hyperthermia due to anesthesia; Nausea & vomiting; Pseudocholinesterase deficiency; Psychiatric disorder; PUD (peptic ulcer disease); Seizures ([address] Stroke [facility]); Thromboembolus ([address] Thyroid disease; Unspecified adverse effect of anesthesia; or Unspecified sleep apnea.   Mr. Geo Jones  has a past surgical history that includes bunionectomy (Bilateral); vascular surgery procedure unlist; carpal tunnel release (Bilateral); cataract removal (Bilateral); coronary artery bypass graft (10/14/2014); heart catheterization; colonoscopy; and knee replacement (Right, 2017). Social History/Living Environment:   Home Environment: Private residence  # Steps to Enter: 5  One/Two Story Residence: Two story  # of Interior Steps: 14  Lift Chair Available: No  Living Alone: No  Support Systems: Spouse/Significant Other/Partner, Family member(s)  Patient Expects to be Discharged to[de-identified] Rehabilitation facility  Current DME Used/Available at Home: CPAP, Walker, rolling  Prior Level of Function/Work/Activity:  independent   Number of Personal Factors/Comorbidities that affect the Plan of Care: 1-2: MODERATE COMPLEXITY   EXAMINATION:   Most Recent Physical Functioning:   Gross Assessment: Yes  Gross Assessment  AROM: Within functional limits (right LE)  Strength: Generally decreased, functional (right LE)          LLE AROM  L Knee Flexion: 60  L Knee Extension: 15          Bed Mobility  Supine to Sit: Minimum assistance  Sit to Supine: Contact guard assistance    Transfers  Sit to Stand: Minimum assistance  Stand to Sit: Minimum assistance    Balance  Sitting: Intact  Standing: Pull to stand; With support              Weight Bearing Status  Left Side Weight Bearing: As tolerated  Distance (ft): 3 Feet (ft)  Ambulation - Level of Assistance: Minimal assistance  Assistive Device: Walker, rolling  Speed/Dinorah: Delayed  Step Length: Left shortened;Right shortened  Stance: Left decreased  Gait Abnormalities: Antalgic;Decreased step clearance  Interventions: Safety awareness training;Verbal cues     Braces/Orthotics:     Left Knee Cold  Type: Cryocuff      Body Structures Involved:  1. Bones  2. Joints  3. Muscles  4. Ligaments Body Functions Affected:  1. Movement Related Activities and Participation Affected:  1.  Mobility   Number of elements that affect the Plan of Care: 3: MODERATE COMPLEXITY   CLINICAL PRESENTATION:   Presentation: Stable and uncomplicated: LOW COMPLEXITY   CLINICAL DECISION MAKIN[address] [place] [id number] AM-PAC 6 Clicks   Basic Mobility Inpatient Short Form  How much difficulty does the patient currently have. .. Unable A Lot A Little None   1. Turning over in bed (including adjusting bedclothes, sheets and blankets)? [] 1   [] 2   [x] 3   [] 4   2. Sitting down on and standing up from a chair with arms ( e.g., wheelchair, bedside commode, etc.)   [] 1   [] 2   [x] 3   [] 4   3. Moving from lying on back to sitting on the side of the bed? [] 1   [] 2   [x] 3   [] 4   How much help from another person does the patient currently need. .. Total A Lot A Little None   4. Moving to and from a bed to a chair (including a wheelchair)? [] 1   [] 2   [x] 3   [] 4   5. Need to walk in hospital room? [] 1   [] 2   [x] 3   [] 4   6. Climbing 3-5 steps with a railing? [] 1   [x] 2   [] 3   [] 4   © 2007, Trustees of [address] [place] [address] [address] [id number], under license to "[employer]. All rights reserved        Score:  Initial: 17 Most Recent: X (Date: -- )    Interpretation of Tool:  Represents activities that are increasingly more difficult (i.e. Bed mobility, Transfers, Gait). Score 24 23 22-20 19-15 14-10 9-7 6     Modifier CH CI CJ CK CL CM CN      ? Mobility - Walking and Moving Around:     - CURRENT STATUS: CK - 40%-59% impaired, limited or restricted    - GOAL STATUS: CJ - 20%-39% impaired, limited or restricted    - D/C STATUS:  ---------------To be determined---------------  [place] / [place] / Product Type: Medicare /      Medical Necessity:     · Patient is expected to demonstrate progress in strength, range of motion and balance to decrease assistance required with theraputic exercises and functional mobility.   Reason for Services/Other Comments:  · Patient continues to require present interventions due to patient's inability to perform theraputic exercises and functional mobility independently. Use of outcome tool(s) and clinical judgement create a POC that gives a: Clear prediction of patient's progress: LOW COMPLEXITY            TREATMENT:   (In addition to Assessment/Re-Assessment sessions the following treatments were rendered)     Pre-treatment Symptoms/Complaints:  Knee pain  Pain: Initial: 3     Post Session:  3     Assessment/Reassessment only, no treatment provided today    Date:   Date:   Date:     ACTIVITY/EXERCISE AM PM AM PM AM PM   GROUP THERAPY  []  []  []  []  []  []   Ankle Pumps         Quad Sets         Gluteal Sets         Hip ABd/ADduction         Straight Leg Raises         Knee Slides         Short Arc Quads         Long Arc Quads         Chair Slides                  B = bilateral; AA = active assistive; A = active; P = passive      Treatment/Session Assessment:     Response to Treatment:  Pt. Did well. Education:  [x] Home Exercises  [x] Fall Precautions  [] Hip Precautions [] D/C Instruction Review  [x] Knee/Hip Prosthesis Review  [x] Walker Management/Safety [] Adaptive Equipment as Needed       Interdisciplinary Collaboration:   o Occupational Therapist  o Registered Nurse    After treatment position/precautions:   o Supine in bed  o Bed/Chair-wheels locked  o Bed in low position  o Caregiver at bedside  o Call light within reach  o Family at bedside    Compliance with Program/Exercises: Will assess as treatment progresses. No questions. Recommendations/Intent for next treatment session:  Treatment next visit will focus on increasing Mr. Desir's independence with bed mobility, transfers, gait training, strength/ROM exercises, modalities for pain, and patient education.       Total Treatment Duration:  PT Patient Time In/Time Out  Time In: 1300  Time Out: [place], PT

## 2017-11-15 NOTE — PERIOP NOTES
TRANSFER - IN REPORT:    Verbal report received from [facility]  on Yeni Fail  being received from [facility] [place]  for routine progression of care      Report consisted of patients Situation, Background, Assessment and   Recommendations(SBAR). Information from the following report(s) SBAR, Kardex, Intake/Output and MAR was reviewed with the receiving nurse. Opportunity for questions and clarification was provided. Assessment completed upon patients arrival to unit and care assumed.

## 2017-11-15 NOTE — ANESTHESIA PROCEDURE NOTES
Peripheral Block    Start time: 11/15/2017 8:20 AM  End time: 11/15/2017 8:24 AM  Performed by: Kennedy Salazar  Authorized by: Kennedy Salazar       Pre-procedure: Indications: at surgeon's request and post-op pain management    Preanesthetic Checklist: patient identified, risks and benefits discussed, site marked, timeout performed, anesthesia consent given and patient being monitored    Timeout Time: 08:20          Block Type:   Block Type:   Adductor canal  Laterality:  Left  Monitoring:  Standard ASA monitoring, continuous pulse ox, frequent vital sign checks, heart rate, oxygen and responsive to questions  Injection Technique:  Single shot  Procedures: ultrasound guided    Patient Position: supine  Prep: chlorhexidine    Location:  Mid thigh  Needle Type:  Stimuplex  Needle Gauge:  21 G  Needle Localization:  Anatomical landmarks and ultrasound guidance  Medication Injected:  0.2%  ropivacaine  Volume (mL):  20    Assessment:  Number of attempts:  1  Injection Assessment:  Incremental injection every 5 mL, local visualized surrounding nerve on ultrasound, no intravascular symptoms, no paresthesia and ultrasound image on chart  Patient tolerance:  Patient tolerated the procedure well with no immediate complications  Heme+--vein--needle redirect ac any injection

## 2017-11-15 NOTE — CONSULTS
History and Physical    Subjective: Simin Pimentel is a 76 y.o. obese white male, with a pmh of aortic valve replacement in 2014, CAD and stage 3 CKD, who    Past Medical History:   Diagnosis Date    Aortic valve replaced 12/2014    Daily Plavix and 81 mg ASA  nightly     Arrhythmia     Hx of Atrial fibrillation-Post op after AVR/CABG. Treated with Amiodarone and Coumadin. NO recurrence. Medication stopped 2/2/15    Arthritis     OA    CAD (coronary artery disease)     [facility]-Dr. Sauceda    Carotid artery stenosis     without cerebral infarction. Left carotid stent in May 2014 as part of the [address]. Per US (09/13/17) \"Right internal carotid artery has <50% stenosis. Left internal carotid artery is status post carotid stent with no significan stenosis. \"      Chronic kidney disease     per cardiology note (12/8/16) \"stage 3, GFR 30-59 ml/min. \" GFR 53 on 5/8/17.  Chronic pain     knee    Coagulation disorder (HCC)     Daily Plavix     Coronary atherosclerosis of native coronary vessel     Cardiac cath times 2 with 1 stent. 1) PCI of OM with 2.5 mm Cypher MARIUM (1/2008) and 2) 2 vessel CABG 10/14/14: LIMA to LAD. SVG to OM.  Dyslipidemia 12/5/2016    Former smoker     GERD (gastroesophageal reflux disease)     PRN Zantac     H/O echocardiogram 01/17/2017    EF 64%, AV Mean gradient: 15 mmHG, AV Peak gradient: 30.4 mmHg.  Hypertension     Left anterior fascicular block     Per Dr. Mariajose Garcia note (12/8/16) \"Chronic\"     KAYCEE (obstructive sleep apnea)     CPAP-Followed by Dr. Yovana Montes.      Platelet inhibition due to Plavix     Pre-diabetes     Last A1C 6.3 on 9/27/17    Status post total left knee replacement 11/15/2017      Past Surgical History:   Procedure Laterality Date    HX BUNIONECTOMY Bilateral     HX CARPAL TUNNEL RELEASE Bilateral     HX CATARACT REMOVAL Bilateral     HX COLONOSCOPY      HX CORONARY ARTERY BYPASS GRAFT  10/14/2014 x2 - Dr. Kirsty Becerra Aortic valve replacement     HX HEART CATHETERIZATION      x2-one stent placed in 2014    HX KNEE REPLACEMENT Right 2017    VASCULAR SURGERY PROCEDURE UNLIST      left carotid stent in May 2014 as part of the [place]     Family History   Problem Relation Age of Onset    Cancer Father 80     LIVER CANCER     Heart Disease Mother     Arthritis-osteo Sister     Heart Disease Brother     Arthritis-osteo Brother       Social History   Substance Use Topics    Smoking status: Former Smoker     Packs/day: 1.00     Years: 23.00     Types: Cigarettes     Quit date: 3/25/1980    Smokeless tobacco: Never Used    Alcohol use 3.5 oz/week     4 Glasses of wine, 3 Cans of beer per week       Prior to Admission medications    Medication Sig Start Date End Date Taking? Authorizing Provider   NAPROXEN SODIUM (ALEVE PO) Take  by mouth. Pt takes two tabs when needed   Yes Historical Provider   metoprolol succinate (TOPROL XL) 25 mg XL tablet Take 1 Tab by mouth every morning. Indications: unknown why takes 6/1/17  Yes Dione Velarde MD   amoxicillin (AMOXIL) 500 mg capsule Take 500 mg by mouth. Prior to dental appointment   Yes [facility], MD   losartan-hydroCHLOROthiazide [facility]) 100-12.5 mg per tablet Take 1 Tab by mouth every morning. Indications: Hypertension 12/8/16  Yes Dione Velarde MD   rosuvastatin (CRESTOR) 20 mg tablet Take 1 Tab by mouth nightly. Indications: hypercholesterolemia 12/8/16  Yes Dione Velarde MD   aspirin delayed-release 81 mg tablet Take 81 mg by mouth nightly. Yes Historical Provider   ranitidine (ZANTAC) 150 mg tablet Take 150 mg by mouth as needed for Indigestion. Non-formulary. Instructed to bring to the hospital on the DOS, in the original bottle, and give to nurse. Indications: gastroesophageal reflux disease   Yes Historical Provider   acetaminophen 500 mg cap Take 1,000 mg by mouth every six (6) hours.  Indications: Fever, Pain Coretta Ch MD   clopidogrel (PLAVIX) 75 mg tab Take 1 Tab by mouth every morning. 12/8/16   Kristie Duverney, MD   Glucosamine &Chondroit-MV-Min3 [id number] mg tab Take  by mouth every morning. 2 tablets every morning     Stop seven days prior to surgery per anesthesia protocol. Historical Provider     Allergies   Allergen Reactions    Poison Ivy Extract Hives and Itching        Review of Systems:  A comprehensive review of systems was negative except for that written in the History of Present Illness. Objective: Intake and Output:    11/15 0701 - 11/15 1900  In: 2019 [I.V.:1775]  Out: 500 [Urine:350]       Physical Exam:     General: lethargic and sleepy but no acute distress, hard of hearing, obese  Eyes; non icteric, EOMI  Neck; supple  CV; RRR  Pulm; shallow but non labored, no wheezing/rhonchi/crackles  Abd: soft, non tender, active bowel sounds    Data Review:   Recent Results (from the past 24 hour(s))   TYPE & SCREEN    Collection Time: 11/15/17  6:58 AM   Result Value Ref Range    Crossmatch Expiration 11/18/2017     ABO/Rh(D) Kandi Albarranet POSITIVE     Antibody screen NEG    GLUCOSE, POC    Collection Time: 11/15/17  7:05 AM   Result Value Ref Range    Glucose (POC) 124 (H) 65 - 100 mg/dL       Assessment:     Principal Problem:    Status post total left knee replacement (11/15/2017)        Plan:     HTN: continue Toprol XL and Hyzaar  GERD: pepcid subbed for zantac  Pre diabetes: will add SSI ACHS due to being on steroids which are expected to increase his blood sugars. Holding plavix for now, but would start this as soon as allowed by orthopedic surgery. Please call with any questions or concerns.      Signed By: Bony Leonard MD     November 15, 2017

## 2017-11-15 NOTE — PERIOP NOTES
TRANSFER - OUT REPORT:    Verbal report given to [employer], RN on [address]  being transferred to 3rd floor-Med/Surg for routine post - op       Report consisted of patients Situation, Background, Assessment and   Recommendations(SBAR). Information from the following report(s) SBAR, Procedure Summary, Intake/Output, Recent Results and Cardiac Rhythm NSR was reviewed with the receiving nurse. Lines:   Peripheral IV 11/15/17 Left; Lower Cephalic (Active)   Site Assessment Clean, dry, & intact 11/15/2017 11:45 AM   Phlebitis Assessment 0 11/15/2017 11:45 AM   Infiltration Assessment 0 11/15/2017 11:45 AM   Dressing Status Clean, dry, & intact 11/15/2017 11:45 AM   Dressing Type Tape;Transparent 11/15/2017 11:45 AM   Hub Color/Line Status Infusing 11/15/2017 11:45 AM   Action Taken Blood drawn 11/15/2017  6:47 AM        Opportunity for questions and clarification was provided. Patient transported with:   O2 @ 3 liters    VTE prophylaxis orders have been written for [address]. Patient given floor number and nurses name.

## 2017-11-15 NOTE — PROGRESS NOTES
11/15/17 1335   Oxygen Therapy   O2 Sat (%) 96 %   Pulse via Oximetry 73 beats per minute   O2 Device Nasal cannula   O2 Flow Rate (L/min) 3 l/min  (weaned to 1 )   Patient achieved   3000    Ml/sec on IS. Patient encouraged to do every hour while awake-patient agreed and demonstrated. No shortness of breath or distress noted. BS are clear b/l.    [facility] notes reviewed- patient brought home cpap unit

## 2017-11-15 NOTE — PROGRESS NOTES
TRANSFER - IN REPORT:    Verbal report received from Yadiel Phelps on Vicki Mayo  being received from PACU for routine post - op      Report consisted of patients Situation, Background, Assessment and   Recommendations(SBAR). Information from the following report(s) SBAR, Intake/Output and MAR was reviewed with the receiving nurse. Opportunity for questions and clarification was provided. Assessment completed upon patients arrival to unit and care assumed. Oriented to room, bed controls, and how to order meals. Family in room. Aquacel dry and intact to L knee with iceman. TEDs and SCDs on. Moving feet. Yellow gripper socks to feet and instructed not to get up without staff to assist. Has IS. Instructed to call for pain medication as needed.

## 2017-11-15 NOTE — ANESTHESIA PROCEDURE NOTES
Spinal Block    Start time: 11/15/2017 8:41 AM  End time: 11/15/2017 8:44 AM  Performed by: Horacio Reich by: Nyoka Cuff     Pre-procedure:   Indications: primary anesthetic  Preanesthetic Checklist: patient identified, risks and benefits discussed, anesthesia consent, site marked, patient being monitored and timeout performed    Timeout Time: 08:41          Spinal Block:   Patient Position:  Seated  Prep Region:  Lumbar  Prep: chlorhexidine and patient draped      Location:  L2-3  Technique:  Single shot        Needle:   Needle Type:  Pencan  Needle Gauge:  25 G  Attempts:  2      Events: CSF confirmed, no blood with aspiration and no paresthesia        Assessment:  Insertion:  Complicated  Patient tolerance:  Patient tolerated the procedure well with no immediate complications  Lumbar scoliosis noted, failed attempt midline L3-4, success midline L2-3? 10 mgs hyperbaric bupivacaine

## 2017-11-15 NOTE — PROGRESS NOTES
Problem: Self Care Deficits Care Plan (Adult)  Goal: *Acute Goals and Plan of Care (Insert Text)  GOALS:   DISCHARGE GOALS (in preparation for going home/rehab):  3 days  1. Mr. Dale Mota will perform one lower body dressing activity with minimal assistance required to demonstrate improved functional mobility and safety. 2.  Mr. Dale Mota will perform one lower body bathing activity with minimal assistance required to demonstrate improved functional mobility and safety. 3.  Mr. Dale Mota will perform toileting/toilet transfer with contact guard assistance to demonstrate improved functional mobility and safety. 4.  Mr. Dale Mota will perform shower transfer with contact guard assistance to demonstrate improved functional mobility and safety. JOINT CAMP OCCUPATIONAL THERAPY TKA: Initial Assessment and PM 11/15/2017  INPATIENT: Hospital Day: 1  [place] / [place] / Product Type: Medicare /      NAME/AGE/GENDER: Selwyn Pimentel is a 76 y.o. male   PRIMARY DIAGNOSIS:  Primary osteoarthritis of left knee [M17.12]   Procedure(s) and Anesthesia Type:     * KNEE ARTHROPLASTY TOTAL/ LEFT/ MADI/ FNB - Spinal (Left)  ICD-10: Treatment Diagnosis:    · Pain in Left Knee (M25.562)  · Stiffness of Left Knee, Not elsewhere classified (Y56.670)  · Other lack of cordination (R27.8)      ASSESSMENT:     Mr. Dale Mota is s/p left TKA and presents with decreased weight bearing on left LE and decreased independence with functional mobility and activities of daily living. Patient would benefit from skilled Occupational Therapy to maximize independence and safety with self-care task and functional mobility. Pt would also benefit from education on adaptive equipment and safety precautions in preparation for going home or for recommendations for post-hospital rehab program.  Patient plans for further rehab at home with home health services and good family support .   OT reviewed therapy schedule and plan of care with patient. Patient was able to transfer and preform self care skills as charted below. Patient instructed to call for assistance when needing to get up from the bed and all needs in reach. Patient verbalized understanding of call light. This section established at most recent assessment   PROBLEM LIST (Impairments causing functional limitations):  1. Decreased Strength  2. Decreased ADL/Functional Activities  3. Decreased Transfer Abilities  4. Increased Pain  5. Increased Fatigue  6. Decreased Flexibility/Joint Mobility  7. Decreased Knowledge of Precautions   INTERVENTIONS PLANNED: (Benefits and precautions of occupational therapy have been discussed with the patient.)  1. Activities of daily living training  2. Adaptive equipment training  3. Balance training  4. Clothing management  5. Donning&doffing training  6. Theraputic activity     TREATMENT PLAN: Frequency/Duration: Follow patient 1 time to address above goals. Rehabilitation Potential For Stated Goals: Excellent     RECOMMENDED REHABILITATION/EQUIPMENT: (at time of discharge pending progress): Continue Skilled Therapy and Home Health: Physical Therapy. OCCUPATIONAL PROFILE AND HISTORY:   History of Present Injury/Illness (Reason for Referral): Pt presents this date s/p (left) TKA. Past Medical History/Comorbidities:   Mr. 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  He also has no past medical history of Adverse effect of anesthesia; Aneurysm ([facility] [address] Asthma; Autoimmune disease ([facility] [address] Cancer ([facility] [address] Chronic obstructive pulmonary disease ([facility] [address] Diabetes ([facility] [address] Difficult intubation; Heart failure ([facility] [address] Liver disease; Malignant hyperthermia due to anesthesia; Nausea & vomiting; Pseudocholinesterase deficiency; Psychiatric disorder; PUD (peptic ulcer disease); Seizures ([facility] [address] Stroke [facility]); Thromboembolus ([facility] [address] Thyroid disease; Unspecified adverse effect of anesthesia; or Unspecified sleep apnea. Mr. Rivka Hurd  has a past surgical history that includes bunionectomy (Bilateral); vascular surgery procedure unlist; carpal tunnel release (Bilateral); cataract removal (Bilateral); coronary artery bypass graft (10/14/2014); heart catheterization; colonoscopy; and knee replacement (Right, 2017). Social History/Living Environment:   Home Environment: Private residence  # Steps to Enter: 2  One/Two Story Residence: Two story, live on 1st floor  # of Interior Steps: 14  Lift Chair Available: No  Living Alone: No  Support Systems: Spouse/Significant Other/Partner  Patient Expects to be Discharged to[de-identified] Private residence  Current DME Used/Available at Home: Shower chair, Walker, rolling  Tub or Shower Type: Shower  Prior Level of Function/Work/Activity:  Modified indep with BADLs and IADLs     Number of Personal Factors/Comorbidities that affect the Plan of Care: Brief history (0):  LOW COMPLEXITY   ASSESSMENT OF OCCUPATIONAL PERFORMANCE[de-identified]   Most Recent Physical Functioning:   Balance  Sitting: Intact  Standing: Pull to stand; With support       Patient Vitals for the past 6 hrs:   BP BP Patient Position SpO2 O2 Flow Rate (L/min) Pulse   11/15/17 0820 162/73 At rest 99 % 2 l/min 78   11/15/17 0824 174/80 At rest 94 % 2 l/min 70   11/15/17 1018 99/52 - 92 % 3 l/min 76   11/15/17 1023 99/56 - 94 % - 60   11/15/17 1028 107/53 - 95 % - (!) 53   11/15/17 1032 105/56 - 96 % - (!) 54   11/15/17 1035 97/54 - 96 % - 61 11/15/17 1039 109/55 - 97 % - 60   11/15/17 1044 105/56 - 98 % - (!) 50   11/15/17 1049 106/57 - 98 % - (!) 53   11/15/17 1054 105/51 - 98 % - (!) 50   11/15/17 1059 110/59 At rest 98 % 3 l/min (!) 54   11/15/17 1104 108/56 - 98 % - (!) 49   11/15/17 1109 110/57 - 98 % - 60   11/15/17 1113 109/57 - 99 % - (!) 49   11/15/17 1141 114/76 At rest 98 % - (!) 50   11/15/17 1335 - - 96 % 3 l/min -       Gross Assessment: Yes  Gross Assessment  AROM: Within functional limits (right LE)  Strength: Generally decreased, functional (right LE)          LLE AROM  L Knee Flexion: 60  L Knee Extension: 15 Coordination  Fine Motor Skills-Upper: Left Intact; Right Intact  Gross Motor Skills-Upper: Left Intact; Right Intact         Mental Status  Neurologic State: Alert  Orientation Level: Oriented X4  Cognition: Appropriate decision making; Appropriate for age attention/concentration; Appropriate safety awareness; Follows commands  Perception: Appears intact  Perseveration: No perseveration noted  Safety/Judgement: Awareness of environment; Fall prevention                Basic ADLs (From Assessment) Complex ADLs (From Assessment)   Basic ADL  Feeding: Supervision  Oral Facial Hygiene/Grooming: Supervision  Bathing: Minimum assistance  Upper Body Dressing: Minimum assistance  Lower Body Dressing: Moderate assistance  Toileting: Total assistance     Grooming/Bathing/Dressing Activities of Daily Living     Cognitive Retraining  Safety/Judgement: Awareness of environment; Fall prevention                 Functional Transfers  Toilet Transfer : Minimum assistance  Shower Transfer: Minimum assistance     Bed/Mat Mobility  Supine to Sit: Minimum assistance  Sit to Supine: Contact guard assistance  Sit to Stand: Minimum assistance         Physical Skills Involved:  1. Range of Motion  2. Balance  3. Strength Cognitive Skills Affected (resulting in the inability to perform in a timely and safe manner):  1.  None Psychosocial Skills Affected:  1. None   Number of elements that affect the Plan of Care: 1-3:  LOW COMPLEXITY   CLINICAL DECISION MAKIN[address] [place] [address] AM-PAC 6 Clicks   Daily Activity Inpatient Short Form  How much help from another person does the patient currently need. .. Total A Lot A Little None   1. Putting on and taking off regular lower body clothing? [] 1   [x] 2   [] 3   [] 4   2. Bathing (including washing, rinsing, drying)? [] 1   [x] 2   [] 3   [] 4   3. Toileting, which includes using toilet, bedpan or urinal?   [] 1   [x] 2   [] 3   [] 4   4. Putting on and taking off regular upper body clothing? [] 1   [] 2   [x] 3   [] 4   5. Taking care of personal grooming such as brushing teeth? [] 1   [] 2   [x] 3   [] 4   6. Eating meals? [] 1   [] 2   [x] 3   [] 4   © , Trustees of [address] [place] [address], under license to [employer]. All rights reserved     Score:  Initial: 15 Most Recent: X (Date: -- )    Interpretation of Tool:  Represents activities that are increasingly more difficult (i.e. Bed mobility, Transfers, Gait). Score 24 23 22-20 19-15 14-10 9-7 6     Modifier CH CI CJ CK CL CM CN      ? Self Care:     - CURRENT STATUS: CK - 40%-59% impaired, limited or restricted    - GOAL STATUS: CJ - 20%-39% impaired, limited or restricted    - D/C STATUS:  ---------------To be determined---------------  [place] / [place] / Product Type: Medicare /      Medical Necessity:     · Patient is expected to demonstrate progress in balance, coordination and functional technique to decrease assistance required with self care and functional mobility. Reason for Services/Other Comments:  · Patient continues to require skilled intervention due to decreased self care and functional mobility.    Use of outcome tool(s) and clinical judgement create a POC that gives a: LOW COMPLEXITY            TREATMENT:   (In addition to Assessment/Re-Assessment sessions the following treatments were rendered)     Pre-treatment Symptoms/Complaints:  none  Pain: Initial:   Pain Intensity 1: 3  Pain Location 1: Knee  Pain Orientation 1: Left  Pain Intervention(s) 1: Repositioned  Post Session:  0     Assessment/Reassessment only, no treatment provided today    Treatment/Session Assessment:     Response to Treatment:  Tolerated well. Education:  [] Home Exercises  [x] Fall Precautions  [] Hip Precautions [] Going Home Video  [x] Knee/Hip Prosthesis Review  [x] Walker Management/Safety [x] Adaptive Equipment as Needed       Interdisciplinary Collaboration:   o Physical Therapist  o Occupational Therapist  o Registered Nurse    After treatment position/precautions:   o Supine in bed  o Bed/Chair-wheels locked  o Bed in low position  o Caregiver at bedside  o Call light within reach  o RN notified  o Family at bedside  o Side rails x 2     Compliance with Program/Exercises: compliant all of the time. Recommendations/Intent for next treatment session:  Treatment next visit will focus on increasing Mr. Biggss independence with bed mobility, transfers, self care, functional mobility, modalities for pain, and patient education.       Total Treatment Duration:  OT Patient Time In/Time Out  Time In: 1310  Time Out: 180 [place], [place]

## 2017-11-15 NOTE — IP AVS SNAPSHOT
[address] 
[phone] Patient: Jaime Cr MRN: [id number] NCJ:[birth year] About your hospitalization You were admitted on:  November 15, 2017 You last received care in the:  [facility] 1 You were discharged on:  November 17, 2017 Why you were hospitalized Your primary diagnosis was:  Status Post Total Left Knee Replacement Things You Need To Do (next 8 weeks) Follow up with Tilford Dubin, MD  
As needed Phone:  [phone] [facility]  [address] [facility], [facility], [address] Follow up with Etta Pugh MD  
As scheduled by office Phone:  [phone] Where:  [address] [address] Follow up with [address] [place] [address] Will contact you within 48 hrs Phone:  [phone] Where:  [address] [facility], [address] [address] Discharge Orders Procedure Order Date Status Priority Quantity Spec Type Associated Dx CALL YOUR DOCTOR For: Temperature greater than 100.4., Severe uncontrolled pain. , Persistant nausea and vomiting., Persistant dizziness or light-headedness. , Hives, Difficulty breathing, headache, or visual disturbances. , Redness, tenderness, or s. .. 11/15/17 1216 Normal Routine 1  Status post total left knee replacement [[id number]] Questions: For:  Temperature greater than 100.4. For:  Severe uncontrolled pain. For:  Persistant nausea and vomiting. For:  Persistant dizziness or light-headedness. For:  Venetta Sigifredo For:  Difficulty breathing, headache, or visual disturbances. For:  Redness, tenderness, or signs of infection. ACTIVITY AFTER DISCHARGE Patient should: Restrict driving, Restrict lifting, Other (specify) 11/15/17 1216 Normal Routine 1  Status post total left knee replacement [[id number]] Questions: Patient should:  Restrict driving Patient should:  Restrict lifting Patient should: Other (specify) DRESSING, CHANGE SPECIFY 11/15/17 1216 Normal Routine 1  Status post total left knee replacement [[id number]] Comments:  Routine dressing changes. Notify if excessive drainage. If staples are present they are to be removed 10 days post surgery and steri strips applied. REFERRAL TO HOME HEALTH 11/15/17 1216 Normal Routine 1  Status post total left knee replacement [[id number]] REFERRAL TO PHYSICAL THERAPY 11/15/17 1216 Normal Routine 1  Status post total left knee replacement [[id number]] Comments:  Referral to Home PT A check jakob indicates which time of day the medication should be taken. My Medications STOP taking these medications   
 acetaminophen 500 mg Cap ALEVE PO  
   
  
  
TAKE these medications as instructed Instructions Each Dose to Equal  
 Morning Noon Evening Bedtime  
 amoxicillin 500 mg capsule Commonly known as:  AMOXIL Your next dose is:  Tomorrow Take 500 mg by mouth. Prior to dental appointment 500 mg  
    
  
   
   
   
  
 aspirin delayed-release 81 mg tablet Your next dose is: Today Take 81 mg by mouth nightly. 81 mg  
    
   
   
   
  
  
 clopidogrel 75 mg Tab Commonly known as:  PLAVIX Your next dose is:  Tomorrow Take 1 Tab by mouth every morning. 75 mg  
    
  
   
   
   
  
 Glucosamine &Chondroit-MV-Min3 [id number] mg Tab Your next dose is:  Tomorrow Take  by mouth every morning. 2 tablets every morning   Stop seven days prior to surgery per anesthesia protocol. HYDROmorphone 2 mg tablet Commonly known as:  DILAUDID Your next dose is: Today Take 1 Tab by mouth every four (4) hours as needed. Max Daily Amount: 12 mg.  
 2 mg  
    
   
   
  
   
  
 losartan-hydroCHLOROthiazide 100-12.5 mg per tablet Commonly known as:  HYZAAR Your next dose is:  Tomorrow Take 1 Tab by mouth every morning. Indications: Hypertension 1 Tab  
    
  
   
   
   
  
 metoprolol succinate 25 mg XL tablet Commonly known as:  TOPROL XL Your next dose is:  Tomorrow Take 1 Tab by mouth every morning. Indications: unknown why takes 25 mg  
    
  
   
   
   
  
 rosuvastatin 20 mg tablet Commonly known as:  CRESTOR Your next dose is: Today Take 1 Tab by mouth nightly. Indications: hypercholesterolemia 20 mg  
    
   
   
   
  
  
 ZANTAC 150 mg tablet Generic drug:  raNITIdine Your next dose is:  Tomorrow Take 150 mg by mouth as needed for Indigestion. Non-formulary. Instructed to bring to the hospital on the DOS, in the original bottle, and give to nurse. Indications: gastroesophageal reflux disease 150 mg Where to Get Your Medications Information on where to get these meds will be given to you by the nurse or doctor. ! Ask your nurse or doctor about these medications HYDROmorphone 2 mg tablet Discharge Instructions [facility] [employer] Patient Discharge Instructions Uday Casillas / [id number] : 1940 Admitted 11/15/2017 Discharged: 11/15/2017 IF YOU HAVE ANY PROBLEMS ONCE YOU ARE AT HOME CALL THE FOLLOWING NUMBERS:  
Main office number: [phone] Medications · The medications you are to continue on are listed on the medication reconciliation sheet. · Narcotic pain medications as well as supplemental iron can cause constipation. If this occurs try stopping the narcotic pain medication and/or the iron. · It is important that you take the medication exactly as they are prescribed. · Medications which increase your risk of blood clots are listed to stop for 5 weeks after surgery as well as medications or supplements which increase your risk of bleeding complications. · Keep your medication in the bottles provided by the pharmacist and keep a list of the medication names, dosages, and times to be taken in your wallet. · Do not take other medications without consulting your doctor. Important Information Do NOT smoke as this will greatly increase your risk of infection! Resume your prehospital diet. If you have excessive nausea or vomitting call your doctor's office Leg swelling and warmth is normal for 6 months after surgery. If you experience swelling in your leg elevate you leg while laying down with your toes above your heart. If you have sudden onset severe swelling with leg pain call our office. Use Jericho Hose stockings until we see you in the office for your follow up appointment. The stitches deep inside take approximately 6 months to dissolve. There will be sharp shooting, stinging and burning pain. This is normal and will resolve between 3-6 months after surgery. Difficulty sleeping is normal following total Knee and Hip replacement. You may try melatonin, an over-the-counter sleep aid or benadryl to help with sleep. Most patients will resume sleeping through the night 8 weeks after surgery. Home Physical Therapy is arranged. Home Health will contact you within 48 hrs of discharge that you have chosen. If you have not received a call within this time frame please contact that provider you chose. You should be given this information before you leave the hospital.  
 
You are at a risk for falls. Use the rolling walker when walking. Patients who have had a joint replacement should not drive if they are still taking narcotic pain mediation during the daytime hours. Most patients wean themselves off of pain medication within 2-5 weeks after surgery. When to Call the office - If you have a temperature greater then 101 
- Uncontrolled vomiting - Loose control of your bladder or bowel function - Are unable to bear any weight - Need a pain medication refill DISCHARGE SUMMARY from Nurse The following personal items collected during your admission are returned to you:  
Dental Appliance: Dental Appliances: Partials, Uppers, With patient Vision:   na 
Hearing Aid:   na 
Jewelry: Jewelry: Kristi Man Clothing:   self Other Valuables: Other Valuables: Cell Phone, Dahliajerevicotrino Mehta (with [place]ett) Valuables sent to safe:   na 
 
PATIENT INSTRUCTIONS: 
 
After general anesthesia or intravenous sedation, for 24 hours or while taking prescription Narcotics: · Limit your activities · Do not drive and operate hazardous machinery · Do not make important personal or business decisions · Do  not drink alcoholic beverages · If you have not urinated within 8 hours after discharge, please contact your surgeon on call. Report the following to your surgeon: 
· Excessive pain, swelling, redness or odor of or around the surgical area · Temperature over 101 · Nausea and vomiting lasting longer than 4 hours or if unable to take medications · Any signs of decreased circulation or nerve impairment to extremity: change in color, persistent  numbness, tingling, coldness or increase pain · Any questions, call office @ [phone] Keep scheduled follow up appointment. If need to change, call office @ [phone]. *  Please give a list of your current medications to your Primary Care Provider. *  Please update this list whenever your medications are discontinued, doses are 
    changed, or new medications (including over-the-counter products) are added. *  Please carry medication information at all times in case of emergency situations. Total Knee Replacement: What to Expect at Home Your Recovery When you leave the hospital, you should be able to move around with a walker or crutches. But you will need someone to help you at home for the next few weeks or until you have more energy and can move around better. If there is no one to help you at home, you may go to a rehabilitation center. You will go home with a bandage and stitches or staples. Change the bandage as your doctor tells you to. Your doctor will remove your stitches or staples 10 to 21 days after your surgery. You may still have some mild pain, and the area may be swollen for 3 to 6 months after surgery. Your knee will continue to improve for 6 to 12 months. You will probably use a walker for 1 to 3 weeks and then use crutches. When you are ready, you can use a cane. You will probably be able to walk on your own in 4 to 8 weeks. You will need to do months of physical rehabilitation (rehab) after a knee replacement. Rehab will help you strengthen the muscles of the knee and help you regain movement. After you recover, your artificial knee will allow you to do normal daily activities with less pain or no pain at all. You may be able to hike, dance, ride a bike, and play golf. Talk to your doctor about whether you can do more strenuous activities. Always tell your caregivers that you have an artificial knee. How long it will take to walk on your own, return to normal activities, and go back to work depends on your health and how well your rehabilitation (rehab) program goes. The better you do with your rehab exercises, the quicker you will get your strength and movement back. This care sheet gives you a general idea about how long it will take for you to recover. But each person recovers at a different pace. Follow the steps below to get better as quickly as possible. How can you care for yourself at home? Activity ? · Rest when you feel tired. You may take a nap, but do not stay in bed all day. When you sit, use a chair with arms. You can use the arms to help you stand up. ? · Work with your physical therapist to find the best way to exercise. You may be able to take frequent, short walks using crutches or a walker. What you can do as your knee heals will depend on whether your new knee is cemented or uncemented. You may not be able to do certain things for a while if your new knee is uncemented. ? · After your knee has healed enough, you can do more strenuous activities with caution. ¨ You can golf, but use a golf cart, and do not wear shoes with spikes. ¨ You can bike on a flat road or on a stationary bike. Avoid biking up hills. ¨ Your doctor may suggest that you stay away from activities that put stress on your knee. These include tennis or badminton, squash or racquetball, contact sports like football, jumping (such as in basketball), jogging, or running. ¨ Avoid activities where you might fall. These include horseback riding, skiing, and mountain biking. ? · Do not sit for more than 1 hour at a time. Get up and walk around for a while before you sit again. If you must sit for a long time, prop up your leg with a chair or footstool. This will help you avoid swelling. ? · Ask your doctor when you can shower. You may need to take sponge baths until your stitches or staples have been removed. ? · Ask your doctor when you can drive again. It may take up to 8 weeks after knee replacement surgery before it is safe for you to drive. ? · When you get into a car, sit on the edge of the seat. Then pull in your legs, and turn to face the front. ? · You should be able to do many everyday activities 3 to 6 weeks after your surgery. You will probably need to take 4 to 16 weeks off from work. When you can go back to work depends on the type of work you do and how you feel. ? · Ask your doctor when it is okay for you to have sex. ? · Do not lift anything heavier than 10 pounds and do not lift weights for 12 weeks. Diet ? · By the time you leave the hospital, you should be eating your normal diet.  If your stomach is upset, try bland, low-fat foods like plain rice, broiled chicken, toast, and yogurt. Your doctor may suggest that you take iron and vitamin supplements. ? · Drink plenty of fluids (unless your doctor tells you not to). ? · Eat healthy foods, and watch your portion sizes. Try to stay at your ideal weight. Too much weight puts more stress on your new knee. ? · You may notice that your bowel movements are not regular right after your surgery. This is common. Try to avoid constipation and straining with bowel movements. You may want to take a fiber supplement every day. If you have not had a bowel movement after a couple of days, ask your doctor about taking a mild laxative. Medicines ? · Your doctor will tell you if and when you can restart your medicines. He or she will also give you instructions about taking any new medicines. ? · If you take blood thinners, such as warfarin (Coumadin), clopidogrel (Plavix), or aspirin, be sure to talk to your doctor. He or she will tell you if and when to start taking those medicines again. Make sure that you understand exactly what your doctor wants you to do.  
? · Your doctor may give you a blood-thinning medicine to prevent blood clots. If you take a blood thinner, be sure you get instructions about how to take your medicine safely. Blood thinners can cause serious bleeding problems. This medicine could be in pill form or as a shot (injection). If a shot is necessary, your doctor will tell you how to do this. ? · Be safe with medicines. Take pain medicines exactly as directed. ¨ If the doctor gave you a prescription medicine for pain, take it as prescribed. ¨ If you are not taking a prescription pain medicine, ask your doctor if you can take an over-the-counter medicine. ¨ Plan to take your pain medicine 30 minutes before exercises. It is easier to prevent pain before it starts than to stop it once it has started.   
? · If you think your pain medicine is making you sick to your stomach: 
 ¨ Take your medicine after meals (unless your doctor has told you not to). ¨ Ask your doctor for a different pain medicine. ? · If your doctor prescribed antibiotics, take them as directed. Do not stop taking them just because you feel better. You need to take the full course of antibiotics. Incision care ? · You will have a bandage over the cut (incision) and staples or stitches. Take the bandage off when your doctor says it is okay. ? · Your doctor will remove the staples or stitches 10 days to 3 weeks after the surgery and replace them with strips of tape. Leave the tape on for a week or until it falls off. Exercise ? · Your rehab program will give you a number of exercises to do to help you get back your knee's range of motion and strength. Always do them as your therapist tells you. Ice and elevation ? · For pain and swelling, put ice or a cold pack on the area for 10 to 20 minutes at a time. Put a thin cloth between the ice and your skin. Other instructions ? · Continue to wear your support stockings as your doctor says. These help to prevent blood clots. The length of time that you will have to wear them depends on your activity level and the amount of swelling. ? · You have metal pieces in your knee. These may set off some airport metal detectors. Carry a medical alert card that says you have an artificial joint, just in case. Follow-up care is a key part of your treatment and safety. Be sure to make and go to all appointments, and call your doctor if you are having problems. It's also a good idea to know your test results and keep a list of the medicines you take. When should you call for help? Call 911 anytime you think you may need emergency care. For example, call if: 
? · You passed out (lost consciousness). ? · You have severe trouble breathing. ? · You have sudden chest pain and shortness of breath, or you cough up blood. ?Call your doctor now or seek immediate medical care if: 
? · You have signs of infection, such as: 
¨ Increased pain, swelling, warmth, or redness. ¨ Red streaks leading from the incision. ¨ Pus draining from the incision. ¨ A fever. ? · You have signs of a blood clot, such as: 
¨ Pain in your calf, back of the knee, thigh, or groin. ¨ Redness and swelling in your leg or groin. ? · Your incision comes open and begins to bleed, or the bleeding increases. ? · You have pain that does not get better after you take pain medicine. ? Watch closely for changes in your health, and be sure to contact your doctor if: 
? · You do not have a bowel movement after taking a laxative. Where can you learn more? Go to http://joann-drake.info/. Enter T806 in the search box to learn more about \"Total Knee Replacement: What to Expect at Home. \" Current as of: March 21, 2017 Content Version: 11.4 © 3007-2340 "[employer]. Care instructions adapted under license by [employer] (which disclaims liability or warranty for this information). If you have questions about a medical condition or this instruction, always ask your healthcare professional. [place] [address] any warranty or liability for your use of this information. These are general instructions for a healthy lifestyle: No smoking/ No tobacco products/ Avoid exposure to second hand smoke Surgeon General's Warning:  Quitting smoking now greatly reduces serious risk to your health. Obesity, smoking, and sedentary lifestyle greatly increases your risk for illness A healthy diet, regular physical exercise & weight monitoring are important for maintaining a healthy lifestyle You may be retaining fluid if you have a history of heart failure or if you experience any of the following symptoms:  Weight gain of 3 pounds or more overnight or 5 pounds in a week, increased swelling in our hands or feet or shortness of breath while lying flat in bed. Please call your doctor as soon as you notice any of these symptoms; do not wait until your next office visit. Recognize signs and symptoms of STROKE: 
 
F-face looks uneven A-arms unable to move or move even S-speech slurred or non-existent T-time-call 911 as soon as signs and symptoms begin-DO NOT go Back to bed or wait to see if you get better-TIME IS BRAIN. The discharge information has been reviewed with the patient. The patient verbalized understanding. Information obtained by : 
I understand that if any problems occur once I am at home I am to contact my physician. I understand and acknowledge receipt of the instructions indicated above. Physician's or R.N.'s Signature                                                                  Date/Time Patient or Representative Signature                                                          Date/Time Introducing [facility] SERVICES! Dear Leigha Soni: 
Thank you for requesting a [employer] account. Our records indicate that you already have an active [employer]or account. You can access your account anytime at [email or web address]. [email or web address] Did you know that you can access your hospital and ER discharge instructions at any time in Eng[employer]or? You can also review all of your test results from your hospital stay or ER visit. Additional Information If you have questions, please visit the Frequently Asked Questions section of the [employer]or website at [email or web address]/. Remember, Eng[employer]or is NOT to be used for urgent needs. For medical emergencies, dial 911. Now available from your iPhone and Android! Providers Seen During Your Hospitalization Provider Specialty Primary office phone Jak Jackman MD Orthopedic Surgery [phone] Immunizations Administered for This Admission Name Date  
 TB Skin Test (PPD) Intradermal 11/15/2017 Your Primary Care Physician (PCP) Primary Care Physician Office Phone Office Fax Sharyle Breeze [phone] You are allergic to the following Allergen Reactions Poison Ivy Extract Hives Itching Recent Documentation Height Weight BMI Smoking Status 1.803 m 122 kg 37.52 kg/m2 Former Smoker Emergency Contacts Name Discharge Info Relation Home Work Mobile Krystle Desir DISCHARGE CAREGIVER [3] Spouse [3] [phone] Patient Belongings The following personal items are in your possession at time of discharge: 
  Dental Appliances: Partials, Uppers, With patient         Home Medications: None   Jewelry: Ring       Other Valuables: Cell Phone, Caralyn Roder (with Jennifer Branham) Please provide this summary of care documentation to your next provider. Signatures-by signing, you are acknowledging that this After Visit Summary has been reviewed with you and you have received a copy. Patient Signature:  ____________________________________________________________ Date:  ____________________________________________________________  
  
[place] Green Provider Signature:  ____________________________________________________________ Date:  ____________________________________________________________

## 2017-11-15 NOTE — PERIOP NOTES
TRANSFER - OUT REPORT:    Verbal report given to Lillianjosecharisse Lizama on Alfred Freeman  being transferred to preop block for routine progression of care       Report consisted of patients Situation, Background, Assessment and   Recommendations(SBAR). Information from the following report(s) SBAR, MAR and Recent Results was reviewed with the receiving nurse. Lines:   Peripheral IV 11/15/17 Left; Lower Cephalic (Active)   Site Assessment Clean, dry, & intact 11/15/2017  6:47 AM   Phlebitis Assessment 0 11/15/2017  6:47 AM   Dressing Status Clean, dry, & intact 11/15/2017  6:47 AM   Dressing Type Tape;Transparent 11/15/2017  6:47 AM   Hub Color/Line Status Green; Infusing 11/15/2017  6:47 AM   Action Taken Blood drawn 11/15/2017  6:47 AM        Opportunity for questions and clarification was provided.       Patient transported with:   [facility]

## 2017-11-15 NOTE — PROGRESS NOTES
Sitting up in bed. Good movement and sensation to LEs. Bandage dry. Wife in room. Pt brought CPAP from home. No further complaints or changes.

## 2017-11-15 NOTE — CONSULTS
MD Lion   Medical Director  [address] [place] [address], [address] [facility]  Tel: [phone]     Physical Medicine & Rehabilitation Note-consult    Patient: Libertad Matute MRN: [id number]  SSN: [id number]    YOB: 1940  Age: 76 y.o. Sex: male      Admit Date: 11/15/2017  Admitting Physician: Sandie Cordova MD    Medical Decision Making/Plan/Recommend:  Gait impairment and functional deficits. S/p L TKA. Therapy progressing steadily, without unusual barriers to progress. He is at [facility] with transfers, and ambulation using a RW. Best care option is to continue rehab program at home via [facility] following discharge. Continue PT, OT for active/assisted/passive left TKA ROM, strengthening, mobility, transfers, gait training. Will follow progress. Chief Complaint : Gait dysfunction secondary to below. Admit Diagnosis: Primary osteoarthritis of left knee [M17.12]  left total knee arthroplasty     11/15/2017  Pain  DVT risk  Post op hemorrhagic anemia  CAD (coronary artery disease)  Hx of Atrial fibrillation-Post op after AVR/CABG. Acute Rehab Dx:  Gait impairment  Mobility and ambulation deficits  Self Care/ADL deficits    Medical Dx:  Past Medical History:   Diagnosis Date    Aortic valve replaced 12/2014    Daily Plavix and 81 mg ASA  nightly     Arrhythmia     Hx of Atrial fibrillation-Post op after AVR/CABG. Treated with Amiodarone and Coumadin. NO recurrence. Medication stopped 2/2/15    Arthritis     OA    CAD (coronary artery disease)     Followed by [address] Card[address]-Dr. Sauceda    Carotid artery stenosis     without cerebral infarction. Left carotid stent in May 2014 as part of the [address]. Per US (09/13/17) \"Right internal carotid artery has <50% stenosis. Left internal carotid artery is status post carotid stent with no significan stenosis. \"      Chronic kidney disease     per cardiology note (12/8/16) Catrachito Meier 3, GFR 30-59 ml/min. \" GFR 53 on 5/8/17.  Chronic pain     knee    Coagulation disorder (HCC)     Daily Plavix     Coronary atherosclerosis of native coronary vessel     Cardiac cath times 2 with 1 stent. 1) PCI of OM with 2.5 mm Cypher MARIUM (1/2008) and 2) 2 vessel CABG 10/14/14: LIMA to LAD. SVG to OM.  Dyslipidemia 12/5/2016    Former smoker     GERD (gastroesophageal reflux disease)     PRN Zantac     H/O echocardiogram 01/17/2017    EF 64%, AV Mean gradient: 15 mmHG, AV Peak gradient: 30.4 mmHg.  Hypertension     Left anterior fascicular block     Per Dr. Nancy Guido note (12/8/16) \"Chronic\"     KAYCEE (obstructive sleep apnea)     CPAP-Followed by Dr. Alejandra Murry.  Platelet inhibition due to Plavix     Pre-diabetes     Last A1C 6.3 on 9/27/17    Status post total left knee replacement 11/15/2017     Subjective:     Date of Evaluation:  November 15, 2017    HPI: Vicki Mayo is a 76 y.o. male patient at [address] [place] [address] who was admitted on 11/15/2017  by Jak Jackman MD with below mentioned medical history, is being seen for Physical Medicine and Rehabilitation consult. Vicki Mayo has had progressively worsening left knee pain due to end stage DJD. He underwent a left total knee arthroplasty per Dr. Jak Jackman MD on 11/15/2017. We are consulted to assist with rehab needs and placement. Patient is made WBAT LLE. Patient is starting to participate in acute post op PT, without major barriers. Expect continued progress. He is significantly limited by surgical knee pain, decreased ROM and decreased strength. Vicki Mayo is seen and examined today. Medical Records reviewed. He has extensive cardiac history. He has been independent with ambulation, prior to admission, limited by left knee pain.       Current Level of Function: bed mobility - min A, transfers - min A, decreased balance , ambulation -  3' with min A using a RW     Previous Functional Level-   Independent       Family History   Problem Relation Age of Onset    Cancer Father 80     LIVER CANCER     Heart Disease Mother     Arthritis-osteo Sister     Heart Disease Brother     Arthritis-osteo Brother       Social History   Substance Use Topics    Smoking status: Former Smoker     Packs/day: 1.00     Years: 23.00     Types: Cigarettes     Quit date: 3/25/1980    Smokeless tobacco: Never Used    Alcohol use 3.5 oz/week     4 Glasses of wine, 3 Cans of beer per week     Past Surgical History:   Procedure Laterality Date    HX BUNIONECTOMY Bilateral     HX CARPAL TUNNEL RELEASE Bilateral     HX CATARACT REMOVAL Bilateral     HX COLONOSCOPY      HX CORONARY ARTERY BYPASS GRAFT  10/14/2014    x2 - Dr. Shekhar Carlisle Aortic valve replacement     HX HEART CATHETERIZATION      x2-one stent placed in 2014    HX KNEE REPLACEMENT Right 2017    VASCULAR SURGERY PROCEDURE UNLIST      left carotid stent in May 2014 as part of the [place] [facility]      Prior to Admission medications    Medication Sig Start Date End Date Taking? Authorizing Provider   NAPROXEN SODIUM (ALEVE PO) Take  by mouth. Pt takes two tabs when needed   Yes Historical Provider   HYDROmorphone (DILAUDID) 2 mg tablet Take 1 Tab by mouth every four (4) hours as needed. Max Daily Amount: 12 mg. 11/15/17  Yes SHILA Lowe   metoprolol succinate (TOPROL XL) 25 mg XL tablet Take 1 Tab by mouth every morning. Indications: unknown why takes 6/1/17  Yes Candi Freeman MD   amoxicillin (AMOXIL) 500 mg capsule Take 500 mg by mouth. Prior to dental appointment   Yes [facility], MD   losartan-hydroCHLOROthiazide [facility]) 100-12.5 mg per tablet Take 1 Tab by mouth every morning. Indications: Hypertension 12/8/16  Yes Candi Freeman MD   rosuvastatin (CRESTOR) 20 mg tablet Take 1 Tab by mouth nightly.  Indications: hypercholesterolemia 12/8/16  Yes Candi Freeman MD   aspirin delayed-release 81 mg tablet Take 81 mg by mouth nightly. Yes Historical Provider   ranitidine (ZANTAC) 150 mg tablet Take 150 mg by mouth as needed for Indigestion. Non-formulary. Instructed to bring to the hospital on the DOS, in the original bottle, and give to nurse. Indications: gastroesophageal reflux disease   Yes Historical Provider   acetaminophen 500 mg cap Take 1,000 mg by mouth every six (6) hours. Indications: Fever, Pain    Laurie Jimenez MD   clopidogrel (PLAVIX) 75 mg tab Take 1 Tab by mouth every morning. 16   Ana Ozuna MD   Glucosamine &Chondroit-MV-Min3 155-234-28-0.5 mg tab Take  by mouth every morning. 2 tablets every morning     Stop seven days prior to surgery per anesthesia protocol. Historical Provider     Allergies   Allergen Reactions    Poison Ivy Extract Hives and Itching        Review of Systems: +left knee pain, +antalgic gait. Denies chest pain, shortness of breath, cough, headache, visual problems, abdominal pain, dysurea, calf pain. Pertinent positives are as noted in the medical records and unremarkable otherwise. Objective:     Vitals:  Blood pressure 132/78, pulse 83, temperature 97.5 °F (36.4 °C), resp. rate 16, height 5' 11\" (1.803 m), weight 269 lb (122 kg), SpO2 94 %. Temp (24hrs), Av.6 °F (36.4 °C), Min:97.2 °F (36.2 °C), Max:98 °F (36.7 °C)    BMI (calculated): 37.5 (17 0925)   Intake and Output:       Physical Exam:   General: Alert and age appropriately oriented. No acute cardio respiratory distress. HEENT: Normocephalic, no conjunctival pallor. Oral mucosa moist without cyanosis. No JVD. Lungs: Clear to auscultation bilaterally. Respiration even and unlabored   Heart: Regular rate and rhythm, S1, S2   No  Murmurs. Abdomen: Soft, non-tender, non-distended. Genitourinary: defered   Neuromuscular:      Grossly no focal motor deficits.   Left knee extension strong  Left ankle dorsiflexion 5/5  Left ankle plantarflexion 5/5  No sensory deficits distally BLE to soft touch. Skin/extremity: Non tender calves BLE. No rashes, no marginal erythema. Labs/Studies:  Recent Results (from the past 72 hour(s))   TYPE & SCREEN    Collection Time: 11/15/17  6:58 AM   Result Value Ref Range    Crossmatch Expiration 11/18/2017     ABO/Rh(D) Zandra Hatchet POSITIVE     Antibody screen NEG    GLUCOSE, POC    Collection Time: 11/15/17  7:05 AM   Result Value Ref Range    Glucose (POC) 124 (H) 65 - 100 mg/dL   GLUCOSE, POC    Collection Time: 11/15/17  3:52 PM   Result Value Ref Range    Glucose (POC) 168 (H) 65 - 100 mg/dL       Functional Assessment:  Reviewed participation and progress in therapies  Gross Assessment  AROM: Within functional limits (right LE) (11/15/17 1300)  Strength: Generally decreased, functional (right LE) (11/15/17 1300)   Bed Mobility  Supine to Sit: Minimum assistance (11/15/17 1320)  Sit to Supine: Contact guard assistance (11/15/17 1320)   Balance  Sitting: Intact (11/15/17 1300)  Standing: Pull to stand; With support (11/15/17 1300)               Bed/Mat Mobility  Supine to Sit: Minimum assistance (11/15/17 1320)  Sit to Supine: Contact guard assistance (11/15/17 1320)  Sit to Stand: Minimum assistance (11/15/17 1320)     Ambulation:  Gait  Speed/Dinorah: Delayed (11/15/17 1300)  Step Length: Left shortened;Right shortened (11/15/17 1300)  Stance: Left decreased (11/15/17 1300)  Gait Abnormalities: Antalgic;Decreased step clearance (11/15/17 1300)  Ambulation - Level of Assistance: Minimal assistance (11/15/17 1300)  Distance (ft): 3 Feet (ft) (11/15/17 1300)  Assistive Device: Walker, rolling (11/15/17 1300)  Interventions: Safety awareness training;Verbal cues (11/15/17 1300)    Impression/Plan:     Principal Problem:    Status post total left knee replacement (11/15/2017)        Current Facility-Administered Medications   Medication Dose Route Frequency Provider Last Rate Last Dose    tuberculin injection 5 Units  5 Units IntraDERMal ONCE Gino Adkins MD   5 Units at 11/15/17 0659    [START ON 11/16/2017] metoprolol succinate (TOPROL-XL) XL tablet 25 mg  25 mg Oral DAILY Barber Samantha, [place]        famotidine (PEPCID) tablet 20 mg  20 mg Oral BID Barber Samantha, [place]        0.9% sodium chloride infusion  100 mL/hr IntraVENous CONTINUOUS Barber Samantha,  mL/hr at 11/15/17 1445 100 mL/hr at 11/15/17 1445    sodium chloride (NS) flush 5-10 mL  5-10 mL IntraVENous Q8H Barber Samantha, [place]        sodium chloride (NS) flush 5-10 mL  5-10 mL IntraVENous PRN Sunil Singh, PA        ceFAZolin in 0.9% NS (ANCEF) IVPB soln 2 g  2 g IntraVENous Q8H Barber Samantha,  mL/hr at 11/15/17 1529 2 g at 11/15/17 1529    acetaminophen (OFIRMEV) infusion 1,000 mg  1,000 mg IntraVENous ONCE Barber Samantha, [place]        [START ON 11/16/2017] acetaminophen (TYLENOL) tablet 1,000 mg  1,000 mg Oral Q6H [facility], [place]        celecoxib (CELEBREX) capsule 200 mg  200 mg [facility]12[facility] [place]        HYDROmorphone (DILAUDID) tablet 2 mg  2 mg Oral Q4H PRN Sunil Singh, PA   2 mg at 11/15/17 1352    HYDROmorphone (PF) (DILAUDID) injection 1 mg  1 mg IntraVENous Q3H PRN Sunil Singh, PA        naloxone [facility]) injection 0.2-0.4 mg  0.2-0.4 mg IntraVENous Q10MIN PRN Sunil Singh, PA        [START ON 11/16/2017] dexamethasone (DECADRON) injection 10 mg  10 mg IntraVENous ONCE Barber Samantha, PA        ondansetron [facility]) injection 4 mg  4 mg IntraVENous Q4H PRN Sunil Singh, PA        diphenhydrAMINE (BENADRYL) capsule 25 mg  25 mg Oral Q4H PRN Sunil Singh, PA        [START ON 11/16/2017] senna-docusate (PERICOLACE) 8.6-50 mg per tablet 2 Tab  2 Tab Oral DAILY Caro Aleman        aspirin delayed-release tablet 325 mg  325 mg Oral Q12H Caro Aleman        insulin lispro (HUMALOG) injection   SubCUTAneous AC&HS Kaley Holley, MD Agata Amezcua Ashley Pelletier ON 11/16/2017] losartan/hydroCHLOROthiazide (HYZAAR) 100/12.5 mg   Oral DAILY Ema Lopez MD            Recommendations: Recommend HH PT. Continue Acute Rehab Program. PT, OT  to focus on  gait training, transfer training, balance activities, ROM and strengthening exercises. Coordination of rehab/medical care. Counseling of Physical Medicine & Rehab care issues management. Monitoring and management of rehab conditions per the plan of care/orders. Rehabilitation Management/ Medical Management: 1. Devices:Walkers, Type: Rolling Walker  2. Consult:Rehab team including PT, OT,  and . 3. Disposition Rehab-discussed with patient. 4. Thigh-high or knee-high ZAC's when out of bed. 5. DVT Prophylaxis - plan to resume home Plavix. Aspirin bid x 35days, possibly at lower dose. .  6. Incentive spirometer Q1H while awake  7. Post op hemorrhagic anemia-   monitor. 8. Activity: WBAT LLE  9. Planned Labs: CBC,BMP  10. Pain Control:  Continue scheduled tylenol, Celebrex and  PRN dilaudid. 11. Wound Care: Keep left TKA wound clean and dry and reinforce dressing PRN. May remove Aquacel 1 week post op ad replace with new one. Remove staples 12-14 post surgery, when incision appears appropriately closed and apply benzoin and 1/2\" steristrips. Follow up with Dr Daryl Gil  2 weeks after discharge from rehab. Follow up with ORTHO per instructions. Thank you for the opportunity to participate in the care of this patient.     Signed By: Oliver Vargas MD     November 15, 2017

## 2017-11-15 NOTE — ANESTHESIA PREPROCEDURE EVALUATION
Anesthetic History   No history of anesthetic complications            Review of Systems / Medical History  Patient summary reviewed and pertinent labs reviewed    Pulmonary        Sleep apnea: CPAP           Neuro/Psych   Within defined limits           Cardiovascular    Hypertension: well controlled  Valvular problems/murmurs: aortic stenosis        CAD and cardiac stents    Exercise tolerance: <4 METS  Comments: S/p AVR/CABG(bovine valve) 2014   GI/Hepatic/Renal     GERD: well controlled           Endo/Other        Morbid obesity and arthritis     Other Findings              Physical Exam    Airway  Mallampati: II  TM Distance: > 6 cm  Neck ROM: normal range of motion   Mouth opening: Normal     Cardiovascular    Rhythm: regular           Dental         Pulmonary                 Abdominal  GI exam deferred       Other Findings            Anesthetic Plan    ASA: 3  Anesthesia type: spinal - femoral single shot      Post-op pain plan if not by surgeon: peripheral nerve block single    Induction: Intravenous  Anesthetic plan and risks discussed with: Patient

## 2017-11-16 ENCOUNTER — HOME HEALTH ADMISSION (OUTPATIENT)
Dept: HOME HEALTH SERVICES | Facility: HOME HEALTH | Age: 77
End: 2017-11-16
Payer: MEDICARE

## 2017-11-16 LAB
ANION GAP SERPL CALC-SCNC: 7 MMOL/L (ref 7–16)
BUN SERPL-MCNC: 26 MG/DL (ref 8–23)
CALCIUM SERPL-MCNC: 8.2 MG/DL (ref 8.3–10.4)
CHLORIDE SERPL-SCNC: 104 MMOL/L (ref 98–107)
CO2 SERPL-SCNC: 28 MMOL/L (ref 21–32)
CREAT SERPL-MCNC: 1.45 MG/DL (ref 0.8–1.5)
GLUCOSE BLD STRIP.AUTO-MCNC: 146 MG/DL (ref 65–100)
GLUCOSE BLD STRIP.AUTO-MCNC: 180 MG/DL (ref 65–100)
GLUCOSE SERPL-MCNC: 164 MG/DL (ref 65–100)
HGB BLD-MCNC: 13.9 G/DL (ref 13.6–17.2)
MM INDURATION POC: 0 MM (ref 0–5)
POTASSIUM SERPL-SCNC: 4.2 MMOL/L (ref 3.5–5.1)
PPD POC: NORMAL NEGATIVE
SODIUM SERPL-SCNC: 139 MMOL/L (ref 136–145)

## 2017-11-16 PROCEDURE — 74011636637 HC RX REV CODE- 636/637: Performed by: INTERNAL MEDICINE

## 2017-11-16 PROCEDURE — 85018 HEMOGLOBIN: CPT | Performed by: PHYSICIAN ASSISTANT

## 2017-11-16 PROCEDURE — 82962 GLUCOSE BLOOD TEST: CPT

## 2017-11-16 PROCEDURE — 65270000029 HC RM PRIVATE

## 2017-11-16 PROCEDURE — 74011250637 HC RX REV CODE- 250/637: Performed by: PHYSICIAN ASSISTANT

## 2017-11-16 PROCEDURE — 97535 SELF CARE MNGMENT TRAINING: CPT

## 2017-11-16 PROCEDURE — 36415 COLL VENOUS BLD VENIPUNCTURE: CPT | Performed by: PHYSICIAN ASSISTANT

## 2017-11-16 PROCEDURE — 94760 N-INVAS EAR/PLS OXIMETRY 1: CPT

## 2017-11-16 PROCEDURE — 97110 THERAPEUTIC EXERCISES: CPT

## 2017-11-16 PROCEDURE — 74011250637 HC RX REV CODE- 250/637: Performed by: INTERNAL MEDICINE

## 2017-11-16 PROCEDURE — 97116 GAIT TRAINING THERAPY: CPT

## 2017-11-16 PROCEDURE — 97150 GROUP THERAPEUTIC PROCEDURES: CPT

## 2017-11-16 PROCEDURE — 80048 BASIC METABOLIC PNL TOTAL CA: CPT | Performed by: PHYSICIAN ASSISTANT

## 2017-11-16 RX ADMIN — ACETAMINOPHEN 1000 MG: 500 TABLET, FILM COATED ORAL at 06:16

## 2017-11-16 RX ADMIN — INSULIN LISPRO 2 UNITS: 100 INJECTION, SOLUTION INTRAVENOUS; SUBCUTANEOUS at 22:00

## 2017-11-16 RX ADMIN — FAMOTIDINE 20 MG: 20 TABLET, FILM COATED ORAL at 17:43

## 2017-11-16 RX ADMIN — ASPIRIN 325 MG: 325 TABLET, DELAYED RELEASE ORAL at 21:00

## 2017-11-16 RX ADMIN — ACETAMINOPHEN 1000 MG: 500 TABLET, FILM COATED ORAL at 12:01

## 2017-11-16 RX ADMIN — ACETAMINOPHEN 1000 MG: 500 TABLET, FILM COATED ORAL at 17:43

## 2017-11-16 RX ADMIN — HYDROMORPHONE HYDROCHLORIDE 2 MG: 2 TABLET ORAL at 12:44

## 2017-11-16 RX ADMIN — ASPIRIN 325 MG: 325 TABLET, DELAYED RELEASE ORAL at 08:50

## 2017-11-16 RX ADMIN — HYDROMORPHONE HYDROCHLORIDE 2 MG: 2 TABLET ORAL at 08:49

## 2017-11-16 RX ADMIN — FAMOTIDINE 20 MG: 20 TABLET, FILM COATED ORAL at 08:50

## 2017-11-16 RX ADMIN — HYDROCHLOROTHIAZIDE: 12.5 CAPSULE ORAL at 08:49

## 2017-11-16 RX ADMIN — METOPROLOL SUCCINATE 25 MG: 25 TABLET, EXTENDED RELEASE ORAL at 08:49

## 2017-11-16 RX ADMIN — HYDROMORPHONE HYDROCHLORIDE 2 MG: 2 TABLET ORAL at 17:45

## 2017-11-16 RX ADMIN — SENNOSIDES AND DOCUSATE SODIUM 2 TABLET: 8.6; 5 TABLET ORAL at 08:50

## 2017-11-16 NOTE — PROGRESS NOTES
11/16/17 0917   Oxygen Therapy   O2 Sat (%) 94 %   Pulse via Oximetry 82 beats per minute   O2 Device Room air   O2 Flow Rate (L/min) 0 l/min   FIO2 (%) 21 %   Patient achieved 2750 Ml/sec on IS. Patient encouraged to do every hour while awake-patient agreed and demonstrated. No shortness of breath or distress noted. BS are clear b/l.    [facility] notes reviewed- wore home cpap last night

## 2017-11-16 NOTE — PROGRESS NOTES
Post op interview conducted following total left knee arthroplasty, no anesthetic complications noted

## 2017-11-16 NOTE — PROGRESS NOTES
Problem: Self Care Deficits Care Plan (Adult)  Goal: *Acute Goals and Plan of Care (Insert Text)  GOALS:   DISCHARGE GOALS (in preparation for going home/rehab):  3 days  1. Mr. Lina Fish will perform one lower body dressing activity with minimal assistance required to demonstrate improved functional mobility and safety. GOAL MET 11/16/2017      2. Mr. Lina Fish will perform one lower body bathing activity with minimal assistance required to demonstrate improved functional mobility and safety. GOAL MET 11/16/2017    3. Mr. Lina Fish will perform toileting/toilet transfer with contact guard assistance to demonstrate improved functional mobility and safety. GOAL MET 11/16/2017    4. Mr. Lina Fish will perform shower transfer with contact guard assistance to demonstrate improved functional mobility and safety. GOAL MET 11/16/2017      JOINT CAMP OCCUPATIONAL THERAPY TKA: Daily Note and AM 11/16/2017  INPATIENT: Hospital Day: 2  [place] / [place]: [place] / Product Type: Medicare /      NAME/AGE/GENDER: Trudy Lau is a 76 y.o. male   PRIMARY DIAGNOSIS:  Primary osteoarthritis of left knee [M17.12]   Procedure(s) and Anesthesia Type:     * KNEE ARTHROPLASTY TOTAL/ LEFT/ MADI/ FNB - Spinal (Left)  ICD-10: Treatment Diagnosis:    · Pain in Left Knee (M25.562)  · Stiffness of Left Knee, Not elsewhere classified (D88.256)  · Other lack of cordination (R27.8)      ASSESSMENT:      Mr. Lina Fish is s/p left TKA and presents with decreased weight bearing on left LE and decreased independence with functional mobility and activities of daily living. Patient completed shower and dressing as charter below in ADL grid and is ambulating with rolling walker and Stand by assist.  Patient has met 4/4 goals and plans to return home with good family support. Family able to provide patient with appropriate level of assistance at this time.   OT reviewed safety precautions throughout session and therapy schedule for the remainder of today. Patient instructed to call for assistance when needing to get up from recliner and all needs in reach. Patient verbalized understanding of call light. This section established at most recent assessment   PROBLEM LIST (Impairments causing functional limitations):  1. Decreased Strength  2. Decreased ADL/Functional Activities  3. Decreased Transfer Abilities  4. Increased Pain  5. Increased Fatigue  6. Decreased Flexibility/Joint Mobility  7. Decreased Knowledge of Precautions   INTERVENTIONS PLANNED: (Benefits and precautions of occupational therapy have been discussed with the patient.)  1. Activities of daily living training  2. Adaptive equipment training  3. Balance training  4. Clothing management  5. Donning&doffing training  6. Theraputic activity     TREATMENT PLAN: Frequency/Duration: Follow patient 1 time to address above goals. Rehabilitation Potential For Stated Goals: Excellent     RECOMMENDED REHABILITATION/EQUIPMENT: (at time of discharge pending progress): Continue Skilled Therapy and Home Health: Physical Therapy. OCCUPATIONAL PROFILE AND HISTORY:   History of Present Injury/Illness (Reason for Referral): Pt presents this date s/p (left) TKA. Past Medical History/Comorbidities:   Mr. 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 He also has no past medical history of Adverse effect of anesthesia;  Aneurysm ([address] Asthma; Autoimmune disease ([facility] [address] Cancer ([facility] [address] Chronic obstructive pulmonary disease ([facility] [address] Diabetes ([facility] [address] Difficult intubation; Heart failure ([facility] [address] Liver disease; Malignant hyperthermia due to anesthesia; Nausea & vomiting; Pseudocholinesterase deficiency; Psychiatric disorder; PUD (peptic ulcer disease); Seizures ([facility] [address] Stroke [facility]); Thromboembolus ([facility] [address] Thyroid disease; Unspecified adverse effect of anesthesia; or Unspecified sleep apnea. Mr. Kassandra Hopper  has a past surgical history that includes bunionectomy (Bilateral); vascular surgery procedure unlist; carpal tunnel release (Bilateral); cataract removal (Bilateral); coronary artery bypass graft (10/14/2014); heart catheterization; colonoscopy; and knee replacement (Right, 2017). Social History/Living Environment:   Home Environment: Private residence  # Steps to Enter: 2  One/Two Story Residence: Two story, live on 1st floor  # of Interior Steps: 14  Lift Chair Available: No  Living Alone: No  Support Systems: Spouse/Significant Other/Partner  Patient Expects to be Discharged to[de-identified] Private residence  Current DME Used/Available at Home: Shower chair, Walker, rolling  Tub or Shower Type: Shower  Prior Level of Function/Work/Activity:  Modified indep with BADLs and IADLs     Number of Personal Factors/Comorbidities that affect the Plan of Care: Brief history (0):  LOW COMPLEXITY   ASSESSMENT OF OCCUPATIONAL PERFORMANCE[de-identified]   Most Recent Physical Functioning:           Patient Vitals for the past 6 hrs:   BP BP Patient Position SpO2 O2 Flow Rate (L/min) Pulse   11/16/17 0719 145/70 At rest 93 % - 80   11/16/17 0917 - - 94 % 0 l/min -                              Mental Status  Neurologic State: Alert; Appropriate for age  Orientation Level: Appropriate for age  Cognition: Appropriate decision making; Appropriate for age attention/concentration; Appropriate safety awareness; Follows commands  Perception: Appears intact  Perseveration: No perseveration noted  Safety/Judgement: Awareness of environment; Fall prevention                Basic ADLs (From Assessment) Complex ADLs (From Assessment)   Basic ADL  Feeding: Supervision  Oral Facial Hygiene/Grooming: Supervision  Bathing: Minimum assistance  Upper Body Dressing: Minimum assistance  Lower Body Dressing: Moderate assistance  Toileting: Total assistance     Grooming/Bathing/Dressing Activities of Daily Living   Grooming  Grooming Assistance: Supervision/set up  Washing Face: Supervision/set-up  Washing Hands: Supervision/set-up  Brushing Teeth: Supervision/set-up; Stand-by assistance Cognitive Retraining  Safety/Judgement: Awareness of environment; Fall prevention   Upper Body Bathing  Bathing Assistance: Supervision/set-up  Position Performed: Standing     Lower Body Bathing  Bathing Assistance: Stand-by assistance  Perineal  : Stand-by assistance  Position Performed: Standing  Lower Body : Stand-by assistance  Position Performed: Seated in chair;Standing  Adaptive Equipment: Grab bar; Shower chair     Upper Body Dressing Assistance  Dressing Assistance: Supervision/set-up  Pullover Shirt: Supervision/set-up Functional Transfers  Bathroom Mobility: Stand-by assistance  Toilet Transfer : Stand-by asssistance  Shower Transfer: Stand-by asssistance   Lower Body Dressing Assistance  Dressing Assistance: Minimum assistance  Underpants: Stand-by assistance  Pants With Elastic Waist: Stand-by assistance  Socks: Moderate assistance  Antiembolitic Stockings: Maximum assistance           Physical Skills Involved:  1. Range of Motion  2. Balance  3. Strength Cognitive Skills Affected (resulting in the inability to perform in a timely and safe manner):  1. None Psychosocial Skills Affected:  1.  None   Number of elements that affect the Plan of Care: 1-3:  LOW COMPLEXITY   CLINICAL DECISION MAKIN [facility] [address] [address] AM-PAC 6 Clicks   Daily Activity Inpatient Short Form  How much help from another person does the patient currently need. .. Total A Lot A Little None   1. Putting on and taking off regular lower body clothing? [] 1   [x] 2   [] 3   [] 4   2. Bathing (including washing, rinsing, drying)? [] 1   [x] 2   [] 3   [] 4   3. Toileting, which includes using toilet, bedpan or urinal?   [] 1   [x] 2   [] 3   [] 4   4. Putting on and taking off regular upper body clothing? [] 1   [] 2   [x] 3   [] 4   5. Taking care of personal grooming such as brushing teeth? [] 1   [] 2   [x] 3   [] 4   6. Eating meals? [] 1   [] 2   [x] 3   [] 4   © 2007, Trustees of [address] [place] [address] [address] [address], under license to [employer]. All rights reserved     Score:  Initial: 15 Most Recent: X (Date: -- )    Interpretation of Tool:  Represents activities that are increasingly more difficult (i.e. Bed mobility, Transfers, Gait). Score 24 23 22-20 19-15 14-10 9-7 6     Modifier CH CI CJ CK CL CM CN      ? Self Care:     - CURRENT STATUS: CK - 40%-59% impaired, limited or restricted    - GOAL STATUS: CJ - 20%-39% impaired, limited or restricted    - D/C STATUS:  ---------------To be determined---------------  [place] / [place] / Product Type: Medicare /      Medical Necessity:     · Patient is expected to demonstrate progress in balance, coordination and functional technique to decrease assistance required with self care and functional mobility. Reason for Services/Other Comments:  · Patient continues to require skilled intervention due to decreased self care and functional mobility.    Use of outcome tool(s) and clinical judgement create a POC that gives a: LOW COMPLEXITY            TREATMENT:   (In addition to Assessment/Re-Assessment sessions the following treatments were rendered)     Pre-treatment Symptoms/Complaints:  none  Pain: Initial:   Pain Intensity 1: 0  Pain Location 1: Knee  Pain Orientation 1: Left  Post Session:  0/10 iceman in place     Self Care: (30): Procedure(s) (per grid) utilized to improve and/or restore self-care/home management as related to dressing, bathing, toileting and grooming. Required minimal visual and verbal cueing to facilitate activities of daily living skills and compensatory activities. Treatment/Session Assessment:     Response to Treatment:  Tolerated well, plans to go home tomorrow    Education:  [] Home Exercises  [x] Fall Precautions  [] Hip Precautions [] Going Home Video  [x] Knee/Hip Prosthesis Review  [x] Walker Management/Safety [x] Adaptive Equipment as Needed       Interdisciplinary Collaboration:   o Occupational Therapist  o Registered Nurse    After treatment position/precautions:   o Up in chair  o Bed/Chair-wheels locked  o Bed in low position  o Call light within reach  o RN notified     Compliance with Program/Exercises: compliant all of the time. Recommendations/Intent for next treatment session:  Treatment next visit will focus on increasing Mr. Biggss independence with bed mobility, transfers, self care, functional mobility, modalities for pain, and patient education.       Total Treatment Duration:30 minutes  OT Patient Time In/Time Out  Time In: 8312  Time Out: Roque Juarez OT

## 2017-11-16 NOTE — PROGRESS NOTES
Got up with therapist assisting. Resting in recliner with iceman to knee. Medicated for pain with dilaudid po. TEDs on. Good movement and sensation to LEs.

## 2017-11-16 NOTE — PROGRESS NOTES
Resting comfortably,dsng D/I. NV status WDL. SCDs on bilaterally. Iceman cooling system in use. Call light within reach. Denies needs at present.

## 2017-11-16 NOTE — PROGRESS NOTES
2017         Post Op day: 1 Day Post-OpProcedure(s) (LRB):  KNEE ARTHROPLASTY TOTAL/ LEFT/ MADI/ FNB (Left)      Admit Date: 11/15/2017  Admit Diagnosis: Primary osteoarthritis of left knee [M17.12]    LAB:    Recent Results (from the past 24 hour(s))   GLUCOSE, POC    Collection Time: 11/15/17  3:52 PM   Result Value Ref Range    Glucose (POC) 168 (H) 65 - 100 mg/dL   HEMOGLOBIN    Collection Time: 11/15/17  7:22 PM   Result Value Ref Range    HGB 16.3 13.6 - 17.2 g/dL   GLUCOSE, POC    Collection Time: 11/15/17  8:11 PM   Result Value Ref Range    Glucose (POC) 169 (H) 65 - 100 mg/dL   HEMOGLOBIN    Collection Time: 17  4:21 AM   Result Value Ref Range    HGB 13.9 13.6 - 50.5 g/dL   METABOLIC PANEL, BASIC    Collection Time: 17  4:21 AM   Result Value Ref Range    Sodium 139 136 - 145 mmol/L    Potassium 4.2 3.5 - 5.1 mmol/L    Chloride 104 98 - 107 mmol/L    CO2 28 21 - 32 mmol/L    Anion gap 7 7 - 16 mmol/L    Glucose 164 (H) 65 - 100 mg/dL    BUN 26 (H) 8 - 23 MG/DL    Creatinine 1.45 0.8 - 1.5 MG/DL    GFR est AA >60 >60 ml/min/1.73m2    GFR est non-AA 50 (L) >60 ml/min/1.73m2    Calcium 8.2 (L) 8.3 - 10.4 MG/DL     Vital Signs:    Patient Vitals for the past 8 hrs:   BP Temp Pulse Resp SpO2   17 0719 145/70 98.3 °F (36.8 °C) 80 16 93 %   17 0347 137/85 98.1 °F (36.7 °C) 94 17 95 %     Temp (24hrs), Av.9 °F (36.6 °C), Min:97.2 °F (36.2 °C), Max:98.6 °F (37 °C)    Body mass index is 37.52 kg/(m^2).   Pain Control:   Pain Assessment  Pain Scale 1: FLACC  Pain Intensity 1: 0  Pain Onset 1: at rest  Pain Location 1: Knee  Pain Orientation 1: Left  Pain Description 1: Aching  Pain Intervention(s) 1: Medication (see MAR)    Subjective: Doing well, No complaints, No SOB, No Chest Pain, No Nausea or Vomitting     Objective: Vital Signs are Stable, No Acute Distress, Alert and Oriented, Dressing is Dry,  Neurovascular exam is normal.       PT/OT:            Assistive Device: Walker (comment)        LLE AROM  L Knee Flexion: 60  L Knee Extension: 15       Weight Bearing Status: WBAT    Meds:  [unfilled]  [unfilled]  [unfilled]    Assessment:   Patient Active Problem List   Diagnosis Code    Carotid artery stenosis without cerebral infarction I65.29    Coronary atherosclerosis of native coronary vessel I25.10    S/P CABG (coronary artery bypass graft) Z95.1    H/O aortic valve replacement Z95.2    CKD (chronic kidney disease) stage 3, GFR 30-59 ml/min N18.3    Elevated hemoglobin A1c R73.09    Arthritis M19.90    Chronic pain G89.29    Atrial fibrillation (HCC) I48.91    Dyslipidemia E78.5    Benign hypertensive heart disease without CHF I11.9    Abnormal EKG R94.31    Snoring R06.83    Witnessed episode of apnea R06.81    Status post total right knee replacement Z96.651    Preop cardiovascular exam Z01.810    Status post total left knee replacement Z96.652             Plan: Continue Physical Therapy, Monitor  Hbg, home tomorrow.         Signed By: Scot Arias MD

## 2017-11-16 NOTE — PROGRESS NOTES
Sitting in recliner eating. Medicated for pain with dilaudid po. Iceman to knee. NV checks remain WDL. No further changes. Wife in room.

## 2017-11-16 NOTE — PROGRESS NOTES
Problem: Mobility Impaired (Adult and Pediatric)  Goal: *Acute Goals and Plan of Care (Insert Text)  GOALS (1-4 days):  (1.)Mr. Desir will move from supine to sit and sit to supine  in bed with STAND BY ASSIST.  (2.)Mr. Desir will transfer from bed to chair and chair to bed with STAND BY ASSIST using the least restrictive device. (3.)Mr. Desir will ambulate with STAND BY ASSIST for 200 feet with the least restrictive device. (4.)Mr. Desir will ambulate up/down 5 steps with left railing with CONTACT GUARD ASSIST with device as needed. (5.)Mr. Desir will increase left knee ROM to 5°-80°.   ________________________________________________________________________________________________      PHYSICAL THERAPY [facility] camp tKa: Daily Note, PM 11/16/2017  INPATIENT: Hospital Day: 2  [place] / Product Type: Medicare /      NAME/AGE/GENDER: Braden Meyer is a 76 y.o. male   PRIMARY DIAGNOSIS:  Primary osteoarthritis of left knee [M17.12]   Procedure(s) and Anesthesia Type:     * KNEE ARTHROPLASTY TOTAL/ LEFT/ MADI/ FNB - Spinal (Left)  ICD-10: Treatment Diagnosis:    · Pain in Left Knee (M25.562)  · Stiffness of Left Knee, Not elsewhere classified (M25.662)  · Difficulty in walking, Not elsewhere classified (R26.2)      ASSESSMENT:     Mr. Adriana Zhang presents with limited rom and strength of left LE as well as decreased functional mobility and gait s/p left tka. He plans to go home with HHPT. Pt. Continues to do well with therapy. He increased gait distance with walker and did tka exercises well with cues. Good rom. No questions. Plans to go home tomorrow. This section established at most recent assessment   PROBLEM LIST (Impairments causing functional limitations):  1. Decreased Strength  2. Decreased ADL/Functional Activities  3. Decreased Transfer Abilities  4. Decreased Ambulation Ability/Technique  5. Decreased Balance  6.  Increased Pain  7. Decreased Activity Tolerance  8. Decreased [place] with Home Exercise Program   INTERVENTIONS PLANNED: (Benefits and precautions of physical therapy have been discussed with the patient.)  1. Bed Mobility  2. Gait Training  3. Home Exercise Program (HEP)  4. Therapeutic Exercise/Strengthening  5. Transfer Training  6. Range of Motion: active/assisted/passive  7. Therapeutic Activities  8. Group Therapy     TREATMENT PLAN: Frequency/Duration: Follow patient BID   to address above goals. Rehabilitation Potential For Stated Goals: Good     RECOMMENDED REHABILITATION/EQUIPMENT: (at time of discharge pending progress): Continue Skilled Therapy and Home Health: Physical Therapy. HISTORY:   History of Present Injury/Illness (Reason for Referral):  S/p left tka  Past Medical History/Comorbidities:   Mr. 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 He also has no past medical history of Adverse effect of anesthesia; Aneurysm ([address] Asthma; Autoimmune disease ([address] Cancer ([address] Chronic obstructive pulmonary disease ([address] Diabetes ([address] Difficult intubation; Heart failure ([address] Liver disease; Malignant hyperthermia due to anesthesia; Nausea & vomiting; Pseudocholinesterase deficiency; Psychiatric disorder; PUD (peptic ulcer disease); Seizures ([address] Stroke [facility]); Thromboembolus ([address] Thyroid disease; Unspecified adverse effect of anesthesia; or Unspecified sleep apnea.    Karlee  has a past surgical history that includes bunionectomy (Bilateral); vascular surgery procedure unlist; carpal tunnel release (Bilateral); cataract removal (Bilateral); coronary artery bypass graft (10/14/2014); heart catheterization; colonoscopy; and knee replacement (Right, 2017). Social History/Living Environment:   Home Environment: Private residence  # Steps to Enter: 2  One/Two Story Residence: Two story, live on 1st floor  # of Interior Steps: 14  Lift Chair Available: No  Living Alone: No  Support Systems: Spouse/Significant Other/Partner  Patient Expects to be Discharged to[de-identified] Private residence  Current DME Used/Available at Home: Shower chair, Walker, rolling  Tub or Shower Type: Shower  Prior Level of Function/Work/Activity:  independent   Number of Personal Factors/Comorbidities that affect the Plan of Care: 1-2: MODERATE COMPLEXITY   EXAMINATION:   Most Recent Physical Functioning:                 LLE AROM  L Knee Flexion: 91  L Knee Extension: 5     LLE Strength  L Knee Flexion: 3  L Knee Extension: 3    Bed Mobility  Supine to Sit: Minimum assistance    Transfers  Sit to Stand: Contact guard assistance  Stand to Sit: Contact guard assistance  Bed to Chair: Contact guard assistance    Balance  Sitting: Intact  Standing: With support              Weight Bearing Status  Left Side Weight Bearing: As tolerated  Distance (ft): 200 Feet (ft)  Ambulation - Level of Assistance: Contact guard assistance  Assistive Device: Walker, rolling  Speed/Dinorah: Delayed  Step Length: Left shortened;Right shortened  Stance: Left decreased  Gait Abnormalities: Antalgic  Interventions: Safety awareness training;Verbal cues     Braces/Orthotics:     Left Knee Cold  Type: Cryocuff      Body Structures Involved:  1. Bones  2. Joints  3. Muscles  4. Ligaments Body Functions Affected:  1. Movement Related Activities and Participation Affected:  1.  Mobility   Number of elements that affect the Plan of Care: 3: MODERATE COMPLEXITY   CLINICAL PRESENTATION:   Presentation: Stable and uncomplicated: LOW COMPLEXITY   CLINICAL DECISION MAKIN[address] [place] [address] [address] [id number] AM-PAC 6 Clicks   Basic Mobility Inpatient Short Form  How much difficulty does the patient currently have. .. Unable A Lot A Little None   1. Turning over in bed (including adjusting bedclothes, sheets and blankets)? [] 1   [] 2   [x] 3   [] 4   2. Sitting down on and standing up from a chair with arms ( e.g., wheelchair, bedside commode, etc.)   [] 1   [] 2   [x] 3   [] 4   3. Moving from lying on back to sitting on the side of the bed? [] 1   [] 2   [x] 3   [] 4   How much help from another person does the patient currently need. .. Total A Lot A Little None   4. Moving to and from a bed to a chair (including a wheelchair)? [] 1   [] 2   [x] 3   [] 4   5. Need to walk in hospital room? [] 1   [] 2   [x] 3   [] 4   6. Climbing 3-5 steps with a railing? [] 1   [x] 2   [] 3   [] 4   © 2007, Trustees of [address] [place] [address] [address] [id number], under license to [employer]. All rights reserved        Score:  Initial: 17 Most Recent: X (Date: -- )    Interpretation of Tool:  Represents activities that are increasingly more difficult (i.e. Bed mobility, Transfers, Gait). Score 24 23 22-20 19-15 14-10 9-7 6     Modifier CH CI CJ CK CL CM CN      ? Mobility - Walking and Moving Around:     - CURRENT STATUS: CK - 40%-59% impaired, limited or restricted    - GOAL STATUS: CJ - 20%-39% impaired, limited or restricted    - D/C STATUS:  ---------------To be determined---------------  [place] / Product Type: Medicare /      Medical Necessity:     · Patient is expected to demonstrate progress in strength, range of motion and balance to decrease assistance required with theraputic exercises and functional mobility.   Reason for Services/Other Comments:  · Patient continues to require present interventions due to patient's inability to perform theraputic exercises and functional mobility independently. Use of outcome tool(s) and clinical judgement create a POC that gives a: Clear prediction of patient's progress: LOW COMPLEXITY            TREATMENT:   (In addition to Assessment/Re-Assessment sessions the following treatments were rendered)     Pre-treatment Symptoms/Complaints:  Knee pain  Pain: Initial: 3     Post Session:  Still rates a 3     Gait Training (15 Minutes):  Gait training to improve and/or restore physical functioning as related to mobility, strength and balance. Ambulated 200 Feet (ft) with Contact guard assistance using a Walker, rolling and minimal Safety awareness training;Verbal cues related to their stance phase to promote proper body alignment, promote proper body posture and promote proper body mechanics. Therapeutic Exercise: (45 Minutes):  Exercises per grid below to improve mobility and strength. Required minimal visual, verbal and manual cues to promote proper body alignment, promote proper body posture and promote proper body mechanics. Progressed range and repetitions as indicated. Date:  11/16 Date:   Date:     ACTIVITY/EXERCISE AM PM AM PM AM PM   GROUP THERAPY  []  [x]  []  []  []  []   Ankle Pumps 15a 15a       Quad Sets 10a 15a       Gluteal Sets 10a 15a       Hip ABd/ADduction 10a 15a       Straight Leg Raises 10a 15a       Knee Slides 10aa 15a       Short Arc Quads  15a       Long Arc Quads         Chair Slides  15a                B = bilateral; AA = active assistive; A = active; P = passive      Treatment/Session Assessment:     Response to Treatment:  Pt. Progressing well.     Education:  [x] Home Exercises  [x] Fall Precautions  [] Hip Precautions [x] D/C Instruction Review  [x] Knee/Hip Prosthesis Review  [x] Walker Management/Safety [] Adaptive Equipment as Needed       Interdisciplinary Collaboration:   o Physical Therapist  o Rehabilitation Attendant    After treatment position/precautions: o Up in chair  o Bed/Chair-wheels locked  o Bed in low position  o Caregiver at bedside  o Call light within reach  o Family at bedside    Compliance with Program/Exercises: Will assess as treatment progresses. No questions. Recommendations/Intent for next treatment session:  Treatment next visit will focus on increasing Mr. Desir's independence with bed mobility, transfers, gait training, strength/ROM exercises, modalities for pain, and patient education.       Total Treatment Duration:  PT Patient Time In/Time Out  Time In: 1300  Time Out: [address], PT

## 2017-11-16 NOTE — PROGRESS NOTES
Had shower with OT assisting. Dressed and in recliner. Medicated for pain with dilaudid po. Family in room.

## 2017-11-16 NOTE — PROGRESS NOTES
Problem: Mobility Impaired (Adult and Pediatric)  Goal: *Acute Goals and Plan of Care (Insert Text)  GOALS (1-4 days):  (1.)Mr. Desir will move from supine to sit and sit to supine  in bed with STAND BY ASSIST.  (2.)Mr. Desir will transfer from bed to chair and chair to bed with STAND BY ASSIST using the least restrictive device. (3.)Mr. Desir will ambulate with STAND BY ASSIST for 200 feet with the least restrictive device. (4.)Mr. Desir will ambulate up/down 5 steps with left railing with CONTACT GUARD ASSIST with device as needed. (5.)Mr. Desir will increase left knee ROM to 5°-80°.   ________________________________________________________________________________________________      PHYSICAL THERAPY Joint camp tKa: Daily Note, AM 11/16/2017  INPATIENT: Hospital Day: 2  [place] / Product Type: Medicare /      NAME/AGE/GENDER: Jaime Cr is a 76 y.o. male   PRIMARY DIAGNOSIS:  Primary osteoarthritis of left knee [M17.12]   Procedure(s) and Anesthesia Type:     * KNEE ARTHROPLASTY TOTAL/ LEFT/ MADI/ FNB - Spinal (Left)  ICD-10: Treatment Diagnosis:    · Pain in Left Knee (M25.562)  · Stiffness of Left Knee, Not elsewhere classified (M25.662)  · Difficulty in walking, Not elsewhere classified (R26.2)      ASSESSMENT:     Mr. Patti Sequeira presents with limited rom and strength of left LE as well as decreased functional mobility and gait s/p left tka. He plans to go home with HHPT. Pt. Doing well this am.  He took some steps with walker this am and did tka exercises with cues and assistance. No questions. This section established at most recent assessment   PROBLEM LIST (Impairments causing functional limitations):  1. Decreased Strength  2. Decreased ADL/Functional Activities  3. Decreased Transfer Abilities  4. Decreased Ambulation Ability/Technique  5. Decreased Balance  6. Increased Pain  7.  Decreased Activity Tolerance  8. Decreased [place] with Home Exercise Program   INTERVENTIONS PLANNED: (Benefits and precautions of physical therapy have been discussed with the patient.)  1. Bed Mobility  2. Gait Training  3. Home Exercise Program (HEP)  4. Therapeutic Exercise/Strengthening  5. Transfer Training  6. Range of Motion: active/assisted/passive  7. Therapeutic Activities  8. Group Therapy     TREATMENT PLAN: Frequency/Duration: Follow patient BID   to address above goals. Rehabilitation Potential For Stated Goals: Good     RECOMMENDED REHABILITATION/EQUIPMENT: (at time of discharge pending progress): Continue Skilled Therapy and Home Health: Physical Therapy. HISTORY:   History of Present Injury/Illness (Reason for Referral):  S/p left tka  Past Medical History/Comorbidities:   Mr. 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 He also has no past medical history of Adverse effect of anesthesia; Aneurysm ([address] Asthma; Autoimmune disease ([address] Cancer ([address] Chronic obstructive pulmonary disease ([address] Diabetes ([address] Difficult intubation; Heart failure ([address] Liver disease; Malignant hyperthermia due to anesthesia; Nausea & vomiting; Pseudocholinesterase deficiency; Psychiatric disorder; PUD (peptic ulcer disease); Seizures ([address] Stroke [facility]); Thromboembolus ([address] Thyroid disease; Unspecified adverse effect of anesthesia; or Unspecified sleep apnea.   Mr. Mila Ferguson  has a past surgical history that includes bunionectomy (Bilateral); vascular surgery procedure unlist; carpal tunnel release (Bilateral); cataract removal (Bilateral); coronary artery bypass graft (10/14/2014); heart catheterization; colonoscopy; and knee replacement (Right, 2017). Social History/Living Environment:   Home Environment: Private residence  # Steps to Enter: 2  One/Two Story Residence: Two story, live on 1st floor  # of Interior Steps: 14  Lift Chair Available: No  Living Alone: No  Support Systems: Spouse/Significant Other/Partner  Patient Expects to be Discharged to[de-identified] Private residence  Current DME Used/Available at Home: Shower chair, Walker, rolling  Tub or Shower Type: Shower  Prior Level of Function/Work/Activity:  independent   Number of Personal Factors/Comorbidities that affect the Plan of Care: 1-2: MODERATE COMPLEXITY   EXAMINATION:   Most Recent Physical Functioning:                 LLE AROM  L Knee Flexion: 60  L Knee Extension: 15     LLE Strength  L Knee Flexion: 3  L Knee Extension: 3    Bed Mobility  Supine to Sit: Minimum assistance    Transfers  Sit to Stand: Minimum assistance  Stand to Sit: Contact guard assistance  Bed to Chair: Minimum assistance    Balance  Sitting: Intact  Standing: With support;Pull to stand              Weight Bearing Status  Left Side Weight Bearing: As tolerated  Distance (ft): 4 Feet (ft)  Ambulation - Level of Assistance: Contact guard assistance  Assistive Device: Walker, rolling  Speed/Dinorah: Delayed  Step Length: Left shortened;Right shortened  Stance: Left decreased  Gait Abnormalities: Antalgic;Decreased step clearance  Interventions: Safety awareness training;Verbal cues     Braces/Orthotics:     Left Knee Cold  Type: Cryocuff      Body Structures Involved:  1. Bones  2. Joints  3. Muscles  4. Ligaments Body Functions Affected:  1. Movement Related Activities and Participation Affected:  1.  Mobility   Number of elements that affect the Plan of Care: 3: MODERATE perform theraputic exercises and functional mobility independently. Use of outcome tool(s) and clinical judgement create a POC that gives a: Clear prediction of patient's progress: LOW COMPLEXITY            TREATMENT:   (In addition to Assessment/Re-Assessment sessions the following treatments were rendered)     Pre-treatment Symptoms/Complaints:  Knee pain  Pain: Initial: 3     Post Session:  3     Gait Training (15 Minutes):  Gait training to improve and/or restore physical functioning as related to mobility, strength and balance. Ambulated 4 Feet (ft) with Contact guard assistance using a Walker, rolling and minimal Safety awareness training;Verbal cues related to their stance phase to promote proper body alignment, promote proper body posture and promote proper body mechanics. Therapeutic Exercise: (15 Minutes):  Exercises per grid below to improve mobility and strength. Required minimal visual, verbal and manual cues to promote proper body alignment, promote proper body posture and promote proper body mechanics. Progressed range and repetitions as indicated. Date:  11/16 Date:   Date:     ACTIVITY/EXERCISE AM PM AM PM AM PM   GROUP THERAPY  []  []  []  []  []  []   Ankle Pumps 15a        Quad Sets 10a        Gluteal Sets 10a        Hip ABd/ADduction 10a        Straight Leg Raises 10a        Knee Slides 10aa        Short Arc Quads         Long Arc Quads         Chair Slides                  B = bilateral; AA = active assistive; A = active; P = passive      Treatment/Session Assessment:     Response to Treatment:  Pt. Doing well, did not want to walk further until got dressed.     Education:  [x] Home Exercises  [x] Fall Precautions  [] Hip Precautions [] D/C Instruction Review  [x] Knee/Hip Prosthesis Review  [x] Walker Management/Safety [] Adaptive Equipment as Needed       Interdisciplinary Collaboration:   o Registered Nurse    After treatment position/precautions:   o Up in chair  o Bed/Chair-wheels locked  o Bed in low position  o Call light within reach    Compliance with Program/Exercises: Will assess as treatment progresses. No questions. Recommendations/Intent for next treatment session:  Treatment next visit will focus on increasing Mr. Desir's independence with bed mobility, transfers, gait training, strength/ROM exercises, modalities for pain, and patient education.       Total Treatment Duration:  PT Patient Time In/Time Out  Time In: 0800  Time Out: [address], PT

## 2017-11-17 VITALS
HEART RATE: 79 BPM | RESPIRATION RATE: 16 BRPM | HEIGHT: 71 IN | TEMPERATURE: 98.7 F | BODY MASS INDEX: 37.66 KG/M2 | SYSTOLIC BLOOD PRESSURE: 143 MMHG | WEIGHT: 269 LBS | DIASTOLIC BLOOD PRESSURE: 76 MMHG | OXYGEN SATURATION: 93 %

## 2017-11-17 LAB
GLUCOSE BLD STRIP.AUTO-MCNC: 106 MG/DL (ref 65–100)
HGB BLD-MCNC: 13.7 G/DL (ref 13.6–17.2)
MM INDURATION POC: 0 MM (ref 0–5)
PPD POC: NORMAL NEGATIVE

## 2017-11-17 PROCEDURE — 85018 HEMOGLOBIN: CPT | Performed by: PHYSICIAN ASSISTANT

## 2017-11-17 PROCEDURE — 97116 GAIT TRAINING THERAPY: CPT

## 2017-11-17 PROCEDURE — 74011250637 HC RX REV CODE- 250/637: Performed by: PHYSICIAN ASSISTANT

## 2017-11-17 PROCEDURE — 82962 GLUCOSE BLOOD TEST: CPT

## 2017-11-17 PROCEDURE — 74011250637 HC RX REV CODE- 250/637: Performed by: INTERNAL MEDICINE

## 2017-11-17 PROCEDURE — 97150 GROUP THERAPEUTIC PROCEDURES: CPT

## 2017-11-17 RX ADMIN — METOPROLOL SUCCINATE 25 MG: 25 TABLET, EXTENDED RELEASE ORAL at 09:37

## 2017-11-17 RX ADMIN — SENNOSIDES AND DOCUSATE SODIUM 2 TABLET: 8.6; 5 TABLET ORAL at 09:37

## 2017-11-17 RX ADMIN — HYDROCHLOROTHIAZIDE: 12.5 CAPSULE ORAL at 09:37

## 2017-11-17 RX ADMIN — ACETAMINOPHEN 1000 MG: 500 TABLET, FILM COATED ORAL at 00:00

## 2017-11-17 RX ADMIN — ACETAMINOPHEN 1000 MG: 500 TABLET, FILM COATED ORAL at 06:00

## 2017-11-17 RX ADMIN — HYDROMORPHONE HYDROCHLORIDE 2 MG: 2 TABLET ORAL at 09:37

## 2017-11-17 RX ADMIN — ASPIRIN 325 MG: 325 TABLET, DELAYED RELEASE ORAL at 09:38

## 2017-11-17 RX ADMIN — HYDROMORPHONE HYDROCHLORIDE 2 MG: 2 TABLET ORAL at 05:03

## 2017-11-17 RX ADMIN — FAMOTIDINE 20 MG: 20 TABLET, FILM COATED ORAL at 09:38

## 2017-11-17 NOTE — PROGRESS NOTES
Problem: Mobility Impaired (Adult and Pediatric)  Goal: *Acute Goals and Plan of Care (Insert Text)  GOALS (1-4 days):  (1.)Mr. Desir will move from supine to sit and sit to supine  in bed with STAND BY ASSIST.  (2.)Mr. Desir will transfer from bed to chair and chair to bed with STAND BY ASSIST using the least restrictive device. goal met  (3.)Mr. Desir will ambulate with STAND BY ASSIST for 200 feet with the least restrictive device. goal met  (4.)Mr. Desir will ambulate up/down 5 steps with left railing with CONTACT GUARD ASSIST with device as needed. (5.)Mr. Desir will increase left knee ROM to 5°-80°.   ________________________________________________________________________________________________      PHYSICAL THERAPY [facility]: Daily Note, AM 11/17/2017  INPATIENT: Hospital Day: 3  [place] / [place] / Product Type: Medicare /      NAME/AGE/GENDER: Mojgan Klein is a 76 y.o. male   PRIMARY DIAGNOSIS:  Primary osteoarthritis of left knee [M17.12]   Procedure(s) and Anesthesia Type:     * KNEE ARTHROPLASTY TOTAL/ LEFT/ MADI/ FNB - Spinal (Left)  ICD-10: Treatment Diagnosis:    · Pain in Left Knee (M25.562)  · Stiffness of Left Knee, Not elsewhere classified (M25.662)  · Difficulty in walking, Not elsewhere classified (R26.2)      ASSESSMENT:     Mr. Beverley Jean presents with limited rom and strength of left LE as well as decreased functional mobility and gait s/p left tka. He plans to go home with HHPT. Pt. Reports increased pain today from block wearing off. He did well with gait with walker and did stairs without difficulty. He performed tka exercises with cues and assistance. He had a little more trouble with exercises today but did fine. No questions or concerns about going home. Wife present. This section established at most recent assessment   PROBLEM LIST (Impairments causing functional limitations):  1.  Decreased Strength  2. Decreased ADL/Functional Activities  3. Decreased Transfer Abilities  4. Decreased Ambulation Ability/Technique  5. Decreased Balance  6. Increased Pain  7. Decreased Activity Tolerance  8. Decreased [place] with Home Exercise Program   INTERVENTIONS PLANNED: (Benefits and precautions of physical therapy have been discussed with the patient.)  1. Bed Mobility  2. Gait Training  3. Home Exercise Program (HEP)  4. Therapeutic Exercise/Strengthening  5. Transfer Training  6. Range of Motion: active/assisted/passive  7. Therapeutic Activities  8. Group Therapy     TREATMENT PLAN: Frequency/Duration: Follow patient BID   to address above goals. Rehabilitation Potential For Stated Goals: Good     RECOMMENDED REHABILITATION/EQUIPMENT: (at time of discharge pending progress): Continue Skilled Therapy and Home Health: Physical Therapy. HISTORY:   History of Present Injury/Illness (Reason for Referral):  S/p left tka  Past Medical History/Comorbidities:   Mr. 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 He also has no past medical history of Adverse effect of anesthesia; Aneurysm ([address] Asthma; Autoimmune disease ([address] Cancer ([address] Chronic obstructive pulmonary disease ([address] Diabetes ([address] Difficult intubation; Heart failure ([address] Liver disease; Malignant hyperthermia due to anesthesia;  Nausea & vomiting; Pseudocholinesterase deficiency; Psychiatric disorder; PUD (peptic ulcer disease); Seizures ([facility] [address] Stroke [facility]); Thromboembolus ([facility] [facility] [address] Thyroid disease; Unspecified adverse effect of anesthesia; or Unspecified sleep apnea. Mr. Michael Keller  has a past surgical history that includes bunionectomy (Bilateral); vascular surgery procedure unlist; carpal tunnel release (Bilateral); cataract removal (Bilateral); coronary artery bypass graft (10/14/2014); heart catheterization; colonoscopy; and knee replacement (Right, 2017).   Social History/Living Environment:   Home Environment: Private residence  # Steps to Enter: 2  One/Two Story Residence: Two story, live on 1st floor  # of Interior Steps: 14  Lift Chair Available: No  Living Alone: No  Support Systems: Spouse/Significant Other/Partner  Patient Expects to be Discharged to[de-identified] Private residence  Current DME Used/Available at Home: Shower chair, Walker, rolling  Tub or Shower Type: Shower  Prior Level of Function/Work/Activity:  independent   Number of Personal Factors/Comorbidities that affect the Plan of Care: 1-2: MODERATE COMPLEXITY   EXAMINATION:   Most Recent Physical Functioning:                 LLE AROM  L Knee Flexion: 82  L Knee Extension: 7     LLE Strength  L Knee Flexion: 2  L Knee Extension: 2    Bed Mobility  Supine to Sit: Minimum assistance    Transfers  Sit to Stand: Stand-by asssistance  Stand to Sit: Stand-by asssistance    Balance  Sitting: Intact  Standing: With support              Weight Bearing Status  Left Side Weight Bearing: As tolerated  Distance (ft): 206 Feet (ft)  Ambulation - Level of Assistance: Stand-by asssistance  Assistive Device: Walker, rolling  Speed/Dinorah: Delayed  Step Length: Left shortened;Right shortened  Stance: Left decreased  Gait Abnormalities: Antalgic;Decreased step clearance  Number of Stairs Trained: 3  Stairs - Level of Assistance: Contact guard assistance  Rail Use: Left   Interventions: Safety awareness training;Verbal cues     Braces/Orthotics:     Left Knee Cold  Type: Cryocuff Body Structures Involved:  1. Bones  2. Joints  3. Muscles  4. Ligaments Body Functions Affected:  1. Movement Related Activities and Participation Affected:  1. Mobility   Number of elements that affect the Plan of Care: 3: MODERATE COMPLEXITY   CLINICAL PRESENTATION:   Presentation: Stable and uncomplicated: LOW COMPLEXITY   CLINICAL DECISION MAKIN[address] [place] [address] AM-PAC 6 Clicks   Basic Mobility Inpatient Short Form  How much difficulty does the patient currently have. .. Unable A Lot A Little None   1. Turning over in bed (including adjusting bedclothes, sheets and blankets)? [] 1   [] 2   [x] 3   [] 4   2. Sitting down on and standing up from a chair with arms ( e.g., wheelchair, bedside commode, etc.)   [] 1   [] 2   [x] 3   [] 4   3. Moving from lying on back to sitting on the side of the bed? [] 1   [] 2   [x] 3   [] 4   How much help from another person does the patient currently need. .. Total A Lot A Little None   4. Moving to and from a bed to a chair (including a wheelchair)? [] 1   [] 2   [x] 3   [] 4   5. Need to walk in hospital room? [] 1   [] 2   [x] 3   [] 4   6. Climbing 3-5 steps with a railing? [] 1   [x] 2   [] 3   [] 4   © , Trustees of [address] [place] [address], under license to [employer]. All rights reserved        Score:  Initial: 17 Most Recent: X (Date: -- )    Interpretation of Tool:  Represents activities that are increasingly more difficult (i.e. Bed mobility, Transfers, Gait). Score 24 23 22-20 19-15 14-10 9-7 6     Modifier CH CI CJ CK CL CM CN      ?  Mobility - Walking and Moving Around:     - CURRENT STATUS: CK - 40%-59% impaired, limited or restricted    - GOAL STATUS: CJ - 20%-39% impaired, limited or restricted    - D/C STATUS:  ---------------To be determined---------------  [place] / [place] / Product Type: Medicare /      Medical Necessity:     · Patient is expected to demonstrate progress in strength, range of motion and balance to decrease assistance required with theraputic exercises and functional mobility. Reason for Services/Other Comments:  · Patient continues to require present interventions due to patient's inability to perform theraputic exercises and functional mobility independently. Use of outcome tool(s) and clinical judgement create a POC that gives a: Clear prediction of patient's progress: LOW COMPLEXITY            TREATMENT:   (In addition to Assessment/Re-Assessment sessions the following treatments were rendered)     Pre-treatment Symptoms/Complaints:  Knee pain  Pain: Initial: 3     Post Session:  5 with gait     Gait Training (15 Minutes):  Gait training to improve and/or restore physical functioning as related to mobility, strength and balance. Ambulated 206 Feet (ft) with Stand-by asssistance using a Walker, rolling and minimal Safety awareness training;Verbal cues related to their stance phase to promote proper body alignment, promote proper body posture and promote proper body mechanics. Therapeutic Exercise: (45 Minutes):  Exercises per grid below to improve mobility and strength. Required minimal visual, verbal and manual cues to promote proper body alignment, promote proper body posture and promote proper body mechanics. Progressed range and repetitions as indicated. Date:  11/16 Date:  11/17 Date:     ACTIVITY/EXERCISE AM PM AM PM AM PM   GROUP THERAPY  []  [x]  [x]  []  []  []   Ankle Pumps 15a 15a 20a      Quad Sets 10a 15a 20a      Gluteal Sets 10a 15a 20a      Hip ABd/ADduction 10a 15a 20a      Straight Leg Raises 10a 15a 20aa      Knee Slides 10aa 15a 20aa      Short Arc Quads  15a 20aa      Long Arc Quads         Chair Slides  15a 20a               B = bilateral; AA = active assistive; A = active; P = passive      Treatment/Session Assessment:     Response to Treatment:  Pt. Has made good progress.     Education:  [x] Home Exercises  [x] Fall Precautions  [] Hip Precautions [x] D/C Instruction Review  [x] Knee/Hip Prosthesis Review  [x] Walker Management/Safety [] Adaptive Equipment as Needed       Interdisciplinary Collaboration:   o Physical Therapist  o Rehabilitation Attendant    After treatment position/precautions:   o Up in chair  o Bed/Chair-wheels locked  o Bed in low position  o Caregiver at bedside  o Call light within reach  o Family at bedside    Compliance with Program/Exercises: yes. No questions. Recommendations/Intent for next treatment session:  Treatment next visit will focus on increasing Mr. Biggss independence with bed mobility, transfers, gait training, strength/ROM exercises, modalities for pain, and patient education.       Total Treatment Duration:  PT Patient Time In/Time Out  Time In: 1015  Time Out: 2000 [facility],

## 2017-11-17 NOTE — PROGRESS NOTES
Medicated for pain with dilaudid po. Aquacel dry and intact to L knee with iceman. TEDs on. NV checks WDL. No further changes. Wife in room. HW DCd with catheter intact.

## 2017-11-17 NOTE — PROGRESS NOTES
2017         Post Op day: 2 Days Post-OpProcedure(s) (LRB):  KNEE ARTHROPLASTY TOTAL/ LEFT/ MADI/ FNB (Left)      Admit Date: 11/15/2017  Admit Diagnosis: Primary osteoarthritis of left knee [M17.12]    LAB:    Recent Results (from the past 24 hour(s))   PLEASE READ & DOCUMENT PPD TEST IN 24 HRS    Collection Time: 17  8:45 AM   Result Value Ref Range    PPD  Negative    mm Induration 0 mm   GLUCOSE, POC    Collection Time: 17  4:12 PM   Result Value Ref Range    Glucose (POC) 146 (H) 65 - 100 mg/dL   GLUCOSE, POC    Collection Time: 17  8:27 PM   Result Value Ref Range    Glucose (POC) 180 (H) 65 - 100 mg/dL   HEMOGLOBIN    Collection Time: 17  5:29 AM   Result Value Ref Range    HGB 13.7 13.6 - 17.2 g/dL   GLUCOSE, POC    Collection Time: 17  5:56 AM   Result Value Ref Range    Glucose (POC) 106 (H) 65 - 100 mg/dL     Vital Signs:    Patient Vitals for the past 8 hrs:   BP Temp Pulse Resp SpO2   17 0707 143/76 98.7 °F (37.1 °C) 79 16 93 %   17 0033 129/75 98.2 °F (36.8 °C) 66 17 94 %     Temp (24hrs), Av.4 °F (36.9 °C), Min:98.1 °F (36.7 °C), Max:98.7 °F (37.1 °C)    Body mass index is 37.52 kg/(m^2).   Pain Control:   Pain Assessment  Pain Scale 1: Numeric (0 - 10)  Pain Intensity 1: 2  Pain Onset 1: at rest  Pain Location 1: Knee  Pain Orientation 1: Left  Pain Description 1: Aching  Pain Intervention(s) 1: Medication (see MAR)    Subjective: Doing well, No complaints, No SOB, No Chest Pain, No Nausea or Vomitting     Objective: Vital Signs are Stable, No Acute Distress, Alert and Oriented, Dressing is Dry,  Neurovascular exam is normal.       PT/OT:            Assistive Device: Walker (comment)        LLE AROM  L Knee Flexion: 91  L Knee Extension: 5       Weight Bearing Status: WBAT    Meds:  [unfilled]  [unfilled]  [unfilled]    Assessment:   Patient Active Problem List   Diagnosis Code    Carotid artery stenosis without cerebral infarction I65.29  Coronary atherosclerosis of native coronary vessel I25.10    S/P CABG (coronary artery bypass graft) Z95.1    H/O aortic valve replacement Z95.2    CKD (chronic kidney disease) stage 3, GFR 30-59 ml/min N18.3    Elevated hemoglobin A1c R73.09    Arthritis M19.90    Chronic pain G89.29    Atrial fibrillation (HCC) I48.91    Dyslipidemia E78.5    Benign hypertensive heart disease without CHF I11.9    Abnormal EKG R94.31    Snoring R06.83    Witnessed episode of apnea R06.81    Status post total right knee replacement Z96.651    Preop cardiovascular exam Z01.810    Status post total left knee replacement Z96.652             Plan: Continue Physical Therapy, Monitor  Hbg, home today.         Signed By: Jak Jackman MD

## 2017-11-17 NOTE — DISCHARGE INSTRUCTIONS
[address] [facility]   Patient Discharge Instructions    Nitza Sullivan / [id number] : 1940    Admitted 11/15/2017 Discharged: 11/15/2017     IF YOU HAVE ANY PROBLEMS ONCE YOU ARE AT HOME CALL THE FOLLOWING NUMBERS:   Main office number: [phone]      Medications    · The medications you are to continue on are listed on the medication reconciliation sheet. · Narcotic pain medications as well as supplemental iron can cause constipation. If this occurs try stopping the narcotic pain medication and/or the iron. · It is important that you take the medication exactly as they are prescribed. · Medications which increase your risk of blood clots are listed to stop for 5 weeks after surgery as well as medications or supplements which increase your risk of bleeding complications. · Keep your medication in the bottles provided by the pharmacist and keep a list of the medication names, dosages, and times to be taken in your wallet. · Do not take other medications without consulting your doctor. Important Information    Do NOT smoke as this will greatly increase your risk of infection! Resume your prehospital diet. If you have excessive nausea or vomitting call your doctor's office     Leg swelling and warmth is normal for 6 months after surgery. If you experience swelling in your leg elevate you leg while laying down with your toes above your heart. If you have sudden onset severe swelling with leg pain call our office. Use Jer[place]o Hose stockings until we see you in the office for your follow up appointment. The stitches deep inside take approximately 6 months to dissolve. There will be sharp shooting, stinging and burning pain. This is normal and will resolve between 3-6 months after surgery. Difficulty sleeping is normal following total Knee and Hip replacement. You may try melatonin, an over-the-counter sleep aid or benadryl to help with sleep.  Most patients will resume sleeping through the night 8 weeks after surgery. Home Physical Therapy is arranged. Home Health will contact you within 48 hrs of discharge that you have chosen. If you have not received a call within this time frame please contact that provider you chose. You should be given this information before you leave the hospital.     You are at a risk for falls. Use the rolling walker when walking. Patients who have had a joint replacement should not drive if they are still taking narcotic pain mediation during the daytime hours. Most patients wean themselves off of pain medication within 2-5 weeks after surgery. When to Call the office    - If you have a temperature greater then 101  - Uncontrolled vomiting   - Loose control of your bladder or bowel function  - Are unable to bear any weight   - Need a pain medication refill       DISCHARGE SUMMARY from Nurse    The following personal items collected during your admission are returned to you:   Dental Appliance: Dental Appliances: Partials, Uppers, With patient  Vision:   na  Hearing Aid:   na  Jewelry: Jewelry: Ring  Clothing:   self  Other Valuables: Other Valuables: Cell Phone, [employer]ak Rucker (with Vivi Doyle)  Valuables sent to safe:   na    PATIENT INSTRUCTIONS:    After general anesthesia or intravenous sedation, for 24 hours or while taking prescription Narcotics:  · Limit your activities  · Do not drive and operate hazardous machinery  · Do not make important personal or business decisions  · Do  not drink alcoholic beverages  · If you have not urinated within 8 hours after discharge, please contact your surgeon on call.     Report the following to your surgeon:  · Excessive pain, swelling, redness or odor of or around the surgical area  · Temperature over 101  · Nausea and vomiting lasting longer than 4 hours or if unable to take medications  · Any signs of decreased circulation or nerve impairment to extremity: change in color, persistent  numbness, tingling, coldness or increase pain  · Any questions, call office @ [phone]      Keep scheduled follow up appointment. If need to change, call office @ [phone]. *  Please give a list of your current medications to your Primary Care Provider. *  Please update this list whenever your medications are discontinued, doses are      changed, or new medications (including over-the-counter products) are added. *  Please carry medication information at all times in case of emergency situations. Total Knee Replacement: What to Expect at [address] [place] [address]    When you leave the hospital, you should be able to move around with a walker or crutches. But you will need someone to help you at home for the next few weeks or until you have more energy and can move around better. If there is no one to help you at home, you may go to a rehabilitation center. You will go home with a bandage and stitches or staples. Change the bandage as your doctor tells you to. Your doctor will remove your stitches or staples 10 to 21 days after your surgery. You may still have some mild pain, and the area may be swollen for 3 to 6 months after surgery. Your knee will continue to improve for 6 to 12 months. You will probably use a walker for 1 to 3 weeks and then use crutches. When you are ready, you can use a cane. You will probably be able to walk on your own in 4 to 8 weeks. You will need to do months of physical rehabilitation (rehab) after a knee replacement. Rehab will help you strengthen the muscles of the knee and help you regain movement. After you recover, your artificial knee will allow you to do normal daily activities with less pain or no pain at all. You may be able to hike, dance, ride a bike, and play golf. Talk to your doctor about whether you can do more strenuous activities. Always tell your caregivers that you have an artificial knee.   How long it will take to walk on your own, return to normal activities, and go back to work depends on your health and how well your rehabilitation (rehab) program goes. The better you do with your rehab exercises, the quicker you will get your strength and movement back. This care sheet gives you a general idea about how long it will take for you to recover. But each person recovers at a different pace. Follow the steps below to get better as quickly as possible. How can you care for yourself at home? Activity  ? · Rest when you feel tired. You may take a nap, but do not stay in bed all day. When you sit, use a chair with arms. You can use the arms to help you stand up. ? · Work with your physical therapist to find the best way to exercise. You may be able to take frequent, short walks using crutches or a walker. What you can do as your knee heals will depend on whether your new knee is cemented or uncemented. You may not be able to do certain things for a while if your new knee is uncemented. ? · After your knee has healed enough, you can do more strenuous activities with caution. ¨ You can golf, but use a golf cart, and do not wear shoes with spikes. ¨ You can bike on a flat road or on a stationary bike. Avoid biking up hills. ¨ Your doctor may suggest that you stay away from activities that put stress on your knee. These include tennis or badminton, squash or racquetball, contact sports like football, jumping (such as in basketball), jogging, or running. ¨ Avoid activities where you might fall. These include horseback riding, skiing, and mountain biking. ? · Do not sit for more than 1 hour at a time. Get up and walk around for a while before you sit again. If you must sit for a long time, prop up your leg with a chair or footstool. This will help you avoid swelling. ? · Ask your doctor when you can shower. You may need to take sponge baths until your stitches or staples have been removed. ? · Ask your doctor when you can drive again.  It may take up to 8 weeks after knee replacement surgery before it is safe for you to drive. ? · When you get into a car, sit on the edge of the seat. Then pull in your legs, and turn to face the front. ? · You should be able to do many everyday activities 3 to 6 weeks after your surgery. You will probably need to take 4 to 16 weeks off from work. When you can go back to work depends on the type of work you do and how you feel. ? · Ask your doctor when it is okay for you to have sex. ? · Do not lift anything heavier than 10 pounds and do not lift weights for 12 weeks. Diet  ? · By the time you leave the hospital, you should be eating your normal diet. If your stomach is upset, try bland, low-fat foods like plain rice, broiled chicken, toast, and yogurt. Your doctor may suggest that you take iron and vitamin supplements. ? · Drink plenty of fluids (unless your doctor tells you not to). ? · Eat healthy foods, and watch your portion sizes. Try to stay at your ideal weight. Too much weight puts more stress on your new knee. ? · You may notice that your bowel movements are not regular right after your surgery. This is common. Try to avoid constipation and straining with bowel movements. You may want to take a fiber supplement every day. If you have not had a bowel movement after a couple of days, ask your doctor about taking a mild laxative. Medicines  ? · Your doctor will tell you if and when you can restart your medicines. He or she will also give you instructions about taking any new medicines. ? · If you take blood thinners, such as warfarin (Coumadin), clopidogrel (Plavix), or aspirin, be sure to talk to your doctor. He or she will tell you if and when to start taking those medicines again. Make sure that you understand exactly what your doctor wants you to do.   ? · Your doctor may give you a blood-thinning medicine to prevent blood clots.  If you take a blood thinner, be sure you get instructions about how to take your medicine safely. Blood thinners can cause serious bleeding problems. This medicine could be in pill form or as a shot (injection). If a shot is necessary, your doctor will tell you how to do this. ? · Be safe with medicines. Take pain medicines exactly as directed. ¨ If the doctor gave you a prescription medicine for pain, take it as prescribed. ¨ If you are not taking a prescription pain medicine, ask your doctor if you can take an over-the-counter medicine. ¨ Plan to take your pain medicine 30 minutes before exercises. It is easier to prevent pain before it starts than to stop it once it has started. ? · If you think your pain medicine is making you sick to your stomach:  ¨ Take your medicine after meals (unless your doctor has told you not to). ¨ Ask your doctor for a different pain medicine. ? · If your doctor prescribed antibiotics, take them as directed. Do not stop taking them just because you feel better. You need to take the full course of antibiotics. Incision care  ? · You will have a bandage over the cut (incision) and staples or stitches. Take the bandage off when your doctor says it is okay. ? · Your doctor will remove the staples or stitches 10 days to 3 weeks after the surgery and replace them with strips of tape. Leave the tape on for a week or until it falls off. Exercise  ? · Your rehab program will give you a number of exercises to do to help you get back your knee's range of motion and strength. Always do them as your therapist tells you. Ice and elevation  ? · For pain and swelling, put ice or a cold pack on the area for 10 to 20 minutes at a time. Put a thin cloth between the ice and your skin. Other instructions  ? · Continue to wear your support stockings as your doctor says. These help to prevent blood clots. The length of time that you will have to wear them depends on your activity level and the amount of swelling. ? · You have metal pieces in your knee.  These may set off some airport metal detectors. Carry a medical alert card that says you have an artificial joint, just in case. Follow-up care is a key part of your treatment and safety. Be sure to make and go to all appointments, and call your doctor if you are having problems. It's also a good idea to know your test results and keep a list of the medicines you take. When should you call for help? Call 911 anytime you think you may need emergency care. For example, call if:  ? · You passed out (lost consciousness). ? · You have severe trouble breathing. ? · You have sudden chest pain and shortness of breath, or you cough up blood. ?Call your doctor now or seek immediate medical care if:  ? · You have signs of infection, such as:  ¨ Increased pain, swelling, warmth, or redness. ¨ Red streaks leading from the incision. ¨ Pus draining from the incision. ¨ A fever. ? · You have signs of a blood clot, such as:  ¨ Pain in your calf, back of the knee, thigh, or groin. ¨ Redness and swelling in your leg or groin. ? · Your incision comes open and begins to bleed, or the bleeding increases. ? · You have pain that does not get better after you take pain medicine. ? Watch closely for changes in your health, and be sure to contact your doctor if:  ? · You do not have a bowel movement after taking a laxative. Where can you learn more? Go to http://joann-drake.info/. Enter P159 in the search box to learn more about \"Total Knee Replacement: What to Expect at Home. \"  Current as of: March 21, 2017  Content Version: 11.4  © 4129-3804 [employer]. Care instructions adapted under license by [employer] (which disclaims liability or warranty for this information). If you have questions about a medical condition or this instruction, always ask your healthcare professional. [place] [address] any warranty or liability for your use of this information.         These are general instructions for a healthy lifestyle:    No smoking/ No tobacco products/ Avoid exposure to second hand smoke    Surgeon General's Warning:  Quitting smoking now greatly reduces serious risk to your health. Obesity, smoking, and sedentary lifestyle greatly increases your risk for illness    A healthy diet, regular physical exercise & weight monitoring are important for maintaining a healthy lifestyle    You may be retaining fluid if you have a history of heart failure or if you experience any of the following symptoms:  Weight gain of 3 pounds or more overnight or 5 pounds in a week, increased swelling in our hands or feet or shortness of breath while lying flat in bed. Please call your doctor as soon as you notice any of these symptoms; do not wait until your next office visit. Recognize signs and symptoms of STROKE:    F-face looks uneven    A-arms unable to move or move even    S-speech slurred or non-existent    T-time-call 911 as soon as signs and symptoms begin-DO NOT go       Back to bed or wait to see if you get better-TIME IS BRAIN. The discharge information has been reviewed with the patient. The patient verbalized understanding. Information obtained by :  I understand that if any problems occur once I am at home I am to contact my physician. I understand and acknowledge receipt of the instructions indicated above.                                                                                                                                            Physician's or R.N.'s Signature                                                                  Date/Time                                                                                                                                              Patient or Representative Signature                                                          Date/Time

## 2017-11-17 NOTE — PROGRESS NOTES
Had group therapy. Has follow up appt scheduled with Dr Candis Sandifer. Home therapy to see pt. Instructed to continue wearing TEDs and to call doctor if having fever, excessive drainage, numbness or other problems. Given prescriptions for dilaudid and aspirin and instructed how to take. I have reviewed discharge instructions with the patient and spouse. The patient and spouse verbalized understanding. Taken to car via wheelchair. Discharged home with wife and daughter.

## 2017-11-17 NOTE — PROGRESS NOTES
Resting comfortably,dsng D/I. NV status WDL. Denies needs at present. SCDs on bilaterally. Iceman cooling system in use. Family member at bedside. Call light within reach.

## 2017-11-19 ENCOUNTER — HOME CARE VISIT (OUTPATIENT)
Dept: SCHEDULING | Facility: HOME HEALTH | Age: 77
End: 2017-11-19
Payer: MEDICARE

## 2017-11-19 VITALS
HEART RATE: 85 BPM | DIASTOLIC BLOOD PRESSURE: 82 MMHG | SYSTOLIC BLOOD PRESSURE: 118 MMHG | OXYGEN SATURATION: 98 % | RESPIRATION RATE: 18 BRPM | TEMPERATURE: 97.8 F

## 2017-11-19 PROCEDURE — 3331090001 HH PPS REVENUE CREDIT

## 2017-11-19 PROCEDURE — 3331090002 HH PPS REVENUE DEBIT

## 2017-11-19 PROCEDURE — G0151 HHCP-SERV OF PT,EA 15 MIN: HCPCS

## 2017-11-19 PROCEDURE — 400013 HH SOC

## 2017-11-20 ENCOUNTER — HOME CARE VISIT (OUTPATIENT)
Dept: SCHEDULING | Facility: HOME HEALTH | Age: 77
End: 2017-11-20
Payer: MEDICARE

## 2017-11-20 PROCEDURE — 3331090001 HH PPS REVENUE CREDIT

## 2017-11-20 PROCEDURE — G0157 HHC PT ASSISTANT EA 15: HCPCS

## 2017-11-20 PROCEDURE — 3331090002 HH PPS REVENUE DEBIT

## 2017-11-21 VITALS
HEART RATE: 90 BPM | TEMPERATURE: 98.2 F | DIASTOLIC BLOOD PRESSURE: 84 MMHG | SYSTOLIC BLOOD PRESSURE: 134 MMHG | RESPIRATION RATE: 18 BRPM

## 2017-11-21 PROCEDURE — 3331090001 HH PPS REVENUE CREDIT

## 2017-11-21 PROCEDURE — 3331090002 HH PPS REVENUE DEBIT

## 2017-11-21 NOTE — OP NOTES
[facility] [employer]  Cementless Total Knee Arthroplasty: Posterior Cruciate Retaining     Patient:Ty Turner   : 1940  Medical Record [id number]  Pre-operative Diagnosis:  Primary osteoarthritis of left knee [M17.12]  Post-operative Diagnosis: Status post total left knee replacement  Location: [address] [place] [address]  Surgeon: Anuj Gu MD   Assistant: Angeline Benoit PA-C    Anesthesia: Spinal and FNB    Procedure:Procedure(s) (LRB):  KNEE ARTHROPLASTY TOTAL/ LEFT/ MADI/ FNB (Left)   The complexity of the total joint surgery requires the use of a first assistant for positioning, retraction and expertise in closure. Tourniquet Time: 0 minutes  EBL: 250 cc  Findings: severe degenerative arthritis, patellar osteophytes, posterior femoral osteophytes   BMI: Body mass index is 37.52 kg/(m^2). Julián Allen was brought to the operating room and positioned on the operating table. He was anesthestized with anesthesia. A turner catheter was placed preoperatively and IV antibiotics was administered. Prior to the incision being made a timeout was called identifying the patient, procedure ,operative side and surgeon The operative leg was prepped and draped in the usual sterile manner. An anterior longitudinal incision was accomplished just medial to the tibial tubercle and extending approximal 6 centimeters proximal to the superior pole of the patella. A medial parapatellar capsular incision was performed. The medial capsular flap was elevated around to the insertion of the semimembranous tendon. The patella was everted and the knee flexed and externally rotated. The medial and external menisci were excised. The lateral half of the fat pad excised and the patella femoral ligament was released. The anterior cruciate ligament was resect and the posterior cruciate ligament was retained.   Using extramedullary instrumentation, the tibial cut was accomplished with appropriate posterior slope. The distal femur was addressed. A drill hole was made above the intracondylar notch. Using appropriate intramedullary instrumentation,a five degree valgus distal cut was accomplished. The femur was sized. The anterior and posterior cuts were then made about the distal femur. The osteophytes were removed from the tibial and femoral surfaces. The flexion and extension gaps were assessed with the appropriate spacer blocks. Additional surgical procedures included: none. The flexion and extension gaps were deemed appropriately balanced. The appropriate cutting blocks were then utilized to perform the anterior, posterior and chamfer cuts, with appropriate lateral translation accomplished for the patellofemoral groove. Approximately 9 mm of bone was removed from the high side of the tibia. The tibia was sized. .  The tibial base plate was pinned into place with the appropriate external rotation and stem site prepared. A preliminary range of motion was accomplished. The  Patient was found to obtain full extension as well as appropriate flexion. The patient's ligaments were stable in flexion and extension to medial and lateral stressing and the alignment was through the appropriate mechanical axis. The patella was then everted. The bone was resect to accommodate the three peg patella button. A trial reduction revealed appropriate tracking through the patellofemoral groove with no lateral retinacular release being accomplished. All trial components were removed. The real implants were opened: Sizes listed below. The knee was irrigated. There were no femoral deficiencies. There were no tibial deficiencies. No augmentation was utilized. The permanent cementless Tibial and Femoral components were impacted into place. The cementless  patella component was then pressed in place.     Mathew Desir knee was placed through range of motion and noted to be stable as mentioned above with the trail components. The wound was dry, therefore no drain was used. The operative knee was injected with 60 cc of Naropin, 10 cc's of morphine and 1 cc of 30 mg of Toradol. The knee was then soaked with a diluted betadine solution for approximately 3 min. This was then thoroughly irrigated. The capsular layer was closed using a #1 PDS suture. Then, 1 gram (100 mg/ml) of Transexamic Acid was injected into the joint space. The subcutaneous layers were closed using 2-0 Stratafix. Finally the skin was closed using 3-0 Vicryl and skin staples, which were applied in occlusive fashion and sterile bandage applied. An Iceman cryo pad was applied on the operative leg. Sponge count and needle counts were correct. Audrey Harada left the operating room     Implants:   Implant Name Type Inv.  Item Serial No.  Lot No. LRB No. Used   COMPNT FEM CR TRIATHLN 7 L PA --  - [id number]  COMPNT FEM CR TRIATHLN 7 L PA --  [id number] [employer] [facility] [id number] Left 1   BASEPLT TIB PC TRITNM SZ 7 -- TRIATHLON - [id number]  BASEPLT TIB PC TRITNM SZ 7 -- TRIATHLON [id number] [employer] [facility] [id number] Left 1   PAT ASYM MTL-BK 11MM SZ A40 -- TRIATHLON - [id number]  PAT ASYM MTL-BK 11MM SZ A40 -- TRIATHLON [id number] [employer] [facility] [id number] Left 1   INSERT TIB CR TRIATHLN 7 11MM --  - [id number]   INSERT TIB CR TRIATHLN 7 11MM --  [id number] [employer] [facility] [id number] Left 1         Signed By: Corey Sage MD   11/21/2017,  2:04 PM

## 2017-11-22 ENCOUNTER — HOME CARE VISIT (OUTPATIENT)
Dept: SCHEDULING | Facility: HOME HEALTH | Age: 77
End: 2017-11-22
Payer: MEDICARE

## 2017-11-22 PROCEDURE — 3331090002 HH PPS REVENUE DEBIT

## 2017-11-22 PROCEDURE — G0157 HHC PT ASSISTANT EA 15: HCPCS

## 2017-11-22 PROCEDURE — 3331090001 HH PPS REVENUE CREDIT

## 2017-11-23 PROCEDURE — 3331090001 HH PPS REVENUE CREDIT

## 2017-11-23 PROCEDURE — 3331090002 HH PPS REVENUE DEBIT

## 2017-11-24 PROCEDURE — 3331090001 HH PPS REVENUE CREDIT

## 2017-11-24 PROCEDURE — 3331090002 HH PPS REVENUE DEBIT

## 2017-11-25 PROCEDURE — 3331090002 HH PPS REVENUE DEBIT

## 2017-11-25 PROCEDURE — 3331090001 HH PPS REVENUE CREDIT

## 2017-11-26 VITALS
HEART RATE: 88 BPM | SYSTOLIC BLOOD PRESSURE: 124 MMHG | TEMPERATURE: 98.2 F | RESPIRATION RATE: 19 BRPM | DIASTOLIC BLOOD PRESSURE: 84 MMHG

## 2017-11-26 PROCEDURE — 3331090002 HH PPS REVENUE DEBIT

## 2017-11-26 PROCEDURE — 3331090001 HH PPS REVENUE CREDIT

## 2017-11-27 ENCOUNTER — HOME CARE VISIT (OUTPATIENT)
Dept: SCHEDULING | Facility: HOME HEALTH | Age: 77
End: 2017-11-27
Payer: MEDICARE

## 2017-11-27 VITALS
RESPIRATION RATE: 17 BRPM | SYSTOLIC BLOOD PRESSURE: 120 MMHG | TEMPERATURE: 97.2 F | DIASTOLIC BLOOD PRESSURE: 82 MMHG | HEART RATE: 90 BPM

## 2017-11-27 PROCEDURE — G0157 HHC PT ASSISTANT EA 15: HCPCS

## 2017-11-27 PROCEDURE — 3331090001 HH PPS REVENUE CREDIT

## 2017-11-27 PROCEDURE — 3331090002 HH PPS REVENUE DEBIT

## 2017-11-28 PROCEDURE — 3331090001 HH PPS REVENUE CREDIT

## 2017-11-28 PROCEDURE — 3331090002 HH PPS REVENUE DEBIT

## 2017-11-29 ENCOUNTER — HOME CARE VISIT (OUTPATIENT)
Dept: SCHEDULING | Facility: HOME HEALTH | Age: 77
End: 2017-11-29
Payer: MEDICARE

## 2017-11-29 VITALS
TEMPERATURE: 97.8 F | SYSTOLIC BLOOD PRESSURE: 140 MMHG | HEART RATE: 78 BPM | DIASTOLIC BLOOD PRESSURE: 90 MMHG | RESPIRATION RATE: 17 BRPM

## 2017-11-29 PROCEDURE — 3331090002 HH PPS REVENUE DEBIT

## 2017-11-29 PROCEDURE — G0157 HHC PT ASSISTANT EA 15: HCPCS

## 2017-11-29 PROCEDURE — 3331090001 HH PPS REVENUE CREDIT

## 2017-11-30 PROCEDURE — 3331090002 HH PPS REVENUE DEBIT

## 2017-11-30 PROCEDURE — 3331090001 HH PPS REVENUE CREDIT

## 2017-12-01 PROCEDURE — 3331090001 HH PPS REVENUE CREDIT

## 2017-12-01 PROCEDURE — 3331090002 HH PPS REVENUE DEBIT

## 2017-12-02 PROCEDURE — 3331090002 HH PPS REVENUE DEBIT

## 2017-12-02 PROCEDURE — 3331090001 HH PPS REVENUE CREDIT

## 2017-12-03 PROCEDURE — 3331090002 HH PPS REVENUE DEBIT

## 2017-12-03 PROCEDURE — 3331090001 HH PPS REVENUE CREDIT

## 2017-12-04 ENCOUNTER — HOME CARE VISIT (OUTPATIENT)
Dept: SCHEDULING | Facility: HOME HEALTH | Age: 77
End: 2017-12-04
Payer: MEDICARE

## 2017-12-04 VITALS
DIASTOLIC BLOOD PRESSURE: 82 MMHG | TEMPERATURE: 96.9 F | SYSTOLIC BLOOD PRESSURE: 138 MMHG | RESPIRATION RATE: 17 BRPM | HEART RATE: 82 BPM

## 2017-12-04 PROCEDURE — 3331090001 HH PPS REVENUE CREDIT

## 2017-12-04 PROCEDURE — 3331090002 HH PPS REVENUE DEBIT

## 2017-12-04 PROCEDURE — G0157 HHC PT ASSISTANT EA 15: HCPCS

## 2017-12-05 PROCEDURE — 3331090002 HH PPS REVENUE DEBIT

## 2017-12-05 PROCEDURE — 3331090001 HH PPS REVENUE CREDIT

## 2017-12-06 ENCOUNTER — HOME CARE VISIT (OUTPATIENT)
Dept: SCHEDULING | Facility: HOME HEALTH | Age: 77
End: 2017-12-06
Payer: MEDICARE

## 2017-12-06 VITALS
RESPIRATION RATE: 16 BRPM | TEMPERATURE: 96.4 F | DIASTOLIC BLOOD PRESSURE: 80 MMHG | HEART RATE: 97 BPM | SYSTOLIC BLOOD PRESSURE: 135 MMHG | OXYGEN SATURATION: 98 %

## 2017-12-06 PROCEDURE — 3331090001 HH PPS REVENUE CREDIT

## 2017-12-06 PROCEDURE — 3331090002 HH PPS REVENUE DEBIT

## 2017-12-06 PROCEDURE — G0151 HHCP-SERV OF PT,EA 15 MIN: HCPCS

## 2017-12-07 PROCEDURE — 3331090002 HH PPS REVENUE DEBIT

## 2017-12-07 PROCEDURE — 3331090001 HH PPS REVENUE CREDIT

## 2017-12-08 PROCEDURE — 3331090001 HH PPS REVENUE CREDIT

## 2017-12-08 PROCEDURE — 3331090002 HH PPS REVENUE DEBIT

## 2017-12-09 PROCEDURE — 3331090001 HH PPS REVENUE CREDIT

## 2017-12-09 PROCEDURE — 3331090002 HH PPS REVENUE DEBIT

## 2017-12-10 PROCEDURE — 3331090001 HH PPS REVENUE CREDIT

## 2017-12-10 PROCEDURE — 3331090002 HH PPS REVENUE DEBIT

## 2017-12-11 PROCEDURE — 3331090001 HH PPS REVENUE CREDIT

## 2017-12-11 PROCEDURE — 3331090002 HH PPS REVENUE DEBIT

## 2017-12-12 PROCEDURE — 3331090001 HH PPS REVENUE CREDIT

## 2017-12-12 PROCEDURE — 3331090002 HH PPS REVENUE DEBIT

## 2017-12-12 NOTE — PROGRESS NOTES
[address]  Face to Face Encounter    Patients Name: Nai Desir    YOB: 1940    Ordering Physician: Abigail Colvin    Primary Diagnosis: Primary osteoarthritis of left knee [M17.12]  S/p left TKA    Date of Face to Face:   11-15-17                                Face to Face Encounter findings are related to primary reason for home care:   yes. 1. I certify that the patient needs intermittent care as follows: physical therapy: gait/stair training    2. I certify that this patient is homebound, that is: 1) patient requires the use of a walker device, special transportation, or assistance of another to leave the home; or 2) patient's condition makes leaving the home medically contraindicated; and 3) patient has a normal inability to leave the home and leaving the home requires considerable and taxing effort. Patient may leave the home for infrequent and short duration for medical reasons, and occasional absences for non-medical reasons. Homebound status is due to the following functional limitations: Patient's ambulation limited secondary to severe pain and requires the use of an assistive device and the assistance of a caregiver for safe completion. Patient with strength and ROM deficits limiting ambulation endurance requiring the use of an assistive device and the assistance of a caregiver. Patient deemed temporarily homebound secondary to increased risk for infection when leaving home and going out into the community. 3. I certify that this patient is under my care and that I, or a nurse practitioner or [facility], or clinical nurse specialist, or certified nurse midwife, working with me, had a Face-to-Face Encounter that meets the physician Face-to-Face Encounter requirements.   The following are the clinical findings from the [address] [place] [address] encounter that support the need for skilled services and is a summary of the encounter: see hospital chart      Faustino Easond Eligio, [address]  12/12/2017      THE FOLLOWING TO BE COMPLETED BY THE COMMUNITY PHYSICIAN:    I concur with the findings described above from the F2F encounter that this patient is homebound and in need of a skilled service.     Certifying Physician: _____________________________________      Printed Certifying Physician Name: _____________________________________    Date: _________________

## 2017-12-13 ENCOUNTER — HOSPITAL ENCOUNTER (OUTPATIENT)
Dept: PHYSICAL THERAPY | Age: 77
Discharge: HOME | End: 2017-12-13
Payer: MEDICARE

## 2017-12-13 PROCEDURE — 3331090002 HH PPS REVENUE DEBIT

## 2017-12-13 PROCEDURE — 3331090001 HH PPS REVENUE CREDIT

## 2017-12-13 PROCEDURE — G8979 MOBILITY GOAL STATUS: HCPCS

## 2017-12-13 PROCEDURE — 97161 PT EVAL LOW COMPLEX 20 MIN: CPT

## 2017-12-13 PROCEDURE — G8978 MOBILITY CURRENT STATUS: HCPCS

## 2017-12-13 NOTE — PROGRESS NOTES
Ambulatory/Rehab Services H2 Model Falls Risk Assessment    Risk Factor Pts. ·   Confusion/Disorientation/Impulsivity  []    4 ·   Symptomatic Depression  []   2 ·   Altered Elimination  []   1 ·   Dizziness/Vertigo  []   1 ·   Gender (Male)  [x]   1 ·   Any administered antiepileptics (anticonvulsants):  []   2 ·   Any administered benzodiazepines:  []   1 ·   Visual Impairment (specify):  []   1 ·   Portable Oxygen Use  []   1 ·   Orthostatic ? BP  []   1 ·   History of Recent Falls (within 3 mos.)  []   5     Ability to Rise from Chair (choose one) Pts. ·   Ability to rise in a single movement  []   0 ·   Pushes up, successful in one attempt  [x]   1 ·   Multiple attempts, but successful  []   3 ·   Unable to rise without assistance  []   4   Total: (5 or greater = High Risk) 2     Falls Prevention Plan:   []                Physical Limitations to Exercise (specify):   []                Mobility Assistance Device (type):   []                Exercise/Equipment Adaptation (specify):    ©2010 [facility] [address] Patent #[id number].  Federal Law prohibits the replication, distribution or use without written permission from [facility]

## 2017-12-13 NOTE — THERAPY EVALUATION
Dillon Desir  : 1940  [place] / [place] / Product Type: Medicare /  [address]  at [facility]  [address], [address], [place]  [phone]   [phone]            OUTPATIENT PHYSICAL THERAPY:Initial Assessment 2017    ICD-10: Treatment Diagnosis: Presence of left artificial knee joint (U83.878), Stiffness of left knee, not elsewhere classified (R71.888)  Precautions/Allergies:   Poison ivy extract   Fall Risk Score: 2 (? 5 = High Risk)  MD Orders: Evaluate and Treat MEDICAL/REFERRING DIAGNOSIS:  Presence of left artificial knee joint [Z96.652]   DATE OF ONSET: Surgery 2017  REFERRING PHYSICIAN: Marah Marcial, *  RETURN PHYSICIAN APPOINTMENT:      INITIAL ASSESSMENT:  Mr. Osmani Ramos presents to physical therapy s/p left total knee replacement. Physical therapy evaluation demonstrates decreased active and passive ROM to Left knee, increased edema, decreased independence with ambulation, decreased strength and decreased balance. Patient would benefit from skilled physical therapy to address his deficits and maximize his function. PROBLEM LIST (Impacting functional limitations):  1. Decreased Strength  2. Decreased ADL/Functional Activities  3. Decreased Ambulation Ability/Technique  4. Decreased Balance  5. Increased Pain  6. Decreased Flexibility/Joint Mobility  7. Edema/Girth INTERVENTIONS PLANNED:  1. Balance Exercise  2. Cold  3. Family Education  4. Gait Training  5. Heat  6. Home Exercise Program (HEP)  7. Manual Therapy  8. Range of Motion (ROM)  9. Therapeutic Exercise/Strengthening   TREATMENT PLAN:  Effective Dates: 17 TO 3-13-18. Frequency/Duration: 2 times a week for 6 weeks  GOALS: (Goals have been discussed and agreed upon with patient.)  Short Term Goals  1. Pt will be independent with HEP for strength and ROM  2.  Pt will perform Nu Step x 10 minutes for hip and knee AAROM   3. Pt will tolerate manual therapy/joint mobilizations to increase knee flexion ROM so pt can ambulate stairs and walk with a more normalized gait pattern  4. Pt will participate in LE strengthening exercises for hip, knee, ankle with weight as appropriate for 3 sets of 10  5. Pt will tolerate scar massage as appropriate to improve tissue mobility with patient to perform independently after education  6. Pt will participate in static and dynamic balance activities for 5 minutes to help improve proprioception and decrease risk of falls  7. Pt will wean from single point cane to no assistive device so patient can be more independent within the community  8. Pt will increase lower extremity functional scale by 5 points to show improvement in function   Long Term Goals  1. Pt will demonstrate >90 degrees of knee flexion ROM so patient can go up/down stairs  2. Pt will be able to perform sit to stand transfers independently with increased knee flexion and decreased use of upper extremities  3. Pt will ascend/descend 12 steps with reciprocal gait pattern and rail  4. Pt will be able to ambulate community level distances without an assistive device and no pain  5. Pt to increase lower extremity functional scale by 10 points to show improvement in areas of difficulty  Rehabilitation Potential For Stated Goals: [address] Desir's therapy, I certify that the treatment plan above will be carried out by a therapist or under their direction. Thank you for this referral,  Aliyah Ahn DPT     Referring Physician Signature: Arun Kelly., *              Date                    HISTORY:   History of Present Injury/Illness (Reason for Referral):  Patient reports having a total knee replacement on the 14th of November. Following the hospital went home with home health. Reports having issues with tight muscles and swelling.  Has been icing his knee and put a heating pad on his IT band. Sitting for long periods of time causes stiffness. Does not have too much pain. Is using a cane for ambulation. Has not been taking the pain medicine. Goes to [facility] twice a week (hasn't gone since surgery)  Past Medical History/Comorbidities:   Mr. 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 He also has no past medical history of Adverse effect of anesthesia; Aneurysm ([address] Asthma; Autoimmune disease ([address] Cancer ([address] Chronic obstructive pulmonary disease ([address] Diabetes ([address] Difficult intubation; Heart failure ([address] Liver disease; Malignant hyperthermia due to anesthesia; Nausea & vomiting; Pseudocholinesterase deficiency; Psychiatric disorder; PUD (peptic ulcer disease); Seizures ([address] Stroke [facility]); Thromboembolus ([address] Thyroid disease; Unspecified adverse effect of anesthesia; or Unspecified sleep apnea. Mr. Mello Navas  has a past surgical history that includes bunionectomy (Bilateral); vascular surgery procedure unlist; carpal tunnel release (Bilateral); cataract removal (Bilateral); coronary artery bypass graft (10/14/2014); heart catheterization; colonoscopy; and knee replacement (Right, 2017). Social History/Living Environment:    Lives with wife. One story home with bonus room upstairs. No barriers at home. Prior Level of Function/Work/Activity:  Retired.  Plays golf goes to [facility]  Dominant Side:         RIGHT  Other Clinical Tests: X-rays   Previous Treatment Approaches:          Physical therapy  Personal Factors:          Sex:  male        Age:  76 y.o. Current Medications:    Current Outpatient Prescriptions:     HYDROmorphone (DILAUDID) 2 mg tablet, Take 1 Tab by mouth every four (4) hours as needed. Max Daily Amount: 12 mg., Disp: 40 Tab, Rfl: 0    metoprolol succinate (TOPROL XL) 25 mg XL tablet, Take 1 Tab by mouth every morning. Indications: unknown why takes, Disp: 7 Tab, Rfl: 0    amoxicillin (AMOXIL) 500 mg capsule, Take 500 mg by mouth. Prior to dental appointment, Disp: , Rfl:     clopidogrel (PLAVIX) 75 mg tab, Take 1 Tab by mouth every morning., Disp: 90 Tab, Rfl: 3    losartan-hydroCHLOROthiazide (HYZAAR) 100-12.5 mg per tablet, Take 1 Tab by mouth every morning. Indications: Hypertension, Disp: 90 Tab, Rfl: 3    rosuvastatin (CRESTOR) 20 mg tablet, Take 1 Tab by mouth nightly. Indications: hypercholesterolemia, Disp: 90 Tab, Rfl: 3    aspirin delayed-release 81 mg tablet, Take 81 mg by mouth nightly., Disp: , Rfl:     Glucosamine &Chondroit-MV-Min3 [id number] mg tab, Take  by mouth every morning. 2 tablets every morning   Stop seven days prior to surgery per anesthesia protocol., Disp: , Rfl:     ranitidine (ZANTAC) 150 mg tablet, Take 150 mg by mouth as needed for Indigestion. Non-formulary. Instructed to bring to the hospital on the DOS, in the original bottle, and give to nurse. Indications: gastroesophageal reflux disease, Disp: , Rfl:      Date Last Reviewed:  12/13/2017    Number of Personal Factors/Comorbidities that affect the Plan of Care: 0: LOW COMPLEXITY   EXAMINATION:   Observation/Orthostatic Postural Assessment:          Moderate amount of edema to LLE. Incision healing and intact. Dry flaky skin  Palpation:          Decreased scar tissue mobility. Decreased soft tissue mobility to IT band, quads, hamstrings.  Hypo mobile patella in all directions  ROM:          Right Knee ROM: WNL        Left Knee ROM: 4-90 degrees  Strength:     LE:  Hip Flexion 5/5 R, 4/5 L  Hip Extension 5/5 R, 4/5 L  Hip Abduction 5/5 R, 4/5 L  Knee Flexion 5/5 B  Knee Extension 5/5 B  Ankle Dorsiflexion 5/5 B  Ankle Plantarflexion 5/5 B  Special Tests:          n/a  Neurological Screen:        Myotomes:  intact        Dermatomes:  Intact (decreased to light touch around incision)  Functional Mobility:         Gait/Ambulation:  Patient ambulates with antalgic gait on left using single point cane: increased trunk sway, decrease knee flexion  Balance:          Single Leg Balance Right: 5 seconds        Single Leg Balance Left: 1 second   Body Structures Involved:  1. Joints  2. Muscles  3. Ligaments Body Functions Affected:  1. Sensory/Pain  2. Neuromusculoskeletal  3. Movement Related Activities and Participation Affected:  1. General Tasks and Demands  2. Mobility  3. Domestic Life  4. Community, Social and [place]   Number of elements that affect the Plan of Care: 1-2: LOW COMPLEXITY   CLINICAL PRESENTATION:   Presentation: Stable and uncomplicated: LOW COMPLEXITY   CLINICAL DECISION MAKING:   Outcome Measure: Tool Used: Lower Extremity Functional Scale (LEFS)  Score:  Initial: 30/80 Most Recent: X/80 (Date: -- )   Interpretation of Score: 20 questions each scored on a 5 point scale with 0 representing \"extreme difficulty or unable to perform\" and 4 representing \"no difficulty\". The lower the score, the greater the functional disability. 80/80 represents no disability. Minimal detectable change is 9 points. Score 80 79-63 62-48 47-32 31-16 15-1 0   Modifier CH CI CJ CK CL CM CN     ?  Mobility - Walking and Moving Around:     - CURRENT STATUS: CL - 60%-79% impaired, limited or restricted    - GOAL STATUS: CJ - 20%-39% impaired, limited or restricted    - D/C STATUS:  ---------------To be determined---------------      Medical Necessity:   · Patient demonstrates good rehab potential due to higher previous functional level.  Reason for Services/Other Comments:  · Patient continues to require skilled intervention due to stiffness, edema, and decreased mobility. Use of outcome tool(s) and clinical judgement create a POC that gives a: Clear prediction of patient's progress: LOW COMPLEXITY   TREATMENT:   Pre-treatment Symptoms:  n/a  (In addition to Assessment/Re-Assessment sessions the following treatments were rendered)  THERAPEUTIC EXERCISE: ( minutes):  Exercises per grid below to improve mobility, strength and balance. Required minimal visual, verbal and manual cues to promote proper body alignment and promote proper body posture. Progressed resistance, range and repetitions as indicated. Date:  12-13-17 Date:   Date:     Activity/Exercise Parameters Parameters Parameters   Patient Education Discussed HEP and POC                                             [employer] Portal  Treatment/Session Assessment:  See assessment above. · Pain: Initial:    0 Post Session:  unchanged ·   Compliance with Program/Exercises: compliant all of the time. · Recommendations/Intent for next treatment session: \"Next visit will focus on advancements to more challenging activities and reduction in assistance provided\".   Future Appointments  Date Time Provider Marisela Alan   12/18/2017 1:45 PM Rana Natalie, DPT [facility]   12/20/2017 10:00 AM Rana Natalie, DPT [facility]   12/27/2017 10:15 AM Rana Natalie, DPT [facility]   1/3/2018 10:00 AM Rana Natalie, DPT [facility]   1/8/2018 10:00 AM Rana Natalie, DPT [facility]   1/10/2018 10:00 AM Rana Natalie, DPT [facility]   1/15/2018 10:00 AM Rana Natalie, DPT [facility]   1/17/2018 10:00 AM Rana Natalie, DPT [facility]   1/22/2018 10:00 AM Rana Natalie, DPT [facility]   1/24/2018 10:00 AM Rana Natalie, DPT [facility]     Total Treatment Duration:  PT Patient Time In/Time Out  Time In: 1045  Time Out: [address], DPT

## 2017-12-14 PROCEDURE — 3331090002 HH PPS REVENUE DEBIT

## 2017-12-14 PROCEDURE — 3331090001 HH PPS REVENUE CREDIT

## 2017-12-15 PROCEDURE — 3331090001 HH PPS REVENUE CREDIT

## 2017-12-15 PROCEDURE — 3331090002 HH PPS REVENUE DEBIT

## 2017-12-16 PROCEDURE — 3331090001 HH PPS REVENUE CREDIT

## 2017-12-16 PROCEDURE — 3331090002 HH PPS REVENUE DEBIT

## 2017-12-18 ENCOUNTER — HOSPITAL ENCOUNTER (OUTPATIENT)
Dept: PHYSICAL THERAPY | Age: 77
Discharge: HOME | End: 2017-12-18
Payer: MEDICARE

## 2017-12-18 PROCEDURE — 97140 MANUAL THERAPY 1/> REGIONS: CPT

## 2017-12-18 PROCEDURE — 97110 THERAPEUTIC EXERCISES: CPT

## 2017-12-18 NOTE — PROGRESS NOTES
Phil Desir  : 1940  [place] / Product Type: Medicare /  [address]  at [facility]  [address], [address], [place] [address]  [phone]   [phone]            OUTPATIENT PHYSICAL THERAPY:Daily Note 2017    ICD-10: Treatment Diagnosis: Presence of left artificial knee joint (U69.524), Stiffness of left knee, not elsewhere classified (V90.392)  Precautions/Allergies:   Poison ivy extract   Fall Risk Score: 2 (? 5 = High Risk)  MD Orders: Evaluate and Treat MEDICAL/REFERRING DIAGNOSIS:  Presence of left artificial knee joint [Z96.652]   DATE OF ONSET: Surgery 2017  REFERRING PHYSICIAN: Aravind Marino, *  RETURN PHYSICIAN APPOINTMENT:      INITIAL ASSESSMENT:  Mr. Michael Keller presents to physical therapy s/p left total knee replacement. Physical therapy evaluation demonstrates decreased active and passive ROM to Left knee, increased edema, decreased independence with ambulation, decreased strength and decreased balance. Patient would benefit from skilled physical therapy to address his deficits and maximize his function. PROBLEM LIST (Impacting functional limitations):  1. Decreased Strength  2. Decreased ADL/Functional Activities  3. Decreased Ambulation Ability/Technique  4. Decreased Balance  5. Increased Pain  6. Decreased Flexibility/Joint Mobility  7. Edema/Girth INTERVENTIONS PLANNED:  1. Balance Exercise  2. Cold  3. Family Education  4. Gait Training  5. Heat  6. Home Exercise Program (HEP)  7. Manual Therapy  8. Range of Motion (ROM)  9. Therapeutic Exercise/Strengthening   TREATMENT PLAN:  Effective Dates: 17 TO 3-13-18. Frequency/Duration: 2 times a week for 6 weeks  GOALS: (Goals have been discussed and agreed upon with patient.)  Short Term Goals  1. Pt will be independent with HEP for strength and ROM  2.  Pt will perform Nu Step x 10 minutes for hip and knee AAROM   3. Pt will tolerate manual therapy/joint mobilizations to increase knee flexion ROM so pt can ambulate stairs and walk with a more normalized gait pattern  4. Pt will participate in LE strengthening exercises for hip, knee, ankle with weight as appropriate for 3 sets of 10  5. Pt will tolerate scar massage as appropriate to improve tissue mobility with patient to perform independently after education  6. Pt will participate in static and dynamic balance activities for 5 minutes to help improve proprioception and decrease risk of falls  7. Pt will wean from single point cane to no assistive device so patient can be more independent within the community  8. Pt will increase lower extremity functional scale by 5 points to show improvement in function   Long Term Goals  1. Pt will demonstrate >90 degrees of knee flexion ROM so patient can go up/down stairs  2. Pt will be able to perform sit to stand transfers independently with increased knee flexion and decreased use of upper extremities  3. Pt will ascend/descend 12 steps with reciprocal gait pattern and rail  4. Pt will be able to ambulate community level distances without an assistive device and no pain  5. Pt to increase lower extremity functional scale by 10 points to show improvement in areas of difficulty  Rehabilitation Potential For Stated Goals: Good              HISTORY:   History of Present Injury/Illness (Reason for Referral):  Patient reports having a total knee replacement on the 14th of November. Following the hospital went home with home health. Reports having issues with tight muscles and swelling. Has been icing his knee and put a heating pad on his IT band. Sitting for long periods of time causes stiffness. Does not have too much pain. Is using a cane for ambulation. Has not been taking the pain medicine.  Goes to healthy self twice a week (hasn't gone since surgery)  Past Medical History/Comorbidities:   Mr. 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 He also has no past medical history of Adverse effect of anesthesia; Aneurysm ([address] Asthma; Autoimmune disease ([address] Cancer ([address] Chronic obstructive pulmonary disease ([address] Diabetes ([address] Difficult intubation; Heart failure ([address] Liver disease; Malignant hyperthermia due to anesthesia; Nausea & vomiting; Pseudocholinesterase deficiency; Psychiatric disorder; PUD (peptic ulcer disease); Seizures ([facility] [address] Stroke [facility]); Thromboembolus ([address] Thyroid disease; Unspecified adverse effect of anesthesia; or Unspecified sleep apnea. Mr. Michael Keller  has a past surgical history that includes bunionectomy (Bilateral); vascular surgery procedure unlist; carpal tunnel release (Bilateral); cataract removal (Bilateral); coronary artery bypass graft (10/14/2014); heart catheterization; colonoscopy; and knee replacement (Right, 2017). Social History/Living Environment:    Lives with wife. One story home with bonus room upstairs. No barriers at home. Prior Level of Function/Work/Activity:  Retired. Plays golf goes to healthy self  Dominant Side:         RIGHT  Other Clinical Tests:          X-rays   Previous Treatment Approaches:          Physical therapy  Personal Factors:          Sex:  male        Age:  76 y.o. Current Medications:    Current Outpatient Prescriptions:     HYDROmorphone (DILAUDID) 2 mg tablet, Take 1 Tab by mouth every four (4) hours as needed.  Max Daily Amount: 12 mg., Disp: 40 Tab, Rfl: 0    metoprolol succinate (TOPROL XL) 25 mg XL tablet, Take 1 Tab by mouth every morning. Indications: unknown why takes, Disp: 7 Tab, Rfl: 0    amoxicillin (AMOXIL) 500 mg capsule, Take 500 mg by mouth. Prior to dental appointment, Disp: , Rfl:     clopidogrel (PLAVIX) 75 mg tab, Take 1 Tab by mouth every morning., Disp: 90 Tab, Rfl: 3    losartan-hydroCHLOROthiazide (HYZAAR) 100-12.5 mg per tablet, Take 1 Tab by mouth every morning. Indications: Hypertension, Disp: 90 Tab, Rfl: 3    rosuvastatin (CRESTOR) 20 mg tablet, Take 1 Tab by mouth nightly. Indications: hypercholesterolemia, Disp: 90 Tab, Rfl: 3    aspirin delayed-release 81 mg tablet, Take 81 mg by mouth nightly., Disp: , Rfl:     Glucosamine &Chondroit-MV-Min3 432-484-33-0.5 mg tab, Take  by mouth every morning. 2 tablets every morning   Stop seven days prior to surgery per anesthesia protocol., Disp: , Rfl:     ranitidine (ZANTAC) 150 mg tablet, Take 150 mg by mouth as needed for Indigestion. Non-formulary. Instructed to bring to the hospital on the DOS, in the original bottle, and give to nurse. Indications: gastroesophageal reflux disease, Disp: , Rfl:      Date Last Reviewed:  12/18/2017    EXAMINATION:   Observation/Orthostatic Postural Assessment:          Moderate amount of edema to LLE. Incision healing and intact. Dry flaky skin  Palpation:          Decreased scar tissue mobility. Decreased soft tissue mobility to IT band, quads, hamstrings.  Hypo mobile patella in all directions  ROM:          Right Knee ROM: WNL        Left Knee ROM: 4-90 degrees  Strength:     LE:  Hip Flexion 5/5 R, 4/5 L  Hip Extension 5/5 R, 4/5 L  Hip Abduction 5/5 R, 4/5 L  Knee Flexion 5/5 B  Knee Extension 5/5 B  Ankle Dorsiflexion 5/5 B  Ankle Plantarflexion 5/5 B  Special Tests:          n/a  Neurological Screen:        Myotomes:  intact        Dermatomes:  Intact (decreased to light touch around incision)  Functional Mobility: Gait/Ambulation:  Patient ambulates with antalgic gait on left using single point cane: increased trunk sway, decrease knee flexion  Balance:          Single Leg Balance Right: 5 seconds        Single Leg Balance Left: 1 second   Body Structures Involved:  1. Joints  2. Muscles  3. Ligaments Body Functions Affected:  1. Sensory/Pain  2. Neuromusculoskeletal  3. Movement Related Activities and Participation Affected:  1. General Tasks and Demands  2. Mobility  3. Domestic Life  4. Community, Social and Civic Life   CLINICAL PRESENTATION:   CLINICAL DECISION MAKING:   Outcome Measure: Tool Used: Lower Extremity Functional Scale (LEFS)  Score:  Initial: 30/80 Most Recent: X/80 (Date: -- )   Interpretation of Score: 20 questions each scored on a 5 point scale with 0 representing \"extreme difficulty or unable to perform\" and 4 representing \"no difficulty\". The lower the score, the greater the functional disability. 80/80 represents no disability. Minimal detectable change is 9 points. Score 80 79-63 62-48 47-32 31-16 15-1 0   Modifier CH CI CJ CK CL CM CN     ? Mobility - Walking and Moving Around:     - CURRENT STATUS: CL - 60%-79% impaired, limited or restricted    - GOAL STATUS: CJ - 20%-39% impaired, limited or restricted    - D/C STATUS:  ---------------To be determined---------------      Medical Necessity:   · Patient demonstrates good rehab potential due to higher previous functional level. Reason for Services/Other Comments:  · Patient continues to require skilled intervention due to stiffness, edema, and decreased mobility. TREATMENT:   Pre-treatment Symptoms:  n/a  (In addition to Assessment/Re-Assessment sessions the following treatments were rendered)  THERAPEUTIC EXERCISE: (15 minutes):  Exercises per grid below to improve mobility, strength and balance. Required minimal visual, verbal and manual cues to promote proper body alignment and promote proper body posture.   Progressed resistance, range and repetitions as indicated. Date:  12-13-17 Date:  12-18-17 Date:     Activity/Exercise Parameters Parameters Parameters   Patient Education Discussed HEP and POC     Nu Step  Level 2 x 10 minutes    Shuttle Press  2 black bands, using for knee flexion stretch 10 sec holds x 10                              Manual Therapy: (25 minutes) to improve soft tissue mobility and joint mobility  Grade III knee flexion mobilizations 3 x 30  Static hold knee flexion at end range 30 sec hold x 5  Deep Tissue Mobilization to left IT band    MODALITIES: (10 minutes) to decrease edema  Gameready, medium compression, 38 degrees    MedBridge Portal  Treatment/Session Assessment:  Patient's IT band loosened up after manual therapy. Able to get 105 degrees of knee flexion post manual. Edema is a limiting factor in his ROM. Discussed ice and elevation  · Pain: Initial:    0 Post Session:  unchanged ·   Compliance with Program/Exercises: compliant all of the time. · Recommendations/Intent for next treatment session: \"Next visit will focus on advancements to more challenging activities and reduction in assistance provided\".   Future Appointments  Date Time Provider Marisela Alan   12/20/2017 10:00 AM Mariano Sullivan DPT [facility]   12/27/2017 10:15 AM Mariano Sullivan DPT [facility]   1/3/2018 10:00 AM Mariano Sullivan DPT [facility]   1/8/2018 10:00 AM Mariano Sullivan, JOSET [facility]   1/10/2018 10:00 AM Mariano Sullivan, JOSET [facility]   1/15/2018 10:00 AM Mariano Sullivan DPT [facility]   1/17/2018 10:00 AM Mariano Sullivan DPT [facility]   1/22/2018 10:00 AM Mariano Sullivan, LIUDMILA [facility]   1/24/2018 10:00 AM Mariano Sullivan DPT [facility]     Total Treatment Duration:  PT Patient Time In/Time Out  Time In: 4104  Time Out: 333  [facility], LIUDMILA

## 2017-12-20 ENCOUNTER — HOSPITAL ENCOUNTER (OUTPATIENT)
Dept: PHYSICAL THERAPY | Age: 77
Discharge: HOME | End: 2017-12-20
Payer: MEDICARE

## 2017-12-20 PROCEDURE — 97110 THERAPEUTIC EXERCISES: CPT

## 2017-12-20 PROCEDURE — 97140 MANUAL THERAPY 1/> REGIONS: CPT

## 2017-12-20 NOTE — PROGRESS NOTES
Boy Desir  : 1940  [place] / [place] / Product Type: Medicare /  [facility] [address]  at [address]  [address], [address], [place] [address]  [phone]   [phone]            OUTPATIENT PHYSICAL THERAPY:Daily Note 2017    ICD-10: Treatment Diagnosis: Presence of left artificial knee joint (R38.117), Stiffness of left knee, not elsewhere classified (S28.329)  Precautions/Allergies:   Poison ivy extract   Fall Risk Score: 2 (? 5 = High Risk)  MD Orders: Evaluate and Treat MEDICAL/REFERRING DIAGNOSIS:  Presence of left artificial knee joint [Z96.652]   DATE OF ONSET: Surgery 2017  REFERRING PHYSICIAN: Bobby Santiago., *  RETURN PHYSICIAN APPOINTMENT:      INITIAL ASSESSMENT:  Mr. Jayne Middleton presents to physical therapy s/p left total knee replacement. Physical therapy evaluation demonstrates decreased active and passive ROM to Left knee, increased edema, decreased independence with ambulation, decreased strength and decreased balance. Patient would benefit from skilled physical therapy to address his deficits and maximize his function. PROBLEM LIST (Impacting functional limitations):  1. Decreased Strength  2. Decreased ADL/Functional Activities  3. Decreased Ambulation Ability/Technique  4. Decreased Balance  5. Increased Pain  6. Decreased Flexibility/Joint Mobility  7. Edema/Girth INTERVENTIONS PLANNED:  1. Balance Exercise  2. Cold  3. Family Education  4. Gait Training  5. Heat  6. Home Exercise Program (HEP)  7. Manual Therapy  8. Range of Motion (ROM)  9. Therapeutic Exercise/Strengthening   TREATMENT PLAN:  Effective Dates: 17 TO 3-13-18. Frequency/Duration: 2 times a week for 6 weeks  GOALS: (Goals have been discussed and agreed upon with patient.)  Short Term Goals  1. Pt will be independent with HEP for strength and ROM  2.  Pt will perform Nu Step x 10 minutes for hip and knee AAROM   3. Pt will tolerate manual therapy/joint mobilizations to increase knee flexion ROM so pt can ambulate stairs and walk with a more normalized gait pattern  4. Pt will participate in LE strengthening exercises for hip, knee, ankle with weight as appropriate for 3 sets of 10  5. Pt will tolerate scar massage as appropriate to improve tissue mobility with patient to perform independently after education  6. Pt will participate in static and dynamic balance activities for 5 minutes to help improve proprioception and decrease risk of falls  7. Pt will wean from single point cane to no assistive device so patient can be more independent within the community  8. Pt will increase lower extremity functional scale by 5 points to show improvement in function   Long Term Goals  1. Pt will demonstrate >90 degrees of knee flexion ROM so patient can go up/down stairs  2. Pt will be able to perform sit to stand transfers independently with increased knee flexion and decreased use of upper extremities  3. Pt will ascend/descend 12 steps with reciprocal gait pattern and rail  4. Pt will be able to ambulate community level distances without an assistive device and no pain  5. Pt to increase lower extremity functional scale by 10 points to show improvement in areas of difficulty  Rehabilitation Potential For Stated Goals: Good              HISTORY:   History of Present Injury/Illness (Reason for Referral):  Patient reports having a total knee replacement on the 14th of November. Following the hospital went home with home health. Reports having issues with tight muscles and swelling. Has been icing his knee and put a heating pad on his IT band. Sitting for long periods of time causes stiffness. Does not have too much pain. Is using a cane for ambulation. Has not been taking the pain medicine.  Goes to healthy self twice a week (hasn't gone since surgery)  Past Medical History/Comorbidities:   Mr. 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 He also has no past medical history of Adverse effect of anesthesia; Aneurysm ([address] Asthma; Autoimmune disease ([address] Cancer ([address] Chronic obstructive pulmonary disease ([address] Diabetes ([address] Difficult intubation; Heart failure ([address] Liver disease; Malignant hyperthermia due to anesthesia; Nausea & vomiting; Pseudocholinesterase deficiency; Psychiatric disorder; PUD (peptic ulcer disease); Seizures ([facility] [address] Stroke [facility]); Thromboembolus ([address] Thyroid disease; Unspecified adverse effect of anesthesia; or Unspecified sleep apnea. Mr. Torsten Rob  has a past surgical history that includes hx bunionectomy (Bilateral); vascular surgery procedure unlist; hx carpal tunnel release (Bilateral); hx cataract removal (Bilateral); hx coronary artery bypass graft (10/14/2014); hx heart catheterization; hx colonoscopy; and hx knee replacement (Right, 2017). Social History/Living Environment:    Lives with wife. One story home with bonus room upstairs. No barriers at home. Prior Level of Function/Work/Activity:  Retired. Plays golf goes to healthy self  Dominant Side:         RIGHT  Other Clinical Tests:          X-rays   Previous Treatment Approaches:          Physical therapy  Personal Factors:          Sex:  male        Age:  76 y.o. Current Medications:    Current Outpatient Prescriptions:     HYDROmorphone (DILAUDID) 2 mg tablet, Take 1 Tab by mouth every four (4) hours as needed. Max Daily Amount: 12 mg., Disp: 40 Tab, Rfl: 0    metoprolol succinate (TOPROL XL) 25 mg XL tablet, Take 1 Tab by mouth every morning. Indications: unknown why takes, Disp: 7 Tab, Rfl: 0    amoxicillin (AMOXIL) 500 mg capsule, Take 500 mg by mouth. Prior to dental appointment, Disp: , Rfl:     clopidogrel (PLAVIX) 75 mg tab, Take 1 Tab by mouth every morning., Disp: 90 Tab, Rfl: 3    losartan-hydroCHLOROthiazide (HYZAAR) 100-12.5 mg per tablet, Take 1 Tab by mouth every morning. Indications: Hypertension, Disp: 90 Tab, Rfl: 3    rosuvastatin (CRESTOR) 20 mg tablet, Take 1 Tab by mouth nightly. Indications: hypercholesterolemia, Disp: 90 Tab, Rfl: 3    aspirin delayed-release 81 mg tablet, Take 81 mg by mouth nightly., Disp: , Rfl:     Glucosamine &Chondroit-MV-Min3 862-557-04-0.5 mg tab, Take  by mouth every morning. 2 tablets every morning   Stop seven days prior to surgery per anesthesia protocol., Disp: , Rfl:     ranitidine (ZANTAC) 150 mg tablet, Take 150 mg by mouth as needed for Indigestion. Non-formulary. Instructed to bring to the hospital on the DOS, in the original bottle, and give to nurse. Indications: gastroesophageal reflux disease, Disp: , Rfl:      Date Last Reviewed:  12/20/2017    EXAMINATION:   Observation/Orthostatic Postural Assessment:          Moderate amount of edema to LLE. Incision healing and intact. Dry flaky skin  Palpation:          Decreased scar tissue mobility. Decreased soft tissue mobility to IT band, quads, hamstrings.  Hypo mobile patella in all directions  ROM:          Right Knee ROM: WNL        Left Knee ROM: 4-90 degrees  Strength:     LE:  Hip Flexion 5/5 R, 4/5 L  Hip Extension 5/5 R, 4/5 L  Hip Abduction 5/5 R, 4/5 L  Knee Flexion 5/5 B  Knee Extension 5/5 B  Ankle Dorsiflexion 5/5 B  Ankle Plantarflexion 5/5 B  Special Tests:          n/a  Neurological Screen:        Myotomes:  intact        Dermatomes:  Intact (decreased to light touch around incision)  Functional Mobility:         Gait/Ambulation:  Patient ambulates with antalgic gait on left using single point cane: increased trunk sway, decrease knee flexion  Balance:          Single Leg Balance Right: 5 seconds        Single Leg Balance Left: 1 second   Body Structures Involved:  1. Joints  2. Muscles  3. Ligaments Body Functions Affected:  1. Sensory/Pain  2. Neuromusculoskeletal  3. Movement Related Activities and Participation Affected:  1. General Tasks and Demands  2. Mobility  3. Domestic Life  4. Community, Social and Civic Life   CLINICAL PRESENTATION:   CLINICAL DECISION MAKING:   Outcome Measure: Tool Used: Lower Extremity Functional Scale (LEFS)  Score:  Initial: 30/80 Most Recent: X/80 (Date: -- )   Interpretation of Score: 20 questions each scored on a 5 point scale with 0 representing \"extreme difficulty or unable to perform\" and 4 representing \"no difficulty\". The lower the score, the greater the functional disability. 80/80 represents no disability. Minimal detectable change is 9 points. Score 80 79-63 62-48 47-32 31-16 15-1 0   Modifier CH CI CJ CK CL CM CN     ? Mobility - Walking and Moving Around:     - CURRENT STATUS: CL - 60%-79% impaired, limited or restricted    - GOAL STATUS: CJ - 20%-39% impaired, limited or restricted    - D/C STATUS:  ---------------To be determined---------------      Medical Necessity:   · Patient demonstrates good rehab potential due to higher previous functional level. Reason for Services/Other Comments:  · Patient continues to require skilled intervention due to stiffness, edema, and decreased mobility. TREATMENT:   Pre-treatment Symptoms: Patient reports his calf feels tight  (In addition to Assessment/Re-Assessment sessions the following treatments were rendered)  THERAPEUTIC EXERCISE: (15 minutes):  Exercises per grid below to improve mobility, strength and balance.   Required minimal visual, verbal and manual cues to promote proper body alignment and promote proper body posture. Progressed resistance, range and repetitions as indicated. Date:  12-13-17 Date:  12-18-17 Date:  12-20-17   Activity/Exercise Parameters Parameters Parameters   Patient Education Discussed HEP and POC     Nu Step  Level 2 x 10 minutes Level 2 x 10 minutes   Shuttle Press  2 black bands, using for knee flexion stretch 10 sec holds x 10 3 black bands, using for knee flexion stretch 10 sec holds x 10   Gastroc Stretch        Tall Kneel                    Manual Therapy: (25 minutes) to improve soft tissue mobility and joint mobility  Grade III knee flexion mobilizations 3 x 30  Static hold knee flexion at end range 30 sec hold x 5  Deep Tissue Mobilization to left IT band, gastroc, anterior tibialis  Prone Knee Flexion Stretch 30 sec hold x 3    MODALITIES: (10 minutes) to decrease edema  Gameready, medium compression, 38 degrees    MedBridge Portal  Treatment/Session Assessment:  Patient's IT band has more mobility today. Gastroc and anterior leg muscles with increased tightness. · Pain: Initial:    0 Post Session:  unchanged ·   Compliance with Program/Exercises: compliant all of the time. · Recommendations/Intent for next treatment session: \"Next visit will focus on advancements to more challenging activities and reduction in assistance provided\".   Future Appointments  Date Time Provider Marisela Alan   12/27/2017 10:15 AM LIUDMILA [facility]a [facility]   1/3/2018 10:00 AM [facility]A [facility]   1/8/2018 10:00 AM [facility]A [facility]   1/10/2018 10:00 AM [facility]A [facility]   1/15/2018 10:00 AM [facility]A [facility]   1/17/2018 10:00 AM [facility]A [facility]   1/22/2018 10:00 AM LIUDMILA [facility]   1/24/2018 10:00 AM Elaz Molina DPT [facility]     Total Treatment Duration:  PT Patient Time In/Time Out  Time In: 1000  Time Out: 2142 [facility] [place], DPT

## 2017-12-27 ENCOUNTER — HOSPITAL ENCOUNTER (OUTPATIENT)
Dept: PHYSICAL THERAPY | Age: 77
Discharge: HOME | End: 2017-12-27
Payer: MEDICARE

## 2017-12-27 PROCEDURE — 97140 MANUAL THERAPY 1/> REGIONS: CPT

## 2017-12-27 PROCEDURE — 97110 THERAPEUTIC EXERCISES: CPT

## 2017-12-27 NOTE — PROGRESS NOTES
Alayna Desir  : 1940  [place] / [place] / Product Type: Medicare /  [address]  at [facility]  [address], [address], [place] [address]  [phone]   [phone]            OUTPATIENT PHYSICAL THERAPY:Daily Note 2017    ICD-10: Treatment Diagnosis: Presence of left artificial knee joint (D44.818), Stiffness of left knee, not elsewhere classified (T81.673)  Precautions/Allergies:   Poison ivy extract   Fall Risk Score: 2 (? 5 = High Risk)  MD Orders: Evaluate and Treat MEDICAL/REFERRING DIAGNOSIS:  Presence of left artificial knee joint [Z96.652]   DATE OF ONSET: Surgery 2017  REFERRING PHYSICIAN: Zari Jackson., *  RETURN PHYSICIAN APPOINTMENT:      INITIAL ASSESSMENT:  Mr. Leobardo Larry presents to physical therapy s/p left total knee replacement. Physical therapy evaluation demonstrates decreased active and passive ROM to Left knee, increased edema, decreased independence with ambulation, decreased strength and decreased balance. Patient would benefit from skilled physical therapy to address his deficits and maximize his function. PROBLEM LIST (Impacting functional limitations):  1. Decreased Strength  2. Decreased ADL/Functional Activities  3. Decreased Ambulation Ability/Technique  4. Decreased Balance  5. Increased Pain  6. Decreased Flexibility/Joint Mobility  7. Edema/Girth INTERVENTIONS PLANNED:  1. Balance Exercise  2. Cold  3. Family Education  4. Gait Training  5. Heat  6. Home Exercise Program (HEP)  7. Manual Therapy  8. Range of Motion (ROM)  9. Therapeutic Exercise/Strengthening   TREATMENT PLAN:  Effective Dates: 17 TO 3-13-18. Frequency/Duration: 2 times a week for 6 weeks  GOALS: (Goals have been discussed and agreed upon with patient.)  Short Term Goals  1. Pt will be independent with HEP for strength and ROM  2.  Pt will perform Nu Step x 10 minutes for hip and knee AAROM   3. Pt will tolerate manual therapy/joint mobilizations to increase knee flexion ROM so pt can ambulate stairs and walk with a more normalized gait pattern  4. Pt will participate in LE strengthening exercises for hip, knee, ankle with weight as appropriate for 3 sets of 10  5. Pt will tolerate scar massage as appropriate to improve tissue mobility with patient to perform independently after education  6. Pt will participate in static and dynamic balance activities for 5 minutes to help improve proprioception and decrease risk of falls  7. Pt will wean from single point cane to no assistive device so patient can be more independent within the community  8. Pt will increase lower extremity functional scale by 5 points to show improvement in function   Long Term Goals  1. Pt will demonstrate >90 degrees of knee flexion ROM so patient can go up/down stairs  2. Pt will be able to perform sit to stand transfers independently with increased knee flexion and decreased use of upper extremities  3. Pt will ascend/descend 12 steps with reciprocal gait pattern and rail  4. Pt will be able to ambulate community level distances without an assistive device and no pain  5. Pt to increase lower extremity functional scale by 10 points to show improvement in areas of difficulty  Rehabilitation Potential For Stated Goals: Good              HISTORY:   History of Present Injury/Illness (Reason for Referral):  Patient reports having a total knee replacement on the 14th of November. Following the hospital went home with home health. Reports having issues with tight muscles and swelling. Has been icing his knee and put a heating pad on his IT band. Sitting for long periods of time causes stiffness. Does not have too much pain. Is using a cane for ambulation. Has not been taking the pain medicine.  Goes to [facility] twice a week (hasn't gone since surgery)  Past Medical History/Comorbidities:   Mr. 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 He also has no past medical history of Adverse effect of anesthesia; Aneurysm ([address] Asthma; Autoimmune disease ([address] Cancer ([address] Chronic obstructive pulmonary disease ([address] Diabetes ([address] Difficult intubation; Heart failure ([address] Liver disease; Malignant hyperthermia due to anesthesia; Nausea & vomiting; Pseudocholinesterase deficiency; Psychiatric disorder; PUD (peptic ulcer disease); Seizures ([facility] Stroke [facility]); Thromboembolus ([address] Thyroid disease; Unspecified adverse effect of anesthesia; or Unspecified sleep apnea. Mr. Ronni Patel  has a past surgical history that includes hx bunionectomy (Bilateral); vascular surgery procedure unlist; hx carpal tunnel release (Bilateral); hx cataract removal (Bilateral); hx coronary artery bypass graft (10/14/2014); hx heart catheterization; hx colonoscopy; and hx knee replacement (Right, 2017). Social History/Living Environment:    Lives with wife. One story home with bonus room upstairs. No barriers at home. Prior Level of Function/Work/Activity:  Retired. Plays golf goes to [facility]  Dominant Side:         RIGHT  Other Clinical Tests:          X-rays   Previous Treatment Approaches:          Physical therapy  Personal Factors:          Sex:  male        Age:  76 y.o. Current Medications:    Current Outpatient Prescriptions:     HYDROmorphone (DILAUDID) 2 mg tablet, Take 1 Tab by mouth every four (4) hours as needed. Max Daily Amount: 12 mg., Disp: 40 Tab, Rfl: 0    metoprolol succinate (TOPROL XL) 25 mg XL tablet, Take 1 Tab by mouth every morning. Indications: unknown why takes, Disp: 7 Tab, Rfl: 0    amoxicillin (AMOXIL) 500 mg capsule, Take 500 mg by mouth. Prior to dental appointment, Disp: , Rfl:     clopidogrel (PLAVIX) 75 mg tab, Take 1 Tab by mouth every morning., Disp: 90 Tab, Rfl: 3    losartan-hydroCHLOROthiazide (HYZAAR) 100-12.5 mg per tablet, Take 1 Tab by mouth every morning. Indications: Hypertension, Disp: 90 Tab, Rfl: 3    rosuvastatin (CRESTOR) 20 mg tablet, Take 1 Tab by mouth nightly. Indications: hypercholesterolemia, Disp: 90 Tab, Rfl: 3    aspirin delayed-release 81 mg tablet, Take 81 mg by mouth nightly., Disp: , Rfl:     Glucosamine &Chondroit-MV-Min3 [id number] mg tab, Take  by mouth every morning. 2 tablets every morning   Stop seven days prior to surgery per anesthesia protocol., Disp: , Rfl:     ranitidine (ZANTAC) 150 mg tablet, Take 150 mg by mouth as needed for Indigestion. Non-formulary. Instructed to bring to the hospital on the DOS, in the original bottle, and give to nurse. Indications: gastroesophageal reflux disease, Disp: , Rfl:      Date Last Reviewed:  12/27/2017    EXAMINATION:   Observation/Orthostatic Postural Assessment:          Moderate amount of edema to LLE. Incision healing and intact. Dry flaky skin  Palpation:          Decreased scar tissue mobility. Decreased soft tissue mobility to IT band, quads, hamstrings.  Hypo mobile patella in all directions  ROM:          Right Knee ROM: WNL        Left Knee ROM: 4-90 degrees  Strength:     LE:  Hip Flexion 5/5 R, 4/5 L  Hip Extension 5/5 R, 4/5 L  Hip Abduction 5/5 R, 4/5 L  Knee Flexion 5/5 B  Knee Extension 5/5 B  Ankle Dorsiflexion 5/5 B  Ankle Plantarflexion 5/5 B  Special Tests:          n/a  Neurological Screen:        Myotomes:  intact        Dermatomes:  Intact (decreased to light touch around incision)  Functional Mobility:         Gait/Ambulation:  Patient ambulates with antalgic gait on left using single point cane: increased trunk sway, decrease knee flexion  Balance:          Single Leg Balance Right: 5 seconds        Single Leg Balance Left: 1 second   Body Structures Involved:  1. Joints  2. Muscles  3. Ligaments Body Functions Affected:  1. Sensory/Pain  2. Neuromusculoskeletal  3. Movement Related Activities and Participation Affected:  1. General Tasks and Demands  2. Mobility  3. Domestic Life  4. Community, Social and Civic Life   CLINICAL PRESENTATION:   CLINICAL DECISION MAKING:   Outcome Measure: Tool Used: Lower Extremity Functional Scale (LEFS)  Score:  Initial: 30/80 Most Recent: X/80 (Date: -- )   Interpretation of Score: 20 questions each scored on a 5 point scale with 0 representing \"extreme difficulty or unable to perform\" and 4 representing \"no difficulty\". The lower the score, the greater the functional disability. 80/80 represents no disability. Minimal detectable change is 9 points. Score 80 79-63 62-48 47-32 31-16 15-1 0   Modifier CH CI CJ CK CL CM CN     ? Mobility - Walking and Moving Around:     - CURRENT STATUS: CL - 60%-79% impaired, limited or restricted    - GOAL STATUS: CJ - 20%-39% impaired, limited or restricted    - D/C STATUS:  ---------------To be determined---------------      Medical Necessity:   · Patient demonstrates good rehab potential due to higher previous functional level. Reason for Services/Other Comments:  · Patient continues to require skilled intervention due to stiffness, edema, and decreased mobility. TREATMENT:   Pre-treatment Symptoms: Patient reports he has been stretching his knee  (In addition to Assessment/Re-Assessment sessions the following treatments were rendered)  THERAPEUTIC EXERCISE: (25 minutes):  Exercises per grid below to improve mobility, strength and balance.   Required minimal visual, verbal and manual cues to promote proper body alignment and promote proper body posture. Progressed resistance, range and repetitions as indicated. Date:  12-13-17 Date:  12-18-17 Date:  12-20-17 Date:  12-27-17   Activity/Exercise Parameters Parameters Parameters    Patient Education Discussed HEP and POC      Nu Step  Level 2 x 10 minutes Level 2 x 10 minutes Level 2 x 10 minutes   Shuttle Press  2 black bands, using for knee flexion stretch 10 sec holds x 10 3 black bands, using for knee flexion stretch 10 sec holds x 10 4 black bands double leg x 30    3 black bands single leg x 15   Gastroc Stretch         Tall Kneel         Side Step Ups    6\" step x 20     Step Ups      8\" step x 20   Mini Squats    x20 against wall     Manual Therapy: (15 minutes) to improve soft tissue mobility and joint mobility  Static hold knee flexion at end range 30 sec hold x 5  Prone Knee Flexion Stretch 30 sec hold x 3    MODALITIES: (10 minutes) to decrease edema  Gameready, medium compression, 38 degrees    MedBridge Portal  Treatment/Session Assessment:  Patient's knee measured 115 degrees today. Demonstrates less swelling and improved mobility. · Pain: Initial:    0 Post Session:  unchanged ·   Compliance with Program/Exercises: compliant all of the time. · Recommendations/Intent for next treatment session: \"Next visit will focus on advancements to more challenging activities and reduction in assistance provided\".   Future Appointments  Date Time Provider Marisela Alan   1/3/2018 10:00 AM Glorianne Band, DPT [facility]   1/8/2018 10:00 AM Glorianne Band, DPT [facility]   1/10/2018 10:00 AM Glorianne Band, DPT [facility]   1/15/2018 10:00 AM Glorianne Band, DPT [facility]   1/17/2018 10:00 AM Glorianne Band, DPT [facility]   1/22/2018 10:00 AM Glorianne Band, DPT [facility]   1/24/2018 10:00 AM Glorianne Band, DPT [facility]     Total Treatment Duration:  PT Patient Time In/Time Out  Time In: 1015  Time Out: 205 [facility], DPT

## 2018-01-03 ENCOUNTER — HOSPITAL ENCOUNTER (OUTPATIENT)
Dept: PHYSICAL THERAPY | Age: 78
Discharge: HOME | End: 2018-01-03
Payer: MEDICARE

## 2018-01-03 PROCEDURE — 97140 MANUAL THERAPY 1/> REGIONS: CPT

## 2018-01-03 PROCEDURE — 97110 THERAPEUTIC EXERCISES: CPT

## 2018-01-03 NOTE — PROGRESS NOTES
Edil Desir  : 1940  [place] / [place] / Product Type: Medicare /  [address]  at [facility]  [address], [address], [place] [address]  [phone]   [phone]            OUTPATIENT PHYSICAL THERAPY:Daily Note 1/3/2018    ICD-10: Treatment Diagnosis: Presence of left artificial knee joint (H12.054), Stiffness of left knee, not elsewhere classified (E22.230)  Precautions/Allergies:   Poison ivy extract   Fall Risk Score: 2 (? 5 = High Risk)  MD Orders: Evaluate and Treat MEDICAL/REFERRING DIAGNOSIS:  Presence of left artificial knee joint [Z96.652]   DATE OF ONSET: Surgery 2017  REFERRING PHYSICIAN: Isadora Walker., *  RETURN PHYSICIAN APPOINTMENT:      INITIAL ASSESSMENT:  Mr. Althea Wilosn presents to physical therapy s/p left total knee replacement. Physical therapy evaluation demonstrates decreased active and passive ROM to Left knee, increased edema, decreased independence with ambulation, decreased strength and decreased balance. Patient would benefit from skilled physical therapy to address his deficits and maximize his function. PROBLEM LIST (Impacting functional limitations):  1. Decreased Strength  2. Decreased ADL/Functional Activities  3. Decreased Ambulation Ability/Technique  4. Decreased Balance  5. Increased Pain  6. Decreased Flexibility/Joint Mobility  7. Edema/Girth INTERVENTIONS PLANNED:  1. Balance Exercise  2. Cold  3. Family Education  4. Gait Training  5. Heat  6. Home Exercise Program (HEP)  7. Manual Therapy  8. Range of Motion (ROM)  9. Therapeutic Exercise/Strengthening   TREATMENT PLAN:  Effective Dates: 17 TO 3-13-18. Frequency/Duration: 2 times a week for 6 weeks  GOALS: (Goals have been discussed and agreed upon with patient.)  Short Term Goals  1. Pt will be independent with HEP for strength and ROM  2.  Pt will perform Nu Step x 10 minutes for hip and knee AAROM   3. Pt will tolerate manual therapy/joint mobilizations to increase knee flexion ROM so pt can ambulate stairs and walk with a more normalized gait pattern  4. Pt will participate in LE strengthening exercises for hip, knee, ankle with weight as appropriate for 3 sets of 10  5. Pt will tolerate scar massage as appropriate to improve tissue mobility with patient to perform independently after education  6. Pt will participate in static and dynamic balance activities for 5 minutes to help improve proprioception and decrease risk of falls  7. Pt will wean from single point cane to no assistive device so patient can be more independent within the community  8. Pt will increase lower extremity functional scale by 5 points to show improvement in function   Long Term Goals  1. Pt will demonstrate >90 degrees of knee flexion ROM so patient can go up/down stairs  2. Pt will be able to perform sit to stand transfers independently with increased knee flexion and decreased use of upper extremities  3. Pt will ascend/descend 12 steps with reciprocal gait pattern and rail  4. Pt will be able to ambulate community level distances without an assistive device and no pain  5. Pt to increase lower extremity functional scale by 10 points to show improvement in areas of difficulty  Rehabilitation Potential For Stated Goals: Good              HISTORY:   History of Present Injury/Illness (Reason for Referral):  Patient reports having a total knee replacement on the 14th of November. Following the hospital went home with home health. Reports having issues with tight muscles and swelling. Has been icing his knee and put a heating pad on his IT band. Sitting for long periods of time causes stiffness. Does not have too much pain. Is using a cane for ambulation. Has not been taking the pain medicine.  Goes to [facility] twice a week (hasn't gone since surgery)  Past Medical History/Comorbidities:   Mr. 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 He also has no past medical history of Adverse effect of anesthesia; Aneurysm ([address] Asthma; Autoimmune disease ([address] Cancer ([address] Chronic obstructive pulmonary disease ([address] Diabetes ([address] Difficult intubation; Heart failure ([address] Liver disease; Malignant hyperthermia due to anesthesia; Nausea & vomiting; Pseudocholinesterase deficiency; Psychiatric disorder; PUD (peptic ulcer disease); Seizures ([facility] [address] Stroke [facility]); Thromboembolus ([address] Thyroid disease; Unspecified adverse effect of anesthesia; or Unspecified sleep apnea. Mr. Tony Romero  has a past surgical history that includes hx bunionectomy (Bilateral); vascular surgery procedure unlist; hx carpal tunnel release (Bilateral); hx cataract removal (Bilateral); hx coronary artery bypass graft (10/14/2014); hx heart catheterization; hx colonoscopy; and hx knee replacement (Right, 2017). Social History/Living Environment:    Lives with wife. One story home with bonus room upstairs. No barriers at home. Prior Level of Function/Work/Activity:  Retired. Plays golf goes to [facility]  Dominant Side:         RIGHT  Other Clinical Tests:          X-rays   Previous Treatment Approaches:          Physical therapy  Personal Factors:          Sex:  male        Age:  76 y.o. Current Medications:    Current Outpatient Prescriptions:     HYDROmorphone (DILAUDID) 2 mg tablet, Take 1 Tab by mouth every four (4) hours as needed. Max Daily Amount: 12 mg., Disp: 40 Tab, Rfl: 0    metoprolol succinate (TOPROL XL) 25 mg XL tablet, Take 1 Tab by mouth every morning. Indications: unknown why takes, Disp: 7 Tab, Rfl: 0    amoxicillin (AMOXIL) 500 mg capsule, Take 500 mg by mouth. Prior to dental appointment, Disp: , Rfl:     clopidogrel (PLAVIX) 75 mg tab, Take 1 Tab by mouth every morning., Disp: 90 Tab, Rfl: 3    losartan-hydroCHLOROthiazide (HYZAAR) 100-12.5 mg per tablet, Take 1 Tab by mouth every morning. Indications: Hypertension, Disp: 90 Tab, Rfl: 3    rosuvastatin (CRESTOR) 20 mg tablet, Take 1 Tab by mouth nightly. Indications: hypercholesterolemia, Disp: 90 Tab, Rfl: 3    aspirin delayed-release 81 mg tablet, Take 81 mg by mouth nightly., Disp: , Rfl:     Glucosamine &Chondroit-MV-Min3 339-152-34-0.5 mg tab, Take  by mouth every morning. 2 tablets every morning   Stop seven days prior to surgery per anesthesia protocol., Disp: , Rfl:     ranitidine (ZANTAC) 150 mg tablet, Take 150 mg by mouth as needed for Indigestion. Non-formulary. Instructed to bring to the hospital on the DOS, in the original bottle, and give to nurse. Indications: gastroesophageal reflux disease, Disp: , Rfl:      Date Last Reviewed:  1/3/2018    EXAMINATION:   Observation/Orthostatic Postural Assessment:          Moderate amount of edema to LLE. Incision healing and intact. Dry flaky skin  Palpation:          Decreased scar tissue mobility. Decreased soft tissue mobility to IT band, quads, hamstrings.  Hypo mobile patella in all directions  ROM:          Right Knee ROM: WNL        Left Knee ROM: 4-90 degrees  Strength:     LE:  Hip Flexion 5/5 R, 4/5 L  Hip Extension 5/5 R, 4/5 L  Hip Abduction 5/5 R, 4/5 L  Knee Flexion 5/5 B  Knee Extension 5/5 B  Ankle Dorsiflexion 5/5 B  Ankle Plantarflexion 5/5 B  Special Tests:          n/a  Neurological Screen:        Myotomes:  intact        Dermatomes:  Intact (decreased to light touch around incision)  Functional Mobility:         Gait/Ambulation:  Patient ambulates with antalgic gait on left using single point cane: increased trunk sway, decrease knee flexion  Balance:          Single Leg Balance Right: 5 seconds        Single Leg Balance Left: 1 second   Body Structures Involved:  1. Joints  2. Muscles  3. Ligaments Body Functions Affected:  1. Sensory/Pain  2. Neuromusculoskeletal  3. Movement Related Activities and Participation Affected:  1. General Tasks and Demands  2. Mobility  3. Domestic Life  4. Community, Social and Civic Life   CLINICAL PRESENTATION:   CLINICAL DECISION MAKING:   Outcome Measure: Tool Used: Lower Extremity Functional Scale (LEFS)  Score:  Initial: 30/80 Most Recent: X/80 (Date: -- )   Interpretation of Score: 20 questions each scored on a 5 point scale with 0 representing \"extreme difficulty or unable to perform\" and 4 representing \"no difficulty\". The lower the score, the greater the functional disability. 80/80 represents no disability. Minimal detectable change is 9 points. Score 80 79-63 62-48 47-32 31-16 15-1 0   Modifier CH CI CJ CK CL CM CN     ? Mobility - Walking and Moving Around:     - CURRENT STATUS: CL - 60%-79% impaired, limited or restricted    - GOAL STATUS: CJ - 20%-39% impaired, limited or restricted    - D/C STATUS:  ---------------To be determined---------------      Medical Necessity:   · Patient demonstrates good rehab potential due to higher previous functional level. Reason for Services/Other Comments:  · Patient continues to require skilled intervention due to stiffness, edema, and decreased mobility. TREATMENT:   Pre-treatment Symptoms: Patient reports his knee gets stiff after sitting. Also reports feeling like a weight is on his leg when he wakes up in the morning.    (In addition to Assessment/Re-Assessment sessions the following treatments were rendered)  THERAPEUTIC EXERCISE: (25 minutes):  Exercises per grid below to improve mobility, strength and balance. Required minimal visual, verbal and manual cues to promote proper body alignment and promote proper body posture. Progressed resistance, range and repetitions as indicated. Date:  12-13-17 Date:  12-18-17 Date:  12-20-17 Date:  12-27-17 Date: 1-3-18   Activity/Exercise Parameters Parameters Parameters     Patient Education Discussed HEP and POC       Nu Step  Level 2 x 10 minutes Level 2 x 10 minutes Level 2 x 10 minutes Level 2 x 10 minutes    Shuttle Press  2 black bands, using for knee flexion stretch 10 sec holds x 10 3 black bands, using for knee flexion stretch 10 sec holds x 10 4 black bands double leg x 30    3 black bands single leg x 15 5 black bands double leg x 30    3 black bands single leg x 15     Gastroc Stretch          Tall Kneel          Side Step Ups    6\" step x 20   8\" step x 15   Step Ups      8\" step x 20 8\" step x 20    Mini Squats    x20 against wall x20 against wall   Single Leg Step Down     4\" step x 15      Manual Therapy: (15 minutes) to improve soft tissue mobility and joint mobility  Static hold knee flexion at end range 30 sec hold x 5  Prone Knee Flexion Stretch 30 sec hold x 3    MODALITIES: (10 minutes) to decrease edema  Gameready, medium compression, 38 degrees    MedBridge Portal  Treatment/Session Assessment:  Patient's knee measured 110 degrees prior to stretching and 113 degrees post stretching. Increased resistance with leg press and patient able to complete with ease. With 8\" step for step ups he compensates using hip and trunk movements. · Pain: Initial:    0 Post Session:  unchanged ·   Compliance with Program/Exercises: compliant all of the time. · Recommendations/Intent for next treatment session: \"Next visit will focus on advancements to more challenging activities and reduction in assistance provided\".   Future Appointments  Date Time Provider Marisela Alan   1/8/2018 10:00 AM Kaushal Hinojosa DPT [facility]   1/10/2018 10:00 AM Baldemar Began, DPT [facility]   1/15/2018 10:00 AM Baldemar Began, DPT [facility]   1/17/2018 10:00 AM Baldemar Began, DPT [facility]   1/22/2018 10:00 AM Baldemar Began, DPT [facility]   1/24/2018 10:00 AM Baldemar Began, DPT [facility]     Total Treatment Duration:  PT Patient Time In/Time Out  Time In: 1000  [facility]: [facility]

## 2018-01-08 ENCOUNTER — HOSPITAL ENCOUNTER (OUTPATIENT)
Dept: PHYSICAL THERAPY | Age: 78
Discharge: HOME | End: 2018-01-08
Payer: MEDICARE

## 2018-01-08 PROCEDURE — 97016 VASOPNEUMATIC DEVICE THERAPY: CPT

## 2018-01-08 PROCEDURE — 97140 MANUAL THERAPY 1/> REGIONS: CPT

## 2018-01-08 PROCEDURE — 97110 THERAPEUTIC EXERCISES: CPT

## 2018-01-08 NOTE — PROGRESS NOTES
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 5 = High Risk)  MD Orders: Evaluate and Treat MEDICAL/REFERRING DIAGNOSIS:  Presence of left artificial knee joint [Z96.652]   DATE OF ONSET: Surgery 2017  REFERRING PHYSICIAN: Michael Bustillos., *  RETURN PHYSICIAN APPOINTMENT:      INITIAL ASSESSMENT:  Mr. Lo Mercedes presents to physical therapy s/p left total knee replacement. Physical therapy evaluation demonstrates decreased active and passive ROM to Left knee, increased edema, decreased independence with ambulation, decreased strength and decreased balance. Patient would benefit from skilled physical therapy to address his deficits and maximize his function. PROBLEM LIST (Impacting functional limitations):  1. Decreased Strength  2. Decreased ADL/Functional Activities  3. Decreased Ambulation Ability/Technique  4. Decreased Balance  5. Increased Pain  6. Decreased Flexibility/Joint Mobility  7. Edema/Girth INTERVENTIONS PLANNED:  1. Balance Exercise  2. Cold  3. Family Education  4. Gait Training  5. Heat  6. Home Exercise Program (HEP)  7. Manual Therapy  8. Range of Motion (ROM)  9. Therapeutic Exercise/Strengthening   TREATMENT PLAN:  Effective Dates: 17 TO 3-13-18. Frequency/Duration: 2 times a week for 6 weeks  GOALS: (Goals have been discussed and agreed upon with patient.)  Short Term Goals  1. Pt will be independent with HEP for strength and ROM  2.  Pt will perform Nu Step x 10 minutes for hip and knee AAROM   3. Pt will tolerate manual therapy/joint mobilizations to increase knee flexion ROM so pt can ambulate stairs and walk with a more normalized gait pattern  4. Pt will participate in LE strengthening exercises for hip, knee, ankle with weight as appropriate for 3 sets of 10  5. Pt will tolerate scar massage as appropriate to improve tissue mobility with patient to perform independently after education  6. Pt will participate in static and dynamic balance activities for 5 minutes to help improve proprioception and decrease risk of falls  7. Pt will wean from single point cane to no assistive device so patient can be more independent within the community  8. Pt will increase lower extremity functional scale by 5 points to show improvement in function   Long Term Goals  1. Pt will demonstrate >90 degrees of knee flexion ROM so patient can go up/down stairs  2. Pt will be able to perform sit to stand transfers independently with increased knee flexion and decreased use of upper extremities  3. Pt will ascend/descend 12 steps with reciprocal gait pattern and rail  4. Pt will be able to ambulate community level distances without an assistive device and no pain  5. Pt to increase lower extremity functional scale by 10 points to show improvement in areas of difficulty  Rehabilitation Potential For Stated Goals: Good              HISTORY:   History of Present Injury/Illness (Reason for Referral):  Patient reports having a total knee replacement on the 14th of November. Following the hospital went home with home health. Reports having issues with tight muscles and swelling. Has been icing his knee and put a heating pad on his IT band. Sitting for long periods of time causes stiffness. Does not have too much pain. Is using a cane for ambulation. Has not been taking the pain medicine.  Goes to [facility] twice a week (hasn't gone since surgery)  Past Medical History/Comorbidities:   Mr. 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 He also has no past medical history of Adverse effect of anesthesia; Aneurysm ([address] Asthma; Autoimmune disease ([address] Cancer ([address] Chronic obstructive pulmonary disease ([address] Diabetes ([address] Difficult intubation; Heart failure ([address] Liver disease; Malignant hyperthermia due to anesthesia; Nausea & vomiting; Pseudocholinesterase deficiency; Psychiatric disorder; PUD (peptic ulcer disease); Seizures ([facility] Stroke [facility]); Thromboembolus ([address] Thyroid disease; Unspecified adverse effect of anesthesia; or Unspecified sleep apnea. Mr. Camacho Mckay  has a past surgical history that includes hx bunionectomy (Bilateral); vascular surgery procedure unlist; hx carpal tunnel release (Bilateral); hx cataract removal (Bilateral); hx coronary artery bypass graft (10/14/2014); hx heart catheterization; hx colonoscopy; and hx knee replacement (Right, 2017). Social History/Living Environment:    Lives with wife. One story home with bonus room upstairs. No barriers at home. Prior Level of Function/Work/Activity:  Retired. Plays golf goes to [facility]  Dominant Side:         RIGHT  Other Clinical Tests:          X-rays   Previous Treatment Approaches:          Physical therapy  Personal Factors:          Sex:  male        Age:  76 y.o. Current Medications:    Current Outpatient Prescriptions:     HYDROmorphone (DILAUDID) 2 mg tablet, Take 1 Tab by mouth every four (4) hours as needed. Max Daily Amount: 12 mg., Disp: 40 Tab, Rfl: 0    metoprolol succinate (TOPROL XL) 25 mg XL tablet, Take 1 Tab by mouth every morning. Indications: unknown why takes, Disp: 7 Tab, Rfl: 0    amoxicillin (AMOXIL) 500 mg capsule, Take 500 mg by mouth. Prior to dental appointment, Disp: , Rfl:     clopidogrel (PLAVIX) 75 mg tab, Take 1 Tab by mouth every morning., Disp: 90 Tab, Rfl: 3    losartan-hydroCHLOROthiazide (HYZAAR) 100-12.5 mg per tablet, Take 1 Tab by mouth every morning. Indications: Hypertension, Disp: 90 Tab, Rfl: 3    rosuvastatin (CRESTOR) 20 mg tablet, Take 1 Tab by mouth nightly. Indications: hypercholesterolemia, Disp: 90 Tab, Rfl: 3    aspirin delayed-release 81 mg tablet, Take 81 mg by mouth nightly., Disp: , Rfl:     Glucosamine &Chondroit-MV-Min3 [id number] mg tab, Take  by mouth every morning. 2 tablets every morning   Stop seven days prior to surgery per anesthesia protocol., Disp: , Rfl:     ranitidine (ZANTAC) 150 mg tablet, Take 150 mg by mouth as needed for Indigestion. Non-formulary. Instructed to bring to the hospital on the DOS, in the original bottle, and give to nurse. Indications: gastroesophageal reflux disease, Disp: , Rfl:      Date Last Reviewed:  1/8/2018    EXAMINATION:   Observation/Orthostatic Postural Assessment:          Moderate amount of edema to LLE. Incision healing and intact. Dry flaky skin  Palpation:          Decreased scar tissue mobility. Decreased soft tissue mobility to IT band, quads, hamstrings.  Hypo mobile patella in all directions  ROM:          Right Knee ROM: WNL        Left Knee ROM: 4-90 degrees  Strength:     LE:  Hip Flexion 5/5 R, 4/5 L  Hip Extension 5/5 R, 4/5 L  Hip Abduction 5/5 R, 4/5 L  Knee Flexion 5/5 B  Knee Extension 5/5 B  Ankle Dorsiflexion 5/5 B  Ankle Plantarflexion 5/5 B  Special Tests:          n/a  Neurological Screen:        Myotomes:  intact        Dermatomes:  Intact (decreased to light touch around incision)  Functional Mobility:         Gait/Ambulation:  Patient ambulates with antalgic gait on left using single point cane: increased trunk sway, decrease knee flexion  Balance:          Single Leg Balance Right: 5 seconds        Single Leg Balance Left: 1 second   Body Structures Involved:  1. Joints  2. Muscles  3. Ligaments Body Functions Affected:  1. Sensory/Pain  2. Neuromusculoskeletal  3. Movement Related Activities and Participation Affected:  1. General Tasks and Demands  2. Mobility  3. Domestic Life  4. Community, Social and Civic Life   CLINICAL PRESENTATION:   CLINICAL DECISION MAKING:   Outcome Measure: Tool Used: Lower Extremity Functional Scale (LEFS)  Score:  Initial: 30/80 Most Recent: X/80 (Date: -- )   Interpretation of Score: 20 questions each scored on a 5 point scale with 0 representing \"extreme difficulty or unable to perform\" and 4 representing \"no difficulty\". The lower the score, the greater the functional disability. 80/80 represents no disability. Minimal detectable change is 9 points. Score 80 79-63 62-48 47-32 31-16 15-1 0   Modifier CH CI CJ CK CL CM CN     ? Mobility - Walking and Moving Around:     - CURRENT STATUS: CL - 60%-79% impaired, limited or restricted    - GOAL STATUS: CJ - 20%-39% impaired, limited or restricted    - D/C STATUS:  ---------------To be determined---------------      Medical Necessity:   · Patient demonstrates good rehab potential due to higher previous functional level. Reason for Services/Other Comments:  · Patient continues to require skilled intervention due to stiffness, edema, and decreased mobility. TREATMENT:   Pre-treatment Symptoms: Patient reports his knee gets stiff after sitting. Also reports feeling like a weight is on his leg when he wakes up in the morning.    (In addition to Assessment/Re-Assessment sessions the following treatments were rendered)  THERAPEUTIC EXERCISE: (30 minutes):  Exercises per grid below to improve mobility, strength and balance. Required minimal visual, verbal and manual cues to promote proper body alignment and promote proper body posture. Progressed resistance, range and repetitions as indicated. Date:  12-13-17 Date:  12-18-17 Date:  12-20-17 Date:  12-27-17 Date: 1-3-18 Date:  1/8/18   Activity/Exercise Parameters Parameters Parameters   Parameters   Patient Education Discussed HEP and POC        Nu Step  Level 2 x 10 minutes Level 2 x 10 minutes Level 2 x 10 minutes Level 2 x 10 minutes  Level 2, 10 mins   Shuttle Press  2 black bands, using for knee flexion stretch 10 sec holds x 10 3 black bands, using for knee flexion stretch 10 sec holds x 10 4 black bands double leg x 30    3 black bands single leg x 15 5 black bands double leg x 30    3 black bands single leg x 15   5 black bands double leg x30  3 black bands single leg x15   Gastroc Stretch        3x20 sec L LE   Tall Kneel           Side Step Ups    6\" step x 20   8\" step x 15 8\" step x15   Step Ups      8\" step x 20 8\" step x 20  8\" step x20   Mini Squats    x20 against wall x20 against wall x20 against wall   Single Leg Step Down     4\" step x 15  Heel taps on 1 inch step x15; 15x from 4 in going down for ROM     Manual Therapy: (10 minutes) to improve soft tissue mobility and joint mobility  Static hold knee flexion at end range 30 sec hold x 5  Prone Knee Flexion Stretch 30 sec hold x 3    MODALITIES: (15  minutes) to decrease edema  Gameready, medium compression, 38 degrees    MedBridge Portal  Treatment/Session Assessment:  Patient progressed w/ therex this session. He displayed weakness in his quads w/ heel taps and will benefit from continued ROM work in the flexion direction. · Pain: Initial:    0 Post Session:  unchanged ·   Compliance with Program/Exercises: compliant all of the time. · Recommendations/Intent for next treatment session:  \"Next visit will focus on advancements to more challenging activities and reduction in assistance provided\".   Future Appointments  Date Time Provider Marisela Dayanna   1/10/2018 10:00 AM Tayo Shepard, DPT [facility]   1/15/2018 10:00 AM Tayo Shepard, DPT [facility]   1/17/2018 10:00 AM Tayo Shepard, DPT [facility]   1/22/2018 10:00 AM Tayo Shepard, DPT [facility]   1/24/2018 10:00 AM Tayo Shepard, DPT [facility]     Total Treatment Duration:  PT Patient Time In/Time Out  Time In: 1000  Time Out: [address], PT

## 2018-01-10 ENCOUNTER — HOSPITAL ENCOUNTER (OUTPATIENT)
Dept: PHYSICAL THERAPY | Age: 78
Discharge: HOME | End: 2018-01-10
Payer: MEDICARE

## 2018-01-10 PROCEDURE — 97110 THERAPEUTIC EXERCISES: CPT

## 2018-01-10 PROCEDURE — 97140 MANUAL THERAPY 1/> REGIONS: CPT

## 2018-01-10 NOTE — PROGRESS NOTES
Dafne Desir  : 1940  [place] / [place] / Product Type: Medicare /  [address]  at [address]  [address], [address], [place]  [phone]   [phone]            OUTPATIENT PHYSICAL THERAPY:Daily Note 1/10/2018    ICD-10: Treatment Diagnosis: Presence of left artificial knee joint (G42.408), Stiffness of left knee, not elsewhere classified (X24.035)  Precautions/Allergies:   Poison ivy extract   Fall Risk Score: 2 (? 5 = High Risk)  MD Orders: Evaluate and Treat MEDICAL/REFERRING DIAGNOSIS:  Presence of left artificial knee joint [Z96.652]   DATE OF ONSET: Surgery 2017  REFERRING PHYSICIAN: Kenneth Hines., *  RETURN PHYSICIAN APPOINTMENT:      INITIAL ASSESSMENT:  Mr. Junior Chu presents to physical therapy s/p left total knee replacement. Physical therapy evaluation demonstrates decreased active and passive ROM to Left knee, increased edema, decreased independence with ambulation, decreased strength and decreased balance. Patient would benefit from skilled physical therapy to address his deficits and maximize his function. PROBLEM LIST (Impacting functional limitations):  1. Decreased Strength  2. Decreased ADL/Functional Activities  3. Decreased Ambulation Ability/Technique  4. Decreased Balance  5. Increased Pain  6. Decreased Flexibility/Joint Mobility  7. Edema/Girth INTERVENTIONS PLANNED:  1. Balance Exercise  2. Cold  3. Family Education  4. Gait Training  5. Heat  6. Home Exercise Program (HEP)  7. Manual Therapy  8. Range of Motion (ROM)  9. Therapeutic Exercise/Strengthening   TREATMENT PLAN:  Effective Dates: 17 TO 3-13-18. Frequency/Duration: 2 times a week for 6 weeks  GOALS: (Goals have been discussed and agreed upon with patient.)  Short Term Goals  1. Pt will be independent with HEP for strength and ROM  2.  Pt will perform Nu Step x 10 minutes for hip and knee AAROM   3. Pt will tolerate manual therapy/joint mobilizations to increase knee flexion ROM so pt can ambulate stairs and walk with a more normalized gait pattern  4. Pt will participate in LE strengthening exercises for hip, knee, ankle with weight as appropriate for 3 sets of 10  5. Pt will tolerate scar massage as appropriate to improve tissue mobility with patient to perform independently after education  6. Pt will participate in static and dynamic balance activities for 5 minutes to help improve proprioception and decrease risk of falls  7. Pt will wean from single point cane to no assistive device so patient can be more independent within the community  8. Pt will increase lower extremity functional scale by 5 points to show improvement in function   Long Term Goals  1. Pt will demonstrate >90 degrees of knee flexion ROM so patient can go up/down stairs  2. Pt will be able to perform sit to stand transfers independently with increased knee flexion and decreased use of upper extremities  3. Pt will ascend/descend 12 steps with reciprocal gait pattern and rail  4. Pt will be able to ambulate community level distances without an assistive device and no pain  5. Pt to increase lower extremity functional scale by 10 points to show improvement in areas of difficulty  Rehabilitation Potential For Stated Goals: Good              HISTORY:   History of Present Injury/Illness (Reason for Referral):  Patient reports having a total knee replacement on the 14th of November. Following the hospital went home with home health. Reports having issues with tight muscles and swelling. Has been icing his knee and put a heating pad on his IT band. Sitting for long periods of time causes stiffness. Does not have too much pain. Is using a cane for ambulation. Has not been taking the pain medicine.  Goes to [facility] twice a week (hasn't gone since surgery)  Past Medical History/Comorbidities:   Mr. 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 He also has no past medical history of Adverse effect of anesthesia; Aneurysm ([address] Asthma; Autoimmune disease ([address] Cancer ([address] Chronic obstructive pulmonary disease ([address] Diabetes ([address] Difficult intubation; Heart failure ([address] Liver disease; Malignant hyperthermia due to anesthesia; Nausea & vomiting; Pseudocholinesterase deficiency; Psychiatric disorder; PUD (peptic ulcer disease); Seizures ([facility] [address] Stroke [facility]); Thromboembolus ([address] Thyroid disease; Unspecified adverse effect of anesthesia; or Unspecified sleep apnea. Mr. Nolberto Fernandez  has a past surgical history that includes hx bunionectomy (Bilateral); vascular surgery procedure unlist; hx carpal tunnel release (Bilateral); hx cataract removal (Bilateral); hx coronary artery bypass graft (10/14/2014); hx heart catheterization; hx colonoscopy; and hx knee replacement (Right, 2017). Social History/Living Environment:    Lives with wife. One story home with bonus room upstairs. No barriers at home. Prior Level of Function/Work/Activity:  Retired. Plays golf goes to [facility]  Dominant Side:         RIGHT  Other Clinical Tests:          X-rays   Previous Treatment Approaches:          Physical therapy  Personal Factors:          Sex:  male        Age:  76 y.o. Current Medications:    Current Outpatient Prescriptions:     HYDROmorphone (DILAUDID) 2 mg tablet, Take 1 Tab by mouth every four (4) hours as needed. Max Daily Amount: 12 mg., Disp: 40 Tab, Rfl: 0    metoprolol succinate (TOPROL XL) 25 mg XL tablet, Take 1 Tab by mouth every morning. Indications: unknown why takes, Disp: 7 Tab, Rfl: 0    amoxicillin (AMOXIL) 500 mg capsule, Take 500 mg by mouth. Prior to dental appointment, Disp: , Rfl:     clopidogrel (PLAVIX) 75 mg tab, Take 1 Tab by mouth every morning., Disp: 90 Tab, Rfl: 3    losartan-hydroCHLOROthiazide (HYZAAR) 100-12.5 mg per tablet, Take 1 Tab by mouth every morning. Indications: Hypertension, Disp: 90 Tab, Rfl: 3    rosuvastatin (CRESTOR) 20 mg tablet, Take 1 Tab by mouth nightly. Indications: hypercholesterolemia, Disp: 90 Tab, Rfl: 3    aspirin delayed-release 81 mg tablet, Take 81 mg by mouth nightly., Disp: , Rfl:     Glucosamine &Chondroit-MV-Min3 [id number] mg tab, Take  by mouth every morning. 2 tablets every morning   Stop seven days prior to surgery per anesthesia protocol., Disp: , Rfl:     ranitidine (ZANTAC) 150 mg tablet, Take 150 mg by mouth as needed for Indigestion. Non-formulary. Instructed to bring to the hospital on the DOS, in the original bottle, and give to nurse. Indications: gastroesophageal reflux disease, Disp: , Rfl:      Date Last Reviewed:  1/10/2018    EXAMINATION:   Observation/Orthostatic Postural Assessment:          Moderate amount of edema to LLE. Incision healing and intact. Dry flaky skin  Palpation:          Decreased scar tissue mobility. Decreased soft tissue mobility to IT band, quads, hamstrings.  Hypo mobile patella in all directions  ROM:          Right Knee ROM: WNL        Left Knee ROM: 4-90 degrees  Strength:     LE:  Hip Flexion 5/5 R, 4/5 L  Hip Extension 5/5 R, 4/5 L  Hip Abduction 5/5 R, 4/5 L  Knee Flexion 5/5 B  Knee Extension 5/5 B  Ankle Dorsiflexion 5/5 B  Ankle Plantarflexion 5/5 B  Special Tests:          n/a  Neurological Screen:        Myotomes:  intact        Dermatomes:  Intact (decreased to light touch around incision)  Functional Mobility:         Gait/Ambulation:  Patient ambulates with antalgic gait on left using single point cane: increased trunk sway, decrease knee flexion  Balance:          Single Leg Balance Right: 5 seconds        Single Leg Balance Left: 1 second   Body Structures Involved:  1. Joints  2. Muscles  3. Ligaments Body Functions Affected:  1. Sensory/Pain  2. Neuromusculoskeletal  3. Movement Related Activities and Participation Affected:  1. General Tasks and Demands  2. Mobility  3. Domestic Life  4. Community, Social and Civic Life   CLINICAL PRESENTATION:   CLINICAL DECISION MAKING:   Outcome Measure: Tool Used: Lower Extremity Functional Scale (LEFS)  Score:  Initial: 30/80 Most Recent: X/80 (Date: -- )   Interpretation of Score: 20 questions each scored on a 5 point scale with 0 representing \"extreme difficulty or unable to perform\" and 4 representing \"no difficulty\". The lower the score, the greater the functional disability. 80/80 represents no disability. Minimal detectable change is 9 points. Score 80 79-63 62-48 47-32 31-16 15-1 0   Modifier CH CI CJ CK CL CM CN     ? Mobility - Walking and Moving Around:     - CURRENT STATUS: CL - 60%-79% impaired, limited or restricted    - GOAL STATUS: CJ - 20%-39% impaired, limited or restricted    - D/C STATUS:  ---------------To be determined---------------      Medical Necessity:   · Patient demonstrates good rehab potential due to higher previous functional level. Reason for Services/Other Comments:  · Patient continues to require skilled intervention due to stiffness, edema, and decreased mobility. TREATMENT:   Pre-treatment Symptoms: Patient reports his knee has been stiff. He states that he has noticed that his knee feels \"like it won't hold\" while descending stairs.    (In addition to Assessment/Re-Assessment sessions the following treatments were rendered)  THERAPEUTIC EXERCISE: (25 minutes):  Exercises per grid below to improve mobility, strength and balance. Required minimal visual, verbal and manual cues to promote proper body alignment and promote proper body posture. Progressed resistance, range and repetitions as indicated. Date:  12-13-17 Date:  12-18-17 Date:  12-20-17 Date:  12-27-17 Date: 1-3-18 Date:  1/8/18 Date: 1-10-18   Activity/Exercise Parameters Parameters Parameters   Parameters    Patient Education Discussed HEP and POC         Nu Step  Level 2 x 10 minutes Level 2 x 10 minutes Level 2 x 10 minutes Level 2 x 10 minutes  Level 2, 10 mins Level 3 x 10 minutes    Shuttle Press  2 black bands, using for knee flexion stretch 10 sec holds x 10 3 black bands, using for knee flexion stretch 10 sec holds x 10 4 black bands double leg x 30    3 black bands single leg x 15 5 black bands double leg x 30    3 black bands single leg x 15   5 black bands double leg x30  3 black bands single leg x15 5 black bands double leg x 30  3 black  bands single leg x15   Gastroc Stretch        3x20 sec L LE    Tall Kneel            Side Step Ups    6\" step x 20   8\" step x 15 8\" step x15 8\" step x15   Step Ups      8\" step x 20 8\" step x 20  8\" step x20 8\" step x20   Mini Squats    x20 against wall x20 against wall x20 against wall x20 against wall   Single Leg Step Down     4\" step x 15  Heel taps on 1 inch step x15; 15x from 4 in going down for ROM      Manual Therapy: (15 minutes) to improve soft tissue mobility and joint mobility  Flexion mobilization 3 x 45 sec  Static hold knee flexion at end range 30 sec hold x 5  Prone Knee Flexion Stretch 30 sec hold x 3    MODALITIES: (15  minutes) to decrease edema  Gameready, medium compression, 38 degrees    MedBridge Portal  Treatment/Session Assessment:  Patient had a measured knee flexion PROM of 113 degrees after static stretching of knee in flexion.    · Pain: Initial:    0 Post Session:  unchanged ·   Compliance with Program/Exercises: compliant all of the time.  · Recommendations/Intent for next treatment session: \"Next visit will focus on advancements to more challenging activities and reduction in assistance provided\".   Future Appointments  Date Time Provider Marisela Alan   1/15/2018 10:00 AM LIUDMILA Crowley   1/17/2018 10:00 AM LIUDMILA Crowley   1/22/2018 10:00 AM LIUDMILA Crolwey   1/24/2018 10:00 AM LIUDMILA Crowley MILLENNIUM     Total Treatment Duration:  PT Patient Time In/Time Out  Time In: 1000  [facility]

## 2018-01-15 ENCOUNTER — HOSPITAL ENCOUNTER (OUTPATIENT)
Dept: PHYSICAL THERAPY | Age: 78
Discharge: HOME | End: 2018-01-15
Payer: MEDICARE

## 2018-01-15 PROCEDURE — 97110 THERAPEUTIC EXERCISES: CPT

## 2018-01-15 NOTE — PROGRESS NOTES
Yeni Martinezzachary  : 1940  [place] / [place] / Product Type: Medicare /  [address]  at [facility]  [address] , [address], [place] [address]  [phone]   [phone]            OUTPATIENT PHYSICAL THERAPY:Daily Note 1/15/2018    ICD-10: Treatment Diagnosis: Presence of left artificial knee joint (L46.616), Stiffness of left knee, not elsewhere classified (W30.692)  Precautions/Allergies:   Poison ivy extract   Fall Risk Score: 2 (? 5 = High Risk)  MD Orders: Evaluate and Treat MEDICAL/REFERRING DIAGNOSIS:  Presence of left artificial knee joint [Z96.652]   DATE OF ONSET: Surgery 2017  REFERRING PHYSICIAN: Mario Grande., *  RETURN PHYSICIAN APPOINTMENT:      INITIAL ASSESSMENT:  Mr. Kathleen Estrella presents to physical therapy s/p left total knee replacement. Physical therapy evaluation demonstrates decreased active and passive ROM to Left knee, increased edema, decreased independence with ambulation, decreased strength and decreased balance. Patient would benefit from skilled physical therapy to address his deficits and maximize his function. PROBLEM LIST (Impacting functional limitations):  1. Decreased Strength  2. Decreased ADL/Functional Activities  3. Decreased Ambulation Ability/Technique  4. Decreased Balance  5. Increased Pain  6. Decreased Flexibility/Joint Mobility  7. Edema/Girth INTERVENTIONS PLANNED:  1. Balance Exercise  2. Cold  3. Family Education  4. Gait Training  5. Heat  6. Home Exercise Program (HEP)  7. Manual Therapy  8. Range of Motion (ROM)  9. Therapeutic Exercise/Strengthening   TREATMENT PLAN:  Effective Dates: 17 TO 3-13-18. Frequency/Duration: 2 times a week for 6 weeks  GOALS: (Goals have been discussed and agreed upon with patient.)  Short Term Goals  1. Pt will be independent with HEP for strength and ROM  2.  Pt will perform Nu Step x 10 minutes for hip and knee AAROM   3. Pt will tolerate manual therapy/joint mobilizations to increase knee flexion ROM so pt can ambulate stairs and walk with a more normalized gait pattern  4. Pt will participate in LE strengthening exercises for hip, knee, ankle with weight as appropriate for 3 sets of 10  5. Pt will tolerate scar massage as appropriate to improve tissue mobility with patient to perform independently after education  6. Pt will participate in static and dynamic balance activities for 5 minutes to help improve proprioception and decrease risk of falls  7. Pt will wean from single point cane to no assistive device so patient can be more independent within the community  8. Pt will increase lower extremity functional scale by 5 points to show improvement in function   Long Term Goals  1. Pt will demonstrate >90 degrees of knee flexion ROM so patient can go up/down stairs  2. Pt will be able to perform sit to stand transfers independently with increased knee flexion and decreased use of upper extremities  3. Pt will ascend/descend 12 steps with reciprocal gait pattern and rail  4. Pt will be able to ambulate community level distances without an assistive device and no pain  5. Pt to increase lower extremity functional scale by 10 points to show improvement in areas of difficulty  Rehabilitation Potential For Stated Goals: Good              HISTORY:   History of Present Injury/Illness (Reason for Referral):  Patient reports having a total knee replacement on the 14th of November. Following the hospital went home with home health. Reports having issues with tight muscles and swelling. Has been icing his knee and put a heating pad on his IT band. Sitting for long periods of time causes stiffness. Does not have too much pain. Is using a cane for ambulation. Has not been taking the pain medicine.  Goes to healthy [facility] twice a week (hasn't gone since surgery)  Past Medical History/Comorbidities:   Mr. 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 He also has no past medical history of Adverse effect of anesthesia; Aneurysm ([address] Asthma; Autoimmune disease ([address] Cancer ([address] Chronic obstructive pulmonary disease ([address] Diabetes ([address] Difficult intubation; Heart failure ([address] Liver disease; Malignant hyperthermia due to anesthesia; Nausea & vomiting; Pseudocholinesterase deficiency; Psychiatric disorder; PUD (peptic ulcer disease); Seizures ([facility] Stroke [facility]); Thromboembolus ([address] Thyroid disease; Unspecified adverse effect of anesthesia; or Unspecified sleep apnea. Mr. Jose Alvarado  has a past surgical history that includes hx bunionectomy (Bilateral); vascular surgery procedure unlist; hx carpal tunnel release (Bilateral); hx cataract removal (Bilateral); hx coronary artery bypass graft (10/14/2014); hx heart catheterization; hx colonoscopy; and hx knee replacement (Right, 2017). Social History/Living Environment:    Lives with wife. One story home with bonus room upstairs. No barriers at home. Prior Level of Function/Work/Activity:  Retired. Plays golf goes to [facility]  Dominant Side:         RIGHT  Other Clinical Tests:          X-rays   Previous Treatment Approaches:          Physical therapy  Personal Factors:          Sex:  male        Age:  76 y.o. Current Medications:    Current Outpatient Prescriptions:     HYDROmorphone (DILAUDID) 2 mg tablet, Take 1 Tab by mouth every four (4) hours as needed. Max Daily Amount: 12 mg., Disp: 40 Tab, Rfl: 0    metoprolol succinate (TOPROL XL) 25 mg XL tablet, Take 1 Tab by mouth every morning. Indications: unknown why takes, Disp: 7 Tab, Rfl: 0    amoxicillin (AMOXIL) 500 mg capsule, Take 500 mg by mouth. Prior to dental appointment, Disp: , Rfl:     clopidogrel (PLAVIX) 75 mg tab, Take 1 Tab by mouth every morning., Disp: 90 Tab, Rfl: 3    losartan-hydroCHLOROthiazide (HYZAAR) 100-12.5 mg per tablet, Take 1 Tab by mouth every morning. Indications: Hypertension, Disp: 90 Tab, Rfl: 3    rosuvastatin (CRESTOR) 20 mg tablet, Take 1 Tab by mouth nightly. Indications: hypercholesterolemia, Disp: 90 Tab, Rfl: 3    aspirin delayed-release 81 mg tablet, Take 81 mg by mouth nightly., Disp: , Rfl:     Glucosamine &Chondroit-MV-Min3 660-404-74-0.5 mg tab, Take  by mouth every morning. 2 tablets every morning   Stop seven days prior to surgery per anesthesia protocol., Disp: , Rfl:     ranitidine (ZANTAC) 150 mg tablet, Take 150 mg by mouth as needed for Indigestion. Non-formulary. Instructed to bring to the hospital on the DOS, in the original bottle, and give to nurse. Indications: gastroesophageal reflux disease, Disp: , Rfl:      Date Last Reviewed:  1/15/2018    EXAMINATION:   Observation/Orthostatic Postural Assessment:          Moderate amount of edema to LLE. Incision healing and intact. Dry flaky skin  Palpation:          Decreased scar tissue mobility. Decreased soft tissue mobility to IT band, quads, hamstrings.  Hypo mobile patella in all directions  ROM:          Right Knee ROM: WNL        Left Knee ROM: 4-90 degrees  Strength:     LE:  Hip Flexion 5/5 R, 4/5 L  Hip Extension 5/5 R, 4/5 L  Hip Abduction 5/5 R, 4/5 L  Knee Flexion 5/5 B  Knee Extension 5/5 B  Ankle Dorsiflexion 5/5 B  Ankle Plantarflexion 5/5 B  Special Tests:          n/a  Neurological Screen:        Myotomes:  intact        Dermatomes:  Intact (decreased to light touch around incision)  Functional Mobility:         Gait/Ambulation:  Patient ambulates with antalgic gait on left using single point cane: increased trunk sway, decrease knee flexion  Balance:          Single Leg Balance Right: 5 seconds        Single Leg Balance Left: 1 second   Body Structures Involved:  1. Joints  2. Muscles  3. Ligaments Body Functions Affected:  1. Sensory/Pain  2. Neuromusculoskeletal  3. Movement Related Activities and Participation Affected:  1. General Tasks and Demands  2. Mobility  3. Domestic Life  4. Community, Social and Civic Life   CLINICAL PRESENTATION:   CLINICAL DECISION MAKING:   Outcome Measure: Tool Used: Lower Extremity Functional Scale (LEFS)  Score:  Initial: 30/80 Most Recent: X/80 (Date: -- )   Interpretation of Score: 20 questions each scored on a 5 point scale with 0 representing \"extreme difficulty or unable to perform\" and 4 representing \"no difficulty\". The lower the score, the greater the functional disability. 80/80 represents no disability. Minimal detectable change is 9 points. Score 80 79-63 62-48 47-32 31-16 15-1 0   Modifier CH CI CJ CK CL CM CN     ? Mobility - Walking and Moving Around:     - CURRENT STATUS: CL - 60%-79% impaired, limited or restricted    - GOAL STATUS: CJ - 20%-39% impaired, limited or restricted    - D/C STATUS:  ---------------To be determined---------------      Medical Necessity:   · Patient demonstrates good rehab potential due to higher previous functional level. Reason for Services/Other Comments:  · Patient continues to require skilled intervention due to stiffness, edema, and decreased mobility. TREATMENT:   Pre-treatment Symptoms: Patient reports he noticed his knee gets stiffer during cold weather. He reports that he has noticed increased swelling on the inside of his knees. He states that he still doesn't trust his knee to hold ascending and descending stairs.    (In addition to Assessment/Re-Assessment sessions the following treatments were rendered)  THERAPEUTIC EXERCISE: (40 minutes):  Exercises per grid below to improve mobility, strength and balance. Required minimal visual, verbal and manual cues to promote proper body alignment and promote proper body posture. Progressed resistance, range and repetitions as indicated. Date:  12-13-17 Date:  12-18-17 Date:  12-20-17 Date:  12-27-17 Date: 1-3-18 Date:  1/8/18 Date: 1-10-18 Date: 1-15-18   Activity/Exercise Parameters Parameters Parameters   Parameters     Patient Education Discussed HEP and POC          Nu Step  Level 2 x 10 minutes Level 2 x 10 minutes Level 2 x 10 minutes Level 2 x 10 minutes  Level 2, 10 mins Level 3 x 10 minutes  Level 3 x 10 minutes   Shuttle Press  2 black bands, using for knee flexion stretch 10 sec holds x 10 3 black bands, using for knee flexion stretch 10 sec holds x 10 4 black bands double leg x 30    3 black bands single leg x 15 5 black bands double leg x 30    3 black bands single leg x 15   5 black bands double leg x30  3 black bands single leg x15 5 black bands double leg x 30  3 black  bands single leg x15 5 black bands double leg x 30  3 black  bands single leg x15   Gastroc Stretch        3x20 sec L LE     Tall Kneel             Side Step Ups    6\" step x 20   8\" step x 15 8\" step x15 8\" step x15 8\" step x20   Step Ups      8\" step x 20 8\" step x 20  8\" step x20 8\" step x20 8\" step x20   Mini Squats    x20 against wall x20 against wall x20 against wall x20 against wall    Single Leg Step Down     4\" step x 15  Heel taps on 1 inch step x15; 15x from 4 in going down for ROM  Heel taps on 1\" step x 15 Both legs   Stairs        4 x10 up and down  focusing on slow and controlled       Med[employer] Portal  Treatment/Session Assessment:  Patient tends to rush through exercises and use momentum instead of control. Focused on controlling descent and foot impact during stair climbing from both step stool and stairs.    · Pain: Initial:    0 Post Session:  unchanged ·   Compliance with Program/Exercises: compliant all of the time. · Recommendations/Intent for next treatment session: \"Next visit will focus on advancements to more challenging activities and reduction in assistance provided\".   Future Appointments  Date Time Provider Marisela Alan   1/17/2018 10:00 [facility]   [facility]2018 [facility]   1/24/2018 10:00 AM LIUDMILA Roberts [facility]     Total Treatment Duration:  PT Patient Time In/Time Out  Time In: 1000  Time Out: [address]

## 2018-01-16 ENCOUNTER — APPOINTMENT (RX ONLY)
Dept: URBAN - METROPOLITAN AREA CLINIC 24 | Facility: CLINIC | Age: 78
Setting detail: DERMATOLOGY
End: 2018-01-16

## 2018-01-16 DIAGNOSIS — D18.0 HEMANGIOMA: ICD-10-CM

## 2018-01-16 DIAGNOSIS — L57.0 ACTINIC KERATOSIS: ICD-10-CM

## 2018-01-16 DIAGNOSIS — L82.1 OTHER SEBORRHEIC KERATOSIS: ICD-10-CM

## 2018-01-16 PROBLEM — L55.1 SUNBURN OF SECOND DEGREE: Status: ACTIVE | Noted: 2018-01-16

## 2018-01-16 PROBLEM — I25.10 ATHEROSCLEROTIC HEART DISEASE OF NATIVE CORONARY ARTERY WITHOUT ANGINA PECTORIS: Status: ACTIVE | Noted: 2018-01-16

## 2018-01-16 PROBLEM — I10 ESSENTIAL (PRIMARY) HYPERTENSION: Status: ACTIVE | Noted: 2018-01-16

## 2018-01-16 PROBLEM — D18.01 HEMANGIOMA OF SKIN AND SUBCUTANEOUS TISSUE: Status: ACTIVE | Noted: 2018-01-16

## 2018-01-16 PROBLEM — H91.90 UNSPECIFIED HEARING LOSS, UNSPECIFIED EAR: Status: ACTIVE | Noted: 2018-01-16

## 2018-01-16 PROCEDURE — ? LIQUID NITROGEN

## 2018-01-16 PROCEDURE — ? COUNSELING

## 2018-01-16 PROCEDURE — 17003 DESTRUCT PREMALG LES 2-14: CPT

## 2018-01-16 PROCEDURE — 17000 DESTRUCT PREMALG LESION: CPT

## 2018-01-16 PROCEDURE — 99213 OFFICE O/P EST LOW 20 MIN: CPT | Mod: 25

## 2018-01-16 ASSESSMENT — LOCATION DETAILED DESCRIPTION DERM
LOCATION DETAILED: LEFT CENTRAL TEMPLE
LOCATION DETAILED: LEFT CLAVICULAR SKIN
LOCATION DETAILED: LEFT SUPERIOR MEDIAL FOREHEAD
LOCATION DETAILED: RIGHT CENTRAL TEMPLE
LOCATION DETAILED: LEFT FOREHEAD
LOCATION DETAILED: RIGHT SUPERIOR UPPER BACK
LOCATION DETAILED: EPIGASTRIC SKIN
LOCATION DETAILED: RIGHT LATERAL FOREHEAD
LOCATION DETAILED: RIGHT INFERIOR FOREHEAD

## 2018-01-16 ASSESSMENT — LOCATION SIMPLE DESCRIPTION DERM
LOCATION SIMPLE: RIGHT TEMPLE
LOCATION SIMPLE: LEFT FOREHEAD
LOCATION SIMPLE: LEFT TEMPLE
LOCATION SIMPLE: RIGHT UPPER BACK
LOCATION SIMPLE: LEFT CLAVICULAR SKIN
LOCATION SIMPLE: ABDOMEN
LOCATION SIMPLE: RIGHT FOREHEAD

## 2018-01-16 ASSESSMENT — LOCATION ZONE DERM
LOCATION ZONE: FACE
LOCATION ZONE: TRUNK

## 2018-01-17 ENCOUNTER — HOSPITAL ENCOUNTER (OUTPATIENT)
Dept: PHYSICAL THERAPY | Age: 78
Discharge: HOME | End: 2018-01-17
Payer: MEDICARE

## 2018-01-18 NOTE — PROGRESS NOTES
Margie Marroquin   (VVS:[birth year]) Therapy Center at   [address]  [address], [address], [place] [address]  [phone]   [phone]       OUTPATIENT DAILY NOTE    NAME/AGE/GENDER: Margie Marroquin is a 76 y.o. male. DATE: 1/18/2018    Patient's appointment was cancelled today due to weather. Will plan to follow up on next scheduled visit.     Pepe Bryant DPT

## 2018-01-22 ENCOUNTER — HOSPITAL ENCOUNTER (OUTPATIENT)
Dept: PHYSICAL THERAPY | Age: 78
Discharge: HOME | End: 2018-01-22
Payer: MEDICARE

## 2018-01-22 PROCEDURE — 97110 THERAPEUTIC EXERCISES: CPT

## 2018-01-22 NOTE — PROGRESS NOTES
Tricia Desir  : 1940  [place] / [place] / Product Type: Medicare /  Anice Ace at [facility] [place]  [address], [address], [place] [address]  [phone]   [phone]            OUTPATIENT PHYSICAL THERAPY:Daily Note 2018    ICD-10: Treatment Diagnosis: Presence of left artificial knee joint (J77.580), Stiffness of left knee, not elsewhere classified (H06.348)  Precautions/Allergies:   Poison ivy extract   Fall Risk Score: 2 (? 5 = High Risk)  MD Orders: Evaluate and Treat MEDICAL/REFERRING DIAGNOSIS:  Presence of left artificial knee joint [Z96.652]   DATE OF ONSET: Surgery 2017  REFERRING PHYSICIAN: Hazel Sinclair., *  RETURN PHYSICIAN APPOINTMENT:      INITIAL ASSESSMENT:  Mr. Heladio Albright presents to physical therapy s/p left total knee replacement. Physical therapy evaluation demonstrates decreased active and passive ROM to Left knee, increased edema, decreased independence with ambulation, decreased strength and decreased balance. Patient would benefit from skilled physical therapy to address his deficits and maximize his function. PROBLEM LIST (Impacting functional limitations):  1. Decreased Strength  2. Decreased ADL/Functional Activities  3. Decreased Ambulation Ability/Technique  4. Decreased Balance  5. Increased Pain  6. Decreased Flexibility/Joint Mobility  7. Edema/Girth INTERVENTIONS PLANNED:  1. Balance Exercise  2. Cold  3. Family Education  4. Gait Training  5. Heat  6. Home Exercise Program (HEP)  7. Manual Therapy  8. Range of Motion (ROM)  9. Therapeutic Exercise/Strengthening   TREATMENT PLAN:  Effective Dates: 17 TO 3-13-18. Frequency/Duration: 2 times a week for 6 weeks  GOALS: (Goals have been discussed and agreed upon with patient.)  Short Term Goals  1. Pt will be independent with HEP for strength and ROM  2.  Pt will perform Nu Step x 10 minutes for hip and knee AAROM   3. Pt will tolerate manual therapy/joint mobilizations to increase knee flexion ROM so pt can ambulate stairs and walk with a more normalized gait pattern  4. Pt will participate in LE strengthening exercises for hip, knee, ankle with weight as appropriate for 3 sets of 10  5. Pt will tolerate scar massage as appropriate to improve tissue mobility with patient to perform independently after education  6. Pt will participate in static and dynamic balance activities for 5 minutes to help improve proprioception and decrease risk of falls  7. Pt will wean from single point cane to no assistive device so patient can be more independent within the community  8. Pt will increase lower extremity functional scale by 5 points to show improvement in function   Long Term Goals  1. Pt will demonstrate >90 degrees of knee flexion ROM so patient can go up/down stairs  2. Pt will be able to perform sit to stand transfers independently with increased knee flexion and decreased use of upper extremities  3. Pt will ascend/descend 12 steps with reciprocal gait pattern and rail  4. Pt will be able to ambulate community level distances without an assistive device and no pain  5. Pt to increase lower extremity functional scale by 10 points to show improvement in areas of difficulty  Rehabilitation Potential For Stated Goals: Good              HISTORY:   History of Present Injury/Illness (Reason for Referral):  Patient reports having a total knee replacement on the 14th of November. Following the hospital went home with home health. Reports having issues with tight muscles and swelling. Has been icing his knee and put a heating pad on his IT band. Sitting for long periods of time causes stiffness. Does not have too much pain. Is using a cane for ambulation. Has not been taking the pain medicine.  Goes to [facility] twice a week (hasn't gone since surgery)  Past Medical History/Comorbidities:   Mr. 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 He also has no past medical history of Adverse effect of anesthesia; Aneurysm ([address] Asthma; Autoimmune disease ([address] Cancer ([address] Chronic obstructive pulmonary disease ([address] Diabetes ([address] Difficult intubation; Heart failure ([address] Liver disease; Malignant hyperthermia due to anesthesia; Nausea & vomiting; Pseudocholinesterase deficiency; Psychiatric disorder; PUD (peptic ulcer disease); Seizures ([facility] Stroke [facility]); Thromboembolus ([address] Thyroid disease; Unspecified adverse effect of anesthesia; or Unspecified sleep apnea. Mr. Leno Hanna  has a past surgical history that includes hx bunionectomy (Bilateral); vascular surgery procedure unlist; hx carpal tunnel release (Bilateral); hx cataract removal (Bilateral); hx coronary artery bypass graft (10/14/2014); hx heart catheterization; hx colonoscopy; and hx knee replacement (Right, 2017). Social History/Living Environment:    Lives with wife. One story home with bonus room upstairs. No barriers at home. Prior Level of Function/Work/Activity:  Retired. Plays golf goes to [facility]  Dominant Side:         RIGHT  Other Clinical Tests:          X-rays   Previous Treatment Approaches:          Physical therapy  Personal Factors:          Sex:  male        Age:  76 y.o. Current Medications:    Current Outpatient Prescriptions:     HYDROmorphone (DILAUDID) 2 mg tablet, Take 1 Tab by mouth every four (4) hours as needed. Max Daily Amount: 12 mg., Disp: 40 Tab, Rfl: 0    metoprolol succinate (TOPROL XL) 25 mg XL tablet, Take 1 Tab by mouth every morning. Indications: unknown why takes, Disp: 7 Tab, Rfl: 0    amoxicillin (AMOXIL) 500 mg capsule, Take 500 mg by mouth. Prior to dental appointment, Disp: , Rfl:     clopidogrel (PLAVIX) 75 mg tab, Take 1 Tab by mouth every morning., Disp: 90 Tab, Rfl: 3    losartan-hydroCHLOROthiazide (HYZAAR) 100-12.5 mg per tablet, Take 1 Tab by mouth every morning. Indications: Hypertension, Disp: 90 Tab, Rfl: 3    rosuvastatin (CRESTOR) 20 mg tablet, Take 1 Tab by mouth nightly. Indications: hypercholesterolemia, Disp: 90 Tab, Rfl: 3    aspirin delayed-release 81 mg tablet, Take 81 mg by mouth nightly., Disp: , Rfl:     Glucosamine &Chondroit-MV-Min3 933-370-62-0.5 mg tab, Take  by mouth every morning. 2 tablets every morning   Stop seven days prior to surgery per anesthesia protocol., Disp: , Rfl:     ranitidine (ZANTAC) 150 mg tablet, Take 150 mg by mouth as needed for Indigestion. Non-formulary. Instructed to bring to the hospital on the DOS, in the original bottle, and give to nurse. Indications: gastroesophageal reflux disease, Disp: , Rfl:      Date Last Reviewed:  1/22/2018    EXAMINATION:   Observation/Orthostatic Postural Assessment:          Moderate amount of edema to LLE. Incision healing and intact. Dry flaky skin  Palpation:          Decreased scar tissue mobility. Decreased soft tissue mobility to IT band, quads, hamstrings.  Hypo mobile patella in all directions  ROM:          Right Knee ROM: WNL        Left Knee ROM: 4-90 degrees  Strength:     LE:  Hip Flexion 5/5 R, 4/5 L  Hip Extension 5/5 R, 4/5 L  Hip Abduction 5/5 R, 4/5 L  Knee Flexion 5/5 B  Knee Extension 5/5 B  Ankle Dorsiflexion 5/5 B  Ankle Plantarflexion 5/5 B  Special Tests:          n/a  Neurological Screen:        Myotomes:  intact        Dermatomes:  Intact (decreased to light touch around incision)  Functional Mobility:         Gait/Ambulation:  Patient ambulates with antalgic gait on left using single point cane: increased trunk sway, decrease knee flexion  Balance:          Single Leg Balance Right: 5 seconds        Single Leg Balance Left: 1 second   Body Structures Involved:  1. Joints  2. Muscles  3. Ligaments Body Functions Affected:  1. Sensory/Pain  2. Neuromusculoskeletal  3. Movement Related Activities and Participation Affected:  1. General Tasks and Demands  2. Mobility  3. Domestic Life  4. Community, Social and Civic Life   CLINICAL PRESENTATION:   CLINICAL DECISION MAKING:   Outcome Measure: Tool Used: Lower Extremity Functional Scale (LEFS)  Score:  Initial: 30/80 Most Recent: X/80 (Date: -- )   Interpretation of Score: 20 questions each scored on a 5 point scale with 0 representing \"extreme difficulty or unable to perform\" and 4 representing \"no difficulty\". The lower the score, the greater the functional disability. 80/80 represents no disability. Minimal detectable change is 9 points. Score 80 79-63 62-48 47-32 31-16 15-1 0   Modifier CH CI CJ CK CL CM CN     ? Mobility - Walking and Moving Around:     - CURRENT STATUS: CL - 60%-79% impaired, limited or restricted    - GOAL STATUS: CJ - 20%-39% impaired, limited or restricted    - D/C STATUS:  ---------------To be determined---------------      Medical Necessity:   · Patient demonstrates good rehab potential due to higher previous functional level. Reason for Services/Other Comments:  · Patient continues to require skilled intervention due to stiffness, edema, and decreased mobility. TREATMENT:   Pre-treatment Symptoms: Patient reports he came Wednesday when office was closed. Reports that he feels he can get more ROM of knee and has been able to remove elevated toilet seat. Patient showed freezer burn on right knee from falling asleep with ice pack on.    (In addition to Assessment/Re-Assessment sessions the following treatments were rendered)  THERAPEUTIC EXERCISE: (40 minutes):  Exercises per grid below to improve mobility, strength and balance. Required minimal visual, verbal and manual cues to promote proper body alignment and promote proper body posture. Progressed resistance, range and repetitions as indicated.    Date:  12-13-17 Date:  12-18-17 Date:  12-20-17 Date:  12-27-17 Date: 1-3-18 Date:  1/8/18 Date: 1-10-18 Date: 1-15-18 Date: 1-22-18   Activity/Exercise Parameters Parameters Parameters   Parameters      Patient Education Discussed HEP and POC           Nu Step  Level 2 x 10 minutes Level 2 x 10 minutes Level 2 x 10 minutes Level 2 x 10 minutes  Level 2, 10 mins Level 3 x 10 minutes  Level 3 x 10 minutes Level 3 x 10 minutes   Shuttle Press  2 black bands, using for knee flexion stretch 10 sec holds x 10 3 black bands, using for knee flexion stretch 10 sec holds x 10 4 black bands double leg x 30    3 black bands single leg x 15 5 black bands double leg x 30    3 black bands single leg x 15   5 black bands double leg x30  3 black bands single leg x15 5 black bands double leg x 30  3 black  bands single leg x15 5 black bands double leg x 30  3 black  bands single leg x15 5 black bands x 30   4 black bands single leg x 15 BLE   Gastroc Stretch        3x20 sec L LE      Tall Kneel              Side Step Ups    6\" step x 20   8\" step x 15 8\" step x15 8\" step x15 8\" step x20 8\" step x20   Step Ups      8\" step x 20 8\" step x 20  8\" step x20 8\" step x20 8\" step x20 8' step x 20    Mini Squats    x20 against wall x20 against wall x20 against wall x20 against wall     Single Leg Step Down     4\" step x 15  Heel taps on 1 inch step x15; 15x from 4 in going down for ROM  Heel taps on 1\" step x 15 Both legs Heel taps on 1\" step x 20 Both legs   Stairs        4 x10 up and down  focusing on slow and controlled 4 x10 up and down  focusing on slow and controlled       [employer] Portal  Treatment/Session Assessment: Patient is able to advance exercises. Wants to work on improving ROM of squat for golfing. Will add eccentric quad exercises next session. · Pain: Initial:    0 Post Session:  unchanged ·   Compliance with Program/Exercises: compliant all of the time. · Recommendations/Intent for next treatment session: \"Next visit will focus on advancements to more challenging activities and reduction in assistance provided\".   Future Appointments  Date Time Provider Marisela Alan   1/22/2018 10:00 AM LIUDMILA Raza   1/24/2018 10:00 AM LIUDMILA Raza     Total Treatment Duration:  PT Patient Time In/Time Out  Time In: 1000  Time Out: Megan Harrison [address]

## 2018-01-24 ENCOUNTER — HOSPITAL ENCOUNTER (OUTPATIENT)
Dept: PHYSICAL THERAPY | Age: 78
Discharge: HOME | End: 2018-01-24
Payer: MEDICARE

## 2018-01-24 PROCEDURE — 97110 THERAPEUTIC EXERCISES: CPT

## 2018-01-24 PROCEDURE — G8978 MOBILITY CURRENT STATUS: HCPCS

## 2018-01-24 PROCEDURE — G8979 MOBILITY GOAL STATUS: HCPCS

## 2018-01-24 NOTE — THERAPY RECERTIFICATION
Vito Desir  : 1940  [place] / [place] / Product Type: Medicare /  [address]  at [facility]  [address], [address], [place]  [phone]   [phone]            OUTPATIENT PHYSICAL THERAPY:Daily Note and Progress Report 2018    ICD-10: Treatment Diagnosis: Presence of left artificial knee joint (F66.406), Stiffness of left knee, not elsewhere classified (A29.108)  Precautions/Allergies:   Poison ivy extract   Fall Risk Score: 2 (? 5 = High Risk)  MD Orders: Evaluate and Treat MEDICAL/REFERRING DIAGNOSIS:  Presence of left artificial knee joint [Z96.652]   DATE OF ONSET: Surgery 2017  REFERRING PHYSICIAN: Aneudy Queen., *  RETURN PHYSICIAN APPOINTMENT:      Progress ASSESSMENT:  Mr. Olga Preciado has been seen for 10 physical therapy visits since his initial evaluation on 2017. He demonstrates improvements in passive and active ROM to the left knee. He measures 1-110 in extension-flexion. His strength has increased to 5/5 bilaterally and single leg balance increased on left leg from 1 sec to 6 sec. He has met all of his short term goals. He is functioning close to his baseline. Patient continues to have decreased technique with stair mobility. Patient would benefit from one more visit of physical therapy to maximize his function. He will then be discharged to healthy self and a home exercise program.    PROBLEM LIST (Impacting functional limitations):  1. Decreased Strength  2. Decreased ADL/Functional Activities  3. Decreased Ambulation Ability/Technique  4. Decreased Balance  5. Increased Pain  6. Decreased Flexibility/Joint Mobility  7. Edema/Girth INTERVENTIONS PLANNED:  1. Balance Exercise  2. Cold  3. Family Education  4. Gait Training  5. Heat  6. Home Exercise Program (HEP)  7. Manual Therapy  8. Range of Motion (ROM)  9.  Therapeutic Exercise/Strengthening   TREATMENT PLAN:  Effective Dates: 12-13-17 TO 3-13-18. Frequency/Duration: 1 visit   GOALS: (Goals have been discussed and agreed upon with patient.)  Short Term Goals  1. Pt will be independent with HEP for strength and ROM Goal Met 1-24-18  2. Pt will perform Nu Step x 10 minutes for hip and knee AAROM  Goal Met 1-24-18  3. Pt will tolerate manual therapy/joint mobilizations to increase knee flexion ROM so pt can ambulate stairs and walk with a more normalized gait pattern Goal Met 1-24-18  4. Pt will participate in LE strengthening exercises for hip, knee, ankle with weight as appropriate for 3 sets of 10 Goal Met 1-24-18  5. Pt will tolerate scar massage as appropriate to improve tissue mobility with patient to perform independently after education Goal Met 1-24-18  6. Pt will participate in static and dynamic balance activities for 5 minutes to help improve proprioception and decrease risk of falls Goal Met 1-24-18  7. Pt will wean from single point cane to no assistive device so patient can be more independent within the community Goal Met 1-24-18  8. Pt will increase lower extremity functional scale by 5 points to show improvement in function  Goal Met 1-24-18  Long Term Goals  1. Pt will demonstrate >90 degrees of knee flexion ROM so patient can go up/down stairs  2. Pt will be able to perform sit to stand transfers independently with increased knee flexion and decreased use of upper extremities  3. Pt will ascend/descend 12 steps with reciprocal gait pattern and rail  4. Pt will be able to ambulate community level distances without an assistive device and no pain Goal Met 1-24-18  5.  Pt to increase lower extremity functional scale by 10 points to show improvement in areas of difficulty  Rehabilitation Potential For Stated Goals: Good              HISTORY:   History of Present Injury/Illness (Reason for Referral):  Patient reports having a total knee replacement on the 14th of November. Following the hospital went home with home health. Reports having issues with tight muscles and swelling. Has been icing his knee and put a heating pad on his IT band. Sitting for long periods of time causes stiffness. Does not have too much pain. Is using a cane for ambulation. Has not been taking the pain medicine. Goes to healthy self twice a week (hasn't gone since surgery)  Past Medical History/Comorbidities:   Mr. 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 He also has no past medical history of Adverse effect of anesthesia; Aneurysm ([address] Asthma; Autoimmune disease ([address] Cancer ([address] Chronic obstructive pulmonary disease ([address] Diabetes ([address] Difficult intubation; Heart failure ([address] Liver disease; Malignant hyperthermia due to anesthesia; Nausea & vomiting; Pseudocholinesterase deficiency; Psychiatric disorder; PUD (peptic ulcer disease); Seizures ([address] Stroke [facility]); Thromboembolus ([address] Thyroid disease; Unspecified adverse effect of anesthesia; or Unspecified sleep apnea. Mr. Heladio Albright  has a past surgical history that includes hx bunionectomy (Bilateral); vascular surgery procedure unlist; hx carpal tunnel release (Bilateral); hx cataract removal (Bilateral); hx coronary artery bypass graft (10/14/2014); hx heart catheterization; hx colonoscopy; and hx knee replacement (Right, 2017). Social History/Living Environment:    Lives with wife.  One story home with [facility] upstairs. No barriers at home. Prior Level of Function/Work/Activity:  Retired. Plays golf goes to healthy self  Dominant Side:         RIGHT  Other Clinical Tests:          X-rays   Previous Treatment Approaches:          Physical therapy  Personal Factors:          Sex:  male        Age:  76 y.o. Current Medications:    Current Outpatient Prescriptions:     HYDROmorphone (DILAUDID) 2 mg tablet, Take 1 Tab by mouth every four (4) hours as needed. Max Daily Amount: 12 mg., Disp: 40 Tab, Rfl: 0    metoprolol succinate (TOPROL XL) 25 mg XL tablet, Take 1 Tab by mouth every morning. Indications: unknown why takes, Disp: 7 Tab, Rfl: 0    amoxicillin (AMOXIL) 500 mg capsule, Take 500 mg by mouth. Prior to dental appointment, Disp: , Rfl:     clopidogrel (PLAVIX) 75 mg tab, Take 1 Tab by mouth every morning., Disp: 90 Tab, Rfl: 3    losartan-hydroCHLOROthiazide (HYZAAR) 100-12.5 mg per tablet, Take 1 Tab by mouth every morning. Indications: Hypertension, Disp: 90 Tab, Rfl: 3    rosuvastatin (CRESTOR) 20 mg tablet, Take 1 Tab by mouth nightly. Indications: hypercholesterolemia, Disp: 90 Tab, Rfl: 3    aspirin delayed-release 81 mg tablet, Take 81 mg by mouth nightly., Disp: , Rfl:     Glucosamine &Chondroit-MV-Min3 [id number] mg tab, Take  by mouth every morning. 2 tablets every morning   Stop seven days prior to surgery per anesthesia protocol., Disp: , Rfl:     ranitidine (ZANTAC) 150 mg tablet, Take 150 mg by mouth as needed for Indigestion. Non-formulary. Instructed to bring to the hospital on the DOS, in the original bottle, and give to nurse. Indications: gastroesophageal reflux disease, Disp: , Rfl:      Date Last Reviewed:  1/24/2018    EXAMINATION:   Observation/Orthostatic Postural Assessment:  1-24-18        Moderate amount of edema to LLE. Incision healing and intact.  Dry flaky skin  Palpation:  Re-assessed 1-24-18        Scar Tissue Mobility- normal.         Hypomobile patellar mobility (inferior and medial directions)  ROM: Re-assessed 1-24-18         Right Knee ROM: WNL        Left Knee ROM: 3-110 degrees  Strength:  Re-assessed 1-24-18   LE:  Hip Flexion 5/5 B  Hip Extension 5/5 B  Hip Abduction 5/5 B  Knee Flexion 5/5 B  Knee Extension 5/5 B  Ankle Dorsiflexion 5/5 B  Ankle Plantarflexion 5/5 B  Special Tests:          n/a  Neurological Screen:        Myotomes:  intact        Dermatomes:  Intact re-assessed 1-24-18  Functional Mobility: re-assessed 1-24-18        Gait/Ambulation:  Patient ambulates with antalgic gait on left: increased trunk sway  Balance:  Re-assessed 1-24-18        Single Leg Balance Right: 5 seconds        Single Leg Balance Left: 6 second   Body Structures Involved:  1. Joints  2. Muscles  3. Ligaments Body Functions Affected:  1. Sensory/Pain  2. Neuromusculoskeletal  3. Movement Related Activities and Participation Affected:  1. General Tasks and Demands  2. Mobility  3. Domestic Life  4. Community, Social and Civic Life   CLINICAL PRESENTATION:   CLINICAL DECISION MAKING:   Outcome Measure: Tool Used: Lower Extremity Functional Scale (LEFS)  Score:  Initial: 30/80 Most Recent: 63/80 (Date:1-24-18 )   Interpretation of Score: 20 questions each scored on a 5 point scale with 0 representing \"extreme difficulty or unable to perform\" and 4 representing \"no difficulty\". The lower the score, the greater the functional disability. 80/80 represents no disability. Minimal detectable change is 9 points. Score 80 79-63 62-48 47-32 31-16 15-1 0   Modifier CH CI CJ CK CL CM CN     ? Mobility - Walking and Moving Around:     - CURRENT STATUS: CI - 1%-19% impaired, limited or restricted    - GOAL STATUS: CI - 1%-19% impaired, limited or restricted    - D/C STATUS:  ---------------To be determined---------------      Medical Necessity:   · Patient demonstrates good rehab potential due to higher previous functional level.   Reason for Services/Other Comments:  · Patient continues to require skilled intervention due to stiffness, edema, and decreased mobility. TREATMENT:   Pre-treatment Symptoms: Patient reports left knee continues to feel stiff, especially when standing after sitting. (In addition to Assessment/Re-Assessment sessions the following treatments were rendered)  THERAPEUTIC EXERCISE: (30 minutes):  Exercises per grid below to improve mobility, strength and balance. Required minimal visual, verbal and manual cues to promote proper body alignment and promote proper body posture. Progressed resistance, range and repetitions as indicated.    Date:  12-13-17 Date:  12-18-17 Date:  12-20-17 Date:  12-27-17 Date: 1-3-18 Date:  1/8/18 Date: 1-10-18 Date: 1-15-18 Date: 1-22-18 Date: 1-24-18   Activity/Exercise Parameters Parameters Parameters   Parameters       Patient Education Discussed HEP and POC            Nu Step  Level 2 x 10 minutes Level 2 x 10 minutes Level 2 x 10 minutes Level 2 x 10 minutes  Level 2, 10 mins Level 3 x 10 minutes  Level 3 x 10 minutes Level 3 x 10 minutes Level 3 x 10 minutes   Shuttle Press  2 black bands, using for knee flexion stretch 10 sec holds x 10 3 black bands, using for knee flexion stretch 10 sec holds x 10 4 black bands double leg x 30    3 black bands single leg x 15 5 black bands double leg x 30    3 black bands single leg x 15   5 black bands double leg x30  3 black bands single leg x15 5 black bands double leg x 30  3 black  bands single leg x15 5 black bands double leg x 30  3 black  bands single leg x15 5 black bands x 30   4 black bands single leg x 15 BLE    Gastroc Stretch        3x20 sec L LE       Tall Kneel               Side Step Ups    6\" step x 20   8\" step x 15 8\" step x15 8\" step x15 8\" step x20 8\" step x20    Step Ups      8\" step x 20 8\" step x 20  8\" step x20 8\" step x20 8\" step x20 8' step x 20     Mini Squats    x20 against wall x20 against wall x20 against wall x20 against wall      Single Leg Step Down     4\" step x 15  Heel taps on 1 inch step x15; 15x from 4 in going down for ROM  Heel taps on 1\" step x 15 Both legs Heel taps on 1\" step x 20 Both legs Heel taps on 1\" step x 20 Both legs   Stairs        4 x10 up and down  focusing on slow and controlled 4 x10 up and down  focusing on slow and controlled 4 x10 up and down  focusing on slow and controlled       [facility] Portal  Treatment/Session Assessment: See assessment above   · Pain: Initial:    0 Post Session:  unchanged ·   Compliance with Program/Exercises: compliant all of the time. · Recommendations/Intent for next treatment session: \"Next visit will focus on advancements to more challenging activities and reduction in assistance provided\".   Future Appointments  Date Time Provider Marisela Alan   3/26/2018 2:00 PM PP SLEEP LEEANN AARON [facility] PP     Total Treatment Duration:  PT Patient Time In/Time Out  Time In: 1000  Time Out: [address]

## 2018-01-29 ENCOUNTER — HOSPITAL ENCOUNTER (OUTPATIENT)
Dept: PHYSICAL THERAPY | Age: 78
Discharge: HOME | End: 2018-01-29
Payer: MEDICARE

## 2018-01-29 PROCEDURE — 97110 THERAPEUTIC EXERCISES: CPT

## 2018-01-29 PROCEDURE — G8979 MOBILITY GOAL STATUS: HCPCS

## 2018-01-29 PROCEDURE — G8980 MOBILITY D/C STATUS: HCPCS

## 2018-01-29 NOTE — THERAPY RECERTIFICATION
Gurpreet Desir  : 1940  [place] / [place] / Product Type: Medicare /  [address]  at [facility]  [address], [address], [place]  [phone]   [phone]            OUTPATIENT PHYSICAL THERAPY:Daily Note and Discharge 2018    ICD-10: Treatment Diagnosis: Presence of left artificial knee joint (K81.669), Stiffness of left knee, not elsewhere classified (U04.805)  Precautions/Allergies:   Poison ivy extract   Fall Risk Score: 2 (? 5 = High Risk)  MD Orders: Evaluate and Treat MEDICAL/REFERRING DIAGNOSIS:  Presence of left artificial knee joint [Z96.652]   DATE OF ONSET: Surgery 2017  REFERRING PHYSICIAN: Monisha Tobias., *  RETURN PHYSICIAN APPOINTMENT:      Progress ASSESSMENT:  Mr. Mralon Watters has been seen for 11 physical therapy visits since his initial evaluation on 2017. He demonstrates improvements in passive and active ROM to the left knee. He measures 1-110 in extension-flexion. His strength has increased to 5/5 bilaterally and single leg balance increased on left leg from 1 sec to 6 sec. He has met all of his  goals. He is functioning close to his baseline. Patient will be discharged to a home exercise program at this time. GOALS: (Goals have been discussed and agreed upon with patient.)  Short Term Goals  1. Pt will be independent with HEP for strength and ROM Goal Met 18  2. Pt will perform Nu Step x 10 minutes for hip and knee AAROM  Goal Met 18  3. Pt will tolerate manual therapy/joint mobilizations to increase knee flexion ROM so pt can ambulate stairs and walk with a more normalized gait pattern Goal Met 18  4. Pt will participate in LE strengthening exercises for hip, knee, ankle with weight as appropriate for 3 sets of 10 Goal Met 18  5.  Pt will tolerate scar massage as appropriate to improve tissue mobility with patient to perform independently after education Goal Met 1-24-18  6. Pt will participate in static and dynamic balance activities for 5 minutes to help improve proprioception and decrease risk of falls Goal Met 1-24-18  7. Pt will wean from single point cane to no assistive device so patient can be more independent within the community Goal Met 1-24-18  8. Pt will increase lower extremity functional scale by 5 points to show improvement in function  Goal Met 1-24-18  Long Term Goals  1. Pt will demonstrate >90 degrees of knee flexion ROM so patient can go up/down stairs Goal Met 1-29-18  2. Pt will be able to perform sit to stand transfers independently with increased knee flexion and decreased use of upper extremities Goal Met 1-29-18  3. Pt will ascend/descend 12 steps with reciprocal gait pattern and rail Goal Met 1-29-18  4. Pt will be able to ambulate community level distances without an assistive device and no pain Goal Met 1-24-18  5. Pt to increase lower extremity functional scale by 10 points to show improvement in areas of difficulty Goal Met 1-29-18                HISTORY:   History of Present Injury/Illness (Reason for Referral):  Patient reports having a total knee replacement on the 14th of November. Following the hospital went home with home health. Reports having issues with tight muscles and swelling. Has been icing his knee and put a heating pad on his IT band. Sitting for long periods of time causes stiffness. Does not have too much pain. Is using a cane for ambulation. Has not been taking the pain medicine. Goes to [facility] twice a week (hasn't gone since surgery)  Past Medical History/Comorbidities:   Mr. Tony Romero  has a past medical history of Aortic valve replaced (12/2014); Arrhythmia; Arthritis; CAD (coronary artery disease); Carotid artery stenosis; Chronic kidney disease; Chronic pain; Coagulation disorder (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 He also has no past medical history of Adverse effect of anesthesia; Aneurysm ([facility] [address] Asthma; Autoimmune disease ([facility] [address] Cancer ([facility] [address] Chronic obstructive pulmonary disease ([facility] [address] Diabetes ([facility] [address] Difficult intubation; Heart failure ([facility] [address] Liver disease; Malignant hyperthermia due to anesthesia; Nausea & vomiting; Pseudocholinesterase deficiency; Psychiatric disorder; PUD (peptic ulcer disease); Seizures ([facility] [address] Stroke [facility]); Thromboembolus ([facility] [address] Thyroid disease; Unspecified adverse effect of anesthesia; or Unspecified sleep apnea. Mr. Lo Mercedes  has a past surgical history that includes hx bunionectomy (Bilateral); vascular surgery procedure unlist; hx carpal tunnel release (Bilateral); hx cataract removal (Bilateral); hx coronary artery bypass graft (10/14/2014); hx heart catheterization; hx colonoscopy; and hx knee replacement (Right, 2017). Social History/Living Environment:    Lives with wife. One story home with bonus room upstairs. No barriers at home. Prior Level of Function/Work/Activity:  Retired. Plays golf goes to [facility]  Dominant Side:         RIGHT  Other Clinical Tests:          X-rays   Previous Treatment Approaches:          Physical therapy  Personal Factors:          Sex:  male        Age:  76 y.o. Current Medications:    Current Outpatient Prescriptions:     HYDROmorphone (DILAUDID) 2 mg tablet, Take 1 Tab by mouth every four (4) hours as needed. Max Daily Amount: 12 mg., Disp: 40 Tab, Rfl: 0    metoprolol succinate (TOPROL XL) 25 mg XL tablet, Take 1 Tab by mouth every morning. Indications: unknown why takes, Disp: 7 Tab, Rfl: 0    amoxicillin (AMOXIL) 500 mg capsule, Take 500 mg by mouth.  Prior to dental appointment, Disp: , Rfl:     clopidogrel (PLAVIX) 75 mg tab, Take 1 Tab by mouth every morning., Disp: 90 Tab, Rfl: 3    losartan-hydroCHLOROthiazide (HYZAAR) 100-12.5 mg per tablet, Take 1 Tab by mouth every morning. Indications: Hypertension, Disp: 90 Tab, Rfl: 3    rosuvastatin (CRESTOR) 20 mg tablet, Take 1 Tab by mouth nightly. Indications: hypercholesterolemia, Disp: 90 Tab, Rfl: 3    aspirin delayed-release 81 mg tablet, Take 81 mg by mouth nightly., Disp: , Rfl:     Glucosamine &Chondroit-MV-Min3 109-137-57-0.5 mg tab, Take  by mouth every morning. 2 tablets every morning   Stop seven days prior to surgery per anesthesia protocol., Disp: , Rfl:     ranitidine (ZANTAC) 150 mg tablet, Take 150 mg by mouth as needed for Indigestion. Non-formulary. Instructed to bring to the hospital on the DOS, in the original bottle, and give to nurse. Indications: gastroesophageal reflux disease, Disp: , Rfl:      Date Last Reviewed:  1/29/2018    EXAMINATION:   Observation/Orthostatic Postural Assessment:  1-24-18        Moderate amount of edema to LLE. Incision healing and intact.  Dry flaky skin  Palpation:  Re-assessed 1-24-18        Scar Tissue Mobility- normal.         Hypomobile patellar mobility (inferior and medial directions)  ROM: Re-assessed 1-24-18         Right Knee ROM: WNL        Left Knee ROM: 3-110 degrees  Strength:  Re-assessed 1-24-18   LE:  Hip Flexion 5/5 B  Hip Extension 5/5 B  Hip Abduction 5/5 B  Knee Flexion 5/5 B  Knee Extension 5/5 B  Ankle Dorsiflexion 5/5 B  Ankle Plantarflexion 5/5 B  Special Tests:          n/a  Neurological Screen:        Myotomes:  intact        Dermatomes:  Intact re-assessed 1-24-18  Functional Mobility: re-assessed 1-24-18        Gait/Ambulation:  Patient ambulates with antalgic gait on left: increased trunk sway  Balance:  Re-assessed 1-24-18        Single Leg Balance Right: 5 seconds        Single Leg Balance Left: 6 second   CLINICAL PRESENTATION:   CLINICAL DECISION MAKING:   Outcome Measure: Tool Used: Lower Extremity Functional Scale (LEFS)  Score:  Initial: 30/80 Most Recent: 63/80 (Date:1-29-18 )   Interpretation of Score: 20 questions each scored on a 5 point scale with 0 representing \"extreme difficulty or unable to perform\" and 4 representing \"no difficulty\". The lower the score, the greater the functional disability. 80/80 represents no disability. Minimal detectable change is 9 points. Score 80 79-63 62-48 47-32 31-16 15-1 0   Modifier CH CI CJ CK CL CM CN     ? Mobility - Walking and Moving Around:     - CURRENT STATUS: CI - 1%-19% impaired, limited or restricted    - GOAL STATUS: CI - 1%-19% impaired, limited or restricted    - D/C STATUS:  CI - 1%-19% impaired, limited or restricted         TREATMENT:   Pre-treatment Symptoms: Patient reports right knee is sore today. He reports that he thinks he is compensating for the left, especially when climbing stairs. He reports that he has noticed he is steadier on stairs. (In addition to Assessment/Re-Assessment sessions the following treatments were rendered)  THERAPEUTIC EXERCISE: (30 minutes):  Exercises per grid below to improve mobility, strength and balance. Required minimal visual, verbal and manual cues to promote proper body alignment and promote proper body posture. Progressed resistance, range and repetitions as indicated.    Date:  12-13-17 Date:  12-18-17 Date:  12-20-17 Date:  12-27-17 Date: 1-3-18 Date:  1/8/18 Date: 1-10-18 Date: 1-15-18 Date: 1-22-18 Date: 1-24-18 Date: 1-29-18   Activity/Exercise Parameters Parameters Parameters   Parameters        Patient Education Discussed HEP and POC             Nu Step  Level 2 x 10 minutes Level 2 x 10 minutes Level 2 x 10 minutes Level 2 x 10 minutes  Level 2, 10 mins Level 3 x 10 minutes  Level 3 x 10 minutes Level 3 x 10 minutes Level 3 x 10 minutes    Shuttle Press  2 black bands, using for knee flexion stretch 10 sec holds x 10 3 black bands, using for knee flexion stretch 10 sec holds x 10 4 black bands double leg x 30    3 black bands single leg x 15 5 black bands double leg x 30    3 black bands single leg x 15   5 black bands double leg x30  3 black bands single leg x15 5 black bands double leg x 30  3 black  bands single leg x15 5 black bands double leg x 30  3 black  bands single leg x15 5 black bands x 30   4 black bands single leg x 15 BLE  5 black bands x 30   4 black bands single leg x 15 BLE   Gastroc Stretch        3x20 sec L LE        Tall Kneel                Side Step Ups    6\" step x 20   8\" step x 15 8\" step x15 8\" step x15 8\" step x20 8\" step x20  8' step x 20    Step Ups      8\" step x 20 8\" step x 20  8\" step x20 8\" step x20 8\" step x20 8' step x 20   8' step x 20    Mini Squats    x20 against wall x20 against wall x20 against wall x20 against wall    x20 against wall   Single Leg Step Down     4\" step x 15  Heel taps on 1 inch step x15; 15x from 4 in going down for ROM  Heel taps on 1\" step x 15 Both legs Heel taps on 1\" step x 20 Both legs Heel taps on 1\" step x 20 Both legs Heel taps on 1\" step x 20 Both legs   Stairs        4 x10 up and down  focusing on slow and controlled 4 x10 up and down  focusing on slow and controlled 4 x10 up and down  focusing on slow and controlled    Bike            Level 1 x 10 minutes       [employer] Portal  Treatment/Session Assessment: See assessment above   · Pain: Initial:    0 Post Session:  unchanged   Total Treatment Duration:  PT Patient Time In/Time Out  Time In: 1000  Time Out: Megan [address]

## 2018-03-26 PROBLEM — E66.01 SEVERE OBESITY (BMI 35.0-39.9) WITH COMORBIDITY (HCC): Status: ACTIVE | Noted: 2018-03-26

## 2019-01-17 ENCOUNTER — APPOINTMENT (RX ONLY)
Dept: URBAN - METROPOLITAN AREA CLINIC 24 | Facility: CLINIC | Age: 79
Setting detail: DERMATOLOGY
End: 2019-01-17

## 2019-01-17 DIAGNOSIS — D22 MELANOCYTIC NEVI: ICD-10-CM

## 2019-01-17 DIAGNOSIS — L57.0 ACTINIC KERATOSIS: ICD-10-CM

## 2019-01-17 DIAGNOSIS — L71.8 OTHER ROSACEA: ICD-10-CM

## 2019-01-17 DIAGNOSIS — D18.0 HEMANGIOMA: ICD-10-CM

## 2019-01-17 DIAGNOSIS — L82.1 OTHER SEBORRHEIC KERATOSIS: ICD-10-CM

## 2019-01-17 DIAGNOSIS — L81.4 OTHER MELANIN HYPERPIGMENTATION: ICD-10-CM

## 2019-01-17 PROBLEM — D22.5 MELANOCYTIC NEVI OF TRUNK: Status: ACTIVE | Noted: 2019-01-17

## 2019-01-17 PROBLEM — L85.3 XEROSIS CUTIS: Status: ACTIVE | Noted: 2019-01-17

## 2019-01-17 PROBLEM — D18.01 HEMANGIOMA OF SKIN AND SUBCUTANEOUS TISSUE: Status: ACTIVE | Noted: 2019-01-17

## 2019-01-17 PROCEDURE — 17003 DESTRUCT PREMALG LES 2-14: CPT

## 2019-01-17 PROCEDURE — 17000 DESTRUCT PREMALG LESION: CPT

## 2019-01-17 PROCEDURE — ? LIQUID NITROGEN

## 2019-01-17 PROCEDURE — ? COUNSELING

## 2019-01-17 PROCEDURE — 99214 OFFICE O/P EST MOD 30 MIN: CPT | Mod: 25

## 2019-01-17 ASSESSMENT — LOCATION SIMPLE DESCRIPTION DERM
LOCATION SIMPLE: CHEST
LOCATION SIMPLE: LEFT SHOULDER
LOCATION SIMPLE: LEFT FOREARM
LOCATION SIMPLE: ABDOMEN
LOCATION SIMPLE: RIGHT EAR
LOCATION SIMPLE: LEFT CHEEK
LOCATION SIMPLE: RIGHT CHEEK
LOCATION SIMPLE: LEFT UPPER BACK
LOCATION SIMPLE: LEFT HAND
LOCATION SIMPLE: LEFT TEMPLE
LOCATION SIMPLE: RIGHT UPPER BACK
LOCATION SIMPLE: LEFT EAR
LOCATION SIMPLE: RIGHT TEMPLE
LOCATION SIMPLE: RIGHT SHOULDER

## 2019-01-17 ASSESSMENT — LOCATION DETAILED DESCRIPTION DERM
LOCATION DETAILED: RIGHT MEDIAL UPPER BACK
LOCATION DETAILED: RIGHT SUPERIOR CRUS OF ANTIHELIX
LOCATION DETAILED: LEFT CENTRAL TEMPLE
LOCATION DETAILED: RIGHT POSTERIOR SHOULDER
LOCATION DETAILED: LEFT CENTRAL MALAR CHEEK
LOCATION DETAILED: EPIGASTRIC SKIN
LOCATION DETAILED: LEFT DISTAL RADIAL DORSAL FOREARM
LOCATION DETAILED: STERNUM
LOCATION DETAILED: RIGHT MEDIAL MALAR CHEEK
LOCATION DETAILED: LEFT RADIAL DORSAL HAND
LOCATION DETAILED: LEFT INFERIOR HELIX
LOCATION DETAILED: LEFT POSTERIOR SHOULDER
LOCATION DETAILED: LEFT MID-UPPER BACK
LOCATION DETAILED: LEFT DORSAL MIDDLE METACARPOPHALANGEAL JOINT
LOCATION DETAILED: RIGHT CENTRAL TEMPLE
LOCATION DETAILED: RIGHT INFERIOR MEDIAL UPPER BACK
LOCATION DETAILED: XIPHOID

## 2019-01-17 ASSESSMENT — LOCATION ZONE DERM
LOCATION ZONE: ARM
LOCATION ZONE: FACE
LOCATION ZONE: EAR
LOCATION ZONE: HAND
LOCATION ZONE: TRUNK

## 2019-03-19 PROBLEM — G47.33 OSA (OBSTRUCTIVE SLEEP APNEA): Status: ACTIVE | Noted: 2019-03-19

## 2019-03-19 PROBLEM — E66.9 OBESITY (BMI 30-39.9): Status: ACTIVE | Noted: 2019-03-19

## 2019-09-24 PROBLEM — Z01.810 PREOP CARDIOVASCULAR EXAM: Status: RESOLVED | Noted: 2017-10-23 | Resolved: 2019-09-24

## 2019-11-07 ENCOUNTER — HOSPITAL ENCOUNTER (OUTPATIENT)
Dept: LAB | Age: 79
Discharge: HOME | End: 2019-11-07
Attending: INTERNAL MEDICINE
Payer: MEDICARE

## 2019-11-07 DIAGNOSIS — E78.5 DYSLIPIDEMIA: ICD-10-CM

## 2019-11-07 DIAGNOSIS — I48.19 OTHER PERSISTENT ATRIAL FIBRILLATION (HCC): ICD-10-CM

## 2019-11-07 PROBLEM — I45.10 RBBB: Status: ACTIVE | Noted: 2019-11-07

## 2019-11-07 LAB
ALBUMIN SERPL-MCNC: 3.9 G/DL (ref 3.2–4.6)
ALBUMIN/GLOB SERPL: 1.1 {RATIO} (ref 1.2–3.5)
ALP SERPL-CCNC: 55 U/L (ref 50–136)
ALT SERPL-CCNC: 40 U/L (ref 12–65)
ANION GAP SERPL CALC-SCNC: 8 MMOL/L (ref 7–16)
AST SERPL-CCNC: 35 U/L (ref 15–37)
BASOPHILS # BLD: 0.1 K/UL (ref 0–0.2)
BASOPHILS NFR BLD: 1 % (ref 0–2)
BILIRUB SERPL-MCNC: 0.9 MG/DL (ref 0.2–1.1)
BUN SERPL-MCNC: 27 MG/DL (ref 8–23)
CALCIUM SERPL-MCNC: 9.4 MG/DL (ref 8.3–10.4)
CHLORIDE SERPL-SCNC: 103 MMOL/L (ref 98–107)
CHOLEST SERPL-MCNC: 136 MG/DL
CO2 SERPL-SCNC: 29 MMOL/L (ref 21–32)
CREAT SERPL-MCNC: 1.8 MG/DL (ref 0.8–1.5)
DIFFERENTIAL METHOD BLD: NORMAL
EOSINOPHIL # BLD: 0.4 K/UL (ref 0–0.8)
EOSINOPHIL NFR BLD: 4 % (ref 0.5–7.8)
ERYTHROCYTE [DISTWIDTH] IN BLOOD BY AUTOMATED COUNT: 12.7 % (ref 11.9–14.6)
GLOBULIN SER CALC-MCNC: 3.6 G/DL (ref 2.3–3.5)
GLUCOSE SERPL-MCNC: 118 MG/DL (ref 65–100)
HCT VFR BLD AUTO: 49.5 % (ref 41.1–50.3)
HDLC SERPL-MCNC: 59 MG/DL (ref 40–60)
HDLC SERPL: 2.3 {RATIO}
HGB BLD-MCNC: 16.1 G/DL (ref 13.6–17.2)
IMM GRANULOCYTES # BLD AUTO: 0 K/UL (ref 0–0.5)
IMM GRANULOCYTES NFR BLD AUTO: 1 % (ref 0–5)
LDLC SERPL CALC-MCNC: 45.4 MG/DL
LIPID PROFILE,FLP: ABNORMAL
LYMPHOCYTES # BLD: 2.2 K/UL (ref 0.5–4.6)
LYMPHOCYTES NFR BLD: 25 % (ref 13–44)
MCH RBC QN AUTO: 29.8 PG (ref 26.1–32.9)
MCHC RBC AUTO-ENTMCNC: 32.5 G/DL (ref 31.4–35)
MCV RBC AUTO: 91.7 FL (ref 79.6–97.8)
MONOCYTES # BLD: 1.1 K/UL (ref 0.1–1.3)
MONOCYTES NFR BLD: 12 % (ref 4–12)
NEUTS SEG # BLD: 4.8 K/UL (ref 1.7–8.2)
NEUTS SEG NFR BLD: 57 % (ref 43–78)
NRBC # BLD: 0 K/UL (ref 0–0.2)
PLATELET # BLD AUTO: 177 K/UL (ref 150–450)
PMV BLD AUTO: 11.3 FL (ref 9.4–12.3)
POTASSIUM SERPL-SCNC: 4.3 MMOL/L (ref 3.5–5.1)
PROT SERPL-MCNC: 7.5 G/DL (ref 6.3–8.2)
RBC # BLD AUTO: 5.4 M/UL (ref 4.23–5.6)
SODIUM SERPL-SCNC: 140 MMOL/L (ref 136–145)
T4 FREE SERPL-MCNC: 1 NG/DL (ref 0.78–1.46)
TRIGL SERPL-MCNC: 158 MG/DL (ref 35–150)
TSH SERPL DL<=0.005 MIU/L-ACNC: 4.15 UIU/ML (ref 0.36–3.74)
VLDLC SERPL CALC-MCNC: 31.6 MG/DL (ref 6–23)
WBC # BLD AUTO: 8.5 K/UL (ref 4.3–11.1)

## 2019-11-07 PROCEDURE — 84439 ASSAY OF FREE THYROXINE: CPT

## 2019-11-07 PROCEDURE — 84443 ASSAY THYROID STIM HORMONE: CPT

## 2019-11-07 PROCEDURE — 80053 COMPREHEN METABOLIC PANEL: CPT

## 2019-11-07 PROCEDURE — 36415 COLL VENOUS BLD VENIPUNCTURE: CPT

## 2019-11-07 PROCEDURE — 80061 LIPID PANEL: CPT

## 2019-11-07 PROCEDURE — 85025 COMPLETE CBC W/AUTO DIFF WBC: CPT

## 2019-11-11 NOTE — PROGRESS NOTES
Please call patient. Labs OK except renal function mildly worse. Cr increased from 1.4 to 1.8. Need to decrease Eliquis to 2.5 mg BID.    Can cut tablets in half butsend in new script for 2.5 mg  Thank you

## 2020-01-23 ENCOUNTER — APPOINTMENT (RX ONLY)
Dept: URBAN - METROPOLITAN AREA CLINIC 24 | Facility: CLINIC | Age: 80
Setting detail: DERMATOLOGY
End: 2020-01-23

## 2020-01-23 DIAGNOSIS — D485 NEOPLASM OF UNCERTAIN BEHAVIOR OF SKIN: ICD-10-CM

## 2020-01-23 DIAGNOSIS — D18.0 HEMANGIOMA: ICD-10-CM

## 2020-01-23 DIAGNOSIS — L81.4 OTHER MELANIN HYPERPIGMENTATION: ICD-10-CM

## 2020-01-23 DIAGNOSIS — L57.0 ACTINIC KERATOSIS: ICD-10-CM

## 2020-01-23 DIAGNOSIS — L82.1 OTHER SEBORRHEIC KERATOSIS: ICD-10-CM

## 2020-01-23 PROBLEM — L85.3 XEROSIS CUTIS: Status: ACTIVE | Noted: 2020-01-23

## 2020-01-23 PROBLEM — D48.5 NEOPLASM OF UNCERTAIN BEHAVIOR OF SKIN: Status: ACTIVE | Noted: 2020-01-23

## 2020-01-23 PROBLEM — I10 ESSENTIAL (PRIMARY) HYPERTENSION: Status: ACTIVE | Noted: 2020-01-23

## 2020-01-23 PROBLEM — D18.01 HEMANGIOMA OF SKIN AND SUBCUTANEOUS TISSUE: Status: ACTIVE | Noted: 2020-01-23

## 2020-01-23 PROBLEM — E78.5 HYPERLIPIDEMIA, UNSPECIFIED: Status: ACTIVE | Noted: 2020-01-23

## 2020-01-23 PROBLEM — M12.9 ARTHROPATHY, UNSPECIFIED: Status: ACTIVE | Noted: 2020-01-23

## 2020-01-23 PROBLEM — E13.9 OTHER SPECIFIED DIABETES MELLITUS WITHOUT COMPLICATIONS: Status: ACTIVE | Noted: 2020-01-23

## 2020-01-23 PROCEDURE — 17003 DESTRUCT PREMALG LES 2-14: CPT

## 2020-01-23 PROCEDURE — 99214 OFFICE O/P EST MOD 30 MIN: CPT | Mod: 25

## 2020-01-23 PROCEDURE — 17000 DESTRUCT PREMALG LESION: CPT | Mod: 59

## 2020-01-23 PROCEDURE — 11102 TANGNTL BX SKIN SINGLE LES: CPT

## 2020-01-23 PROCEDURE — ? LIQUID NITROGEN

## 2020-01-23 PROCEDURE — ? COUNSELING

## 2020-01-23 PROCEDURE — ? BIOPSY BY SHAVE METHOD

## 2020-01-23 ASSESSMENT — LOCATION SIMPLE DESCRIPTION DERM
LOCATION SIMPLE: LEFT CHEEK
LOCATION SIMPLE: LEFT ZYGOMA
LOCATION SIMPLE: LEFT FOREHEAD
LOCATION SIMPLE: RIGHT EAR
LOCATION SIMPLE: LEFT EAR
LOCATION SIMPLE: ABDOMEN
LOCATION SIMPLE: LEFT SHOULDER
LOCATION SIMPLE: LEFT UPPER BACK
LOCATION SIMPLE: RIGHT UPPER BACK
LOCATION SIMPLE: RIGHT SHOULDER

## 2020-01-23 ASSESSMENT — LOCATION DETAILED DESCRIPTION DERM
LOCATION DETAILED: LEFT CENTRAL ZYGOMA
LOCATION DETAILED: LEFT MEDIAL UPPER BACK
LOCATION DETAILED: RIGHT SUPERIOR HELIX
LOCATION DETAILED: LEFT POSTERIOR SHOULDER
LOCATION DETAILED: LEFT CENTRAL BUCCAL CHEEK
LOCATION DETAILED: LEFT SUPERIOR HELIX
LOCATION DETAILED: LEFT ANTIHELIX
LOCATION DETAILED: RIGHT MID-UPPER BACK
LOCATION DETAILED: RIGHT POSTERIOR SHOULDER
LOCATION DETAILED: EPIGASTRIC SKIN
LOCATION DETAILED: LEFT INFERIOR LATERAL FOREHEAD

## 2020-01-23 ASSESSMENT — LOCATION ZONE DERM
LOCATION ZONE: TRUNK
LOCATION ZONE: ARM
LOCATION ZONE: FACE
LOCATION ZONE: EAR

## 2020-01-23 NOTE — PROCEDURE: BIOPSY BY SHAVE METHOD
Hide Additional Anticipated Plan?: No
Notification Instructions: Patient will be notified of biopsy results. However, patient instructed to call the office if not contacted within 2 weeks.
Biopsy Method: Dermablade
Accession #: [id number]  [id number]
Hemostasis: Aluminum Chloride
Wound Care: Vaseline
Additional Anesthesia Volume In Cc (Will Not Render If 0): 0
Consent: Written consent was obtained and risks were reviewed including but not limited to scarring, infection, bleeding, scabbing, incomplete removal, and allergy to anesthesia.
Anesthesia Volume In Cc: 0.3
Biopsy Type: H and E
Silver Nitrate Text: The wound bed was treated with silver nitrate after the biopsy was performed.
Electrodesiccation Text: The wound bed was treated with electrodesiccation after the biopsy was performed.
Cryotherapy Text: The wound bed was treated with cryotherapy after the biopsy was performed.
Electrodesiccation And Curettage Text: The wound bed was treated with electrodesiccation and curettage after the biopsy was performed.
Detail Level: Detailed
Billing Type: Third-Party Bill
Depth Of Biopsy: dermis
Dressing: bandage
Anesthesia Type: 1% lidocaine with epinephrine
Post-care instructions were reviewed in detail and written instructions are provided. Patient is to keep the biopsy site dry overnight, and then apply bacitracin twice daily until healed. Patient may apply hydrogen peroxide soaks to remove any crusting.
Was A Bandage Applied: Yes
Type Of Destruction Used: Curettage

## 2020-02-03 ENCOUNTER — RX ONLY (OUTPATIENT)
Age: 80
Setting detail: RX ONLY
End: 2020-02-03

## 2020-02-03 RX ORDER — IMIQUIMOD 50 MG/G
CREAM TOPICAL
Qty: 1 | Refills: 0 | Status: ERX | COMMUNITY
Start: 2020-02-03

## 2020-05-04 ENCOUNTER — APPOINTMENT (RX ONLY)
Dept: URBAN - METROPOLITAN AREA CLINIC 24 | Facility: CLINIC | Age: 80
Setting detail: DERMATOLOGY
End: 2020-05-04

## 2020-05-04 PROBLEM — D04.39 CARCINOMA IN SITU OF SKIN OF OTHER PARTS OF FACE: Status: ACTIVE | Noted: 2020-05-04

## 2020-05-04 PROCEDURE — ? OTHER

## 2020-05-04 NOTE — PROCEDURE: OTHER
Other (Free Text): Treated with Imiquimod. No clinical residual at today’s visit
Note Text (......Xxx Chief Complaint.): This diagnosis correlates with the
Detail Level: Simple

## 2020-09-21 ENCOUNTER — APPOINTMENT (RX ONLY)
Dept: URBAN - METROPOLITAN AREA CLINIC 24 | Facility: CLINIC | Age: 80
Setting detail: DERMATOLOGY
End: 2020-09-21

## 2020-09-21 DIAGNOSIS — L81.4 OTHER MELANIN HYPERPIGMENTATION: ICD-10-CM

## 2020-09-21 DIAGNOSIS — L57.0 ACTINIC KERATOSIS: ICD-10-CM

## 2020-09-21 DIAGNOSIS — L82.1 OTHER SEBORRHEIC KERATOSIS: ICD-10-CM

## 2020-09-21 DIAGNOSIS — D485 NEOPLASM OF UNCERTAIN BEHAVIOR OF SKIN: ICD-10-CM

## 2020-09-21 DIAGNOSIS — D18.0 HEMANGIOMA: ICD-10-CM

## 2020-09-21 DIAGNOSIS — Z85.828 PERSONAL HISTORY OF OTHER MALIGNANT NEOPLASM OF SKIN: ICD-10-CM

## 2020-09-21 PROBLEM — D18.01 HEMANGIOMA OF SKIN AND SUBCUTANEOUS TISSUE: Status: ACTIVE | Noted: 2020-09-21

## 2020-09-21 PROBLEM — D48.5 NEOPLASM OF UNCERTAIN BEHAVIOR OF SKIN: Status: ACTIVE | Noted: 2020-09-21

## 2020-09-21 PROBLEM — L55.1 SUNBURN OF SECOND DEGREE: Status: ACTIVE | Noted: 2020-09-21

## 2020-09-21 PROCEDURE — 17003 DESTRUCT PREMALG LES 2-14: CPT

## 2020-09-21 PROCEDURE — ? BIOPSY BY SHAVE METHOD

## 2020-09-21 PROCEDURE — 11102 TANGNTL BX SKIN SINGLE LES: CPT

## 2020-09-21 PROCEDURE — ? LIQUID NITROGEN

## 2020-09-21 PROCEDURE — 99214 OFFICE O/P EST MOD 30 MIN: CPT | Mod: 25

## 2020-09-21 PROCEDURE — 17000 DESTRUCT PREMALG LESION: CPT | Mod: 59

## 2020-09-21 PROCEDURE — ? COUNSELING

## 2020-09-21 ASSESSMENT — LOCATION DETAILED DESCRIPTION DERM
LOCATION DETAILED: LEFT MEDIAL UPPER BACK
LOCATION DETAILED: LEFT LATERAL MANDIBULAR CHEEK
LOCATION DETAILED: RIGHT SUPERIOR HELIX
LOCATION DETAILED: LEFT POSTERIOR SHOULDER
LOCATION DETAILED: RIGHT CENTRAL TEMPLE
LOCATION DETAILED: LEFT CENTRAL BUCCAL CHEEK
LOCATION DETAILED: RIGHT POSTERIOR SHOULDER
LOCATION DETAILED: RIGHT MID-UPPER BACK
LOCATION DETAILED: LEFT DISTAL DORSAL FOREARM
LOCATION DETAILED: LEFT SUPERIOR HELIX
LOCATION DETAILED: EPIGASTRIC SKIN
LOCATION DETAILED: LEFT CENTRAL ZYGOMA

## 2020-09-21 ASSESSMENT — LOCATION SIMPLE DESCRIPTION DERM
LOCATION SIMPLE: LEFT ZYGOMA
LOCATION SIMPLE: RIGHT TEMPLE
LOCATION SIMPLE: LEFT EAR
LOCATION SIMPLE: RIGHT SHOULDER
LOCATION SIMPLE: RIGHT EAR
LOCATION SIMPLE: RIGHT UPPER BACK
LOCATION SIMPLE: LEFT UPPER BACK
LOCATION SIMPLE: LEFT CHEEK
LOCATION SIMPLE: LEFT SHOULDER
LOCATION SIMPLE: ABDOMEN
LOCATION SIMPLE: LEFT FOREARM

## 2020-09-21 ASSESSMENT — LOCATION ZONE DERM
LOCATION ZONE: ARM
LOCATION ZONE: FACE
LOCATION ZONE: TRUNK
LOCATION ZONE: EAR

## 2020-09-21 NOTE — PROCEDURE: BIOPSY BY SHAVE METHOD
Biopsy Type: H and E
X Size Of Lesion In Cm: 0
Bill For Surgical Tray: no
Electrodesiccation And Curettage Text: The wound bed was treated with electrodesiccation and curettage after the biopsy was performed.
Wound Care: Vaseline
Type Of Destruction Used: Curettage
Cryotherapy Text: The wound bed was treated with cryotherapy after the biopsy was performed.
Depth Of Biopsy: dermis
Notification Instructions: Patient will be notified of biopsy results. However, patient instructed to call the office if not contacted within 2 weeks.
Anesthesia Volume In Cc: 0.3
Accession #: [id number].
Validate Note Data (See Information Below): Yes
Hemostasis: Aluminum Chloride
Silver Nitrate Text: The wound bed was treated with silver nitrate after the biopsy was performed.
Billing Type: Third-Party Bill
Detail Level: Detailed
Biopsy Method: Dermablade
Anesthesia Type: 1% lidocaine with epinephrine
Electrodesiccation Text: The wound bed was treated with electrodesiccation after the biopsy was performed.
Information: Selecting Yes will display possible errors in your note based on the variables you have selected. This validation is only offered as a suggestion for you. PLEASE NOTE THAT THE VALIDATION TEXT WILL BE REMOVED WHEN YOU FINALIZE YOUR NOTE. IF YOU WANT TO FAX A PRELIMINARY NOTE YOU WILL NEED TO TOGGLE THIS TO 'NO' IF YOU DO NOT WANT IT IN YOUR FAXED NOTE.
Consent: Written consent was obtained and risks were reviewed including but not limited to scarring, infection, bleeding, scabbing, incomplete removal, and allergy to anesthesia.
Dressing: bandage
Post-care instructions were reviewed in detail and written instructions are provided. Patient is to keep the biopsy site dry overnight, and then apply bacitracin twice daily until healed. Patient may apply hydrogen peroxide soaks to remove any crusting.

## 2020-10-08 ENCOUNTER — RX ONLY (OUTPATIENT)
Age: 80
Setting detail: RX ONLY
End: 2020-10-08

## 2020-10-08 RX ORDER — CEPHALEXIN 500 MG/1
CAPSULE ORAL
Qty: 6 | Refills: 0 | Status: ERX | COMMUNITY
Start: 2020-10-08

## 2020-10-20 ENCOUNTER — APPOINTMENT (RX ONLY)
Dept: URBAN - METROPOLITAN AREA CLINIC 24 | Facility: CLINIC | Age: 80
Setting detail: DERMATOLOGY
End: 2020-10-20

## 2020-10-20 PROBLEM — C44.629 SQUAMOUS CELL CARCINOMA OF SKIN OF LEFT UPPER LIMB, INCLUDING SHOULDER: Status: ACTIVE | Noted: 2020-10-20

## 2020-10-20 PROCEDURE — ? EXCISION

## 2020-10-20 PROCEDURE — 12032 INTMD RPR S/A/T/EXT 2.6-7.5: CPT

## 2020-10-20 PROCEDURE — 11602 EXC TR-EXT MAL+MARG 1.1-2 CM: CPT

## 2020-10-20 NOTE — PROCEDURE: EXCISION
Primary Defect Width (In Cm): 0
Hemigard Intro: Due to skin fragility and wound tension, it was decided to use HEMIGARD adhesive retention suture devices to permit a linear closure. The skin was cleaned and dried for a 6cm distance away from the wound. Excessive hair, if present, was removed to allow for adhesion.
Include Suturegard In Note?: No
Complex Repair And V-Y Plasty Text: The defect edges were debeveled with a #15 scalpel blade.  The primary defect was closed partially with a complex linear closure.  Given the location of the remaining defect, shape of the defect and the proximity to free margins a V-Y plasty was deemed most appropriate for complete closure of the defect.  Using a sterile surgical marker, an appropriate advancement flap was drawn incorporating the defect and placing the expected incisions within the relaxed skin tension lines where possible.    The area thus outlined was incised deep to adipose tissue with a #15 scalpel blade.  The skin margins were undermined to an appropriate distance in all directions utilizing iris scissors.
Crescentic Complex Repair Preamble Text (Leave Blank If You Do Not Want): Extensive wide undermining was performed.
Length To Time In Minutes Device Was In Place: 10
Transposition Flap Text: The defect edges were debeveled with a #15 scalpel blade.  Given the location of the defect and the proximity to free margins a transposition flap was deemed most appropriate.  Using a sterile surgical marker, an appropriate transposition flap was drawn incorporating the defect.    The area thus outlined was incised deep to adipose tissue with a #15 scalpel blade.  The skin margins were undermined to an appropriate distance in all directions utilizing iris scissors.
Anesthesia Volume In Cc: 5
Dermal Autograft Text: The defect edges were debeveled with a #15 scalpel blade.  Given the location of the defect, shape of the defect and the proximity to free margins a dermal autograft was deemed most appropriate.  Using a sterile surgical marker, the primary defect shape was transferred to the donor site. The area thus outlined was incised deep to adipose tissue with a #15 scalpel blade.  The harvested graft was then trimmed of adipose and epidermal tissue until only dermis was left.  The skin graft was then placed in the primary defect and oriented appropriately.
Bilateral Helical Rim Advancement Flap Text: The defect edges were debeveled with a #15 blade scalpel.  Given the location of the defect and the proximity to free margins (helical rim) a bilateral helical rim advancement flap was deemed most appropriate.  Using a sterile surgical marker, the appropriate advancement flaps were drawn incorporating the defect and placing the expected incisions between the helical rim and antihelix where possible.  The area thus outlined was incised through and through with a #15 scalpel blade.  With a skin hook and iris scissors, the flaps were gently and sharply undermined and freed up.
Partial Purse String (Simple) Text: Given the location of the defect and the characteristics of the surrounding skin a simple purse string closure was deemed most appropriate.  Undermining was performed circumferentially around the surgical defect.  A purse string suture was then placed and tightened. Wound tension of the circular defect prevented complete closure of the wound.
Undermining Type: Entire Wound
Island Pedicle Flap Text: The defect edges were debeveled with a #15 scalpel blade.  Given the location of the defect, shape of the defect and the proximity to free margins an island pedicle advancement flap was deemed most appropriate.  Using a sterile surgical marker, an appropriate advancement flap was drawn incorporating the defect, outlining the appropriate donor tissue and placing the expected incisions within the relaxed skin tension lines where possible.    The area thus outlined was incised deep to adipose tissue with a #15 scalpel blade.  The skin margins were undermined to an appropriate distance in all directions around the primary defect and laterally outward around the island pedicle utilizing iris scissors.  There was minimal undermining beneath the pedicle flap.
O-T Advancement Flap Text: The defect edges were debeveled with a #15 scalpel blade.  Given the location of the defect, shape of the defect and the proximity to free margins an O-T advancement flap was deemed most appropriate.  Using a sterile surgical marker, an appropriate advancement flap was drawn incorporating the defect and placing the expected incisions within the relaxed skin tension lines where possible.    The area thus outlined was incised deep to adipose tissue with a #15 scalpel blade.  The skin margins were undermined to an appropriate distance in all directions utilizing iris scissors.
O-Z Plasty Text: The defect edges were debeveled with a #15 scalpel blade.  Given the location of the defect, shape of the defect and the proximity to free margins an O-Z plasty (double transposition flap) was deemed most appropriate.  Using a sterile surgical marker, the appropriate transposition flaps were drawn incorporating the defect and placing the expected incisions within the relaxed skin tension lines where possible.    The area thus outlined was incised deep to adipose tissue with a #15 scalpel blade.  The skin margins were undermined to an appropriate distance in all directions utilizing iris scissors.  Hemostasis was achieved with electrocautery.  The flaps were then transposed into place, one clockwise and the other counterclockwise, and anchored with interrupted buried subcutaneous sutures.
Modified Advancement Flap Text: The defect edges were debeveled with a #15 scalpel blade.  Given the location of the defect, shape of the defect and the proximity to free margins a modified advancement flap was deemed most appropriate.  Using a sterile surgical marker, an appropriate advancement flap was drawn incorporating the defect and placing the expected incisions within the relaxed skin tension lines where possible.    The area thus outlined was incised deep to adipose tissue with a #15 scalpel blade.  The skin margins were undermined to an appropriate distance in all directions utilizing iris scissors.
Cheek Interpolation Flap Text: A decision was made to reconstruct the defect utilizing an interpolation axial flap and a staged reconstruction.  A telfa template was made of the defect.  This telfa template was then used to outline the Cheek Interpolation flap.  The donor area for the pedicle flap was then injected with anesthesia.  The flap was excised through the skin and subcutaneous tissue down to the layer of the underlying musculature.  The interpolation flap was carefully excised within this deep plane to maintain its blood supply.  The edges of the donor site were undermined.   The donor site was closed in a primary fashion.  The pedicle was then rotated into position and sutured.  Once the tube was sutured into place, adequate blood supply was confirmed with blanching and refill.  The pedicle was then wrapped with xeroform gauze and dressed appropriately with a telfa and gauze bandage to ensure continued blood supply and protect the attached pedicle.
Estimated Blood Loss (Cc): minimal
Lip Wedge Excision Repair Text: Given the location of the defect and the proximity to free margins a full thickness wedge repair was deemed most appropriate.  Using a sterile surgical marker, the appropriate repair was drawn incorporating the defect and placing the expected incisions perpendicular to the vermillion border.  The vermillion border was also meticulously outlined to ensure appropriate reapproximation during the repair.  The area thus outlined was incised through and through with a #15 scalpel blade.  The muscularis and dermis were reaproximated with deep sutures following hemostasis. Care was taken to realign the vermillion border before proceeding with the superficial closure.  Once the vermillion was realigned the superfical and mucosal closure was finished.
Use Complex Repair Preambles?: Yes
Graft Donor Site Bandage (Optional-Leave Blank If You Don't Want In Note): Steri-strips and a pressure bandage were applied to the donor site.
Elliptical Excision Additional Text (Leave Blank If You Do Not Want): The margin was drawn around the clinically apparent lesion.  An elliptical shape was then drawn on the skin incorporating the lesion and margins.  Incisions were then made along these lines to the appropriate tissue plane and the lesion was extirpated.
No Repair - Repaired With Adjacent Surgical Defect Text (Leave Blank If You Do Not Want): After the excision the defect was repaired concurrently with another surgical defect which was in close approximation.
Path Notes (To The Dermatopathologist): Please check margins\\n*PCM*
Complex Repair And Rhombic Flap Text: The defect edges were debeveled with a #15 scalpel blade.  The primary defect was closed partially with a complex linear closure.  Given the location of the remaining defect, shape of the defect and the proximity to free margins a rhombic flap was deemed most appropriate for complete closure of the defect.  Using a sterile surgical marker, an appropriate advancement flap was drawn incorporating the defect and placing the expected incisions within the relaxed skin tension lines where possible.    The area thus outlined was incised deep to adipose tissue with a #15 scalpel blade.  The skin margins were undermined to an appropriate distance in all directions utilizing iris scissors.
Deep Sutures: 4-0 Vicryl
Complex Repair And Dorsal Nasal Flap Text: The defect edges were debeveled with a #15 scalpel blade.  The primary defect was closed partially with a complex linear closure.  Given the location of the remaining defect, shape of the defect and the proximity to free margins a dorsal nasal flap was deemed most appropriate for complete closure of the defect.  Using a sterile surgical marker, an appropriate flap was drawn incorporating the defect and placing the expected incisions within the relaxed skin tension lines where possible.    The area thus outlined was incised deep to adipose tissue with a #15 scalpel blade.  The skin margins were undermined to an appropriate distance in all directions utilizing iris scissors.
Lazy S Intermediate Repair Preamble Text (Leave Blank If You Do Not Want): Undermining was performed with blunt dissection.
Complex Repair And Xenograft Text: The defect edges were debeveled with a #15 scalpel blade.  The primary defect was closed partially with a complex linear closure.  Given the location of the defect, shape of the defect and the proximity to free margins an tissue cultured epidermal autograft was deemed most appropriate to repair the remaining defect.  The graft was trimmed to fit the size of the remaining defect.  The graft was then placed in the primary defect, oriented appropriately, and sutured into place.
Complex Repair And O-T Advancement Flap Text: The defect edges were debeveled with a #15 scalpel blade.  The primary defect was closed partially with a complex linear closure.  Given the location of the remaining defect, shape of the defect and the proximity to free margins an O-T advancement flap was deemed most appropriate for complete closure of the defect.  Using a sterile surgical marker, an appropriate advancement flap was drawn incorporating the defect and placing the expected incisions within the relaxed skin tension lines where possible.    The area thus outlined was incised deep to adipose tissue with a #15 scalpel blade.  The skin margins were undermined to an appropriate distance in all directions utilizing iris scissors.
Accession #: [id number]
Wound Care: Vaseline
Size Of Margin In Cm: 0.4
Suturegard Intro: Intraoperative tissue expansion was performed, utilizing the SUTUREGARD device, in order to reduce wound tension.
Posterior Auricular Interpolation Flap Text: A decision was made to reconstruct the defect utilizing an interpolation axial flap and a staged reconstruction.  A telfa template was made of the defect.  This telfa template was then used to outline the posterior auricular interpolation flap.  The donor area for the pedicle flap was then injected with anesthesia.  The flap was excised through the skin and subcutaneous tissue down to the layer of the underlying musculature.  The pedicle flap was carefully excised within this deep plane to maintain its blood supply.  The edges of the donor site were undermined.   The donor site was closed in a primary fashion.  The pedicle was then rotated into position and sutured.  Once the tube was sutured into place, adequate blood supply was confirmed with blanching and refill.  The pedicle was then wrapped with xeroform gauze and dressed appropriately with a telfa and gauze bandage to ensure continued blood supply and protect the attached pedicle.
Spiral Flap Text: The defect edges were debeveled with a #15 scalpel blade.  Given the location of the defect, shape of the defect and the proximity to free margins a spiral flap was deemed most appropriate.  Using a sterile surgical marker, an appropriate rotation flap was drawn incorporating the defect and placing the expected incisions within the relaxed skin tension lines where possible. The area thus outlined was incised deep to adipose tissue with a #15 scalpel blade.  The skin margins were undermined to an appropriate distance in all directions utilizing iris scissors.
Composite Graft Text: The defect edges were debeveled with a #15 scalpel blade.  Given the location of the defect, shape of the defect, the proximity to free margins and the fact the defect was full thickness a composite graft was deemed most appropriate.  The defect was outline and then transferred to the donor site.  A full thickness graft was then excised from the donor site. The graft was then placed in the primary defect, oriented appropriately and then sutured into place.  The secondary defect was then repaired using a primary closure.
Bi-Rhombic Flap Text: The defect edges were debeveled with a #15 scalpel blade.  Given the location of the defect and the proximity to free margins a bi-rhombic flap was deemed most appropriate.  Using a sterile surgical marker, an appropriate rhombic flap was drawn incorporating the defect. The area thus outlined was incised deep to adipose tissue with a #15 scalpel blade.  The skin margins were undermined to an appropriate distance in all directions utilizing iris scissors.
Purse String (Simple) Text: Given the location of the defect and the characteristics of the surrounding skin a purse string simple closure was deemed most appropriate.  Undermining was performed circumferentially around the surgical defect.  A purse string suture was then placed and tightened.
Epidermal Closure Graft Donor Site (Optional): simple interrupted
Curvilinear Excision Additional Text (Leave Blank If You Do Not Want): The margin was drawn around the clinically apparent lesion.  A curvilinear shape was then drawn on the skin incorporating the lesion and margins.  Incisions were then made along these lines to the appropriate tissue plane and the lesion was extirpated.
Trilobed Flap Text: The defect edges were debeveled with a #15 scalpel blade.  Given the location of the defect and the proximity to free margins a trilobed flap was deemed most appropriate.  Using a sterile surgical marker, an appropriate trilobed flap drawn around the defect.    The area thus outlined was incised deep to adipose tissue with a #15 scalpel blade.  The skin margins were undermined to an appropriate distance in all directions utilizing iris scissors.
Crescentic Advancement Flap Text: The defect edges were debeveled with a #15 scalpel blade.  Given the location of the defect and the proximity to free margins a crescentic advancement flap was deemed most appropriate.  Using a sterile surgical marker, the appropriate advancement flap was drawn incorporating the defect and placing the expected incisions within the relaxed skin tension lines where possible.    The area thus outlined was incised deep to adipose tissue with a #15 scalpel blade.  The skin margins were undermined to an appropriate distance in all directions utilizing iris scissors.
Keystone Flap Text: The defect edges were debeveled with a #15 scalpel blade.  Given the location of the defect, shape of the defect a keystone flap was deemed most appropriate.  Using a sterile surgical marker, an appropriate keystone flap was drawn incorporating the defect, outlining the appropriate donor tissue and placing the expected incisions within the relaxed skin tension lines where possible. The area thus outlined was incised deep to adipose tissue with a #15 scalpel blade.  The skin margins were undermined to an appropriate distance in all directions around the primary defect and laterally outward around the flap utilizing iris scissors.
Advancement-Rotation Flap Text: The defect edges were debeveled with a #15 scalpel blade.  Given the location of the defect, shape of the defect and the proximity to free margins an advancement-rotation flap was deemed most appropriate.  Using a sterile surgical marker, an appropriate flap was drawn incorporating the defect and placing the expected incisions within the relaxed skin tension lines where possible. The area thus outlined was incised deep to adipose tissue with a #15 scalpel blade.  The skin margins were undermined to an appropriate distance in all directions utilizing iris scissors.
Z Plasty Text: The lesion was extirpated to the level of the fat with a #15 scalpel blade.  Given the location of the defect, shape of the defect and the proximity to free margins a Z-plasty was deemed most appropriate for repair.  Using a sterile surgical marker, the appropriate transposition arms of the Z-plasty were drawn incorporating the defect and placing the expected incisions within the relaxed skin tension lines where possible.    The area thus outlined was incised deep to adipose tissue with a #15 scalpel blade.  The skin margins were undermined to an appropriate distance in all directions utilizing iris scissors.  The opposing transposition arms were then transposed into place in opposite direction and anchored with interrupted buried subcutaneous sutures.
Complex Repair And Dermal Autograft Text: The defect edges were debeveled with a #15 scalpel blade.  The primary defect was closed partially with a complex linear closure.  Given the location of the defect, shape of the defect and the proximity to free margins an dermal autograft was deemed most appropriate to repair the remaining defect.  The graft was trimmed to fit the size of the remaining defect.  The graft was then placed in the primary defect, oriented appropriately, and sutured into place.
Vermilion Border Text: The closure involved the vermilion border.
Size Of Lesion In Cm: 1.2
Positioning (Leave Blank If You Do Not Want): The patient was placed in a comfortable position exposing the surgical site.
Complex Repair And Melolabial Flap Text: The defect edges were debeveled with a #15 scalpel blade.  The primary defect was closed partially with a complex linear closure.  Given the location of the remaining defect, shape of the defect and the proximity to free margins a melolabial flap was deemed most appropriate for complete closure of the defect.  Using a sterile surgical marker, an appropriate advancement flap was drawn incorporating the defect and placing the expected incisions within the relaxed skin tension lines where possible.    The area thus outlined was incised deep to adipose tissue with a #15 scalpel blade.  The skin margins were undermined to an appropriate distance in all directions utilizing iris scissors.
Complex Repair And W Plasty Text: The defect edges were debeveled with a #15 scalpel blade.  The primary defect was closed partially with a complex linear closure.  Given the location of the remaining defect, shape of the defect and the proximity to free margins a W plasty was deemed most appropriate for complete closure of the defect.  Using a sterile surgical marker, an appropriate advancement flap was drawn incorporating the defect and placing the expected incisions within the relaxed skin tension lines where possible.    The area thus outlined was incised deep to adipose tissue with a #15 scalpel blade.  The skin margins were undermined to an appropriate distance in all directions utilizing iris scissors.
Rhombic Flap Text: The defect edges were debeveled with a #15 scalpel blade.  Given the location of the defect and the proximity to free margins a rhombic flap was deemed most appropriate.  Using a sterile surgical marker, an appropriate rhombic flap was drawn incorporating the defect.    The area thus outlined was incised deep to adipose tissue with a #15 scalpel blade.  The skin margins were undermined to an appropriate distance in all directions utilizing iris scissors.
Xenograft Text: The defect edges were debeveled with a #15 scalpel blade.  Given the location of the defect, shape of the defect and the proximity to free margins a xenograft was deemed most appropriate.  The graft was then trimmed to fit the size of the defect.  The graft was then placed in the primary defect and oriented appropriately.
Excision Depth: adipose tissue
Bilobed Flap Text: The defect edges were debeveled with a #15 scalpel blade.  Given the location of the defect and the proximity to free margins a bilobe flap was deemed most appropriate.  Using a sterile surgical marker, an appropriate bilobe flap drawn around the defect.    The area thus outlined was incised deep to adipose tissue with a #15 scalpel blade.  The skin margins were undermined to an appropriate distance in all directions utilizing iris scissors.
Retention Suture Bite Size: 3 mm
Hemigard Retention Suture: 2-0 Nylon
Complex Repair And Modified Advancement Flap Text: The defect edges were debeveled with a #15 scalpel blade.  The primary defect was closed partially with a complex linear closure.  Given the location of the remaining defect, shape of the defect and the proximity to free margins a modified advancement flap was deemed most appropriate for complete closure of the defect.  Using a sterile surgical marker, an appropriate advancement flap was drawn incorporating the defect and placing the expected incisions within the relaxed skin tension lines where possible.    The area thus outlined was incised deep to adipose tissue with a #15 scalpel blade.  The skin margins were undermined to an appropriate distance in all directions utilizing iris scissors.
Double Island Pedicle Flap Text: The defect edges were debeveled with a #15 scalpel blade.  Given the location of the defect, shape of the defect and the proximity to free margins a double island pedicle advancement flap was deemed most appropriate.  Using a sterile surgical marker, an appropriate advancement flap was drawn incorporating the defect, outlining the appropriate donor tissue and placing the expected incisions within the relaxed skin tension lines where possible.    The area thus outlined was incised deep to adipose tissue with a #15 scalpel blade.  The skin margins were undermined to an appropriate distance in all directions around the primary defect and laterally outward around the island pedicle utilizing iris scissors.  There was minimal undermining beneath the pedicle flap.
Double O-Z Flap Text: The defect edges were debeveled with a #15 scalpel blade.  Given the location of the defect, shape of the defect and the proximity to free margins a Double O-Z flap was deemed most appropriate.  Using a sterile surgical marker, an appropriate transposition flap was drawn incorporating the defect and placing the expected incisions within the relaxed skin tension lines where possible. The area thus outlined was incised deep to adipose tissue with a #15 scalpel blade.  The skin margins were undermined to an appropriate distance in all directions utilizing iris scissors.
H Plasty Text: Given the location of the defect, shape of the defect and the proximity to free margins a H-plasty was deemed most appropriate for repair.  Using a sterile surgical marker, the appropriate advancement arms of the H-plasty were drawn incorporating the defect and placing the expected incisions within the relaxed skin tension lines where possible. The area thus outlined was incised deep to adipose tissue with a #15 scalpel blade. The skin margins were undermined to an appropriate distance in all directions utilizing iris scissors.  The opposing advancement arms were then advanced into place in opposite direction and anchored with interrupted buried subcutaneous sutures.
Melolabial Interpolation Flap Text: A decision was made to reconstruct the defect utilizing an interpolation axial flap and a staged reconstruction.  A telfa template was made of the defect.  This telfa template was then used to outline the melolabial interpolation flap.  The donor area for the pedicle flap was then injected with anesthesia.  The flap was excised through the skin and subcutaneous tissue down to the layer of the underlying musculature.  The pedicle flap was carefully excised within this deep plane to maintain its blood supply.  The edges of the donor site were undermined.   The donor site was closed in a primary fashion.  The pedicle was then rotated into position and sutured.  Once the tube was sutured into place, adequate blood supply was confirmed with blanching and refill.  The pedicle was then wrapped with xeroform gauze and dressed appropriately with a telfa and gauze bandage to ensure continued blood supply and protect the attached pedicle.
Hatchet Flap Text: The defect edges were debeveled with a #15 scalpel blade.  Given the location of the defect, shape of the defect and the proximity to free margins a hatchet flap was deemed most appropriate.  Using a sterile surgical marker, an appropriate hatchet flap was drawn incorporating the defect and placing the expected incisions within the relaxed skin tension lines where possible.    The area thus outlined was incised deep to adipose tissue with a #15 scalpel blade.  The skin margins were undermined to an appropriate distance in all directions utilizing iris scissors.
Burow's Graft Text: The defect edges were debeveled with a #15 scalpel blade.  Given the location of the defect, shape of the defect, the proximity to free margins and the presence of a standing cone deformity a Burow's skin graft was deemed most appropriate. The standing cone was removed and this tissue was then trimmed to the shape of the primary defect. The adipose tissue was also removed until only dermis and epidermis were left.  The skin margins of the secondary defect were undermined to an appropriate distance in all directions utilizing iris scissors.  The secondary defect was closed with interrupted buried subcutaneous sutures.  The skin edges were then re-apposed with running  sutures.  The skin graft was then placed in the primary defect and oriented appropriately.
Repair Type: Intermediate
Detail Level: Detailed
Anesthesia Type: 1% lidocaine with epinephrine
Burow's Advancement Flap Text: The defect edges were debeveled with a #15 scalpel blade.  Given the location of the defect and the proximity to free margins a Burow's advancement flap was deemed most appropriate.  Using a sterile surgical marker, the appropriate advancement flap was drawn incorporating the defect and placing the expected incisions within the relaxed skin tension lines where possible.    The area thus outlined was incised deep to adipose tissue with a #15 scalpel blade.  The skin margins were undermined to an appropriate distance in all directions utilizing iris scissors.
Complex Repair And M Plasty Text: The defect edges were debeveled with a #15 scalpel blade.  The primary defect was closed partially with a complex linear closure.  Given the location of the remaining defect, shape of the defect and the proximity to free margins an M plasty was deemed most appropriate for complete closure of the defect.  Using a sterile surgical marker, an appropriate advancement flap was drawn incorporating the defect and placing the expected incisions within the relaxed skin tension lines where possible.    The area thus outlined was incised deep to adipose tissue with a #15 scalpel blade.  The skin margins were undermined to an appropriate distance in all directions utilizing iris scissors.
Complex Repair And Split-Thickness Skin Graft Text: The defect edges were debeveled with a #15 scalpel blade.  The primary defect was closed partially with a complex linear closure.  Given the location of the defect, shape of the defect and the proximity to free margins a split thickness skin graft was deemed most appropriate to repair the remaining defect.  The graft was trimmed to fit the size of the remaining defect.  The graft was then placed in the primary defect, oriented appropriately, and sutured into place.
Hemostasis: Electrocautery
Complex Repair And O-L Flap Text: The defect edges were debeveled with a #15 scalpel blade.  The primary defect was closed partially with a complex linear closure.  Given the location of the remaining defect, shape of the defect and the proximity to free margins an O-L flap was deemed most appropriate for complete closure of the defect.  Using a sterile surgical marker, an appropriate flap was drawn incorporating the defect and placing the expected incisions within the relaxed skin tension lines where possible.    The area thus outlined was incised deep to adipose tissue with a #15 scalpel blade.  The skin margins were undermined to an appropriate distance in all directions utilizing iris scissors.
Hemigard Postcare Instructions: The HEMIGARD strips are to remain completely dry for at least 5-7 days.
Mucosal Advancement Flap Text: Given the location of the defect, shape of the defect and the proximity to free margins a mucosal advancement flap was deemed most appropriate. Incisions were made with a 15 blade scalpel in the appropriate fashion along the cutaneous vermillion border and the mucosal lip. The remaining actinically damaged mucosal tissue was excised.  The mucosal advancement flap was then elevated to the gingival sulcus with care taken to preserve the neurovascular structures and advanced into the primary defect. Care was taken to ensure that precise realignment of the vermillion border was achieved.
Ftsg Text: The defect edges were debeveled with a #15 scalpel blade.  Given the location of the defect, shape of the defect and the proximity to free margins a full thickness skin graft was deemed most appropriate.  Using a sterile surgical marker, the primary defect shape was transferred to the donor site. The area thus outlined was incised deep to adipose tissue with a #15 scalpel blade.  The harvested graft was then trimmed of adipose tissue until only dermis and epidermis was left.  The skin margins of the secondary defect were undermined to an appropriate distance in all directions utilizing iris scissors.  The secondary defect was closed with interrupted buried subcutaneous sutures.  The skin edges were then re-apposed with running  sutures.  The skin graft was then placed in the primary defect and oriented appropriately.
Muscle Hinge Flap Text: The defect edges were debeveled with a #15 scalpel blade.  Given the size, depth and location of the defect and the proximity to free margins a muscle hinge flap was deemed most appropriate.  Using a sterile surgical marker, an appropriate hinge flap was drawn incorporating the defect. The area thus outlined was incised with a #15 scalpel blade.  The skin margins were undermined to an appropriate distance in all directions utilizing iris scissors.
Skin Substitute Text: The defect edges were debeveled with a #15 scalpel blade.  Given the location of the defect, shape of the defect and the proximity to free margins a skin substitute graft was deemed most appropriate.  The graft material was trimmed to fit the size of the defect. The graft was then placed in the primary defect and oriented appropriately.
Ear Star Wedge Flap Text: The defect edges were debeveled with a #15 blade scalpel.  Given the location of the defect and the proximity to free margins (helical rim) an ear star wedge flap was deemed most appropriate.  Using a sterile surgical marker, the appropriate flap was drawn incorporating the defect and placing the expected incisions between the helical rim and antihelix where possible.  The area thus outlined was incised through and through with a #15 scalpel blade.
Consent was obtained from the patient. The risks and benefits to therapy were discussed in detail. Specifically, the risks of infection, scarring, bleeding, prolonged wound healing, incomplete removal, allergy to anesthesia, nerve injury and recurrence were addressed. Prior to the procedure, the treatment site was clearly identified and confirmed by the patient. All components of Universal Protocol/PAUSE Rule completed.
Complex Repair And Single Advancement Flap Text: The defect edges were debeveled with a #15 scalpel blade.  The primary defect was closed partially with a complex linear closure.  Given the location of the remaining defect, shape of the defect and the proximity to free margins a single advancement flap was deemed most appropriate for complete closure of the defect.  Using a sterile surgical marker, an appropriate advancement flap was drawn incorporating the defect and placing the expected incisions within the relaxed skin tension lines where possible.    The area thus outlined was incised deep to adipose tissue with a #15 scalpel blade.  The skin margins were undermined to an appropriate distance in all directions utilizing iris scissors.
Advancement Flap (Single) Text: The defect edges were debeveled with a #15 scalpel blade.  Given the location of the defect and the proximity to free margins a single advancement flap was deemed most appropriate.  Using a sterile surgical marker, an appropriate advancement flap was drawn incorporating the defect and placing the expected incisions within the relaxed skin tension lines where possible.    The area thus outlined was incised deep to adipose tissue with a #15 scalpel blade.  The skin margins were undermined to an appropriate distance in all directions utilizing iris scissors.
Island Pedicle Flap With Canthal Suspension Text: The defect edges were debeveled with a #15 scalpel blade.  Given the location of the defect, shape of the defect and the proximity to free margins an island pedicle advancement flap was deemed most appropriate.  Using a sterile surgical marker, an appropriate advancement flap was drawn incorporating the defect, outlining the appropriate donor tissue and placing the expected incisions within the relaxed skin tension lines where possible. The area thus outlined was incised deep to adipose tissue with a #15 scalpel blade.  The skin margins were undermined to an appropriate distance in all directions around the primary defect and laterally outward around the island pedicle utilizing iris scissors.  There was minimal undermining beneath the pedicle flap. A suspension suture was placed in the canthal tendon to prevent tension and prevent ectropion.
Billing Type: Third-Party Bill
O-L Flap Text: The defect edges were debeveled with a #15 scalpel blade.  Given the location of the defect, shape of the defect and the proximity to free margins an O-L flap was deemed most appropriate.  Using a sterile surgical marker, an appropriate advancement flap was drawn incorporating the defect and placing the expected incisions within the relaxed skin tension lines where possible.    The area thus outlined was incised deep to adipose tissue with a #15 scalpel blade.  The skin margins were undermined to an appropriate distance in all directions utilizing iris scissors.
Double O-Z Plasty Text: The defect edges were debeveled with a #15 scalpel blade.  Given the location of the defect, shape of the defect and the proximity to free margins a Double O-Z plasty (double transposition flap) was deemed most appropriate.  Using a sterile surgical marker, the appropriate transposition flaps were drawn incorporating the defect and placing the expected incisions within the relaxed skin tension lines where possible. The area thus outlined was incised deep to adipose tissue with a #15 scalpel blade.  The skin margins were undermined to an appropriate distance in all directions utilizing iris scissors.  Hemostasis was achieved with electrocautery.  The flaps were then transposed into place, one clockwise and the other counterclockwise, and anchored with interrupted buried subcutaneous sutures.
Cheek-To-Nose Interpolation Flap Text: A decision was made to reconstruct the defect utilizing an interpolation axial flap and a staged reconstruction.  A telfa template was made of the defect.  This telfa template was then used to outline the Cheek-To-Nose Interpolation flap.  The donor area for the pedicle flap was then injected with anesthesia.  The flap was excised through the skin and subcutaneous tissue down to the layer of the underlying musculature.  The interpolation flap was carefully excised within this deep plane to maintain its blood supply.  The edges of the donor site were undermined.   The donor site was closed in a primary fashion.  The pedicle was then rotated into position and sutured.  Once the tube was sutured into place, adequate blood supply was confirmed with blanching and refill.  The pedicle was then wrapped with xeroform gauze and dressed appropriately with a telfa and gauze bandage to ensure continued blood supply and protect the attached pedicle.
Complex Repair And Skin Substitute Graft Text: The defect edges were debeveled with a #15 scalpel blade.  The primary defect was closed partially with a complex linear closure.  Given the location of the remaining defect, shape of the defect and the proximity to free margins a skin substitute graft was deemed most appropriate to repair the remaining defect.  The graft was trimmed to fit the size of the remaining defect.  The graft was then placed in the primary defect, oriented appropriately, and sutured into place.
Saucerization Excision Additional Text (Leave Blank If You Do Not Want): The margin was drawn around the clinically apparent lesion.  Incisions were then made along these lines, in a tangential fashion, to the appropriate tissue plane and the lesion was extirpated.
Excision Method: Elliptical
Suturegard Body: The suture ends were repeatedly re-tightened and re-clamped to achieve the desired tissue expansion.
Complex Repair And Transposition Flap Text: The defect edges were debeveled with a #15 scalpel blade.  The primary defect was closed partially with a complex linear closure.  Given the location of the remaining defect, shape of the defect and the proximity to free margins a transposition flap was deemed most appropriate for complete closure of the defect.  Using a sterile surgical marker, an appropriate advancement flap was drawn incorporating the defect and placing the expected incisions within the relaxed skin tension lines where possible.    The area thus outlined was incised deep to adipose tissue with a #15 scalpel blade.  The skin margins were undermined to an appropriate distance in all directions utilizing iris scissors.
Post-Care Instructions: I reviewed with the patient in detail post-care instructions. Patient is not to engage in any heavy lifting, exercise, or swimming for the next 7 days. Should the patient develop any fevers, chills, bleeding, severe pain patient will contact the office immediately.
Complex Repair And Ftsg Text: The defect edges were debeveled with a #15 scalpel blade.  The primary defect was closed partially with a complex linear closure.  Given the location of the defect, shape of the defect and the proximity to free margins a full thickness skin graft was deemed most appropriate to repair the remaining defect.  The graft was trimmed to fit the size of the remaining defect.  The graft was then placed in the primary defect, oriented appropriately, and sutured into place.
Partial Purse String (Intermediate) Text: Given the location of the defect and the characteristics of the surrounding skin an intermediate purse string closure was deemed most appropriate.  Undermining was performed circumferentially around the surgical defect.  A purse string suture was then placed and tightened. Wound tension of the circular defect prevented complete closure of the wound.
Intermediate / Complex Repair - Final Wound Length In Cm: 5.4
O-T Plasty Text: The defect edges were debeveled with a #15 scalpel blade.  Given the location of the defect, shape of the defect and the proximity to free margins an O-T plasty was deemed most appropriate.  Using a sterile surgical marker, an appropriate O-T plasty was drawn incorporating the defect and placing the expected incisions within the relaxed skin tension lines where possible.    The area thus outlined was incised deep to adipose tissue with a #15 scalpel blade.  The skin margins were undermined to an appropriate distance in all directions utilizing iris scissors.
Mercedes Flap Text: The defect edges were debeveled with a #15 scalpel blade.  Given the location of the defect, shape of the defect and the proximity to free margins a Mercedes flap was deemed most appropriate.  Using a sterile surgical marker, an appropriate advancement flap was drawn incorporating the defect and placing the expected incisions within the relaxed skin tension lines where possible. The area thus outlined was incised deep to adipose tissue with a #15 scalpel blade.  The skin margins were undermined to an appropriate distance in all directions utilizing iris scissors.
Zygomaticofacial Flap Text: Given the location of the defect, shape of the defect and the proximity to free margins a zygomaticofacial flap was deemed most appropriate for repair.  Using a sterile surgical marker, the appropriate flap was drawn incorporating the defect and placing the expected incisions within the relaxed skin tension lines where possible. The area thus outlined was incised deep to adipose tissue with a #15 scalpel blade with preservation of a vascular pedicle.  The skin margins were undermined to an appropriate distance in all directions utilizing iris scissors.  The flap was then placed into the defect and anchored with interrupted buried subcutaneous sutures.
Paramedian Forehead Flap Text: A decision was made to reconstruct the defect utilizing an interpolation axial flap and a staged reconstruction.  A telfa template was made of the defect.  This telfa template was then used to outline the paramedian forehead pedicle flap.  The donor area for the pedicle flap was then injected with anesthesia.  The flap was excised through the skin and subcutaneous tissue down to the layer of the underlying musculature.  The pedicle flap was carefully excised within this deep plane to maintain its blood supply.  The edges of the donor site were undermined.   The donor site was closed in a primary fashion.  The pedicle was then rotated into position and sutured.  Once the tube was sutured into place, adequate blood supply was confirmed with blanching and refill.  The pedicle was then wrapped with xeroform gauze and dressed appropriately with a telfa and gauze bandage to ensure continued blood supply and protect the attached pedicle.
Star Wedge Flap Text: The defect edges were debeveled with a #15 scalpel blade.  Given the location of the defect, shape of the defect and the proximity to free margins a star wedge flap was deemed most appropriate.  Using a sterile surgical marker, an appropriate rotation flap was drawn incorporating the defect and placing the expected incisions within the relaxed skin tension lines where possible. The area thus outlined was incised deep to adipose tissue with a #15 scalpel blade.  The skin margins were undermined to an appropriate distance in all directions utilizing iris scissors.
Epidermal Autograft Text: The defect edges were debeveled with a #15 scalpel blade.  Given the location of the defect, shape of the defect and the proximity to free margins an epidermal autograft was deemed most appropriate.  Using a sterile surgical marker, the primary defect shape was transferred to the donor site. The epidermal graft was then harvested.  The skin graft was then placed in the primary defect and oriented appropriately.
Helical Rim Advancement Flap Text: The defect edges were debeveled with a #15 blade scalpel.  Given the location of the defect and the proximity to free margins (helical rim) a double helical rim advancement flap was deemed most appropriate.  Using a sterile surgical marker, the appropriate advancement flaps were drawn incorporating the defect and placing the expected incisions between the helical rim and antihelix where possible.  The area thus outlined was incised through and through with a #15 scalpel blade.  With a skin hook and iris scissors, the flaps were gently and sharply undermined and freed up.
Fusiform Excision Additional Text (Leave Blank If You Do Not Want): The margin was drawn around the clinically apparent lesion.  A fusiform shape was then drawn on the skin incorporating the lesion and margins.  Incisions were then made along these lines to the appropriate tissue plane and the lesion was extirpated.
Repair Performed By Another Provider Text (Leave Blank If You Do Not Want): After the tissue was excised the defect was repaired by another provider.
Dorsal Nasal Flap Text: The defect edges were debeveled with a #15 scalpel blade.  Given the location of the defect and the proximity to free margins a dorsal nasal flap was deemed most appropriate.  Using a sterile surgical marker, an appropriate dorsal nasal flap was drawn around the defect.    The area thus outlined was incised deep to adipose tissue with a #15 scalpel blade.  The skin margins were undermined to an appropriate distance in all directions utilizing iris scissors.
A-T Advancement Flap Text: The defect edges were debeveled with a #15 scalpel blade.  Given the location of the defect, shape of the defect and the proximity to free margins an A-T advancement flap was deemed most appropriate.  Using a sterile surgical marker, an appropriate advancement flap was drawn incorporating the defect and placing the expected incisions within the relaxed skin tension lines where possible.    The area thus outlined was incised deep to adipose tissue with a #15 scalpel blade.  The skin margins were undermined to an appropriate distance in all directions utilizing iris scissors.
Complex Repair And Z Plasty Text: The defect edges were debeveled with a #15 scalpel blade.  The primary defect was closed partially with a complex linear closure.  Given the location of the remaining defect, shape of the defect and the proximity to free margins a Z plasty was deemed most appropriate for complete closure of the defect.  Using a sterile surgical marker, an appropriate advancement flap was drawn incorporating the defect and placing the expected incisions within the relaxed skin tension lines where possible.    The area thus outlined was incised deep to adipose tissue with a #15 scalpel blade.  The skin margins were undermined to an appropriate distance in all directions utilizing iris scissors.
Information: Selecting Yes will display possible errors in your note based on the variables you have selected. This validation is only offered as a suggestion for you. PLEASE NOTE THAT THE VALIDATION TEXT WILL BE REMOVED WHEN YOU FINALIZE YOUR NOTE. IF YOU WANT TO FAX A PRELIMINARY NOTE YOU WILL NEED TO TOGGLE THIS TO 'NO' IF YOU DO NOT WANT IT IN YOUR FAXED NOTE.
Nostril Rim Text: The closure involved the nostril rim.
Complex Repair And Rotation Flap Text: The defect edges were debeveled with a #15 scalpel blade.  The primary defect was closed partially with a complex linear closure.  Given the location of the remaining defect, shape of the defect and the proximity to free margins a rotation flap was deemed most appropriate for complete closure of the defect.  Using a sterile surgical marker, an appropriate advancement flap was drawn incorporating the defect and placing the expected incisions within the relaxed skin tension lines where possible.    The area thus outlined was incised deep to adipose tissue with a #15 scalpel blade.  The skin margins were undermined to an appropriate distance in all directions utilizing iris scissors.
Pre-Excision Curettage Text (Leave Blank If You Do Not Want): Prior to drawing the surgical margin the visible lesion was removed with electrodesiccation and curettage to clearly define the lesion size.
Rotation Flap Text: The defect edges were debeveled with a #15 scalpel blade.  Given the location of the defect, shape of the defect and the proximity to free margins a rotation flap was deemed most appropriate.  Using a sterile surgical marker, an appropriate rotation flap was drawn incorporating the defect and placing the expected incisions within the relaxed skin tension lines where possible.    The area thus outlined was incised deep to adipose tissue with a #15 scalpel blade.  The skin margins were undermined to an appropriate distance in all directions utilizing iris scissors.
Cartilage Graft Text: The defect edges were debeveled with a #15 scalpel blade.  Given the location of the defect, shape of the defect, the fact the defect involved a full thickness cartilage defect a cartilage graft was deemed most appropriate.  An appropriate donor site was identified, cleansed, and anesthetized. The cartilage graft was then harvested and transferred to the recipient site, oriented appropriately and then sutured into place.  The secondary defect was then repaired using a primary closure.
Where Do You Want The Question To Include Opioid Counseling Located?: Case Summary Tab
Rhomboid Transposition Flap Text: The defect edges were debeveled with a #15 scalpel blade.  Given the location of the defect and the proximity to free margins a rhomboid transposition flap was deemed most appropriate.  Using a sterile surgical marker, an appropriate rhomboid flap was drawn incorporating the defect.    The area thus outlined was incised deep to adipose tissue with a #15 scalpel blade.  The skin margins were undermined to an appropriate distance in all directions utilizing iris scissors.
Purse String (Intermediate) Text: Given the location of the defect and the characteristics of the surrounding skin a pursestring intermediate closure was deemed most appropriate.  Undermining was performed circumfirentially around the surgical defect.  A purstring suture was then placed and tightened.
Bilobed Transposition Flap Text: The defect edges were debeveled with a #15 scalpel blade.  Given the location of the defect and the proximity to free margins a bilobed transposition flap was deemed most appropriate.  Using a sterile surgical marker, an appropriate bilobe flap drawn around the defect.    The area thus outlined was incised deep to adipose tissue with a #15 scalpel blade.  The skin margins were undermined to an appropriate distance in all directions utilizing iris scissors.
Complex Repair And A-T Advancement Flap Text: The defect edges were debeveled with a #15 scalpel blade.  The primary defect was closed partially with a complex linear closure.  Given the location of the remaining defect, shape of the defect and the proximity to free margins an A-T advancement flap was deemed most appropriate for complete closure of the defect.  Using a sterile surgical marker, an appropriate advancement flap was drawn incorporating the defect and placing the expected incisions within the relaxed skin tension lines where possible.    The area thus outlined was incised deep to adipose tissue with a #15 scalpel blade.  The skin margins were undermined to an appropriate distance in all directions utilizing iris scissors.
Chonodrocutaneous Helical Advancement Flap Text: The defect edges were debeveled with a #15 scalpel blade.  Given the location of the defect and the proximity to free margins a chondrocutaneous helical advancement flap was deemed most appropriate.  Using a sterile surgical marker, the appropriate advancement flap was drawn incorporating the defect and placing the expected incisions within the relaxed skin tension lines where possible.    The area thus outlined was incised deep to adipose tissue with a #15 scalpel blade.  The skin margins were undermined to an appropriate distance in all directions utilizing iris scissors.
Island Pedicle Flap-Requiring Vessel Identification Text: The defect edges were debeveled with a #15 scalpel blade.  Given the location of the defect, shape of the defect and the proximity to free margins an island pedicle advancement flap was deemed most appropriate.  Using a sterile surgical marker, an appropriate advancement flap was drawn, based on the axial vessel mentioned above, incorporating the defect, outlining the appropriate donor tissue and placing the expected incisions within the relaxed skin tension lines where possible.    The area thus outlined was incised deep to adipose tissue with a #15 scalpel blade.  The skin margins were undermined to an appropriate distance in all directions around the primary defect and laterally outward around the island pedicle utilizing iris scissors.  There was minimal undermining beneath the pedicle flap.
V-Y Flap Text: The defect edges were debeveled with a #15 scalpel blade.  Given the location of the defect, shape of the defect and the proximity to free margins a V-Y flap was deemed most appropriate.  Using a sterile surgical marker, an appropriate advancement flap was drawn incorporating the defect and placing the expected incisions within the relaxed skin tension lines where possible.    The area thus outlined was incised deep to adipose tissue with a #15 scalpel blade.  The skin margins were undermined to an appropriate distance in all directions utilizing iris scissors.
Debridement Text: The wound edges were debrided prior to proceeding with the closure to facilitate wound healing.
W Plasty Text: The lesion was extirpated to the level of the fat with a #15 scalpel blade.  Given the location of the defect, shape of the defect and the proximity to free margins a W-plasty was deemed most appropriate for repair.  Using a sterile surgical marker, the appropriate transposition arms of the W-plasty were drawn incorporating the defect and placing the expected incisions within the relaxed skin tension lines where possible.    The area thus outlined was incised deep to adipose tissue with a #15 scalpel blade.  The skin margins were undermined to an appropriate distance in all directions utilizing iris scissors.  The opposing transposition arms were then transposed into place in opposite direction and anchored with interrupted buried subcutaneous sutures.
Mastoid Interpolation Flap Text: A decision was made to reconstruct the defect utilizing an interpolation axial flap and a staged reconstruction.  A telfa template was made of the defect.  This telfa template was then used to outline the mastoid interpolation flap.  The donor area for the pedicle flap was then injected with anesthesia.  The flap was excised through the skin and subcutaneous tissue down to the layer of the underlying musculature.  The pedicle flap was carefully excised within this deep plane to maintain its blood supply.  The edges of the donor site were undermined.   The donor site was closed in a primary fashion.  The pedicle was then rotated into position and sutured.  Once the tube was sutured into place, adequate blood supply was confirmed with blanching and refill.  The pedicle was then wrapped with xeroform gauze and dressed appropriately with a telfa and gauze bandage to ensure continued blood supply and protect the attached pedicle.
Epidermal Closure: running
Retention Suture Text: Retention sutures were placed to support the closure and prevent dehiscence.
Dressing: pressure dressing
Perilesional Excision Additional Text (Leave Blank If You Do Not Want): The margin was drawn around the clinically apparent lesion. Incisions were then made along these lines to the appropriate tissue plane and the lesion was extirpated.
Complex Repair And Bilobe Flap Text: The defect edges were debeveled with a #15 scalpel blade.  The primary defect was closed partially with a complex linear closure.  Given the location of the remaining defect, shape of the defect and the proximity to free margins a bilobe flap was deemed most appropriate for complete closure of the defect.  Using a sterile surgical marker, an appropriate advancement flap was drawn incorporating the defect and placing the expected incisions within the relaxed skin tension lines where possible.    The area thus outlined was incised deep to adipose tissue with a #15 scalpel blade.  The skin margins were undermined to an appropriate distance in all directions utilizing iris scissors.
Complex Repair And Double M Plasty Text: The defect edges were debeveled with a #15 scalpel blade.  The primary defect was closed partially with a complex linear closure.  Given the location of the remaining defect, shape of the defect and the proximity to free margins a double M plasty was deemed most appropriate for complete closure of the defect.  Using a sterile surgical marker, an appropriate advancement flap was drawn incorporating the defect and placing the expected incisions within the relaxed skin tension lines where possible.    The area thus outlined was incised deep to adipose tissue with a #15 scalpel blade.  The skin margins were undermined to an appropriate distance in all directions utilizing iris scissors.
Complex Repair And Epidermal Autograft Text: The defect edges were debeveled with a #15 scalpel blade.  The primary defect was closed partially with a complex linear closure.  Given the location of the defect, shape of the defect and the proximity to free margins an epidermal autograft was deemed most appropriate to repair the remaining defect.  The graft was trimmed to fit the size of the remaining defect.  The graft was then placed in the primary defect, oriented appropriately, and sutured into place.
Epidermal Sutures: 4-0 Prolene
Complex Repair And Double Advancement Flap Text: The defect edges were debeveled with a #15 scalpel blade.  The primary defect was closed partially with a complex linear closure.  Given the location of the remaining defect, shape of the defect and the proximity to free margins a double advancement flap was deemed most appropriate for complete closure of the defect.  Using a sterile surgical marker, an appropriate advancement flap was drawn incorporating the defect and placing the expected incisions within the relaxed skin tension lines where possible.    The area thus outlined was incised deep to adipose tissue with a #15 scalpel blade.  The skin margins were undermined to an appropriate distance in all directions utilizing iris scissors.
Helical Rim Text: The closure involved the helical rim.
V-Y Plasty Text: The defect edges were debeveled with a #15 scalpel blade.  Given the location of the defect, shape of the defect and the proximity to free margins an V-Y advancement flap was deemed most appropriate.  Using a sterile surgical marker, an appropriate advancement flap was drawn incorporating the defect and placing the expected incisions within the relaxed skin tension lines where possible.    The area thus outlined was incised deep to adipose tissue with a #15 scalpel blade.  The skin margins were undermined to an appropriate distance in all directions utilizing iris scissors.
Suture Removal: 14 days
Interpolation Flap Text: A decision was made to reconstruct the defect utilizing an interpolation axial flap and a staged reconstruction.  A telfa template was made of the defect.  This telfa template was then used to outline the interpolation flap.  The donor area for the pedicle flap was then injected with anesthesia.  The flap was excised through the skin and subcutaneous tissue down to the layer of the underlying musculature.  The interpolation flap was carefully excised within this deep plane to maintain its blood supply.  The edges of the donor site were undermined.   The donor site was closed in a primary fashion.  The pedicle was then rotated into position and sutured.  Once the tube was sutured into place, adequate blood supply was confirmed with blanching and refill.  The pedicle was then wrapped with xeroform gauze and dressed appropriately with a telfa and gauze bandage to ensure continued blood supply and protect the attached pedicle.
Peng Advancement Flap Text: The defect edges were debeveled with a #15 scalpel blade.  Given the location of the defect, shape of the defect and the proximity to free margins a Peng advancement flap was deemed most appropriate.  Using a sterile surgical marker, an appropriate advancement flap was drawn incorporating the defect and placing the expected incisions within the relaxed skin tension lines where possible. The area thus outlined was incised deep to adipose tissue with a #15 scalpel blade.  The skin margins were undermined to an appropriate distance in all directions utilizing iris scissors.
Split-Thickness Skin Graft Text: The defect edges were debeveled with a #15 scalpel blade.  Given the location of the defect, shape of the defect and the proximity to free margins a split thickness skin graft was deemed most appropriate.  Using a sterile surgical marker, the primary defect shape was transferred to the donor site. The split thickness graft was then harvested.  The skin graft was then placed in the primary defect and oriented appropriately.
Melolabial Transposition Flap Text: The defect edges were debeveled with a #15 scalpel blade.  Given the location of the defect and the proximity to free margins a melolabial flap was deemed most appropriate.  Using a sterile surgical marker, an appropriate melolabial transposition flap was drawn incorporating the defect.    The area thus outlined was incised deep to adipose tissue with a #15 scalpel blade.  The skin margins were undermined to an appropriate distance in all directions utilizing iris scissors.
Tissue Cultured Epidermal Autograft Text: The defect edges were debeveled with a #15 scalpel blade.  Given the location of the defect, shape of the defect and the proximity to free margins a tissue cultured epidermal autograft was deemed most appropriate.  The graft was then trimmed to fit the size of the defect.  The graft was then placed in the primary defect and oriented appropriately.
Banner Transposition Flap Text: The defect edges were debeveled with a #15 scalpel blade.  Given the location of the defect and the proximity to free margins a Banner transposition flap was deemed most appropriate.  Using a sterile surgical marker, an appropriate flap drawn around the defect. The area thus outlined was incised deep to adipose tissue with a #15 scalpel blade.  The skin margins were undermined to an appropriate distance in all directions utilizing iris scissors.
Slit Excision Additional Text (Leave Blank If You Do Not Want): A linear line was drawn on the skin overlying the lesion. An incision was made slowly until the lesion was visualized.  Once visualized, the lesion was removed with blunt dissection.
Advancement Flap (Double) Text: The defect edges were debeveled with a #15 scalpel blade.  Given the location of the defect and the proximity to free margins a double advancement flap was deemed most appropriate.  Using a sterile surgical marker, the appropriate advancement flaps were drawn incorporating the defect and placing the expected incisions within the relaxed skin tension lines where possible.    The area thus outlined was incised deep to adipose tissue with a #15 scalpel blade.  The skin margins were undermined to an appropriate distance in all directions utilizing iris scissors.
Alar Island Pedicle Flap Text: The defect edges were debeveled with a #15 scalpel blade.  Given the location of the defect, shape of the defect and the proximity to the alar rim an island pedicle advancement flap was deemed most appropriate.  Using a sterile surgical marker, an appropriate advancement flap was drawn incorporating the defect, outlining the appropriate donor tissue and placing the expected incisions within the nasal ala running parallel to the alar rim. The area thus outlined was incised with a #15 scalpel blade.  The skin margins were undermined minimally to an appropriate distance in all directions around the primary defect and laterally outward around the island pedicle utilizing iris scissors.  There was minimal undermining beneath the pedicle flap.
O-Z Flap Text: The defect edges were debeveled with a #15 scalpel blade.  Given the location of the defect, shape of the defect and the proximity to free margins an O-Z flap was deemed most appropriate.  Using a sterile surgical marker, an appropriate transposition flap was drawn incorporating the defect and placing the expected incisions within the relaxed skin tension lines where possible. The area thus outlined was incised deep to adipose tissue with a #15 scalpel blade.  The skin margins were undermined to an appropriate distance in all directions utilizing iris scissors.
Scalpel Size: 15 blade
Home Suture Removal Text: Patient was provided a home suture removal kit and will remove their sutures at home.  If they have any questions or difficulties they will call the office.
Complex Repair And Burow's Graft Text: The defect edges were debeveled with a #15 scalpel blade.  The primary defect was closed partially with a complex linear closure.  Given the location of the defect, shape of the defect, the proximity to free margins and the presence of a standing cone deformity a Burow's graft was deemed most appropriate to repair the remaining defect.  The graft was trimmed to fit the size of the remaining defect.  The graft was then placed in the primary defect, oriented appropriately, and sutured into place.
Number Of Hemigard Strips Per Side: 1

## 2020-11-02 ENCOUNTER — APPOINTMENT (RX ONLY)
Dept: URBAN - METROPOLITAN AREA CLINIC 24 | Facility: CLINIC | Age: 80
Setting detail: DERMATOLOGY
End: 2020-11-02

## 2020-11-02 DIAGNOSIS — Z48.01 ENCOUNTER FOR CHANGE OR REMOVAL OF SURGICAL WOUND DRESSING: ICD-10-CM

## 2020-11-02 PROCEDURE — ? SUTURE REMOVAL (GLOBAL PERIOD)

## 2020-11-02 ASSESSMENT — LOCATION SIMPLE DESCRIPTION DERM: LOCATION SIMPLE: LEFT FOREARM

## 2020-11-02 ASSESSMENT — LOCATION ZONE DERM: LOCATION ZONE: ARM

## 2020-11-02 ASSESSMENT — LOCATION DETAILED DESCRIPTION DERM: LOCATION DETAILED: LEFT DISTAL DORSAL FOREARM

## 2020-11-02 NOTE — PROCEDURE: SUTURE REMOVAL (GLOBAL PERIOD)
Detail Level: Simple
Add 78207 Cpt? (Important Note: In 2017 The Use Of 76312 Is Being Tracked By Cms To Determine Future Global Period Reimbursement For Global Periods): no

## 2021-01-25 ENCOUNTER — APPOINTMENT (RX ONLY)
Dept: URBAN - METROPOLITAN AREA CLINIC 24 | Facility: CLINIC | Age: 81
Setting detail: DERMATOLOGY
End: 2021-01-25

## 2021-01-25 DIAGNOSIS — L82.1 OTHER SEBORRHEIC KERATOSIS: ICD-10-CM

## 2021-01-25 DIAGNOSIS — L71.8 OTHER ROSACEA: ICD-10-CM | Status: INADEQUATELY CONTROLLED

## 2021-01-25 DIAGNOSIS — D18.0 HEMANGIOMA: ICD-10-CM

## 2021-01-25 DIAGNOSIS — L57.0 ACTINIC KERATOSIS: ICD-10-CM

## 2021-01-25 DIAGNOSIS — L57.8 OTHER SKIN CHANGES DUE TO CHRONIC EXPOSURE TO NONIONIZING RADIATION: ICD-10-CM

## 2021-01-25 DIAGNOSIS — Z85.828 PERSONAL HISTORY OF OTHER MALIGNANT NEOPLASM OF SKIN: ICD-10-CM

## 2021-01-25 PROBLEM — M12.9 ARTHROPATHY, UNSPECIFIED: Status: ACTIVE | Noted: 2021-01-25

## 2021-01-25 PROBLEM — D18.01 HEMANGIOMA OF SKIN AND SUBCUTANEOUS TISSUE: Status: ACTIVE | Noted: 2021-01-25

## 2021-01-25 PROBLEM — H91.90 UNSPECIFIED HEARING LOSS, UNSPECIFIED EAR: Status: ACTIVE | Noted: 2021-01-25

## 2021-01-25 PROBLEM — E78.5 HYPERLIPIDEMIA, UNSPECIFIED: Status: ACTIVE | Noted: 2021-01-25

## 2021-01-25 PROBLEM — L55.1 SUNBURN OF SECOND DEGREE: Status: ACTIVE | Noted: 2021-01-25

## 2021-01-25 PROCEDURE — ? PRESCRIPTION

## 2021-01-25 PROCEDURE — ? LIQUID NITROGEN

## 2021-01-25 PROCEDURE — 17000 DESTRUCT PREMALG LESION: CPT

## 2021-01-25 PROCEDURE — 99214 OFFICE O/P EST MOD 30 MIN: CPT | Mod: 25

## 2021-01-25 PROCEDURE — 17003 DESTRUCT PREMALG LES 2-14: CPT

## 2021-01-25 PROCEDURE — ? COUNSELING

## 2021-01-25 RX ORDER — METRONIDAZOLE 7.5 MG/G
GEL TOPICAL
Qty: 1 | Refills: 2 | Status: ERX | COMMUNITY
Start: 2021-01-25

## 2021-01-25 RX ADMIN — METRONIDAZOLE: 7.5 GEL TOPICAL at 00:00

## 2021-01-25 ASSESSMENT — LOCATION ZONE DERM
LOCATION ZONE: NOSE
LOCATION ZONE: FACE
LOCATION ZONE: TRUNK
LOCATION ZONE: ARM

## 2021-01-25 ASSESSMENT — LOCATION DETAILED DESCRIPTION DERM
LOCATION DETAILED: LEFT CENTRAL BUCCAL CHEEK
LOCATION DETAILED: EPIGASTRIC SKIN
LOCATION DETAILED: LEFT MEDIAL UPPER BACK
LOCATION DETAILED: LEFT DISTAL DORSAL FOREARM
LOCATION DETAILED: LEFT CENTRAL MALAR CHEEK
LOCATION DETAILED: RIGHT SUPERIOR MEDIAL UPPER BACK
LOCATION DETAILED: NASAL DORSUM
LOCATION DETAILED: RIGHT MEDIAL FOREHEAD
LOCATION DETAILED: LEFT SUPERIOR LATERAL MALAR CHEEK
LOCATION DETAILED: RIGHT MID-UPPER BACK
LOCATION DETAILED: STERNUM
LOCATION DETAILED: RIGHT CENTRAL MALAR CHEEK

## 2021-01-25 ASSESSMENT — LOCATION SIMPLE DESCRIPTION DERM
LOCATION SIMPLE: RIGHT CHEEK
LOCATION SIMPLE: CHEST
LOCATION SIMPLE: ABDOMEN
LOCATION SIMPLE: LEFT UPPER BACK
LOCATION SIMPLE: RIGHT FOREHEAD
LOCATION SIMPLE: NOSE
LOCATION SIMPLE: LEFT FOREARM
LOCATION SIMPLE: LEFT CHEEK
LOCATION SIMPLE: RIGHT UPPER BACK

## 2022-01-25 ENCOUNTER — APPOINTMENT (RX ONLY)
Dept: URBAN - METROPOLITAN AREA CLINIC 24 | Facility: CLINIC | Age: 82
Setting detail: DERMATOLOGY
End: 2022-01-25

## 2022-01-25 DIAGNOSIS — Z85.828 PERSONAL HISTORY OF OTHER MALIGNANT NEOPLASM OF SKIN: ICD-10-CM

## 2022-01-25 DIAGNOSIS — L57.8 OTHER SKIN CHANGES DUE TO CHRONIC EXPOSURE TO NONIONIZING RADIATION: ICD-10-CM

## 2022-01-25 DIAGNOSIS — L82.1 OTHER SEBORRHEIC KERATOSIS: ICD-10-CM

## 2022-01-25 DIAGNOSIS — L57.0 ACTINIC KERATOSIS: ICD-10-CM

## 2022-01-25 DIAGNOSIS — D18.0 HEMANGIOMA: ICD-10-CM

## 2022-01-25 PROBLEM — M12.9 ARTHROPATHY, UNSPECIFIED: Status: ACTIVE | Noted: 2022-01-25

## 2022-01-25 PROBLEM — I10 ESSENTIAL (PRIMARY) HYPERTENSION: Status: ACTIVE | Noted: 2022-01-25

## 2022-01-25 PROBLEM — E13.9 OTHER SPECIFIED DIABETES MELLITUS WITHOUT COMPLICATIONS: Status: ACTIVE | Noted: 2022-01-25

## 2022-01-25 PROBLEM — D18.01 HEMANGIOMA OF SKIN AND SUBCUTANEOUS TISSUE: Status: ACTIVE | Noted: 2022-01-25

## 2022-01-25 PROCEDURE — 99213 OFFICE O/P EST LOW 20 MIN: CPT | Mod: 25

## 2022-01-25 PROCEDURE — ? COUNSELING

## 2022-01-25 PROCEDURE — 17003 DESTRUCT PREMALG LES 2-14: CPT

## 2022-01-25 PROCEDURE — ? LIQUID NITROGEN

## 2022-01-25 PROCEDURE — 17000 DESTRUCT PREMALG LESION: CPT

## 2022-01-25 ASSESSMENT — LOCATION SIMPLE DESCRIPTION DERM
LOCATION SIMPLE: RIGHT FOREARM
LOCATION SIMPLE: CHEST
LOCATION SIMPLE: RIGHT EAR
LOCATION SIMPLE: ABDOMEN
LOCATION SIMPLE: LEFT FOREARM
LOCATION SIMPLE: LEFT UPPER BACK
LOCATION SIMPLE: RIGHT UPPER BACK
LOCATION SIMPLE: RIGHT CHEEK
LOCATION SIMPLE: RIGHT ZYGOMA
LOCATION SIMPLE: LEFT CHEEK
LOCATION SIMPLE: NOSE
LOCATION SIMPLE: RIGHT TEMPLE
LOCATION SIMPLE: LEFT EAR

## 2022-01-25 ASSESSMENT — LOCATION DETAILED DESCRIPTION DERM
LOCATION DETAILED: LEFT NASAL DORSUM
LOCATION DETAILED: RIGHT DISTAL DORSAL FOREARM
LOCATION DETAILED: LEFT PROXIMAL DORSAL FOREARM
LOCATION DETAILED: STERNUM
LOCATION DETAILED: LEFT CENTRAL BUCCAL CHEEK
LOCATION DETAILED: RIGHT LATERAL TEMPLE
LOCATION DETAILED: RIGHT CENTRAL MALAR CHEEK
LOCATION DETAILED: LEFT SUPERIOR HELIX
LOCATION DETAILED: LEFT SUPERIOR PREAURICULAR CHEEK
LOCATION DETAILED: RIGHT MID-UPPER BACK
LOCATION DETAILED: RIGHT SUPERIOR HELIX
LOCATION DETAILED: RIGHT LATERAL ZYGOMA
LOCATION DETAILED: EPIGASTRIC SKIN
LOCATION DETAILED: LEFT MEDIAL UPPER BACK
LOCATION DETAILED: LEFT DISTAL DORSAL FOREARM
LOCATION DETAILED: RIGHT SUPERIOR MEDIAL UPPER BACK

## 2022-01-25 ASSESSMENT — LOCATION ZONE DERM
LOCATION ZONE: ARM
LOCATION ZONE: FACE
LOCATION ZONE: TRUNK
LOCATION ZONE: EAR
LOCATION ZONE: NOSE

## 2022-01-25 NOTE — PROCEDURE: LIQUID NITROGEN
From: Tiara Ramirez  To: Calvin Madrigal MD  Sent: 1/3/2021 3:02 PM EST  Subject: Non-Urgent Medical Question    Given I had COVID 19    should I receive the COVID vaccine? If so, when should I receive it ??     Tiara Ramirez
Detail Level: Detailed
Render Post-Care Instructions In Note?: no
Post-Care Instructions: I reviewed with the patient in detail post-care instructions. Patient is to wear sunprotection, and avoid picking at any of the treated lesions. Pt may apply Vaseline to crusted or scabbing areas.
Show Aperture Variable?: Yes
Duration Of Freeze Thaw-Cycle (Seconds): 0
Consent: The patient's consent was obtained including but not limited to risks of crusting, scabbing, blistering, scarring, darker or lighter pigmentary change, recurrence, incomplete removal and infection.

## 2022-03-18 PROBLEM — E66.9 OBESITY (BMI 30-39.9): Status: ACTIVE | Noted: 2019-03-19

## 2022-03-18 PROBLEM — Z96.652 STATUS POST TOTAL LEFT KNEE REPLACEMENT: Status: ACTIVE | Noted: 2017-11-15

## 2022-03-19 PROBLEM — E66.01 SEVERE OBESITY (BMI 35.0-39.9) WITH COMORBIDITY (HCC): Status: ACTIVE | Noted: 2018-03-26

## 2022-03-19 PROBLEM — G47.33 OSA (OBSTRUCTIVE SLEEP APNEA): Status: ACTIVE | Noted: 2019-03-19

## 2022-03-19 PROBLEM — Z96.651 STATUS POST TOTAL RIGHT KNEE REPLACEMENT: Status: ACTIVE | Noted: 2017-05-26

## 2022-03-19 PROBLEM — R06.83 SNORING: Status: ACTIVE | Noted: 2017-05-08

## 2022-03-20 PROBLEM — I45.10 RBBB: Status: ACTIVE | Noted: 2019-11-07

## 2022-03-20 PROBLEM — R06.81 WITNESSED EPISODE OF APNEA: Status: ACTIVE | Noted: 2017-05-08

## 2022-06-02 ENCOUNTER — OFFICE VISIT (OUTPATIENT)
Dept: CARDIOLOGY CLINIC | Age: 82
End: 2022-06-02
Payer: MEDICARE

## 2022-06-02 VITALS
WEIGHT: 268.6 LBS | SYSTOLIC BLOOD PRESSURE: 132 MMHG | DIASTOLIC BLOOD PRESSURE: 80 MMHG | HEIGHT: 70 IN | BODY MASS INDEX: 38.45 KG/M2 | HEART RATE: 81 BPM

## 2022-06-02 DIAGNOSIS — I10 ESSENTIAL HYPERTENSION: Primary | ICD-10-CM

## 2022-06-02 DIAGNOSIS — I48.11 LONGSTANDING PERSISTENT ATRIAL FIBRILLATION (HCC): ICD-10-CM

## 2022-06-02 DIAGNOSIS — I65.29 CAROTID ARTERY STENOSIS WITHOUT CEREBRAL INFARCTION, UNSPECIFIED LATERALITY: ICD-10-CM

## 2022-06-02 DIAGNOSIS — I25.10 ATHEROSCLEROSIS OF NATIVE CORONARY ARTERY OF NATIVE HEART WITHOUT ANGINA PECTORIS: ICD-10-CM

## 2022-06-02 DIAGNOSIS — I45.10 RBBB: ICD-10-CM

## 2022-06-02 DIAGNOSIS — Z95.2 H/O AORTIC VALVE REPLACEMENT: ICD-10-CM

## 2022-06-02 PROCEDURE — 93000 ELECTROCARDIOGRAM COMPLETE: CPT | Performed by: INTERNAL MEDICINE

## 2022-06-02 PROCEDURE — G8417 CALC BMI ABV UP PARAM F/U: HCPCS | Performed by: INTERNAL MEDICINE

## 2022-06-02 PROCEDURE — 99214 OFFICE O/P EST MOD 30 MIN: CPT | Performed by: INTERNAL MEDICINE

## 2022-06-02 PROCEDURE — 1123F ACP DISCUSS/DSCN MKR DOCD: CPT | Performed by: INTERNAL MEDICINE

## 2022-06-02 PROCEDURE — 1036F TOBACCO NON-USER: CPT | Performed by: INTERNAL MEDICINE

## 2022-06-02 PROCEDURE — G8427 DOCREV CUR MEDS BY ELIG CLIN: HCPCS | Performed by: INTERNAL MEDICINE

## 2022-06-02 ASSESSMENT — ENCOUNTER SYMPTOMS: SHORTNESS OF BREATH: 0

## 2022-06-02 NOTE — PROGRESS NOTES
persistent atrial fibrillation ([facility] [address] 10/20/2014     1. Post op atrial fibrillation after AVR/CABG. Treated with amiodarone   and coumadin. No recurrence. Meds stopped 2/2/15. 2.  Recurrent atrial fibrillation noted on EKG 19:  Started on   ELiquis. 3.  RAte control and anticoagulation.  Chronic pain      knees        Arthritis     Coronary atherosclerosis of native coronary vessel 10/14/2014     1. PCI of OM with 2.5 x 18 mm Cypher FLAKO. (2008)  2.  2 vessel CABG 10/14/14:  LIMA to LAD. SVG to OM        Elevated hemoglobin A1c 10/14/2014     10/10/14 6.5        H/O aortic valve replacement 10/14/2014     1. Aortic valve replacement with a 23 mm Magna Ease Davion-Garcia   pericardial valve. 10/14/14 (Dr Yelitza Sawyer)   2. Echo (17):  EF 64%. Normal Bioprosthetic Aortic Valve. MG 15.    PG 30.   3.  Echo (19):  EF 60-65%. Mild LAE. AVR- MG 15.  PG 28.  DI 0.45. Mild MR.    4.  Echo (21):  EF 60-65%. AVR: MG 16. PG 31. Mild TR.  RVSP 33. Mild MR.          CKD (chronic kidney disease) stage 3, GFR 30-59 ml/min ([facility]) 10/14/2014    S/P CABG (coronary artery bypass graft) 10/14/2014    Carotid artery stenosis without cerebral infarction 2014      Followed by Dr. Burman Hammans  1. CT of carotids ():  LICA 79%. MIGDALIA 30-40%. 2.  Carotid stenting of left carotid ()            Social History     Tobacco Use    Smoking status: Former Smoker     Packs/day: 1.00     Quit date: 3/25/1980     Years since quittin.2    Smokeless tobacco: Never Used   Substance Use Topics    Alcohol use: Yes     Alcohol/week: 5.8 standard drinks       ROS:    Review of Systems   Constitutional: Negative for malaise/fatigue. Cardiovascular: Negative for chest pain. Respiratory: Negative for shortness of breath. Musculoskeletal: Positive for arthritis. Neurological: Negative for focal weakness. Psychiatric/Behavioral: Negative for depression. PHYSICAL EXAM:  Wt Readings from Last 3 Encounters:   06/02/22 268 lb 9.6 oz (121.8 kg)   02/09/22 274 lb 1.6 oz (124.3 kg)   12/08/21 272 lb 3.2 oz (123.5 kg)     BP Readings from Last 3 Encounters:   06/02/22 132/80   02/09/22 130/80   12/08/21 (!) 160/90     Pulse Readings from Last 3 Encounters:   06/02/22 81   02/09/22 82   12/08/21 78       Physical Exam  Constitutional:       General: He is not in acute distress. Neck:      Vascular: No carotid bruit. Cardiovascular:      Rate and Rhythm: Normal rate. Rhythm irregular. Heart sounds: Murmur (Grade I/VI. ) heard. Pulmonary:      Effort: No respiratory distress. Breath sounds: Normal breath sounds. Abdominal:      General: Bowel sounds are normal.      Palpations: Abdomen is soft. Musculoskeletal:         General: No swelling. Skin:     General: Skin is warm and dry. Neurological:      General: No focal deficit present. Psychiatric:         Mood and Affect: Mood normal.         Medical problems and test results were reviewed with the patient today.        Lab Results   Component Value Date    WBC 8.5 11/07/2019    HGB 16.1 11/07/2019    HCT 49.5 11/07/2019    MCV 91.7 11/07/2019     11/07/2019       Lab Results   Component Value Date     11/07/2019    K 4.3 11/07/2019     11/07/2019    CO2 29 11/07/2019    BUN 27 11/07/2019    CREATININE 1.80 11/07/2019    GLUCOSE 118 11/07/2019    CALCIUM 9.4 11/07/2019        Lab Results   Component Value Date    CHOL 136 11/07/2019     Lab Results   Component Value Date    TRIG 158 (H) 11/07/2019     Lab Results   Component Value Date    HDL 59 11/07/2019     Lab Results   Component Value Date    LDLCALC 45.4 11/07/2019     Lab Results   Component Value Date    LABVLDL 31.6 (H) 11/07/2019     Lab Results   Component Value Date    CHOLHDLRATIO 2.3 11/07/2019        Data from outside records/labs from outside providers have been reviewed and summarized as noted in the HPI, past history and data review sections of this note     EKG done today and reviewed with patient showed:  Atrial fibrillation  -Right bundle branch block with left axis -bifascicular block. RAte 81      ASSESSMENT and PLAN      1. Essential hypertension  Blood pressure currently controlled. Continue Toprol and Hyzaar therapy. - EKG 12 Lead    2. Longstanding persistent atrial fibrillation (HCC)  Rate controlled. Continue Eliquis. 3. Atherosclerosis of native coronary artery of native heart without angina pectoris  Stable dyspnea on exertion. Continue Eliquis. 4. Carotid artery stenosis without cerebral infarction, unspecified laterality  Stable. Follows with vascular surgery. 5. RBBB  Patient with EKG consistent with conduction system disease. Currently asymptomatic. Patient to call if develops symptoms of dizziness, syncope or change in exercise tolerance. 6. H/O aortic valve replacement  Stable. Echo in 2 years from last.                     Venkatesh Dawn MD  6/2/2022  10:22 AM    This note may have been dictated using speech recognition software.   As a result, error of speech recognition may have occurred

## 2022-08-02 ENCOUNTER — TELEPHONE (OUTPATIENT)
Dept: CARDIOLOGY CLINIC | Age: 82
End: 2022-08-02

## 2022-08-02 NOTE — TELEPHONE ENCOUNTER
Patient called and states that he cannot afford $600 for Eliquis. Patient informed to go to [employer] print out forms fill out his portion bring in and I would see about getting him samples until we can get the results back for patient assistance. Patient voiced understanding//mikalab    When I looked on the bottle to call in a refill, there were no refills. your nurse nromally orders 90 days X3. When I went to  the prescription I was flored as the cost was over $600. With my sub insurance now at $430  a month  I can really afford the  eliquis  at that price. A friend told me that he had the same problem and his doctor sent him a for and his drug cost wnet down y 2/3th. Ism it possible for you to do the same.

## 2022-08-03 NOTE — TELEPHONE ENCOUNTER
Medication refill    Patient called stating that he spoke with someone at [facility]. Was told that he needs to spend $747 dollars out of pocket, then he can get the Eliquis for $79 per month. Patient want 90 day supply sent to local [facility] pharmacy.  Prescription ordered//brendab

## 2022-08-16 NOTE — PROGRESS NOTES
[address] [place] [address] Court, [address] [facility]  [phone]    Patient Name:  Brayan Hernandez  YOB: 1940      Office Visit 8/17/2022    CHIEF COMPLAINT:    Chief Complaint   Patient presents with    Sleep Apnea    Follow-up         HISTORY OF PRESENT ILLNESS:      Patient is a 79 yo male seen today for follow up of sleep apnea and CPAP therapy. He is prescribed cpap therapy with a humidifier set at 8-16cm with a full face mask. Most recent download reveals AHI on PAP therapy is 11.6, leak is 34 and the hourly usage is 8 hours 6 minutes nightly. The overall use is 8361 hours with days greater than four hours at 30/30. The patient is compliant with the Pap therapy and is feeling better as a result. Settings and compliance pulled from patient's CPAP machine by MA. Patient reports that he is doing well on CPAP therapy. Sates that he is due to get a new machine and is ready for a new machine due to his machine being loud and has quit over the night several nights over the past 6 months. He denies any daytime fatigue or the need to take naps. States he falls asleep quickly in the evenings at bedtime. Sleeps throughout the night. Does not report any major changes in weight up or down. Denies any swelling in his lower extremities. Blood pressures controlled today. Past Medical History:   Diagnosis Date    Aortic valve replaced 12/2014    Daily Plavix and 81 mg ASA  nightly     Arrhythmia     Hx of Atrial fibrillation-Post op after AVR/CABG. Treated with Amiodarone and Coumadin. NO recurrence. Medication stopped 2/2/15    Arthritis     OA    CAD (coronary artery disease)     Followed by [facility]-Dr. Jackman    Carotid artery stenosis     without cerebral infarction. Left carotid stent in May 2014 as part of the [address]. Per US (09/13/17) \"Right internal carotid artery has <50% stenosis.  Left internal carotid artery is status post carotid stent with no significan stenosis. \"      Chronic kidney disease     per cardiology note (12/8/16) \"stage 3, GFR 30-59 ml/min. \" GFR 53 on 5/8/17. Chronic pain     knee    Coagulation disorder (HCC)     Daily Plavix     Coronary atherosclerosis of native coronary vessel     Cardiac cath times 2 with 1 stent. 1) PCI of OM with 2.5 mm Cypher FLAKO (1/2008) and 2) 2 vessel CABG 10/14/14: LIMA to LAD. SVG to OM. Dyslipidemia 12/5/2016    Former smoker     GERD (gastroesophageal reflux disease)     PRN Zantac     H/O echocardiogram 01/17/2017    EF 64%, AV Mean gradient: 15 mmHG, AV Peak gradient: 30.4 mmHg. Hypertension     Left anterior fascicular block     Per Dr. Dalia Carlisle note (12/8/16) \"Chronic\"     EDIS (obstructive sleep apnea)     CPAP-Followed by Dr. Shashank Coyle. Platelet inhibition due to Plavix     Pre-diabetes     Last A1C 6.3 on 9/27/17    Status post total left knee replacement 11/15/2017         Patient Active Problem List   Diagnosis    Coronary atherosclerosis of native coronary vessel    Essential hypertension    Obesity (BMI 30-39. 9)    Status post total left knee replacement    Elevated hemoglobin A1c    Chronic pain    Longstanding persistent atrial fibrillation (HCC)    Dyslipidemia    H/O aortic valve replacement    Snoring    Carotid artery stenosis without cerebral infarction    EDIS (obstructive sleep apnea)    Arthritis    CKD (chronic kidney disease) stage 3, GFR 30-59 ml/min ([facility])    Status post total right knee replacement    Abnormal EKG    Severe obesity (BMI 35.0-39. 9) with comorbidity (HCC)    S/P CABG (coronary artery bypass graft)    RBBB    Witnessed episode of apnea          Past Surgical History:   Procedure Laterality Date    BUNIONECTOMY Bilateral     CARDIAC CATHETERIZATION      x2-one stent placed in 2014    CARPAL TUNNEL RELEASE Bilateral     CATARACT REMOVAL Bilateral     COLONOSCOPY      CORONARY ARTERY BYPASS GRAFT  10/14/2014    x2 - Dr. Magno Oliveira Aortic valve replacement     TOTAL KNEE ARTHROPLASTY Right 2017           Social History     Socioeconomic History    Marital status:      Spouse name: Not on file    Number of children: Not on file    Years of education: Not on file    Highest education level: Not on file   Occupational History    Not on file   Tobacco Use    Smoking status: Former     Packs/day: 1.00     Types: Cigarettes     Quit date: 3/25/1980     Years since quittin.4    Smokeless tobacco: Never   Substance and Sexual Activity    Alcohol use: Yes     Alcohol/week: 5.8 standard drinks    Drug use: No    Sexual activity: Not on file     Comment: Wife   Other Topics Concern    Not on file   Social History Narrative    Rarely drinks 4-5 cups of coffee per day     Social Determinants of Health     Financial Resource Strain: Not on file   Food Insecurity: Not on file   Transportation Needs: Not on file   Physical Activity: Not on file   Stress: Not on file   Social Connections: Not on file   Intimate Partner Violence: Not on file   Housing Stability: Not on file         Family History   Problem Relation Age of Onset    Osteoarthritis Sister     Heart Disease Brother     Osteoarthritis Brother     Cancer Father 80        LIVER CANCER     Heart Disease Mother          Allergies   Allergen Reactions    Poison Ivy Extract Hives and Itching         Current Outpatient Medications   Medication Sig    apixaban (ELIQUIS) 2.5 MG TABS tablet Take 1 tablet by mouth in the morning and 1 tablet before bedtime. aspirin 81 MG EC tablet Take 81 mg by mouth    gabapentin (NEURONTIN) 300 MG capsule 600 mg.  TAKE 1 CAPSULE (300 MG) 3 (THREE) TIMES A DAY    losartan-hydroCHLOROthiazide (HYZAAR) 100-25 MG per tablet Take 1 tablet by mouth daily    metoprolol succinate (TOPROL XL) 25 MG extended release tablet TAKE 1 TABLET EVERY MORNING    rosuvastatin (CRESTOR) 20 MG tablet TAKE 1 TABLET NIGHTLY FOR HIGH CHOLESTEROL     No current facility-administered medications for this visit. REVIEW OF SYSTEMS:   CONSTITUTIONAL:   There is no history of fever, chills, night sweats, weight loss, weight gain, persistent fatigue, or lethargy/hypersomnolence. CARDIAC:   No chest pain, pressure, discomfort, palpitations, orthopnea, murmurs, or edema. GI:   No dysphagia, heartburn reflux, nausea/vomiting, diarrhea, abdominal pain, or bleeding. NEURO:   There is no history of AMS, persistent headache, decreased level of consciousness, seizures, or motor or sensory deficits. PHYSICAL EXAM:    Vitals:    08/17/22 1312   BP: 126/76   Pulse: 70   Resp: 15   Temp: (!) 96.6 °F (35.9 °C)   SpO2: 97%   Weight: 272 lb 6.4 oz (123.6 kg)   Height: 5' 10\" (1.778 m)        BMI: 39.09        GENERAL APPEARANCE:   The patient is overweight and in no respiratory distress. HEENT:   PERRL. Conjunctivae unremarkable. Nasal mucosa is without epistaxis, exudate, or polyps. Nares patent bilateral.  Gums and dentition are unremarkable. There is oropharyngeal narrowing. Campuzano score 3. NECK/LYMPHATIC:   Symmetrical with no elevation of jugular venous pulsation. Trachea midline. No thyroid enlargement. No cervical adenopathy. LUNGS:   Normal respiratory effort with symmetrical lung expansion. Breath sounds clear. HEART:   There is a regular rate and rhythm. No murmur, rub, or gallop. There is no edema in the lower extremities. ABDOMEN:   Soft and non-tender. No hepatosplenomegaly. Bowel sounds are normal.     NEURO:   The patient is alert and oriented to person, place, and time. Memory appears intact and mood is normal.  No gross sensorimotor deficits are present. ASSESSMENT:  (Medical Decision Making)      Diagnosis Orders   1. EDIS (obstructive sleep apnea)  DME - DURABLE MEDICAL EQUIPMENT - Patient is using, compliant and benefiting from Pap therapy. Continue current settings as AHI is down to 11.6 events per hour.   AHI is up from the last time patient was seen in clinic. He is showing a mean pressure of 14.1 on his machine. We will adjust his pressures to 10-18 cm H2O with C-Flex of 2. His machine is older we will order a new machine today. This could be attributing to his increased AHI. 2. Obesity (BMI 35.0-39.9 without comorbidity)  Patient can lose weight including ambulating 8-10,000 steps. Can Build Up Slowly as tolerated to this goal.    Also modifying dietary intake with decrease carbohydrates and sweets. Told to use avoid the P's --> Pizza, Pasta, Pastry, etc.  Increase fruits, vegetables, and lean meats e.g. [place] chicken or game meat. Patient is aware the goal is to consume less than 2000 jay/day, since patient is a nondiabetic. We discussed using the Alison LOSE IT to count calories. Patient can police themselves. If the future, if still having issues can use a more regimented diet e.g. [place], [address] etc. Clinical correlation suggested. PLAN:    Continue CPAP 10-18 cm H2O with nightly compliance  Will send message to MA to do download on new machine in 2 months  Recommendations as above  Follow up in 6 months or sooner if needed    Orders Placed This Encounter   Procedures    DME - [place] [address] [place]  Phone: [phone] [address]  Dept: [phone]      Patient Name: Yulissa Rodriguez  : 1940  Gender: male  Address: [address] [place], [place] [id number]  Patient phone number: [phone] (home)         Primary Insurance: Payor: MEDICARE / Plan: MEDICARE PART A AND B / Product Type: *No Product type* /   Subscriber ID: [id number] - (Medicare)      AMB Supply Order  Order Details     DME Location: [facility]   Order Date: 2022   There were no encounter diagnoses.           (  X   )New Set-Up     CPAP machine   (     )[id number] CPAP Unit  (  x  ) Auto CPAP Unit  (     ) BiLevel Unit  (     ) Auto BiLevel Unit  (     ) ASV         Length of need: 12 months    Pressure: 10-18  cmH20  EPR: 2  Ramp Time:   min    Starting Ramp Pressure:    cm H20    Patient had a diagnostic Apnea Hypopnea Index (AHI) of :  37.3    *SUPPLIES* Replace all as needed, or per coverage guidelines     Masks Type: Full Face <<<Vitera>>>    ( x  ) -Full Face Mask (1 per 3 mon)  (  x  ) -Full Mask (1 per month) Interface/Cushion      (    ) -Nasal Mask (1 per 3 mon)  (    ) - Nasal Mask (1 per month) Interface/Cushion  (    ) -Pillow (2 per mon)  (    ) -Zfdhevvbz (1 per 6 mon)      _________________________________________________________________          Other Supplies:    (  X   )-Uvnnftru (1 per 6 mon)  ( X    )-Rgviwn Tubing (1 per 3 mon)  (  X   )- Disposable Filter (2 per mon)  (   X  )-Nfzhik Humidifier (1 per year)     (     )-Hotiurmir (sometimes used with Full Face Mask) (1 per 6 mos)  (     )-Tubing-without heat (1 per 3 mos)  (     )-Non-Disposable Filter (1 per 6 mos)  (     )-Water Chamber (1 per 6 mos)  (     )-Humidifier non-heated (1 per 5 yrs)      Signed Date: 8/17/2022  Electronically Signed By: TONYA Goff CNP             Collaborating Physician: Dr. Shanelle Peterson    Over 50% of today's office visit was spent in face to face time reviewing test results, prognosis, importance of compliance, education about disease process, benefits of medications, instructions for management of acute flare-ups, and follow up plans. Total face to face time spent with patient was 20 minutes.         TONYA Goff CNP  Electronically signed

## 2022-08-17 ENCOUNTER — OFFICE VISIT (OUTPATIENT)
Dept: SLEEP MEDICINE | Age: 82
End: 2022-08-17
Payer: MEDICARE

## 2022-08-17 VITALS
DIASTOLIC BLOOD PRESSURE: 76 MMHG | OXYGEN SATURATION: 97 % | HEIGHT: 70 IN | HEART RATE: 70 BPM | SYSTOLIC BLOOD PRESSURE: 126 MMHG | RESPIRATION RATE: 15 BRPM | WEIGHT: 272.4 LBS | TEMPERATURE: 96.6 F | BODY MASS INDEX: 39 KG/M2

## 2022-08-17 DIAGNOSIS — E66.9 OBESITY (BMI 35.0-39.9 WITHOUT COMORBIDITY): ICD-10-CM

## 2022-08-17 DIAGNOSIS — G47.33 OSA (OBSTRUCTIVE SLEEP APNEA): Primary | ICD-10-CM

## 2022-08-17 PROCEDURE — 1123F ACP DISCUSS/DSCN MKR DOCD: CPT | Performed by: NURSE PRACTITIONER

## 2022-08-17 PROCEDURE — 99213 OFFICE O/P EST LOW 20 MIN: CPT | Performed by: NURSE PRACTITIONER

## 2022-08-17 ASSESSMENT — SLEEP AND FATIGUE QUESTIONNAIRES
HOW LIKELY ARE YOU TO NOD OFF OR FALL ASLEEP IN A CAR, WHILE STOPPED FOR A FEW MINUTES IN TRAFFIC: 0
HOW LIKELY ARE YOU TO NOD OFF OR FALL ASLEEP WHILE WATCHING TV: 0
HOW LIKELY ARE YOU TO NOD OFF OR FALL ASLEEP WHILE SITTING INACTIVE IN A PUBLIC PLACE: 0
HOW LIKELY ARE YOU TO NOD OFF OR FALL ASLEEP WHILE SITTING AND READING: 1
HOW LIKELY ARE YOU TO NOD OFF OR FALL ASLEEP WHILE SITTING QUIETLY AFTER LUNCH WITHOUT ALCOHOL: 0
ESS TOTAL SCORE: 1
HOW LIKELY ARE YOU TO NOD OFF OR FALL ASLEEP WHEN YOU ARE A PASSENGER IN A CAR FOR AN HOUR WITHOUT A BREAK: 0
HOW LIKELY ARE YOU TO NOD OFF OR FALL ASLEEP WHILE SITTING AND TALKING TO SOMEONE: 0
HOW LIKELY ARE YOU TO NOD OFF OR FALL ASLEEP WHILE LYING DOWN TO REST IN THE AFTERNOON WHEN CIRCUMSTANCES PERMIT: 0

## 2022-08-17 NOTE — PATIENT INSTRUCTIONS
Continue CPAP 10-18 cm H2O with nightly compliance  Will send message to MA to do download on new machine in 2 months  Recommendations as above  Follow up in 6 months or sooner if needed

## 2022-10-03 RX ORDER — METOPROLOL SUCCINATE 25 MG/1
TABLET, EXTENDED RELEASE ORAL
Qty: 90 TABLET | Refills: 3 | Status: SHIPPED | OUTPATIENT
Start: 2022-10-03

## 2022-10-03 RX ORDER — ROSUVASTATIN CALCIUM 20 MG/1
TABLET, COATED ORAL
Qty: 90 TABLET | Refills: 3 | Status: SHIPPED | OUTPATIENT
Start: 2022-10-03

## 2022-10-03 NOTE — TELEPHONE ENCOUNTER
Prescription sent to pharmacy//mikalab  Requested Prescriptions     Signed Prescriptions Disp Refills    rosuvastatin (CRESTOR) 20 MG tablet 90 tablet 3     Sig: TAKE 1 TABLET NIGHTLY FOR  HIGH CHOLESTEROL     Authorizing Provider: Layo Stallings     Ordering User: TATIANA Mcgovern    metoprolol succinate (TOPROL XL) 25 MG extended release tablet 90 tablet 3     Sig: TAKE 1 TABLET EVERY MORNING     Authorizing Provider: Layo Stallings     Ordering User: Marina Rawls

## 2022-10-26 ENCOUNTER — TELEPHONE (OUTPATIENT)
Dept: SLEEP MEDICINE | Age: 82
End: 2022-10-26

## 2022-10-26 NOTE — TELEPHONE ENCOUNTER
----- Message from TONYA Deleon CNP sent at 8/17/2022  5:01 PM EDT -----  Regarding: download of compliance

## 2022-11-07 RX ORDER — LOSARTAN POTASSIUM AND HYDROCHLOROTHIAZIDE 25; 100 MG/1; MG/1
TABLET ORAL
Qty: 90 TABLET | Refills: 3 | Status: SHIPPED | OUTPATIENT
Start: 2022-11-07

## 2022-11-07 NOTE — TELEPHONE ENCOUNTER
Prescription sent to pharmacy//brendab  Requested Prescriptions     Signed Prescriptions Disp Refills    losartan-hydroCHLOROthiazide (HYZAAR) 100-25 MG per tablet 90 tablet 3     Sig: TAKE 1 TABLET DAILY     Authorizing Provider: Jagruti Sharp     Ordering User: Helen Fields

## 2022-12-02 ENCOUNTER — OFFICE VISIT (OUTPATIENT)
Dept: CARDIOLOGY CLINIC | Age: 82
End: 2022-12-02

## 2022-12-02 VITALS
HEART RATE: 80 BPM | BODY MASS INDEX: 38.77 KG/M2 | DIASTOLIC BLOOD PRESSURE: 68 MMHG | WEIGHT: 270.8 LBS | HEIGHT: 70 IN | SYSTOLIC BLOOD PRESSURE: 120 MMHG

## 2022-12-02 DIAGNOSIS — N18.30 STAGE 3 CHRONIC KIDNEY DISEASE, UNSPECIFIED WHETHER STAGE 3A OR 3B CKD (HCC): ICD-10-CM

## 2022-12-02 DIAGNOSIS — I10 ESSENTIAL HYPERTENSION: ICD-10-CM

## 2022-12-02 DIAGNOSIS — I48.11 LONGSTANDING PERSISTENT ATRIAL FIBRILLATION (HCC): ICD-10-CM

## 2022-12-02 DIAGNOSIS — Z95.2 H/O AORTIC VALVE REPLACEMENT: Primary | ICD-10-CM

## 2022-12-02 DIAGNOSIS — E78.5 DYSLIPIDEMIA: ICD-10-CM

## 2022-12-02 DIAGNOSIS — I25.10 ATHEROSCLEROSIS OF NATIVE CORONARY ARTERY OF NATIVE HEART WITHOUT ANGINA PECTORIS: ICD-10-CM

## 2022-12-02 ASSESSMENT — ENCOUNTER SYMPTOMS: SHORTNESS OF BREATH: 0

## 2022-12-02 NOTE — PROGRESS NOTES
[address], [address], [place] [address]  [facility], [address] [place] [address]  PHONE: [phone]  1940      SUBJECTIVE:   Jese Jarrell is a 78 y.o. male seen for a follow up visit regarding the following:     Chief Complaint   Patient presents with    Hypertension    Atrial Fibrillation    Coronary Artery Disease       HPI:    Jese Jarrell presents for routine follow up for known Coronary Artery Disease. Multiple issues addressed as outlined below:     Coronary Artery Disease:  Patient denies any recent angina. Notes compliance with medical therapy. No recent NTG use. No intolerance to anti-platelet therapy. No blood in stool or melena. AVR:  No fever or chills. Atrial fibrillation:  No palpitations or tachycardia. Tolerating anticoagulation. Hypertension:  Ambulatory BP readings have been controlled. Patient reports compliance with medical therapy without side effects. Hyperlipidemia:   Tolerating Crestor. CKD:  Last Cr 1.53 on 5/19/22. Carotid Artery disease: Follows with Vascular surgery at [place]. No neurologic symptoms. Sees then 1/23. Past Medical History, Past Surgical History, Family history, Social History, and Medications were all reviewed with the patient today and updated as necessary. Current Outpatient Medications:     losartan-hydroCHLOROthiazide (HYZAAR) 100-25 MG per tablet, TAKE 1 TABLET DAILY, Disp: 90 tablet, Rfl: 3    rosuvastatin (CRESTOR) 20 MG tablet, TAKE 1 TABLET NIGHTLY FOR  HIGH CHOLESTEROL, Disp: 90 tablet, Rfl: 3    metoprolol succinate (TOPROL XL) 25 MG extended release tablet, TAKE 1 TABLET EVERY MORNING, Disp: 90 tablet, Rfl: 3    apixaban (ELIQUIS) 2.5 MG TABS tablet, Take 1 tablet by mouth in the morning and 1 tablet before bedtime. , Disp: 180 tablet, Rfl: 3    aspirin 81 MG EC tablet, Take 81 mg by mouth, Disp: , Rfl:     gabapentin (NEURONTIN) 300 MG capsule, 600 mg. TAKE 1 CAPSULE (300 MG) 3 (THREE) TIMES A DAY, Disp: , Rfl:      Allergies   Allergen Reactions    Poison Ivy Extract Hives and Itching        Patient Active Problem List    Diagnosis Date Noted    RBBB 11/07/2019     Priority: Low    Obesity (BMI 30-39.9) 03/19/2019     Priority: Low    EDIS (obstructive sleep apnea) 03/19/2019     Priority: Low    Severe obesity (BMI 35.0-39. 9) with comorbidity ([facility] [address] 03/26/2018     Priority: Low    Status post total left knee replacement 11/15/2017     Priority: Low    Status post total right knee replacement 05/26/2017     Priority: Low    Snoring 05/08/2017     Priority: Low    Witnessed episode of apnea 05/08/2017     Priority: Low    Abnormal EKG 12/08/2016     Priority: Low    Essential hypertension 12/05/2016     Priority: Low    Dyslipidemia 12/05/2016     Priority: Low    Longstanding persistent [facility] 10/20/2014     Priority: Low     1. Post op atrial fibrillation after AVR/CABG. Treated with amiodarone   and coumadin. No recurrence. Meds stopped 2/2/15. 2.  Recurrent atrial fibrillation noted on EKG 11/7/19:  Started on   ELiquis. 3.  RAte control and anticoagulation. Chronic pain      Priority: Low     knees        Arthritis      Priority: Low    Coronary atherosclerosis of native coronary vessel 10/14/2014     Priority: Low     1. PCI of OM with 2.5 x 18 mm Cypher FLAKO. (1/2008)  2.  2 vessel CABG 10/14/14:  LIMA to LAD. SVG to OM        Elevated hemoglobin A1c 10/14/2014     Priority: Low     10/10/14 6.5        H/O aortic valve replacement 10/14/2014     Priority: Low     1. Aortic valve replacement with a 23 mm Magna Ease Davion-Garcia   pericardial valve. 10/14/14 (Dr Travis Cowan)   2. Echo (1/17/17):  EF 64%. Normal Bioprosthetic Aortic Valve. MG 15.    PG 30.   3.  Echo (11/27/19):  EF 60-65%. Mild LAE. AVR- MG 15.  PG 28.  DI 0.45. Mild MR.    4.  Echo (12/2/21):  EF 60-65%. AVR: MG 16. PG 31. Mild TR. RVSP 33. Mild MR.          CKD (chronic kidney disease) stage 3, GFR 30-59 ml/min ([facility]) 10/14/2014     Priority: Low    S/P CABG (coronary artery bypass graft) 10/14/2014     Priority: Low    Carotid artery stenosis without cerebral infarction 2014     Priority: Low      Followed by Dr. Gal Arredondo  1. CT of carotids ():  LICA 34%. MIGDALIA 30-40%. 2.  Carotid stenting of left carotid ()            Social History     Tobacco Use    Smoking status: Former     Packs/day: 1.00     Types: Cigarettes     Quit date: 3/25/1980     Years since quittin.7    Smokeless tobacco: Never   Substance Use Topics    Alcohol use: Yes     Alcohol/week: 5.8 standard drinks       ROS:    Review of Systems   Constitutional: Negative for malaise/fatigue. Cardiovascular:  Negative for chest pain. Respiratory:  Negative for shortness of breath. Musculoskeletal:  Positive for arthritis. Neurological:  Negative for focal weakness. Psychiatric/Behavioral:  Negative for depression. PHYSICAL EXAM:  Wt Readings from Last 3 Encounters:   22 270 lb 12.8 oz (122.8 kg)   22 272 lb 6.4 oz (123.6 kg)   22 268 lb 9.6 oz (121.8 kg)     BP Readings from Last 3 Encounters:   22 120/68   22 126/76   22 132/80     Pulse Readings from Last 3 Encounters:   22 80   22 70   22 81       Physical Exam  Constitutional:       General: He is not in acute distress. Neck:      Vascular: No carotid bruit. Cardiovascular:      Rate and Rhythm: Normal rate. Rhythm irregular. Heart sounds: Murmur (Early grade II/VI ILANA) heard. Pulmonary:      Effort: No respiratory distress. Breath sounds: Normal breath sounds. Abdominal:      General: Bowel sounds are normal.      Palpations: Abdomen is soft. Musculoskeletal:         General: Swelling (trivial) present. Skin:     General: Skin is warm and dry. Neurological:      General: No focal deficit present. Psychiatric:         Mood and Affect: Mood normal.       Medical problems and test results were reviewed with the patient today. Lab Results   Component Value Date    WBC 8.5 11/07/2019    HGB 16.1 11/07/2019    HCT 49.5 11/07/2019    MCV 91.7 11/07/2019     11/07/2019       Lab Results   Component Value Date/Time     11/07/2019 09:32 AM    K 4.3 11/07/2019 09:32 AM     11/07/2019 09:32 AM    CO2 29 11/07/2019 09:32 AM    BUN 27 11/07/2019 09:32 AM    CREATININE 1.80 11/07/2019 09:32 AM    GLUCOSE 118 11/07/2019 09:32 AM    CALCIUM 9.4 11/07/2019 09:32 AM        Lab Results   Component Value Date    CHOL 136 11/07/2019     Lab Results   Component Value Date    TRIG 158 (H) 11/07/2019     Lab Results   Component Value Date    HDL 59 11/07/2019     Lab Results   Component Value Date    LDLCALC 45.4 11/07/2019     Lab Results   Component Value Date    LABVLDL 31.6 (H) 11/07/2019     Lab Results   Component Value Date    CHOLHDLRATIO 2.3 11/07/2019        Data from outside records/labs from outside providers have been reviewed and summarized as noted in the HPI, past history and data review sections of this note       ASSESSMENT and PLAN      1. H/O aortic valve replacement  Echo in 12/23. Check every 2 years. 2. Dyslipidemia  Continue Crestor. 3. Stage 3 chronic kidney disease, unspecified whether stage 3a or 3b CKD (HCC)  Stable. Eliquis appropriately dosed. 4. Longstanding persistent atrial fibrillation (HCC)  Rate controlled. On Eliquis. 5. Atherosclerosis of native coronary artery of native heart without angina pectoris  Patient is doing well without any anginal symptoms. Continue Aspirin 81 mg a day and PRN NTG. We discussed the importance of continued risk factor modification. The patient is encouraged to contact my office with any worsening dyspnea or chest discomfort. 6. Essential hypertension  BP controlled. Continue Toprol and Hyzarr. Return in about 6 months (around 6/2/2023). Brijesh Schulz MD  12/2/2022  10:21 AM    This note may have been dictated using speech recognition software.   As a result, error of speech recognition may have occurred

## 2023-01-30 ENCOUNTER — APPOINTMENT (RX ONLY)
Dept: URBAN - METROPOLITAN AREA CLINIC 24 | Facility: CLINIC | Age: 83
Setting detail: DERMATOLOGY
End: 2023-01-30

## 2023-01-30 DIAGNOSIS — L57.8 OTHER SKIN CHANGES DUE TO CHRONIC EXPOSURE TO NONIONIZING RADIATION: ICD-10-CM

## 2023-01-30 DIAGNOSIS — D18.0 HEMANGIOMA: ICD-10-CM

## 2023-01-30 DIAGNOSIS — L82.1 OTHER SEBORRHEIC KERATOSIS: ICD-10-CM

## 2023-01-30 DIAGNOSIS — L57.0 ACTINIC KERATOSIS: ICD-10-CM

## 2023-01-30 DIAGNOSIS — Z85.828 PERSONAL HISTORY OF OTHER MALIGNANT NEOPLASM OF SKIN: ICD-10-CM

## 2023-01-30 PROBLEM — D18.01 HEMANGIOMA OF SKIN AND SUBCUTANEOUS TISSUE: Status: ACTIVE | Noted: 2023-01-30

## 2023-01-30 PROCEDURE — ? COUNSELING

## 2023-01-30 PROCEDURE — 17003 DESTRUCT PREMALG LES 2-14: CPT

## 2023-01-30 PROCEDURE — 17000 DESTRUCT PREMALG LESION: CPT

## 2023-01-30 PROCEDURE — 99213 OFFICE O/P EST LOW 20 MIN: CPT | Mod: 25

## 2023-01-30 PROCEDURE — ? LIQUID NITROGEN

## 2023-01-30 ASSESSMENT — LOCATION SIMPLE DESCRIPTION DERM
LOCATION SIMPLE: CHEST
LOCATION SIMPLE: RIGHT EAR
LOCATION SIMPLE: LEFT UPPER BACK
LOCATION SIMPLE: RIGHT TEMPLE
LOCATION SIMPLE: RIGHT UPPER BACK
LOCATION SIMPLE: LEFT FOREARM
LOCATION SIMPLE: LEFT CHEEK
LOCATION SIMPLE: NOSE
LOCATION SIMPLE: LEFT EAR
LOCATION SIMPLE: ABDOMEN

## 2023-01-30 ASSESSMENT — LOCATION DETAILED DESCRIPTION DERM
LOCATION DETAILED: RIGHT SUPERIOR HELIX
LOCATION DETAILED: LEFT INFERIOR HELIX
LOCATION DETAILED: NASAL DORSUM
LOCATION DETAILED: RIGHT LATERAL TEMPLE
LOCATION DETAILED: LEFT TRIANGULAR FOSSA
LOCATION DETAILED: LEFT DISTAL DORSAL FOREARM
LOCATION DETAILED: STERNUM
LOCATION DETAILED: RIGHT SUPERIOR MEDIAL UPPER BACK
LOCATION DETAILED: EPIGASTRIC SKIN
LOCATION DETAILED: RIGHT MID-UPPER BACK
LOCATION DETAILED: LEFT CENTRAL BUCCAL CHEEK
LOCATION DETAILED: LEFT MEDIAL UPPER BACK

## 2023-01-30 ASSESSMENT — LOCATION ZONE DERM
LOCATION ZONE: ARM
LOCATION ZONE: FACE
LOCATION ZONE: EAR
LOCATION ZONE: NOSE
LOCATION ZONE: TRUNK

## 2023-02-16 NOTE — PROGRESS NOTES
[address] [place] [address] Court, [address] [facility]  [phone]    Patient Name:  Sherif Choe  YOB: 1940      Office Visit 2/17/2023    CHIEF COMPLAINT:    Chief Complaint   Patient presents with    Sleep Apnea    Follow-up           HISTORY OF PRESENT ILLNESS:      Patient is a 77 yo male seen today for follow up of sleep apnea and CPAP therapy. Diagnostic sleep study on 07/27/2017 with an AHI of 37.3 and lowest oxygen saturation of 71%. He is prescribed cpap therapy with a humidifier set at 10-18 cm with a full face mask. Most recent download reveals AHI on PAP therapy is 5.4, leak is 11.2 and the hourly usage is 8 hours 20 minutes nightly. The overall use is 992 hours with days greater than four hours at 119/120. The patient is compliant with the Pap therapy and is feeling better as a result. He reports that he loves the new CPAP machine and functions well better than the one he had previously. States that he awakens in the morning fully rested and denies any daytime sleepiness or fatigue. Donna score is 1/24. He denies any major medical changes over the last 3 months. States that his weight has consistently been around 270 pounds. He has recently started trying to decrease carbohydrates in his diet. His blood pressure slightly elevated today at 152/90. He reports that he usually does not have an elevated blood pressure and he will monitor this at home and if it remains elevated he will follow-up with his primary care provider.     Sleep Medicine 2/17/2023 8/17/2022 2/9/2022 11/5/2021   Sitting and reading 1 1 2 1   Watching TV 0 0 1 1   Sitting, inactive in a public place (e.g. a theatre or a meeting) 0 0 0 0   As a passenger in a car for an hour without a break 0 0 1 0   Lying down to rest in the afternoon when circumstances permit 0 0 1 0   Sitting and talking to someone 0 0 0 0   Sitting quietly after a lunch without alcohol 0 0 0 0   In a car, while stopped for a few minutes in traffic 0 0 0 0   Center Sleepiness Score 1 1 5 2                    Past Medical History:   Diagnosis Date    Aortic valve replaced 12/2014    Daily Plavix and 81 mg ASA  nightly     Arrhythmia     Hx of Atrial fibrillation-Post op after AVR/CABG. Treated with Amiodarone and Coumadin. NO recurrence. Medication stopped 2/2/15    Arthritis     OA    CAD (coronary artery disease)     Followed by [facility]-Dr. Jackman    Carotid artery stenosis     without cerebral infarction. Left carotid stent in May 2014 as part of the [address]. Per US (09/13/17) \"Right internal carotid artery has <50% stenosis. Left internal carotid artery is status post carotid stent with no significan stenosis. \"      Chronic kidney disease     per cardiology note (12/8/16) \"stage 3, GFR 30-59 ml/min. \" GFR 53 on 5/8/17. Chronic pain     knee    Coagulation disorder (HCC)     Daily Plavix     Coronary atherosclerosis of native coronary vessel     Cardiac cath times 2 with 1 stent. 1) PCI of OM with 2.5 mm Cypher FLAKO (1/2008) and 2) 2 vessel CABG 10/14/14: LIMA to LAD. SVG to OM. Dyslipidemia 12/5/2016    Former smoker     GERD (gastroesophageal reflux disease)     PRN Zantac     H/O echocardiogram 01/17/2017    EF 64%, AV Mean gradient: 15 mmHG, AV Peak gradient: 30.4 mmHg. Hypertension     Left anterior fascicular block     Per Dr. Dany Mcdonough note (12/8/16) \"Chronic\"     EDIS (obstructive sleep apnea)     CPAP-Followed by Dr. Bernarda Del Real. Platelet inhibition due to Plavix     Pre-diabetes     Last A1C 6.3 on 9/27/17    Status post total left knee replacement 11/15/2017         Patient Active Problem List   Diagnosis    Coronary atherosclerosis of native coronary vessel    Essential hypertension    Obesity (BMI 30-39. 9)    Status post total left knee replacement    Elevated hemoglobin A1c    Chronic pain    Longstanding persistent atrial fibrillation (HCC)    Dyslipidemia    H/O aortic valve replacement    Snoring    Carotid artery stenosis without cerebral infarction    EDIS (obstructive sleep apnea)    Arthritis    CKD (chronic kidney disease) stage 3, GFR 30-59 ml/min ([facility])    Status post total right knee replacement    Abnormal EKG    Severe obesity (BMI 35.0-39. 9) with comorbidity (HCC)    S/P CABG (coronary artery bypass graft)    RBBB    Witnessed episode of apnea          Past Surgical History:   Procedure Laterality Date    BUNIONECTOMY Bilateral     CARDIAC CATHETERIZATION      x2-one stent placed in     CARPAL TUNNEL RELEASE Bilateral     CATARACT REMOVAL Bilateral     COLONOSCOPY      CORONARY ARTERY BYPASS GRAFT  10/14/2014    x2 - Dr. Ricky Osborne Aortic valve replacement     TOTAL KNEE ARTHROPLASTY Right 2017           Social History     Socioeconomic History    Marital status:      Spouse name: Not on file    Number of children: Not on file    Years of education: Not on file    Highest education level: Not on file   Occupational History    Not on file   Tobacco Use    Smoking status: Former     Packs/day: 1.00     Types: Cigarettes     Quit date: 3/25/1980     Years since quittin.9    Smokeless tobacco: Never   Substance and Sexual Activity    Alcohol use:  Yes     Alcohol/week: 5.8 standard drinks    Drug use: No    Sexual activity: Not on file     Comment: Wife   Other Topics Concern    Not on file   Social History Narrative    Rarely drinks 4-5 cups of coffee per day     Social Determinants of Health     Financial Resource Strain: Not on file   Food Insecurity: Not on file   Transportation Needs: Not on file   Physical Activity: Not on file   Stress: Not on file   Social Connections: Not on file   Intimate Partner Violence: Not on file   Housing Stability: Not on file         Family History   Problem Relation Age of Onset    Osteoarthritis Sister     Heart Disease Brother     Osteoarthritis Brother     Cancer Father 80        LIVER CANCER     Heart Disease Mother          Allergies   Allergen Reactions    Poison Ivy Extract Hives and Itching         Current Outpatient Medications   Medication Sig    losartan-hydroCHLOROthiazide (HYZAAR) 100-25 MG per tablet TAKE 1 TABLET DAILY    rosuvastatin (CRESTOR) 20 MG tablet TAKE 1 TABLET NIGHTLY FOR  HIGH CHOLESTEROL    metoprolol succinate (TOPROL XL) 25 MG extended release tablet TAKE 1 TABLET EVERY MORNING    apixaban (ELIQUIS) 2.5 MG TABS tablet Take 1 tablet by mouth in the morning and 1 tablet before bedtime. aspirin 81 MG EC tablet Take 81 mg by mouth    gabapentin (NEURONTIN) 300 MG capsule 600 mg. TAKE 1 CAPSULE (300 MG) 3 (THREE) TIMES A DAY     No current facility-administered medications for this visit. REVIEW OF SYSTEMS:   CONSTITUTIONAL:   There is no history of fever, chills, night sweats, weight loss, weight gain, persistent fatigue, or lethargy/hypersomnolence. CARDIAC:   No chest pain, pressure, discomfort, palpitations, orthopnea, murmurs, or edema. GI:   No dysphagia, heartburn reflux, nausea/vomiting, diarrhea, abdominal pain, or bleeding. NEURO:   There is no history of AMS, persistent headache, decreased level of consciousness, seizures, or motor or sensory deficits. PHYSICAL EXAM:    Vitals:    02/17/23 1306   BP: (!) 152/90   Pulse: 62   Resp: 15   Temp: 96.9 °F (36.1 °C)   SpO2: 93%   Weight: 270 lb 14.4 oz (122.9 kg)   Height: 5' 11\" (1.803 m)          BMI: 39.09        GENERAL APPEARANCE:   The patient is overweight and in no respiratory distress. HEENT:   PERRL. Conjunctivae unremarkable. Nasal mucosa is without epistaxis, exudate, or polyps. Nares patent bilateral.  Gums and dentition are unremarkable. There is oropharyngeal narrowing. Campuzano score 3. NECK/LYMPHATIC:   Symmetrical with no elevation of jugular venous pulsation. Trachea midline. No thyroid enlargement. No cervical adenopathy. LUNGS:   Normal respiratory effort with symmetrical lung expansion. Breath sounds clear. HEART:   There is a regular rate and rhythm. No murmur, rub, or gallop. There is no edema in the lower extremities. ABDOMEN:   Soft and non-tender. No hepatosplenomegaly. Bowel sounds are normal.     NEURO:   The patient is alert and oriented to person, place, and time. Memory appears intact and mood is normal.  No gross sensorimotor deficits are present. ASSESSMENT:  (Medical Decision Making)      ICD-10-CM    1. EDIS (obstructive sleep apnea)  G47.33 DME - DURABLE MEDICAL EQUIPMENT - Patient is using, compliant and benefiting from Pap therapy. Continue current settings as AHI is down to 5.4 events per hour. 2. Obesity (BMI 35.0-39.9 without comorbidity)  E66.9 Patient can lose weight including ambulating 8-10,000 steps. Can Build Up Slowly as tolerated to this goal.    Also modifying dietary intake with decrease carbohydrates and sweets. Told to use avoid the P's --> Pizza, Pasta, Pastry, etc.  Increase fruits, vegetables, and lean meats e.g. [place] chicken or game meat. Patient is aware the goal is to consume less than 2000 jay/day, since patient is a nondiabetic. We discussed using the Alison LOSE IT to count calories. Patient can police themselves. If the future, if still having issues can use a more regimented diet e.g. [place], [address] etc. Clinical correlation suggested.                PLAN:    Continue CPAP 10-18 cm H2O with nightly compliance  New supplies ordered  Recommendations as above  Follow up in 1 year or sooner if needed    Orders Placed This Encounter   Procedures    DME - [address] [facility]  Phone: [address] [employer] [address] [address]  Dept: [phone]      Patient Name: Meagan Mosley  : 1940  Gender: male  Address: [address] [place] [address]  Patient phone: [phone] (home)       Primary Insurance: Payor: Rito Waddell / Plan: Rito Waddell / Product Type: *No Product type* /   Subscriber ID: [id number] - (Commercial)      AMB Supply Order  Order Details     DME Location: [facility]   Order Date: 2/17/2023   The primary encounter diagnosis was EDIS (obstructive sleep apnea). A diagnosis of Obesity (BMI 35.0-39.9 without comorbidity) was also pertinent to this visit.              (  X   )Supplies Needed       CPAP Machine   (     ) CPAP Unit  (   x  ) Auto CPAP Unit  (     ) BiLevel Unit  (     ) Auto BiLevel Unit  (     ) ASV   (     ) Bilevel ST      Length of need: 12 months    Pressure:  10-18 cmH20  EPR: 2     Starting Ramp Pressure:   cm H20  Ramp Time: min      Patient had a diagnostic Apnea Hypopnea Index (AHI) of :  37.3  *SUPPLIES* Replace all as needed, or per coverage guidelines     Masks Type:  ( x   ) -Full Face Mask (1 per 3 mon)  (  x  ) -Full Mask (1 per month) Interface/Cushion      (  ) -Nasal Mask (1 per 3 mon)  (  ) - Nasal Mask (1 per month) Interface/Cushion  (     ) -Pillow (2 per mon)  (     ) -Krkggicnt (1 per 6 mon)            Other Supplies:    (   X  )-Azjyngmi (1 per 6 mon)  (   X  )-Gbbeal Tubing (1 per 3 mon)  (   X  )- Disposable Filter (2 per mon)  (   x  )-Qozvcm Humidifier (1 per year)     ( x    )-Kbejlbbbn (sometimes used with Full Face Mask) (1 per 6 mos)  (    )-Tubing-without heat (1 per 3 mos)  (     )-Non-Disposable Filter (1 per 6 mos)  (  x   )-Water Chamber (1 per 6 mos)  (     )-Humidifier non-heated (1 per 5 yrs)      Signed Date: 2/17/2023  Electronically Signed By: TONYA Jackson CNP  Electronically Dated:  2/17/2023               Collaborating Physician: Dr. Harriett Ray    Over 50% of today's office visit was spent in face to face time reviewing test results, prognosis, importance of compliance, education about disease process, benefits of medications, instructions for management of acute flare-ups, and follow up plans. Total face to face time spent with patient was 20 minutes.         [address], APRN - CNP  Electronically signed

## 2023-02-17 ENCOUNTER — OFFICE VISIT (OUTPATIENT)
Dept: SLEEP MEDICINE | Age: 83
End: 2023-02-17
Payer: COMMERCIAL

## 2023-02-17 VITALS
HEIGHT: 71 IN | WEIGHT: 270.9 LBS | HEART RATE: 62 BPM | DIASTOLIC BLOOD PRESSURE: 90 MMHG | OXYGEN SATURATION: 93 % | SYSTOLIC BLOOD PRESSURE: 152 MMHG | RESPIRATION RATE: 15 BRPM | BODY MASS INDEX: 37.93 KG/M2 | TEMPERATURE: 96.9 F

## 2023-02-17 DIAGNOSIS — E66.9 OBESITY (BMI 35.0-39.9 WITHOUT COMORBIDITY): ICD-10-CM

## 2023-02-17 DIAGNOSIS — G47.33 OSA (OBSTRUCTIVE SLEEP APNEA): Primary | ICD-10-CM

## 2023-02-17 PROCEDURE — 3077F SYST BP >= 140 MM HG: CPT | Performed by: NURSE PRACTITIONER

## 2023-02-17 PROCEDURE — 1123F ACP DISCUSS/DSCN MKR DOCD: CPT | Performed by: NURSE PRACTITIONER

## 2023-02-17 PROCEDURE — 3080F DIAST BP >= 90 MM HG: CPT | Performed by: NURSE PRACTITIONER

## 2023-02-17 PROCEDURE — 99213 OFFICE O/P EST LOW 20 MIN: CPT | Performed by: NURSE PRACTITIONER

## 2023-02-17 ASSESSMENT — SLEEP AND FATIGUE QUESTIONNAIRES
HOW LIKELY ARE YOU TO NOD OFF OR FALL ASLEEP IN A CAR, WHILE STOPPED FOR A FEW MINUTES IN TRAFFIC: 0
ESS TOTAL SCORE: 1
HOW LIKELY ARE YOU TO NOD OFF OR FALL ASLEEP WHEN YOU ARE A PASSENGER IN A CAR FOR AN HOUR WITHOUT A BREAK: 0
HOW LIKELY ARE YOU TO NOD OFF OR FALL ASLEEP WHILE SITTING QUIETLY AFTER LUNCH WITHOUT ALCOHOL: 0
HOW LIKELY ARE YOU TO NOD OFF OR FALL ASLEEP WHILE LYING DOWN TO REST IN THE AFTERNOON WHEN CIRCUMSTANCES PERMIT: 0
HOW LIKELY ARE YOU TO NOD OFF OR FALL ASLEEP WHILE SITTING AND TALKING TO SOMEONE: 0
HOW LIKELY ARE YOU TO NOD OFF OR FALL ASLEEP WHILE SITTING INACTIVE IN A PUBLIC PLACE: 0
HOW LIKELY ARE YOU TO NOD OFF OR FALL ASLEEP WHILE SITTING AND READING: 1
HOW LIKELY ARE YOU TO NOD OFF OR FALL ASLEEP WHILE WATCHING TV: 0

## 2023-02-17 NOTE — PATIENT INSTRUCTIONS
Continue CPAP 10-18 cm H2O with nightly compliance  New supplies ordered  Recommendations as above  Follow up in 1 year or sooner if needed

## 2023-08-08 ENCOUNTER — APPOINTMENT (RX ONLY)
Dept: URBAN - METROPOLITAN AREA CLINIC 25 | Facility: CLINIC | Age: 83
Setting detail: DERMATOLOGY
End: 2023-08-08

## 2023-08-08 DIAGNOSIS — L82.1 OTHER SEBORRHEIC KERATOSIS: ICD-10-CM

## 2023-08-08 DIAGNOSIS — D69.2 OTHER NONTHROMBOCYTOPENIC PURPURA: ICD-10-CM

## 2023-08-08 DIAGNOSIS — D18.0 HEMANGIOMA: ICD-10-CM

## 2023-08-08 DIAGNOSIS — Z85.828 PERSONAL HISTORY OF OTHER MALIGNANT NEOPLASM OF SKIN: ICD-10-CM

## 2023-08-08 DIAGNOSIS — L82.0 INFLAMED SEBORRHEIC KERATOSIS: ICD-10-CM

## 2023-08-08 DIAGNOSIS — L57.8 OTHER SKIN CHANGES DUE TO CHRONIC EXPOSURE TO NONIONIZING RADIATION: ICD-10-CM

## 2023-08-08 DIAGNOSIS — L57.0 ACTINIC KERATOSIS: ICD-10-CM

## 2023-08-08 DIAGNOSIS — D22 MELANOCYTIC NEVI: ICD-10-CM

## 2023-08-08 PROBLEM — D22.5 MELANOCYTIC NEVI OF TRUNK: Status: ACTIVE | Noted: 2023-08-08

## 2023-08-08 PROBLEM — D18.01 HEMANGIOMA OF SKIN AND SUBCUTANEOUS TISSUE: Status: ACTIVE | Noted: 2023-08-08

## 2023-08-08 PROCEDURE — 17000 DESTRUCT PREMALG LESION: CPT

## 2023-08-08 PROCEDURE — ? LIQUID NITROGEN

## 2023-08-08 PROCEDURE — ? COUNSELING

## 2023-08-08 PROCEDURE — 99213 OFFICE O/P EST LOW 20 MIN: CPT | Mod: 25

## 2023-08-08 PROCEDURE — 17003 DESTRUCT PREMALG LES 2-14: CPT

## 2023-08-08 ASSESSMENT — LOCATION ZONE DERM
LOCATION ZONE: NECK
LOCATION ZONE: HAND
LOCATION ZONE: EAR
LOCATION ZONE: FACE
LOCATION ZONE: TRUNK
LOCATION ZONE: ARM

## 2023-08-08 ASSESSMENT — LOCATION DETAILED DESCRIPTION DERM
LOCATION DETAILED: LEFT CENTRAL BUCCAL CHEEK
LOCATION DETAILED: LEFT MEDIAL UPPER BACK
LOCATION DETAILED: LEFT INFERIOR CRUS OF ANTIHELIX
LOCATION DETAILED: LEFT ANTERIOR PROXIMAL UPPER ARM
LOCATION DETAILED: RIGHT SUPERIOR CENTRAL MALAR CHEEK
LOCATION DETAILED: RIGHT DISTAL DORSAL FOREARM
LOCATION DETAILED: RIGHT ULNAR DORSAL HAND
LOCATION DETAILED: RIGHT CLAVICULAR NECK
LOCATION DETAILED: LEFT CENTRAL MALAR CHEEK
LOCATION DETAILED: LEFT RADIAL DORSAL HAND
LOCATION DETAILED: LEFT DISTAL DORSAL FOREARM
LOCATION DETAILED: RIGHT SUPERIOR POSTERIOR HELIX
LOCATION DETAILED: RIGHT SUPERIOR MEDIAL MIDBACK
LOCATION DETAILED: EPIGASTRIC SKIN
LOCATION DETAILED: LEFT SUPERIOR POSTERIOR HELIX
LOCATION DETAILED: STERNUM
LOCATION DETAILED: LEFT PROXIMAL DORSAL FOREARM
LOCATION DETAILED: RIGHT MID-UPPER BACK
LOCATION DETAILED: RIGHT SUPERIOR MEDIAL UPPER BACK

## 2023-08-08 ASSESSMENT — LOCATION SIMPLE DESCRIPTION DERM
LOCATION SIMPLE: LEFT EAR
LOCATION SIMPLE: LEFT UPPER BACK
LOCATION SIMPLE: RIGHT UPPER BACK
LOCATION SIMPLE: RIGHT HAND
LOCATION SIMPLE: RIGHT LOWER BACK
LOCATION SIMPLE: LEFT HAND
LOCATION SIMPLE: RIGHT CHEEK
LOCATION SIMPLE: RIGHT ANTERIOR NECK
LOCATION SIMPLE: ABDOMEN
LOCATION SIMPLE: RIGHT EAR
LOCATION SIMPLE: LEFT CHEEK
LOCATION SIMPLE: RIGHT FOREARM
LOCATION SIMPLE: LEFT FOREARM
LOCATION SIMPLE: CHEST
LOCATION SIMPLE: LEFT UPPER ARM

## 2023-12-15 ENCOUNTER — OFFICE VISIT (OUTPATIENT)
Age: 83
End: 2023-12-15
Payer: COMMERCIAL

## 2023-12-15 VITALS
BODY MASS INDEX: 36.76 KG/M2 | SYSTOLIC BLOOD PRESSURE: 138 MMHG | HEIGHT: 71 IN | HEART RATE: 60 BPM | DIASTOLIC BLOOD PRESSURE: 68 MMHG | WEIGHT: 262.6 LBS

## 2023-12-15 DIAGNOSIS — I10 ESSENTIAL HYPERTENSION: ICD-10-CM

## 2023-12-15 DIAGNOSIS — E78.5 DYSLIPIDEMIA: ICD-10-CM

## 2023-12-15 DIAGNOSIS — N18.30 STAGE 3 CHRONIC KIDNEY DISEASE, UNSPECIFIED WHETHER STAGE 3A OR 3B CKD (HCC): ICD-10-CM

## 2023-12-15 DIAGNOSIS — Z95.2 H/O AORTIC VALVE REPLACEMENT: ICD-10-CM

## 2023-12-15 DIAGNOSIS — I25.10 ATHEROSCLEROSIS OF NATIVE CORONARY ARTERY OF NATIVE HEART WITHOUT ANGINA PECTORIS: Primary | ICD-10-CM

## 2023-12-15 DIAGNOSIS — I48.11 LONGSTANDING PERSISTENT ATRIAL FIBRILLATION (HCC): ICD-10-CM

## 2023-12-15 DIAGNOSIS — I45.10 RBBB: ICD-10-CM

## 2023-12-15 PROCEDURE — 3075F SYST BP GE 130 - 139MM HG: CPT | Performed by: INTERNAL MEDICINE

## 2023-12-15 PROCEDURE — 1123F ACP DISCUSS/DSCN MKR DOCD: CPT | Performed by: INTERNAL MEDICINE

## 2023-12-15 PROCEDURE — 99214 OFFICE O/P EST MOD 30 MIN: CPT | Performed by: INTERNAL MEDICINE

## 2023-12-15 PROCEDURE — 3078F DIAST BP <80 MM HG: CPT | Performed by: INTERNAL MEDICINE

## 2023-12-15 NOTE — PROGRESS NOTES
[id number]  PHONE: [address]  1940      SUBJECTIVE:   Danica Wilson is a 78 y.o. male seen for a follow up visit regarding the following:     Chief Complaint   Patient presents with    Hypertension    Coronary Artery Disease       HPI:    Danica Wilson presents for routine follow up for known Coronary Artery Disease. Multiple issues addressed as outlined below:      Coronary Artery Disease:  Patient denies any recent angina or chest pain. Has some stable VIDES. Notes compliance with medical therapy. No recent NTG use. Carotid artery disease:  No neurologic symptoms. Hypertension:  Ambulatory BP readings have been controlled. Patient reports compliance with medical therapy without side effects. CKD:  Last Cr 1.73 in August.  Seeing Dr. Darylene Doles at [facility] nephrology. Eliquis appropriately dosed. Atrial fibrillation:  No palpitations or tachycardia. Compliant with Eliquis. No neurologic symptoms. AVR:  Recent echo  Echo (12/1/2023): EF 60 to 65%. Normal diastolic function. AVR: MG 21. PG 39. DI 0.39. Mild LAE     Hyperlipidemia:   Labs in 8/23:  LDL 71, Tchol 143, HDL 45 and Tri 135. Past Medical History, Past Surgical History, Family history, Social History, and Medications were all reviewed with the patient today and updated as necessary. Current Outpatient Medications:     losartan-hydroCHLOROthiazide (HYZAAR) 100-25 MG per tablet, TAKE 1 TABLET DAILY, Disp: 90 tablet, Rfl: 3    rosuvastatin (CRESTOR) 20 MG tablet, TAKE 1 TABLET NIGHTLY FOR  HIGH CHOLESTEROL, Disp: 90 tablet, Rfl: 3    metoprolol succinate (TOPROL XL) 25 MG extended release tablet, TAKE 1 TABLET EVERY MORNING, Disp: 90 tablet, Rfl: 3    apixaban (ELIQUIS) 2.5 MG TABS tablet, Take 1 tablet by mouth in the morning and 1 tablet before bedtime. , Disp: 180 tablet, Rfl: 3    aspirin

## 2024-02-13 ENCOUNTER — APPOINTMENT (RX ONLY)
Dept: URBAN - METROPOLITAN AREA CLINIC 25 | Facility: CLINIC | Age: 84
Setting detail: DERMATOLOGY
End: 2024-02-13

## 2024-02-13 DIAGNOSIS — L57.0 ACTINIC KERATOSIS: ICD-10-CM

## 2024-02-13 DIAGNOSIS — D22 MELANOCYTIC NEVI: ICD-10-CM

## 2024-02-13 DIAGNOSIS — L57.8 OTHER SKIN CHANGES DUE TO CHRONIC EXPOSURE TO NONIONIZING RADIATION: ICD-10-CM

## 2024-02-13 DIAGNOSIS — L82.1 OTHER SEBORRHEIC KERATOSIS: ICD-10-CM

## 2024-02-13 DIAGNOSIS — D18.0 HEMANGIOMA: ICD-10-CM

## 2024-02-13 DIAGNOSIS — D69.2 OTHER NONTHROMBOCYTOPENIC PURPURA: ICD-10-CM

## 2024-02-13 DIAGNOSIS — Z85.828 PERSONAL HISTORY OF OTHER MALIGNANT NEOPLASM OF SKIN: ICD-10-CM

## 2024-02-13 PROBLEM — D18.01 HEMANGIOMA OF SKIN AND SUBCUTANEOUS TISSUE: Status: ACTIVE | Noted: 2024-02-13

## 2024-02-13 PROBLEM — D22.5 MELANOCYTIC NEVI OF TRUNK: Status: ACTIVE | Noted: 2024-02-13

## 2024-02-13 PROCEDURE — 99213 OFFICE O/P EST LOW 20 MIN: CPT | Mod: 25

## 2024-02-13 PROCEDURE — ? COUNSELING

## 2024-02-13 PROCEDURE — ? LIQUID NITROGEN

## 2024-02-13 PROCEDURE — 17000 DESTRUCT PREMALG LESION: CPT

## 2024-02-13 PROCEDURE — 17003 DESTRUCT PREMALG LES 2-14: CPT

## 2024-02-13 ASSESSMENT — LOCATION SIMPLE DESCRIPTION DERM
LOCATION SIMPLE: LEFT FOREHEAD
LOCATION SIMPLE: LEFT UPPER BACK
LOCATION SIMPLE: RIGHT UPPER BACK
LOCATION SIMPLE: LEFT UPPER ARM
LOCATION SIMPLE: RIGHT TEMPLE
LOCATION SIMPLE: LEFT CHEEK
LOCATION SIMPLE: RIGHT HAND
LOCATION SIMPLE: RIGHT FOREHEAD
LOCATION SIMPLE: RIGHT LOWER BACK
LOCATION SIMPLE: RIGHT FOREARM
LOCATION SIMPLE: CHEST
LOCATION SIMPLE: ABDOMEN
LOCATION SIMPLE: LEFT HAND
LOCATION SIMPLE: RIGHT ZYGOMA
LOCATION SIMPLE: LEFT FOREARM

## 2024-02-13 ASSESSMENT — LOCATION DETAILED DESCRIPTION DERM
LOCATION DETAILED: RIGHT CENTRAL ZYGOMA
LOCATION DETAILED: LEFT MEDIAL UPPER BACK
LOCATION DETAILED: RIGHT MEDIAL TEMPLE
LOCATION DETAILED: LEFT SUPERIOR PREAURICULAR CHEEK
LOCATION DETAILED: LEFT RADIAL DORSAL HAND
LOCATION DETAILED: LEFT SUPERIOR MEDIAL FOREHEAD
LOCATION DETAILED: LEFT ANTERIOR PROXIMAL UPPER ARM
LOCATION DETAILED: RIGHT SUPERIOR MEDIAL UPPER BACK
LOCATION DETAILED: LEFT LATERAL FOREHEAD
LOCATION DETAILED: RIGHT DISTAL DORSAL FOREARM
LOCATION DETAILED: LEFT PROXIMAL DORSAL FOREARM
LOCATION DETAILED: LEFT CENTRAL BUCCAL CHEEK
LOCATION DETAILED: RIGHT ULNAR DORSAL HAND
LOCATION DETAILED: RIGHT SUPERIOR MEDIAL FOREHEAD
LOCATION DETAILED: RIGHT SUPERIOR FOREHEAD
LOCATION DETAILED: LEFT DISTAL DORSAL FOREARM
LOCATION DETAILED: STERNUM
LOCATION DETAILED: RIGHT MID-UPPER BACK
LOCATION DETAILED: EPIGASTRIC SKIN
LOCATION DETAILED: RIGHT SUPERIOR MEDIAL MIDBACK
LOCATION DETAILED: LEFT CENTRAL MALAR CHEEK
LOCATION DETAILED: LEFT FOREHEAD

## 2024-02-13 ASSESSMENT — LOCATION ZONE DERM
LOCATION ZONE: FACE
LOCATION ZONE: TRUNK
LOCATION ZONE: HAND
LOCATION ZONE: ARM

## 2024-03-20 NOTE — PROGRESS NOTES
[facility]  [address]  [place] [address]  [phone]    Patient Name:  Jr Hager  YOB: 1940      Office Visit 3/21/2024    CHIEF COMPLAINT:    Chief Complaint   Patient presents with    Sleep Apnea           HISTORY OF PRESENT ILLNESS:      Patient is a 85 yo male seen today for follow up of sleep apnea and CPAP therapy.  Diagnostic sleep study on 07/27/2017 with an AHI of 37.3 and lowest oxygen saturation of 71%. He is prescribed cpap therapy with a humidifier set at 10-18 cm with a full face mask. Most recent download reveals AHI on PAP therapy is 6.1, leak is median 1.4 and 12.2 at 95th percentile and the hourly usage is 8 hours 23 minutes nightly. The overall use is 3067 hours with days greater than four hours at 365/365. The patient is compliant with the Pap therapy and is feeling better as a result.   His compliance with CPAP is excellent.  He reports that he awakens refreshed in the mornings and denies any excessive daytime sleepiness or fatigue.  [place] score is 2/24.  He denies any major medical changes over the last year.  Reports that his weight has consistently been around 260 pounds.  We did discuss decreasing carbohydrates in his diet and increasing his activity by walking to help with weight reduction.  His blood pressure is well-controlled today.            3/21/2024     3:54 PM 2/17/2023     1:06 PM 8/17/2022     1:08 PM 2/9/2022     2:08 PM 11/5/2021     2:05 PM   Sleep Medicine   Sitting and reading 1 1 1 2 1   Watching TV 1 0 0 1 1   Sitting, inactive in a public place (e.g. a theatre or a meeting) 0 0 0 0 0   As a passenger in a car for an hour without a break 0 0 0 1 0   Lying down to rest in the afternoon when circumstances permit 0 0 0 1 0   Sitting and talking to someone 0 0 0 0 0   Sitting quietly after a lunch without alcohol 0 0 0 0 0   In a car, while stopped for a few minutes in traffic 0 0 0 0 0   [place] Sleepiness

## 2024-03-21 ENCOUNTER — OFFICE VISIT (OUTPATIENT)
Dept: SLEEP MEDICINE | Age: 84
End: 2024-03-21
Payer: COMMERCIAL

## 2024-03-21 VITALS
OXYGEN SATURATION: 90 % | SYSTOLIC BLOOD PRESSURE: 165 MMHG | HEART RATE: 71 BPM | DIASTOLIC BLOOD PRESSURE: 80 MMHG | BODY MASS INDEX: 36.26 KG/M2 | WEIGHT: 259 LBS | HEIGHT: 71 IN

## 2024-03-21 DIAGNOSIS — G47.33 OSA (OBSTRUCTIVE SLEEP APNEA): Primary | ICD-10-CM

## 2024-03-21 DIAGNOSIS — E66.9 OBESITY (BMI 35.0-39.9 WITHOUT COMORBIDITY): ICD-10-CM

## 2024-03-21 PROCEDURE — 99213 OFFICE O/P EST LOW 20 MIN: CPT | Performed by: NURSE PRACTITIONER

## 2024-03-21 PROCEDURE — 1123F ACP DISCUSS/DSCN MKR DOCD: CPT | Performed by: NURSE PRACTITIONER

## 2024-03-21 ASSESSMENT — SLEEP AND FATIGUE QUESTIONNAIRES
HOW LIKELY ARE YOU TO NOD OFF OR FALL ASLEEP WHILE SITTING AND READING: SLIGHT CHANCE OF DOZING
HOW LIKELY ARE YOU TO NOD OFF OR FALL ASLEEP WHILE SITTING QUIETLY AFTER LUNCH WITHOUT ALCOHOL: WOULD NEVER DOZE
HOW LIKELY ARE YOU TO NOD OFF OR FALL ASLEEP WHILE WATCHING TV: SLIGHT CHANCE OF DOZING
HOW LIKELY ARE YOU TO NOD OFF OR FALL ASLEEP WHEN YOU ARE A PASSENGER IN A CAR FOR AN HOUR WITHOUT A BREAK: WOULD NEVER DOZE
HOW LIKELY ARE YOU TO NOD OFF OR FALL ASLEEP WHILE SITTING AND TALKING TO SOMEONE: WOULD NEVER DOZE
HOW LIKELY ARE YOU TO NOD OFF OR FALL ASLEEP IN A CAR, WHILE STOPPED FOR A FEW MINUTES IN TRAFFIC: WOULD NEVER DOZE
HOW LIKELY ARE YOU TO NOD OFF OR FALL ASLEEP WHILE SITTING INACTIVE IN A PUBLIC PLACE: WOULD NEVER DOZE
HOW LIKELY ARE YOU TO NOD OFF OR FALL ASLEEP WHILE LYING DOWN TO REST IN THE AFTERNOON WHEN CIRCUMSTANCES PERMIT: WOULD NEVER DOZE
ESS TOTAL SCORE: 2

## 2024-06-13 ENCOUNTER — TELEPHONE (OUTPATIENT)
Age: 84
End: 2024-06-13

## 2024-06-13 NOTE — TELEPHONE ENCOUNTER
Cardiac Clearance        Physician or Practice Requesting:[facility]  : ?  Contact Phone Number: [phone]  Fax Number: [phone]  Date of Surgery/Procedure: Epidural Steroid injection  Type of Surgery or Procedure: 06/26/24  Type of Anesthesia: ?  Type of Clearance Requested: Cardiac Clearance and Medication Hold  Medication to Hold:Eliquis  Days to Hold: 2 days

## 2024-06-20 ENCOUNTER — OFFICE VISIT (OUTPATIENT)
Age: 84
End: 2024-06-20
Payer: COMMERCIAL

## 2024-06-20 VITALS
WEIGHT: 255 LBS | HEIGHT: 71 IN | HEART RATE: 59 BPM | BODY MASS INDEX: 35.7 KG/M2 | DIASTOLIC BLOOD PRESSURE: 90 MMHG | SYSTOLIC BLOOD PRESSURE: 134 MMHG

## 2024-06-20 DIAGNOSIS — N18.30 STAGE 3 CHRONIC KIDNEY DISEASE, UNSPECIFIED WHETHER STAGE 3A OR 3B CKD (HCC): ICD-10-CM

## 2024-06-20 DIAGNOSIS — I25.10 ATHEROSCLEROSIS OF NATIVE CORONARY ARTERY OF NATIVE HEART WITHOUT ANGINA PECTORIS: ICD-10-CM

## 2024-06-20 DIAGNOSIS — I10 ESSENTIAL HYPERTENSION: Primary | ICD-10-CM

## 2024-06-20 DIAGNOSIS — I48.11 LONGSTANDING PERSISTENT ATRIAL FIBRILLATION (HCC): ICD-10-CM

## 2024-06-20 DIAGNOSIS — Z95.2 H/O AORTIC VALVE REPLACEMENT: ICD-10-CM

## 2024-06-20 DIAGNOSIS — I45.10 RBBB: ICD-10-CM

## 2024-06-20 PROCEDURE — 1123F ACP DISCUSS/DSCN MKR DOCD: CPT | Performed by: INTERNAL MEDICINE

## 2024-06-20 PROCEDURE — 93000 ELECTROCARDIOGRAM COMPLETE: CPT | Performed by: INTERNAL MEDICINE

## 2024-06-20 PROCEDURE — 99214 OFFICE O/P EST MOD 30 MIN: CPT | Performed by: INTERNAL MEDICINE

## 2024-06-20 RX ORDER — ACETAMINOPHEN 325 MG/1
650 TABLET ORAL EVERY 6 HOURS PRN
COMMUNITY

## 2024-06-20 ASSESSMENT — ENCOUNTER SYMPTOMS: SHORTNESS OF BREATH: 0

## 2024-06-20 NOTE — PROGRESS NOTES
[facility] CARDIOLOGY[address], [address]  [place] [address]  PHONE: [phone]    Jr Hager  1940      SUBJECTIVE:   Jr Hager is a 84 y.o. male seen for a follow up visit regarding the following:     Chief Complaint   Patient presents with    Coronary Artery Disease    Atrial Fibrillation    Hypertension       HPI:    Jr Hager presents for routine follow up for known Coronary Artery Disease. Multiple issues addressed as outlined below:      Coronary Artery Disease:  Patient denies any recent angina or chest pain.  Has some stable VIDES.  Notes compliance with medical therapy.  No recent NTG use.     Hypertension:  Ambulatory BP readings have been controlled.  Patient reports compliance with medical therapy without side effects.       CKD:  Last Cr 1.77 on 5/3/24.  Seeing Dr. Savage at [facility].  Eliquis appropriately dosed.       Atrial fibrillation:  No palpitations or tachycardia.  Compliant with Eliquis. No neurologic symptoms.     RBBB:  No dizziness or syncope.      AVR:  Recent echo  Echo (12/1/2023): EF 60 to 65%.  Normal diastolic function.  AVR: MG 21. PG 39. DI 0.39.   Mild LAE     Hyperlipidemia:   Labs in  2/28/24:  Tchol 105, HDL 35, LDL 51 and tri 95. Tolerating Crestor.     Past Medical History, Past Surgical History, Family history, Social History, and Medications were all reviewed with the patient today and updated as necessary.           Current Outpatient Medications:     acetaminophen (TYLENOL) 325 MG tablet, Take 2 tablets by mouth every 6 hours as needed for Pain, Disp: , Rfl:     losartan-hydroCHLOROthiazide (HYZAAR) 100-25 MG per tablet, TAKE 1 TABLET DAILY, Disp: 90 tablet, Rfl: 3    rosuvastatin (CRESTOR) 20 MG tablet, TAKE 1 TABLET NIGHTLY FOR  HIGH CHOLESTEROL, Disp: 90 tablet, Rfl: 3    metoprolol succinate (TOPROL XL) 25 MG extended release tablet, TAKE 1 TABLET EVERY MORNING, Disp: 90 tablet, Rfl: 3

## 2024-07-10 ENCOUNTER — TELEPHONE (OUTPATIENT)
Age: 84
End: 2024-07-10

## 2024-07-10 NOTE — TELEPHONE ENCOUNTER
If interruption of novel anticoagulation therapy is deemed necessary, the agent can be held for short periods of time with a low risk of embolic events from atrial fibrillation.  Typically less than a 1% risk.    As always,   A risk-benefit decision should be made by the physician performing procedure when deciding to hold anticoagulation.

## 2024-07-10 NOTE — TELEPHONE ENCOUNTER
Patient having epidural steroid injection on 08/07/24 . Requesting risk assessment and Eliquis hold for 3 days prior. Fax: [phone]

## 2024-08-13 ENCOUNTER — APPOINTMENT (RX ONLY)
Dept: URBAN - METROPOLITAN AREA CLINIC 25 | Facility: CLINIC | Age: 84
Setting detail: DERMATOLOGY
End: 2024-08-13

## 2024-08-13 DIAGNOSIS — L57.0 ACTINIC KERATOSIS: ICD-10-CM

## 2024-08-13 DIAGNOSIS — D18.0 HEMANGIOMA: ICD-10-CM

## 2024-08-13 DIAGNOSIS — I87.2 VENOUS INSUFFICIENCY (CHRONIC) (PERIPHERAL): ICD-10-CM

## 2024-08-13 DIAGNOSIS — L57.8 OTHER SKIN CHANGES DUE TO CHRONIC EXPOSURE TO NONIONIZING RADIATION: ICD-10-CM

## 2024-08-13 DIAGNOSIS — D22 MELANOCYTIC NEVI: ICD-10-CM

## 2024-08-13 DIAGNOSIS — L82.1 OTHER SEBORRHEIC KERATOSIS: ICD-10-CM

## 2024-08-13 DIAGNOSIS — Z85.828 PERSONAL HISTORY OF OTHER MALIGNANT NEOPLASM OF SKIN: ICD-10-CM

## 2024-08-13 PROBLEM — D18.01 HEMANGIOMA OF SKIN AND SUBCUTANEOUS TISSUE: Status: ACTIVE | Noted: 2024-08-13

## 2024-08-13 PROBLEM — D22.5 MELANOCYTIC NEVI OF TRUNK: Status: ACTIVE | Noted: 2024-08-13

## 2024-08-13 PROCEDURE — 17000 DESTRUCT PREMALG LESION: CPT

## 2024-08-13 PROCEDURE — 99213 OFFICE O/P EST LOW 20 MIN: CPT | Mod: 25

## 2024-08-13 PROCEDURE — ? LIQUID NITROGEN

## 2024-08-13 PROCEDURE — ? COUNSELING

## 2024-08-13 ASSESSMENT — LOCATION SIMPLE DESCRIPTION DERM
LOCATION SIMPLE: LEFT UPPER BACK
LOCATION SIMPLE: LEFT FOREARM
LOCATION SIMPLE: CHEST
LOCATION SIMPLE: RIGHT FOREARM
LOCATION SIMPLE: ABDOMEN
LOCATION SIMPLE: RIGHT PRETIBIAL REGION
LOCATION SIMPLE: LEFT CHEEK
LOCATION SIMPLE: RIGHT LOWER BACK
LOCATION SIMPLE: NOSE
LOCATION SIMPLE: RIGHT UPPER BACK
LOCATION SIMPLE: LEFT PRETIBIAL REGION

## 2024-08-13 ASSESSMENT — LOCATION ZONE DERM
LOCATION ZONE: LEG
LOCATION ZONE: FACE
LOCATION ZONE: NOSE
LOCATION ZONE: TRUNK
LOCATION ZONE: ARM

## 2024-08-13 ASSESSMENT — LOCATION DETAILED DESCRIPTION DERM
LOCATION DETAILED: LEFT MEDIAL UPPER BACK
LOCATION DETAILED: LEFT CENTRAL BUCCAL CHEEK
LOCATION DETAILED: NASAL DORSUM
LOCATION DETAILED: STERNUM
LOCATION DETAILED: LEFT DISTAL DORSAL FOREARM
LOCATION DETAILED: RIGHT SUPERIOR MEDIAL UPPER BACK
LOCATION DETAILED: LEFT CENTRAL MALAR CHEEK
LOCATION DETAILED: RIGHT SUPERIOR MEDIAL MIDBACK
LOCATION DETAILED: RIGHT MID-UPPER BACK
LOCATION DETAILED: EPIGASTRIC SKIN
LOCATION DETAILED: LEFT PROXIMAL DORSAL FOREARM
LOCATION DETAILED: RIGHT DISTAL DORSAL FOREARM
LOCATION DETAILED: LEFT PROXIMAL PRETIBIAL REGION
LOCATION DETAILED: RIGHT PROXIMAL PRETIBIAL REGION

## 2024-12-15 NOTE — PROGRESS NOTES
[address], [address]  [place] [address]  PHONE: [phone]    Jr Hager  1940      SUBJECTIVE:   Jr Hager is a 84 y.o. male seen for a follow up visit regarding the following:     Chief Complaint   Patient presents with    Hypertension    Coronary Artery Disease    Atrial Fibrillation       HPI:    Jr Hager presents for routine follow. Multiple issues addressed as outlined below:     Coronary Artery Disease  Status:  Patient denies any recent angina.  Notes compliance with medical therapy.  No recent NTG use.    Medications:  ASA 81 mg QD.    Prior History:     PCI of OM with 2.5 x 18 mm Cypher FLAKO. (1/2008)   CABG 2Vz (10/14/14):  LIMA to LAD.  SVG to OM    Surgical Aortic Valve Replacement  Status:  No fever or chills.  Recent echo with stable valve function as below:    Echo (12/2/2024): EF 55 to 60%.  + DD.  AVR: MG 18.  Mild RV dilatation.  RVSP 55.  Mild TR/MR.  Moderate LAE  Prior History:     Aortic valve replacement with a 23 mm Magna Ease Davion-Garcia pericardial valve. 10/14/14 (Dr Dunham)   2.  Echo (1/17/17):  EF 64%.  Normal Bioprosthetic Aortic Valve.  MG 15.  PG 30.   3.   Echo (12/2/2024): EF 55 to 60%.  + DD.  AVR: MG 18.  Mild RV dilatation.  RVSP 55.  Mild TR/MR.  Moderate LAE    Permanent Atrial Fibrillation  Status:  No palpitations or tachycardia.   Medications:  Toprol XL 25 mg QD.  Eliquis 2.5 mg BID.  Prior History:  RATE CONTROL/ANTICOAGULATION   Post op atrial fibrillation after AVR/CABG.  Treated with amiodarone and coumadin.  No recurrence.  Meds stopped 2/2/15.   2.  Recurrent atrial fibrillation noted on EKG 11/7/19:  Started on Eliquis. Planned rate control and anticoagulation.     RBBB  Status:  No dizziness, pre-syncope or syncope    CKD  Status:  Stable. Followed by nephrology.  Last Cr 1.75 on 11/12/24.  Normal electrolytes.     Hypertension  Status:  Ambulatory BP readings have

## 2024-12-20 ENCOUNTER — OFFICE VISIT (OUTPATIENT)
Age: 84
End: 2024-12-20
Payer: COMMERCIAL

## 2024-12-20 VITALS
DIASTOLIC BLOOD PRESSURE: 70 MMHG | HEIGHT: 70 IN | HEART RATE: 62 BPM | BODY MASS INDEX: 37.8 KG/M2 | SYSTOLIC BLOOD PRESSURE: 136 MMHG | WEIGHT: 264 LBS

## 2024-12-20 DIAGNOSIS — N18.30 STAGE 3 CHRONIC KIDNEY DISEASE, UNSPECIFIED WHETHER STAGE 3A OR 3B CKD (HCC): ICD-10-CM

## 2024-12-20 DIAGNOSIS — I65.29 CAROTID ARTERY STENOSIS WITHOUT CEREBRAL INFARCTION, UNSPECIFIED LATERALITY: ICD-10-CM

## 2024-12-20 DIAGNOSIS — I45.10 RBBB: ICD-10-CM

## 2024-12-20 DIAGNOSIS — I48.11 LONGSTANDING PERSISTENT ATRIAL FIBRILLATION (HCC): ICD-10-CM

## 2024-12-20 DIAGNOSIS — I10 ESSENTIAL HYPERTENSION: ICD-10-CM

## 2024-12-20 DIAGNOSIS — E78.5 DYSLIPIDEMIA: ICD-10-CM

## 2024-12-20 DIAGNOSIS — I25.10 ATHEROSCLEROSIS OF NATIVE CORONARY ARTERY OF NATIVE HEART WITHOUT ANGINA PECTORIS: Primary | ICD-10-CM

## 2024-12-20 DIAGNOSIS — Z95.2 H/O AORTIC VALVE REPLACEMENT: ICD-10-CM

## 2024-12-20 PROCEDURE — 99214 OFFICE O/P EST MOD 30 MIN: CPT | Performed by: INTERNAL MEDICINE

## 2024-12-20 PROCEDURE — 1123F ACP DISCUSS/DSCN MKR DOCD: CPT | Performed by: INTERNAL MEDICINE

## 2024-12-20 RX ORDER — ASPIRIN 81 MG/1
81 TABLET ORAL DAILY
Qty: 90 TABLET | Refills: 0 | Status: SHIPPED | OUTPATIENT
Start: 2024-12-20

## 2024-12-20 RX ORDER — TRAMADOL HYDROCHLORIDE 50 MG/1
50 TABLET ORAL NIGHTLY
COMMUNITY
Start: 2024-10-09

## 2024-12-20 RX ORDER — FUROSEMIDE 20 MG/1
20 TABLET ORAL AS NEEDED
COMMUNITY
Start: 2024-10-23

## 2024-12-20 RX ORDER — PREGABALIN 75 MG/1
75 CAPSULE ORAL 2 TIMES DAILY
COMMUNITY
Start: 2024-09-26

## 2025-02-06 ENCOUNTER — TELEPHONE (OUTPATIENT)
Age: 85
End: 2025-02-06

## 2025-02-18 ENCOUNTER — APPOINTMENT (OUTPATIENT)
Dept: URBAN - METROPOLITAN AREA CLINIC 25 | Facility: CLINIC | Age: 85
Setting detail: DERMATOLOGY
End: 2025-02-18

## 2025-02-18 DIAGNOSIS — L57.8 OTHER SKIN CHANGES DUE TO CHRONIC EXPOSURE TO NONIONIZING RADIATION: ICD-10-CM

## 2025-02-18 DIAGNOSIS — Z85.828 PERSONAL HISTORY OF OTHER MALIGNANT NEOPLASM OF SKIN: ICD-10-CM

## 2025-02-18 DIAGNOSIS — D22 MELANOCYTIC NEVI: ICD-10-CM

## 2025-02-18 DIAGNOSIS — L57.0 ACTINIC KERATOSIS: ICD-10-CM

## 2025-02-18 DIAGNOSIS — L82.1 OTHER SEBORRHEIC KERATOSIS: ICD-10-CM

## 2025-02-18 DIAGNOSIS — I87.2 VENOUS INSUFFICIENCY (CHRONIC) (PERIPHERAL): ICD-10-CM

## 2025-02-18 DIAGNOSIS — D18.0 HEMANGIOMA: ICD-10-CM

## 2025-02-18 PROBLEM — D18.01 HEMANGIOMA OF SKIN AND SUBCUTANEOUS TISSUE: Status: ACTIVE | Noted: 2025-02-18

## 2025-02-18 PROBLEM — D22.5 MELANOCYTIC NEVI OF TRUNK: Status: ACTIVE | Noted: 2025-02-18

## 2025-02-18 PROCEDURE — 17000 DESTRUCT PREMALG LESION: CPT

## 2025-02-18 PROCEDURE — 17003 DESTRUCT PREMALG LES 2-14: CPT

## 2025-02-18 PROCEDURE — ? LIQUID NITROGEN

## 2025-02-18 PROCEDURE — ? COUNSELING

## 2025-02-18 PROCEDURE — 99213 OFFICE O/P EST LOW 20 MIN: CPT | Mod: 25

## 2025-02-18 ASSESSMENT — LOCATION SIMPLE DESCRIPTION DERM
LOCATION SIMPLE: LEFT TEMPLE
LOCATION SIMPLE: ABDOMEN
LOCATION SIMPLE: LEFT FOREARM
LOCATION SIMPLE: RIGHT PRETIBIAL REGION
LOCATION SIMPLE: RIGHT LOWER BACK
LOCATION SIMPLE: LEFT FOREHEAD
LOCATION SIMPLE: LEFT PRETIBIAL REGION
LOCATION SIMPLE: CHEST
LOCATION SIMPLE: RIGHT FOREHEAD
LOCATION SIMPLE: RIGHT UPPER BACK
LOCATION SIMPLE: LEFT UPPER BACK
LOCATION SIMPLE: RIGHT FOREARM
LOCATION SIMPLE: LEFT CHEEK

## 2025-02-18 ASSESSMENT — LOCATION DETAILED DESCRIPTION DERM
LOCATION DETAILED: LEFT PROXIMAL PRETIBIAL REGION
LOCATION DETAILED: RIGHT MID-UPPER BACK
LOCATION DETAILED: LEFT MEDIAL UPPER BACK
LOCATION DETAILED: LEFT CENTRAL MALAR CHEEK
LOCATION DETAILED: EPIGASTRIC SKIN
LOCATION DETAILED: LEFT DISTAL DORSAL FOREARM
LOCATION DETAILED: RIGHT SUPERIOR FOREHEAD
LOCATION DETAILED: LEFT CENTRAL BUCCAL CHEEK
LOCATION DETAILED: LEFT SUPERIOR FOREHEAD
LOCATION DETAILED: RIGHT DISTAL DORSAL FOREARM
LOCATION DETAILED: RIGHT PROXIMAL PRETIBIAL REGION
LOCATION DETAILED: LEFT PROXIMAL DORSAL FOREARM
LOCATION DETAILED: RIGHT SUPERIOR MEDIAL UPPER BACK
LOCATION DETAILED: STERNUM
LOCATION DETAILED: RIGHT SUPERIOR MEDIAL MIDBACK
LOCATION DETAILED: LEFT CENTRAL TEMPLE

## 2025-02-18 ASSESSMENT — LOCATION ZONE DERM
LOCATION ZONE: ARM
LOCATION ZONE: FACE
LOCATION ZONE: TRUNK
LOCATION ZONE: LEG

## 2025-02-26 ENCOUNTER — TELEPHONE (OUTPATIENT)
Age: 85
End: 2025-02-26

## 2025-02-26 NOTE — TELEPHONE ENCOUNTER
Pt is calling saying he had a scan and that Dr Jackman told his wife that he needed to call this office and ask for Danica,Please call pt [phone]

## 2025-02-26 NOTE — TELEPHONE ENCOUNTER
Patient called stating that he had a CT scan at [facility] by his PCP showed fluid on his lungs. Per Dr. Augustina jensen to make follow up appointment. Appointment scheduled for 3-24 at 300. Will call if an earlier appointment is available. Patient voiced understanding and thanked me//isabel

## 2025-02-27 ENCOUNTER — OFFICE VISIT (OUTPATIENT)
Age: 85
End: 2025-02-27
Payer: COMMERCIAL

## 2025-02-27 VITALS
BODY MASS INDEX: 38.94 KG/M2 | HEIGHT: 70 IN | WEIGHT: 272 LBS | SYSTOLIC BLOOD PRESSURE: 126 MMHG | HEART RATE: 60 BPM | DIASTOLIC BLOOD PRESSURE: 70 MMHG

## 2025-02-27 DIAGNOSIS — N18.30 STAGE 3 CHRONIC KIDNEY DISEASE, UNSPECIFIED WHETHER STAGE 3A OR 3B CKD (HCC): ICD-10-CM

## 2025-02-27 DIAGNOSIS — E78.5 DYSLIPIDEMIA: ICD-10-CM

## 2025-02-27 DIAGNOSIS — I25.10 ATHEROSCLEROSIS OF NATIVE CORONARY ARTERY OF NATIVE HEART WITHOUT ANGINA PECTORIS: ICD-10-CM

## 2025-02-27 DIAGNOSIS — I48.11 LONGSTANDING PERSISTENT ATRIAL FIBRILLATION (HCC): ICD-10-CM

## 2025-02-27 DIAGNOSIS — Z95.2 H/O AORTIC VALVE REPLACEMENT: ICD-10-CM

## 2025-02-27 DIAGNOSIS — I45.10 RBBB: ICD-10-CM

## 2025-02-27 DIAGNOSIS — I50.32 CHRONIC DIASTOLIC CHF (CONGESTIVE HEART FAILURE) (HCC): Primary | ICD-10-CM

## 2025-02-27 DIAGNOSIS — I10 ESSENTIAL HYPERTENSION: ICD-10-CM

## 2025-02-27 PROCEDURE — 99214 OFFICE O/P EST MOD 30 MIN: CPT | Performed by: INTERNAL MEDICINE

## 2025-02-27 PROCEDURE — 1123F ACP DISCUSS/DSCN MKR DOCD: CPT | Performed by: INTERNAL MEDICINE

## 2025-02-27 RX ORDER — LOSARTAN POTASSIUM 100 MG/1
100 TABLET ORAL DAILY
Qty: 90 TABLET | Refills: 3 | Status: SHIPPED | OUTPATIENT
Start: 2025-02-27

## 2025-02-27 RX ORDER — TORSEMIDE 20 MG/1
20 TABLET ORAL DAILY
Qty: 30 TABLET | Refills: 3 | Status: SHIPPED | OUTPATIENT
Start: 2025-02-27

## 2025-02-27 ASSESSMENT — ENCOUNTER SYMPTOMS: SHORTNESS OF BREATH: 1

## 2025-02-27 NOTE — PROGRESS NOTES
Lab Results   Component Value Date    CHOLHDLRATIO 2.3 11/07/2019        Data from outside records/labs from outside providers have been reviewed and summarized as noted in the HPI, past history and data review sections of this note       ASSESSMENT and PLAN    Jr Coleman\" was seen today for atrial fibrillation, hypertension and results.    Diagnoses and all orders for this visit:    Chronic diastolic CHF (congestive heart failure) (HCC)  -     losartan (COZAAR) 100 MG tablet; Take 1 tablet by mouth daily  -     torsemide (DEMADEX) 20 MG tablet; Take 1 tablet by mouth daily  -     empagliflozin (JARDIANCE) 10 MG tablet; Take 1 tablet by mouth daily  -     Basic Metabolic Panel; Future    Atherosclerosis of native coronary artery of native heart without angina pectoris    Longstanding persistent atrial fibrillation (HCC)    Essential hypertension    RBBB    Stage 3 chronic kidney disease, unspecified whether stage 3a or 3b CKD (HCC)    Dyslipidemia    H/O aortic valve replacement        Overall Impression    1. Chronic diastolic CHF (congestive heart failure) (HCC)  Discussed with patient that clinically he has diastolic heart failure.  Will start Jardiance and change diuretic regimen to Demadex 20 mg daily.  Check labs in 2 weeks.  Stop Hyzaar and start losartan.  - losartan (COZAAR) 100 MG tablet; Take 1 tablet by mouth daily  Dispense: 90 tablet; Refill: 3  - torsemide (DEMADEX) 20 MG tablet; Take 1 tablet by mouth daily  Dispense: 30 tablet; Refill: 3  - empagliflozin (JARDIANCE) 10 MG tablet; Take 1 tablet by mouth daily  Dispense: 30 tablet; Refill: 3  - Basic Metabolic Panel; Future    2. Atherosclerosis of native coronary artery of native heart without angina pectoris  Patient is doing well without any anginal symptoms.  Continue Aspirin 81 mg a day and PRN NTG.  We discussed the importance of continued risk factor modification.  The patient is encouraged to contact my office with any worsening dyspnea

## 2025-03-11 DIAGNOSIS — I50.32 CHRONIC DIASTOLIC CHF (CONGESTIVE HEART FAILURE) (HCC): ICD-10-CM

## 2025-03-11 LAB
ANION GAP SERPL CALC-SCNC: 14 MMOL/L (ref 7–16)
BUN SERPL-MCNC: 41 MG/DL (ref 8–23)
CALCIUM SERPL-MCNC: 9.2 MG/DL (ref 8.8–10.2)
CHLORIDE SERPL-SCNC: 102 MMOL/L (ref 98–107)
CO2 SERPL-SCNC: 27 MMOL/L (ref 20–29)
CREAT SERPL-MCNC: 2.19 MG/DL (ref 0.8–1.3)
GLUCOSE SERPL-MCNC: 107 MG/DL (ref 70–99)
POTASSIUM SERPL-SCNC: 4.5 MMOL/L (ref 3.5–5.1)
SODIUM SERPL-SCNC: 143 MMOL/L (ref 136–145)

## 2025-03-13 ENCOUNTER — OFFICE VISIT (OUTPATIENT)
Age: 85
End: 2025-03-13
Payer: COMMERCIAL

## 2025-03-13 VITALS
WEIGHT: 268 LBS | SYSTOLIC BLOOD PRESSURE: 148 MMHG | HEART RATE: 62 BPM | DIASTOLIC BLOOD PRESSURE: 78 MMHG | HEIGHT: 70 IN | BODY MASS INDEX: 38.37 KG/M2

## 2025-03-13 DIAGNOSIS — I48.11 LONGSTANDING PERSISTENT ATRIAL FIBRILLATION (HCC): ICD-10-CM

## 2025-03-13 DIAGNOSIS — I45.10 RBBB: ICD-10-CM

## 2025-03-13 DIAGNOSIS — I50.32 CHRONIC DIASTOLIC CHF (CONGESTIVE HEART FAILURE) (HCC): Primary | ICD-10-CM

## 2025-03-13 DIAGNOSIS — E78.5 DYSLIPIDEMIA: ICD-10-CM

## 2025-03-13 DIAGNOSIS — N18.32 STAGE 3B CHRONIC KIDNEY DISEASE (HCC): ICD-10-CM

## 2025-03-13 DIAGNOSIS — I10 ESSENTIAL HYPERTENSION: ICD-10-CM

## 2025-03-13 DIAGNOSIS — I25.10 ATHEROSCLEROSIS OF NATIVE CORONARY ARTERY OF NATIVE HEART WITHOUT ANGINA PECTORIS: ICD-10-CM

## 2025-03-13 PROCEDURE — 99214 OFFICE O/P EST MOD 30 MIN: CPT | Performed by: INTERNAL MEDICINE

## 2025-03-13 PROCEDURE — 1123F ACP DISCUSS/DSCN MKR DOCD: CPT | Performed by: INTERNAL MEDICINE

## 2025-03-13 ASSESSMENT — ENCOUNTER SYMPTOMS
COUGH: 0
SHORTNESS OF BREATH: 0

## 2025-03-13 NOTE — PROGRESS NOTES
vessel CABG 10/14/14:  LIMA to LAD.  SVG to OM        Elevated hemoglobin A1c 10/14/2014     Priority: Low     10/10/14 6.5        H/O aortic valve replacement 10/14/2014     Priority: Low     1. Aortic valve replacement with a 23 mm Magna Ease Davion-Garcia pericardial valve. 10/14/14 (Dr Dunham)   2. Echo (17):  EF 64%.  Normal Bioprosthetic Aortic Valve.  MG 15.  PG 30.   3.  Echo (19):  EF 60-65%.  Mild LAE.  AVR- MG 15.  PG 28.  DI 0.45.  Mild MR.    4.  Echo (21):  EF 60-65%.  AVR: MG 16. PG 31.    Mild TR.  RVSP 33.  Mild MR.    5.  Echo (2023): EF 60 to 65%.  Normal diastolic function.  AVR: MG 21. PG 39. DI 0.39.   Mild LAE  6.  Echo (2024): EF 55 to 60%.  + DD.  AVR: MG 18.  Mild RV dilatation.  RVSP 55.  Mild TR/MR.  Moderate LAE        CKD (chronic kidney disease) stage 3, GFR 30-59 ml/min ([facility]) 10/14/2014     Priority: Low    S/P CABG (coronary artery bypass graft) 10/14/2014     Priority: Low    Carotid artery stenosis without cerebral infarction 2014     Priority: Low      Followed by Dr. Rich  1.  CT of carotids (13):  LICA 80%.  MIGDALIA 30-40%.   2.  Carotid stenting of left carotid ()            Social History     Tobacco Use    Smoking status: Former     Current packs/day: 0.00     Types: Cigarettes     Quit date: 3/25/1980     Years since quittin.9     Passive exposure: Past    Smokeless tobacco: Never   Substance Use Topics    Alcohol use: Yes     Alcohol/week: 5.8 standard drinks of alcohol       ROS:    Review of Systems   Constitutional: Negative for malaise/fatigue.   Cardiovascular:  Positive for dyspnea on exertion. Negative for chest pain.   Respiratory:  Negative for cough and shortness of breath.    Musculoskeletal:  Positive for arthritis.   Neurological:  Negative for focal weakness.   Psychiatric/Behavioral:  Negative for depression.            PHYSICAL EXAM:  Wt Readings from Last 3 Encounters:   25 121.6 kg (268 lb)

## 2025-03-27 ENCOUNTER — TELEPHONE (OUTPATIENT)
Age: 85
End: 2025-03-27

## 2025-03-27 NOTE — TELEPHONE ENCOUNTER
Risk assessment and Eliquis hold 3 days prior to epidural steroid injection    Last office visit 3-13-25  Last echo 12-2-24

## 2025-03-27 NOTE — TELEPHONE ENCOUNTER
Cardiac Clearance        Physician or Practice Requesting:[facility]   : ?  Contact Phone Number: [phone]  Fax Number: [phone]  Date of Surgery/Procedure: ?  Type of Surgery or Procedure: epidural Steroid injection   Type of Anesthesia: ?  Type of Clearance Requested: risk assessment and any medication hold   Medication to Hold:Eliquis   Days to Hold: 3 day hold

## 2025-03-30 ASSESSMENT — SLEEP AND FATIGUE QUESTIONNAIRES
HOW LIKELY ARE YOU TO NOD OFF OR FALL ASLEEP WHILE SITTING INACTIVE IN A PUBLIC PLACE: SLIGHT CHANCE OF DOZING
HOW LIKELY ARE YOU TO NOD OFF OR FALL ASLEEP IN A CAR, WHILE STOPPED FOR A FEW MINUTES IN TRAFFIC: WOULD NEVER DOZE
ESS TOTAL SCORE: 10
HOW LIKELY ARE YOU TO NOD OFF OR FALL ASLEEP WHILE SITTING AND READING: MODERATE CHANCE OF DOZING
HOW LIKELY ARE YOU TO NOD OFF OR FALL ASLEEP WHILE LYING DOWN TO REST IN THE AFTERNOON WHEN CIRCUMSTANCES PERMIT: MODERATE CHANCE OF DOZING
HOW LIKELY ARE YOU TO NOD OFF OR FALL ASLEEP WHILE WATCHING TV: SLIGHT CHANCE OF DOZING
HOW LIKELY ARE YOU TO NOD OFF OR FALL ASLEEP WHEN YOU ARE A PASSENGER IN A CAR FOR AN HOUR WITHOUT A BREAK: SLIGHT CHANCE OF DOZING
HOW LIKELY ARE YOU TO NOD OFF OR FALL ASLEEP WHILE SITTING QUIETLY AFTER LUNCH WITHOUT ALCOHOL: MODERATE CHANCE OF DOZING
HOW LIKELY ARE YOU TO NOD OFF OR FALL ASLEEP WHILE WATCHING TV: SLIGHT CHANCE OF DOZING
HOW LIKELY ARE YOU TO NOD OFF OR FALL ASLEEP WHILE LYING DOWN TO REST IN THE AFTERNOON WHEN CIRCUMSTANCES PERMIT: MODERATE CHANCE OF DOZING
HOW LIKELY ARE YOU TO NOD OFF OR FALL ASLEEP WHEN YOU ARE A PASSENGER IN A CAR FOR AN HOUR WITHOUT A BREAK: SLIGHT CHANCE OF DOZING
HOW LIKELY ARE YOU TO NOD OFF OR FALL ASLEEP WHILE SITTING AND READING: MODERATE CHANCE OF DOZING
HOW LIKELY ARE YOU TO NOD OFF OR FALL ASLEEP WHILE SITTING AND TALKING TO SOMEONE: SLIGHT CHANCE OF DOZING
HOW LIKELY ARE YOU TO NOD OFF OR FALL ASLEEP WHILE SITTING AND TALKING TO SOMEONE: SLIGHT CHANCE OF DOZING
HOW LIKELY ARE YOU TO NOD OFF OR FALL ASLEEP IN A CAR, WHILE STOPPED FOR A FEW MINUTES IN TRAFFIC: WOULD NEVER DOZE
HOW LIKELY ARE YOU TO NOD OFF OR FALL ASLEEP WHILE SITTING QUIETLY AFTER LUNCH WITHOUT ALCOHOL: MODERATE CHANCE OF DOZING
HOW LIKELY ARE YOU TO NOD OFF OR FALL ASLEEP WHILE SITTING INACTIVE IN A PUBLIC PLACE: SLIGHT CHANCE OF DOZING

## 2025-04-02 ENCOUNTER — OFFICE VISIT (OUTPATIENT)
Dept: SLEEP MEDICINE | Age: 85
End: 2025-04-02
Payer: COMMERCIAL

## 2025-04-02 VITALS
WEIGHT: 262 LBS | SYSTOLIC BLOOD PRESSURE: 148 MMHG | DIASTOLIC BLOOD PRESSURE: 64 MMHG | HEART RATE: 67 BPM | OXYGEN SATURATION: 93 % | BODY MASS INDEX: 37.51 KG/M2 | HEIGHT: 70 IN | RESPIRATION RATE: 18 BRPM

## 2025-04-02 DIAGNOSIS — R09.89 DECREASED RADIAL PULSE: ICD-10-CM

## 2025-04-02 DIAGNOSIS — E66.9 OBESITY (BMI 35.0-39.9 WITHOUT COMORBIDITY): ICD-10-CM

## 2025-04-02 DIAGNOSIS — G47.33 OSA (OBSTRUCTIVE SLEEP APNEA): Primary | ICD-10-CM

## 2025-04-02 PROCEDURE — 1123F ACP DISCUSS/DSCN MKR DOCD: CPT | Performed by: NURSE PRACTITIONER

## 2025-04-02 PROCEDURE — 99213 OFFICE O/P EST LOW 20 MIN: CPT | Performed by: NURSE PRACTITIONER

## 2025-04-02 ASSESSMENT — ENCOUNTER SYMPTOMS
VOICE CHANGE: 0
SHORTNESS OF BREATH: 0
SORE THROAT: 0
APNEA: 0

## 2025-04-02 NOTE — PATIENT INSTRUCTIONS
To reduce leaks in the mask, be sure to change out the mask cushion frequently, at least every 1-3 months  May also consider CPAP pillow if experiencing leaking while sleeping on the side. (Google search \"CPAP Pillow\")  Follow up in 1 year or sooner as needed

## 2025-04-02 NOTE — PROGRESS NOTES
[facility]  [address]  [place] [address]  [phone]    Patient Name:  Jr Hager  YOB: 1940      Office Visit 4/2/2025    CHIEF COMPLAINT:    Chief Complaint   Patient presents with    Sleep Apnea         HISTORY OF PRESENT ILLNESS:  Patient is a 85 y.o. male seen today for follow up of EDIS.  Diagnostic sleep study on 07/27/2017 with an AHI of 37.3 and lowest oxygen saturation of 71%.  Patient is prescribed cpap therapy with a humidifier set at 10-18cm with a FF mask. Most recent download reveals AHI on PAP therapy is 7.8, leak is 2.0 L/min and 20.7 L/min at the 95th perecentile and the hourly usage is 7 hours 57 minutes nightly. The usage days greater than four hours is 364/365. The patient is compliant with the PAP therapy and is feeling better as a result.     Mr. Hager states that he occasionally gets mask leaks in the chin area, but his CPAP is otherwise fine.  He feels that the pressures are comfortable and denies pressure hunger or air intolerance.  He occasionally notices a leak in the mask when side-lying. He typically goes to bed around 10 PM and wakes around 6 AM without an alarm.  He will occasionally wake around 3:00 AM with some trouble returning to sleep. He reports nocturia 1x/night.    Patient reports that he had some issues with increased shortness of breath over the last month.  He was seen by his primary care and subsequently followed up with cardiologist who changed some medications. He has started a new diuretic and he reports that his shortness of breath has improved, but he still has some fatigue. [place] is 10/24 today, which is a substantial increase from the past 4 years.  He will follow up with cardiology in 6 months.  Blood pressure is elevated today, despite taking his medications this morning.  He states his blood pressure is always elevated in the clinic.  He has lost 10 lbs since 2/27/2025 which he associates

## 2025-04-25 NOTE — PATIENT INSTRUCTIONS
As we discussed in the office today, I would like you to stop your Hyzaar and Lasix therapy.  I have sent prescriptions in for losartan 100 mg a day, Jardiance 10 mg a day and Demadex 20 mg a day.  Demadex is a diuretic.  Jardiance is a medication which will help with your fluid and heart function.  Losartan replaces your Hyzaar.  If any questions please give me a call.  Check labs in 2 weeks   25-Apr-2025 19:56

## 2025-06-12 DIAGNOSIS — N18.32 STAGE 3B CHRONIC KIDNEY DISEASE (HCC): ICD-10-CM

## 2025-06-12 LAB
ANION GAP SERPL CALC-SCNC: 14 MMOL/L (ref 7–16)
BUN SERPL-MCNC: 36 MG/DL (ref 8–23)
CALCIUM SERPL-MCNC: 9.5 MG/DL (ref 8.8–10.2)
CHLORIDE SERPL-SCNC: 103 MMOL/L (ref 98–107)
CO2 SERPL-SCNC: 25 MMOL/L (ref 20–29)
CREAT SERPL-MCNC: 2.13 MG/DL (ref 0.8–1.3)
GLUCOSE SERPL-MCNC: 117 MG/DL (ref 70–99)
POTASSIUM SERPL-SCNC: 4.6 MMOL/L (ref 3.5–5.1)
SODIUM SERPL-SCNC: 141 MMOL/L (ref 136–145)

## 2025-06-16 ENCOUNTER — RESULTS FOLLOW-UP (OUTPATIENT)
Dept: CARDIOLOGY | Age: 85
End: 2025-06-16

## 2025-06-17 NOTE — TELEPHONE ENCOUNTER
Left message on voice mail with results per HIPAA//brendab  ----- Message from Dr. Fermin Jackman MD sent at 6/16/2025  9:25 PM EDT -----  Please call patient.  Labs are reviewed and are acceptable.  Continue current medications.  Call with any questions, concerns or new/worsening cardiac symptoms.

## 2025-06-19 ENCOUNTER — OFFICE VISIT (OUTPATIENT)
Age: 85
End: 2025-06-19
Payer: COMMERCIAL

## 2025-06-19 VITALS
HEIGHT: 70 IN | BODY MASS INDEX: 36.69 KG/M2 | HEART RATE: 60 BPM | WEIGHT: 256.3 LBS | SYSTOLIC BLOOD PRESSURE: 138 MMHG | DIASTOLIC BLOOD PRESSURE: 64 MMHG

## 2025-06-19 DIAGNOSIS — I48.11 LONGSTANDING PERSISTENT ATRIAL FIBRILLATION (HCC): ICD-10-CM

## 2025-06-19 DIAGNOSIS — Z95.2 H/O AORTIC VALVE REPLACEMENT: ICD-10-CM

## 2025-06-19 DIAGNOSIS — I45.10 RBBB: ICD-10-CM

## 2025-06-19 DIAGNOSIS — E78.5 DYSLIPIDEMIA: ICD-10-CM

## 2025-06-19 DIAGNOSIS — I50.32 CHRONIC DIASTOLIC CHF (CONGESTIVE HEART FAILURE) (HCC): ICD-10-CM

## 2025-06-19 DIAGNOSIS — I25.10 ATHEROSCLEROSIS OF NATIVE CORONARY ARTERY OF NATIVE HEART WITHOUT ANGINA PECTORIS: ICD-10-CM

## 2025-06-19 DIAGNOSIS — N18.32 STAGE 3B CHRONIC KIDNEY DISEASE (HCC): ICD-10-CM

## 2025-06-19 DIAGNOSIS — I10 ESSENTIAL HYPERTENSION: Primary | ICD-10-CM

## 2025-06-19 PROCEDURE — 1123F ACP DISCUSS/DSCN MKR DOCD: CPT | Performed by: INTERNAL MEDICINE

## 2025-06-19 PROCEDURE — 93000 ELECTROCARDIOGRAM COMPLETE: CPT | Performed by: INTERNAL MEDICINE

## 2025-06-19 PROCEDURE — 99214 OFFICE O/P EST MOD 30 MIN: CPT | Performed by: INTERNAL MEDICINE

## 2025-06-19 ASSESSMENT — ENCOUNTER SYMPTOMS
COUGH: 0
SHORTNESS OF BREATH: 0

## 2025-06-19 NOTE — PROGRESS NOTES
[address], [address]  [place] [address]  PHONE: [phone]    Jr Hager  1940      SUBJECTIVE:   Jr Hager is a 85 y.o. male seen for a follow up visit regarding the following:     Chief Complaint   Patient presents with    Coronary Artery Disease    Congestive Heart Failure    Atrial Fibrillation       HPI:      Jr Hager presents for routine follow. Multiple issues addressed as outlined below:     Chronic Diastolic Heart Failure  Status: Patient notes his dyspnea is stable.  No edema.  Tolerating current diuretic regimen.  No significant weight gain.  Medications: Jardiance 25 1/2 tablet daily ([facility]).  Demadex 20 mg QD.    Prior History:     H2FPEF SCORE:  8.  Probability of Heart failure with preserved ejection fraction:  >95%        Coronary Artery Disease  Status:  Patient denies any recent angina.  Notes compliance with medical therapy.  No recent NTG use.    Medications:  ASA 81 mg QD.    Prior History:     PCI of OM with 2.5 x 18 mm Cypher FLAKO. (1/2008)   CABG 2Vz (10/14/14):  LIMA to LAD.  SVG to OM     Surgical Aortic Valve Replacement  Status:  No fever or chills.  Most recent echo with normal valve function as outlined below.  Prior History:     Aortic valve replacement with a 23 mm Magna Ease Davion-Garcia pericardial valve. 10/14/14 (Dr Dunham)   2.  Echo (1/17/17):  EF 64%.  Normal Bioprosthetic Aortic Valve.  MG 15.  PG 30.   3.   Echo (12/2/2024): EF 55 to 60%.  + DD.  AVR: MG 18.  Mild RV dilatation.  RVSP 55.  Mild TR/MR.  Moderate LAE     Permanent Atrial Fibrillation  Status:  No palpitations or tachycardia.  Patient notes excessive bleeding.  He has had several hematomas on his arm due to gait instability and injuries.  He is concerned about his risk of falls.  Medications:  Toprol XL 25 mg QD.  Eliquis 2.5 mg BID.  Prior History:  RATE CONTROL/ANTICOAGULATION   Post op atrial fibrillation after

## 2025-07-01 ENCOUNTER — TELEPHONE (OUTPATIENT)
Dept: CARDIAC CATH/INVASIVE PROCEDURES | Age: 85
End: 2025-07-01

## 2025-07-01 NOTE — TELEPHONE ENCOUNTER
Jr Hager was contacted in follow up to discuss Watchman process and procedure.  Educational information/booklet emailed to the patient regarding stroke prevention options for patients with Atrial Fibrillation. [facility] CardioSmart tool provided for review as the shared decision making process continues between patient and physicians.  Watchman coordinator contact (Lisa Mathis, [phone]) information provided.

## 2025-08-18 ENCOUNTER — TELEPHONE (OUTPATIENT)
Dept: CARDIAC CATH/INVASIVE PROCEDURES | Age: 85
End: 2025-08-18

## 2025-08-27 VITALS — BODY MASS INDEX: 36.65 KG/M2 | HEIGHT: 70 IN | WEIGHT: 256 LBS

## 2025-08-28 ENCOUNTER — TELEPHONE (OUTPATIENT)
Dept: CARDIAC CATH/INVASIVE PROCEDURES | Age: 85
End: 2025-08-28

## 2025-08-29 ENCOUNTER — HOSPITAL ENCOUNTER (OUTPATIENT)
Dept: LAB | Age: 85
Discharge: HOME | End: 2025-09-01
Payer: MEDICARE

## 2025-08-29 ENCOUNTER — HOSPITAL ENCOUNTER (OUTPATIENT)
Dept: SURGERY | Age: 85
End: 2025-08-29
Payer: MEDICARE

## 2025-08-29 DIAGNOSIS — I48.21 PERMANENT ATRIAL FIBRILLATION (HCC): ICD-10-CM

## 2025-08-29 LAB
ALBUMIN SERPL-MCNC: 3 G/DL (ref 3.2–4.6)
ANION GAP SERPL CALC-SCNC: 13 MMOL/L (ref 7–16)
BASOPHILS # BLD: 0.04 K/UL (ref 0–0.2)
BASOPHILS NFR BLD: 0.6 % (ref 0–2)
BUN SERPL-MCNC: 35 MG/DL (ref 8–23)
CALCIUM SERPL-MCNC: 9 MG/DL (ref 8.8–10.2)
CHLORIDE SERPL-SCNC: 106 MMOL/L (ref 98–107)
CO2 SERPL-SCNC: 27 MMOL/L (ref 20–29)
CREAT SERPL-MCNC: 2.08 MG/DL (ref 0.8–1.3)
DIFFERENTIAL METHOD BLD: ABNORMAL
EOSINOPHIL # BLD: 0 K/UL (ref 0–0.8)
EOSINOPHIL NFR BLD: 0 % (ref 0.5–7.8)
ERYTHROCYTE [DISTWIDTH] IN BLOOD BY AUTOMATED COUNT: 15 % (ref 11.9–14.6)
GLUCOSE SERPL-MCNC: 121 MG/DL (ref 70–99)
HCT VFR BLD AUTO: 40 % (ref 41.1–50.3)
HGB BLD-MCNC: 12.4 G/DL (ref 13.6–17.2)
IMM GRANULOCYTES # BLD AUTO: 0.01 K/UL (ref 0–0.5)
IMM GRANULOCYTES NFR BLD AUTO: 0.2 % (ref 0–5)
INR PPP: 1.6
LYMPHOCYTES # BLD: 1.79 K/UL (ref 0.5–4.6)
LYMPHOCYTES NFR BLD: 27 % (ref 13–44)
MCH RBC QN AUTO: 30.6 PG (ref 26.1–32.9)
MCHC RBC AUTO-ENTMCNC: 31 G/DL (ref 31.4–35)
MCV RBC AUTO: 98.8 FL (ref 82–102)
MONOCYTES # BLD: 0.93 K/UL (ref 0.1–1.3)
MONOCYTES NFR BLD: 14 % (ref 4–12)
NEUTS SEG # BLD: 3.86 K/UL (ref 1.7–8.2)
NEUTS SEG NFR BLD: 58.2 % (ref 43–78)
NRBC # BLD: 0 K/UL (ref 0–0.2)
PLATELET # BLD AUTO: 136 K/UL (ref 150–450)
PMV BLD AUTO: 12.3 FL (ref 9.4–12.3)
POTASSIUM SERPL-SCNC: 4.3 MMOL/L (ref 3.5–5.1)
PROTHROMBIN TIME: 18.8 SEC (ref 11.3–14.9)
RBC # BLD AUTO: 4.05 M/UL (ref 4.23–5.6)
SODIUM SERPL-SCNC: 146 MMOL/L (ref 136–145)
WBC # BLD AUTO: 6.6 K/UL (ref 4.3–11.1)

## 2025-08-29 PROCEDURE — 85025 COMPLETE CBC W/AUTO DIFF WBC: CPT

## 2025-08-29 PROCEDURE — 80048 BASIC METABOLIC PNL TOTAL CA: CPT

## 2025-08-29 PROCEDURE — 82040 ASSAY OF SERUM ALBUMIN: CPT

## 2025-08-29 PROCEDURE — 85610 PROTHROMBIN TIME: CPT

## 2025-08-29 PROCEDURE — 36415 COLL VENOUS BLD VENIPUNCTURE: CPT

## 2025-09-01 ENCOUNTER — ANESTHESIA EVENT (OUTPATIENT)
Dept: CARDIAC CATH/INVASIVE PROCEDURES | Age: 85
DRG: 310 | End: 2025-09-01
Payer: MEDICARE

## 2025-09-01 RX ORDER — LABETALOL HYDROCHLORIDE 5 MG/ML
10 INJECTION, SOLUTION INTRAVENOUS
Status: CANCELLED | OUTPATIENT
Start: 2025-09-01

## 2025-09-01 RX ORDER — OXYCODONE HYDROCHLORIDE 5 MG/1
5 TABLET ORAL
Refills: 0 | Status: CANCELLED | OUTPATIENT
Start: 2025-09-01

## 2025-09-01 RX ORDER — PROCHLORPERAZINE EDISYLATE 5 MG/ML
5 INJECTION INTRAMUSCULAR; INTRAVENOUS
Status: CANCELLED | OUTPATIENT
Start: 2025-09-01

## 2025-09-01 RX ORDER — ONDANSETRON 2 MG/ML
4 INJECTION INTRAMUSCULAR; INTRAVENOUS
Status: CANCELLED | OUTPATIENT
Start: 2025-09-01

## 2025-09-01 RX ORDER — HYDRALAZINE HYDROCHLORIDE 20 MG/ML
10 INJECTION INTRAMUSCULAR; INTRAVENOUS
Status: CANCELLED | OUTPATIENT
Start: 2025-09-01

## 2025-09-02 ENCOUNTER — APPOINTMENT (OUTPATIENT)
Dept: CARDIAC CATH/INVASIVE PROCEDURES | Age: 85
DRG: 310 | End: 2025-09-02
Attending: INTERNAL MEDICINE
Payer: MEDICARE

## 2025-09-02 ENCOUNTER — HOSPITAL ENCOUNTER (OUTPATIENT)
Age: 85
Discharge: HOME | DRG: 310 | End: 2025-09-02
Attending: INTERNAL MEDICINE | Admitting: INTERNAL MEDICINE
Payer: MEDICARE

## 2025-09-02 ENCOUNTER — ANESTHESIA (OUTPATIENT)
Dept: CARDIAC CATH/INVASIVE PROCEDURES | Age: 85
DRG: 310 | End: 2025-09-02
Payer: MEDICARE

## 2025-09-02 VITALS
WEIGHT: 256 LBS | TEMPERATURE: 98 F | HEIGHT: 70 IN | HEART RATE: 65 BPM | OXYGEN SATURATION: 92 % | BODY MASS INDEX: 36.65 KG/M2 | RESPIRATION RATE: 16 BRPM | DIASTOLIC BLOOD PRESSURE: 118 MMHG | SYSTOLIC BLOOD PRESSURE: 138 MMHG

## 2025-09-02 DIAGNOSIS — I48.21 PERMANENT ATRIAL FIBRILLATION (HCC): Primary | ICD-10-CM

## 2025-09-02 LAB
ABO + RH BLD: NORMAL
BLOOD GROUP ANTIBODIES SERPL: NORMAL
ECHO BSA: 2.39 M2
ECHO BSA: 2.39 M2
ECHO LV EF PHYSICIAN: 60 %
EKG DIAGNOSIS: NORMAL
EKG Q-T INTERVAL: 492 MS
EKG QRS DURATION: 188 MS
EKG QTC CALCULATION (BAZETT): 499 MS
EKG R AXIS: 265 DEGREES
EKG T AXIS: 51 DEGREES
EKG VENTRICULAR RATE: 62 BPM
SPECIMEN EXP DATE BLD: NORMAL

## 2025-09-02 PROCEDURE — B24BZZ4 ULTRASONOGRAPHY OF HEART WITH AORTA, TRANSESOPHAGEAL: ICD-10-PCS | Performed by: INTERNAL MEDICINE

## 2025-09-02 PROCEDURE — 2500000003 HC RX 250 WO HCPCS: Performed by: NURSE ANESTHETIST, CERTIFIED REGISTERED

## 2025-09-02 PROCEDURE — 86900 BLOOD TYPING SEROLOGIC ABO: CPT

## 2025-09-02 PROCEDURE — C1894 INTRO/SHEATH, NON-LASER: HCPCS | Performed by: INTERNAL MEDICINE

## 2025-09-02 PROCEDURE — 2580000003 HC RX 258: Performed by: NURSE ANESTHETIST, CERTIFIED REGISTERED

## 2025-09-02 PROCEDURE — 3700000000 HC ANESTHESIA ATTENDED CARE: Performed by: INTERNAL MEDICINE

## 2025-09-02 PROCEDURE — 93325 DOPPLER ECHO COLOR FLOW MAPG: CPT

## 2025-09-02 PROCEDURE — 86901 BLOOD TYPING SEROLOGIC RH(D): CPT

## 2025-09-02 PROCEDURE — 93325 DOPPLER ECHO COLOR FLOW MAPG: CPT | Performed by: INTERNAL MEDICINE

## 2025-09-02 PROCEDURE — 93312 ECHO TRANSESOPHAGEAL: CPT | Performed by: INTERNAL MEDICINE

## 2025-09-02 PROCEDURE — 2709999900 HC NON-CHARGEABLE SUPPLY: Performed by: INTERNAL MEDICINE

## 2025-09-02 PROCEDURE — 93355 ECHO TRANSESOPHAGEAL (TEE): CPT | Performed by: INTERNAL MEDICINE

## 2025-09-02 PROCEDURE — 7100000001 HC PACU RECOVERY - ADDTL 15 MIN: Performed by: INTERNAL MEDICINE

## 2025-09-02 PROCEDURE — 6360000002 HC RX W HCPCS: Performed by: NURSE ANESTHETIST, CERTIFIED REGISTERED

## 2025-09-02 PROCEDURE — 7100000000 HC PACU RECOVERY - FIRST 15 MIN: Performed by: INTERNAL MEDICINE

## 2025-09-02 PROCEDURE — 3700000001 HC ADD 15 MINUTES (ANESTHESIA): Performed by: INTERNAL MEDICINE

## 2025-09-02 PROCEDURE — 93010 ELECTROCARDIOGRAM REPORT: CPT | Performed by: INTERNAL MEDICINE

## 2025-09-02 PROCEDURE — C1760 CLOSURE DEV, VASC: HCPCS | Performed by: INTERNAL MEDICINE

## 2025-09-02 PROCEDURE — 93005 ELECTROCARDIOGRAM TRACING: CPT | Performed by: INTERNAL MEDICINE

## 2025-09-02 PROCEDURE — 86850 RBC ANTIBODY SCREEN: CPT

## 2025-09-02 RX ORDER — ONDANSETRON 2 MG/ML
INJECTION INTRAMUSCULAR; INTRAVENOUS
Status: DISCONTINUED | OUTPATIENT
Start: 2025-09-02 | End: 2025-09-02 | Stop reason: SDUPTHER

## 2025-09-02 RX ORDER — DEXAMETHASONE SODIUM PHOSPHATE 4 MG/ML
INJECTION, SOLUTION INTRA-ARTICULAR; INTRALESIONAL; INTRAMUSCULAR; INTRAVENOUS; SOFT TISSUE
Status: DISCONTINUED | OUTPATIENT
Start: 2025-09-02 | End: 2025-09-02 | Stop reason: SDUPTHER

## 2025-09-02 RX ORDER — SODIUM CHLORIDE 0.9 % (FLUSH) 0.9 %
5-40 SYRINGE (ML) INJECTION PRN
Status: DISCONTINUED | OUTPATIENT
Start: 2025-09-02 | End: 2025-09-02 | Stop reason: HOSPADM

## 2025-09-02 RX ORDER — LIDOCAINE HYDROCHLORIDE 10 MG/ML
1 INJECTION, SOLUTION INFILTRATION; PERINEURAL
Status: DISCONTINUED | OUTPATIENT
Start: 2025-09-02 | End: 2025-09-02 | Stop reason: HOSPADM

## 2025-09-02 RX ORDER — SODIUM CHLORIDE 0.9 % (FLUSH) 0.9 %
5-40 SYRINGE (ML) INJECTION EVERY 12 HOURS SCHEDULED
Status: DISCONTINUED | OUTPATIENT
Start: 2025-09-02 | End: 2025-09-02 | Stop reason: HOSPADM

## 2025-09-02 RX ORDER — SODIUM CHLORIDE 9 MG/ML
INJECTION, SOLUTION INTRAVENOUS PRN
Status: DISCONTINUED | OUTPATIENT
Start: 2025-09-02 | End: 2025-09-02 | Stop reason: HOSPADM

## 2025-09-02 RX ORDER — SODIUM CHLORIDE 9 MG/ML
INJECTION, SOLUTION INTRAVENOUS CONTINUOUS
Status: DISCONTINUED | OUTPATIENT
Start: 2025-09-02 | End: 2025-09-02 | Stop reason: HOSPADM

## 2025-09-02 RX ORDER — SODIUM CHLORIDE, SODIUM LACTATE, POTASSIUM CHLORIDE, CALCIUM CHLORIDE 600; 310; 30; 20 MG/100ML; MG/100ML; MG/100ML; MG/100ML
INJECTION, SOLUTION INTRAVENOUS
Status: DISCONTINUED | OUTPATIENT
Start: 2025-09-02 | End: 2025-09-02 | Stop reason: SDUPTHER

## 2025-09-02 RX ORDER — LIDOCAINE HYDROCHLORIDE 20 MG/ML
INJECTION, SOLUTION EPIDURAL; INFILTRATION; INTRACAUDAL; PERINEURAL
Status: DISCONTINUED | OUTPATIENT
Start: 2025-09-02 | End: 2025-09-02 | Stop reason: SDUPTHER

## 2025-09-02 RX ORDER — PROPOFOL 10 MG/ML
INJECTION, EMULSION INTRAVENOUS
Status: DISCONTINUED | OUTPATIENT
Start: 2025-09-02 | End: 2025-09-02 | Stop reason: SDUPTHER

## 2025-09-02 RX ORDER — SODIUM CHLORIDE, SODIUM LACTATE, POTASSIUM CHLORIDE, CALCIUM CHLORIDE 600; 310; 30; 20 MG/100ML; MG/100ML; MG/100ML; MG/100ML
INJECTION, SOLUTION INTRAVENOUS CONTINUOUS
Status: DISCONTINUED | OUTPATIENT
Start: 2025-09-02 | End: 2025-09-02 | Stop reason: HOSPADM

## 2025-09-02 RX ORDER — ROCURONIUM BROMIDE 10 MG/ML
INJECTION, SOLUTION INTRAVENOUS
Status: DISCONTINUED | OUTPATIENT
Start: 2025-09-02 | End: 2025-09-02 | Stop reason: SDUPTHER

## 2025-09-02 RX ORDER — MIDAZOLAM HYDROCHLORIDE 2 MG/2ML
2 INJECTION, SOLUTION INTRAMUSCULAR; INTRAVENOUS
Status: DISCONTINUED | OUTPATIENT
Start: 2025-09-02 | End: 2025-09-02 | Stop reason: HOSPADM

## 2025-09-02 RX ORDER — ACETAMINOPHEN 500 MG
1000 TABLET ORAL ONCE
Status: DISCONTINUED | OUTPATIENT
Start: 2025-09-02 | End: 2025-09-02 | Stop reason: HOSPADM

## 2025-09-02 RX ORDER — FENTANYL CITRATE 50 UG/ML
100 INJECTION, SOLUTION INTRAMUSCULAR; INTRAVENOUS
Status: DISCONTINUED | OUTPATIENT
Start: 2025-09-02 | End: 2025-09-02 | Stop reason: HOSPADM

## 2025-09-02 RX ADMIN — SUGAMMADEX 200 MG: 100 INJECTION, SOLUTION INTRAVENOUS at 11:26

## 2025-09-02 RX ADMIN — ROCURONIUM BROMIDE 50 MG: 10 INJECTION, SOLUTION INTRAVENOUS at 11:06

## 2025-09-02 RX ADMIN — ONDANSETRON 4 MG: 2 INJECTION, SOLUTION INTRAMUSCULAR; INTRAVENOUS at 11:15

## 2025-09-02 RX ADMIN — PROPOFOL 120 MG: 10 INJECTION, EMULSION INTRAVENOUS at 11:05

## 2025-09-02 RX ADMIN — LIDOCAINE HYDROCHLORIDE 60 MG: 20 INJECTION, SOLUTION EPIDURAL; INFILTRATION; INTRACAUDAL; PERINEURAL at 11:05

## 2025-09-02 RX ADMIN — SODIUM CHLORIDE, SODIUM LACTATE, POTASSIUM CHLORIDE, AND CALCIUM CHLORIDE: 600; 310; 30; 20 INJECTION, SOLUTION INTRAVENOUS at 10:48

## 2025-09-02 RX ADMIN — DEXAMETHASONE SODIUM PHOSPHATE 4 MG: 4 INJECTION, SOLUTION INTRAMUSCULAR; INTRAVENOUS at 11:15

## 2025-09-02 RX ADMIN — PHENYLEPHRINE HYDROCHLORIDE 1 ML: 10 INJECTION INTRAVENOUS at 11:23

## 2025-09-02 RX ADMIN — WATER 2000 MG: 1 INJECTION INTRAMUSCULAR; INTRAVENOUS; SUBCUTANEOUS at 11:11

## 2025-09-02 RX ADMIN — PHENYLEPHRINE HYDROCHLORIDE 1 ML: 10 INJECTION INTRAVENOUS at 11:09

## 2025-09-02 ASSESSMENT — PAIN SCALES - GENERAL
PAINLEVEL_OUTOF10: 0

## (undated) DEVICE — 2000CC GUARDIAN II: Brand: GUARDIAN

## (undated) DEVICE — SUT ETHBND 2 30IN LR DA GRN --

## (undated) DEVICE — SOLUTION IRRIG 1000ML H2O PIC PLAS SHATTERPROOF CONTAINER

## (undated) DEVICE — GUIDE NDL ST W/ 14 X 147CM TELESCOPICALLY FLD CIV FLX CVR

## (undated) DEVICE — BIPOLAR SEALER 23-112-1 AQM 6.0: Brand: AQUAMANTYS ®

## (undated) DEVICE — BASIC SINGLE BASIN BTC-LF: Brand: MEDLINE INDUSTRIES, INC.

## (undated) DEVICE — BUTTON SWITCH PENCIL BLADE ELECTRODE, HOLSTER: Brand: EDGE

## (undated) DEVICE — SUTURE PDS II SZ 1 L96IN ABSRB VLT TP-1 L65MM 1/2 CIR Z880G

## (undated) DEVICE — MEDI-VAC YANKAUER SUCTION HANDLE W/BULBOUS TIP: Brand: CARDINAL HEALTH

## (undated) DEVICE — 3M™ STERI-DRAPE™ INSTRUMENT POUCH 1018: Brand: STERI-DRAPE™

## (undated) DEVICE — SUTURE STRATAFIX SYMMETRIC PDS + SZ 2-0 L18IN ABSRB VLT SXPP1A403

## (undated) DEVICE — TRAY CATH 16F DRN BG LTX -- CONVERT TO ITEM 363158

## (undated) DEVICE — 3M™ COBAN™ NL STERILE NON-LATEX SELF-ADHERENT WRAP, 2084S, 4 IN X 5 YD (10 CM X 4,5 M), 18 ROLLS/CASE: Brand: 3M™ COBAN™

## (undated) DEVICE — X-LARGE COTTON GLOVE: Brand: DEROYAL

## (undated) DEVICE — SUTURE ABSRB X-1 REV CUT 1/2 CIR 22MM UD BRAID 27IN SZ 3-0 J458H

## (undated) DEVICE — FAN SPRAY KIT: Brand: PULSAVAC®

## (undated) DEVICE — PACK PROCEDURE SURG TOT KNEE

## (undated) DEVICE — SOLUTION IV DEXTROSE/SALINE 5%-0.9% 500ML - 500ML

## (undated) DEVICE — SKIN STAPLER: Brand: SIGNET

## (undated) DEVICE — DRAPE,TOP,102X53,STERILE: Brand: MEDLINE

## (undated) DEVICE — Device

## (undated) DEVICE — TRAY PREP DRY W/ PREM GLV 2 APPL 6 SPNG 2 UNDPD 1 OVERWRAP

## (undated) DEVICE — T4 HOOD

## (undated) DEVICE — SYR LR LCK 1ML GRAD NSAF 30ML --

## (undated) DEVICE — REM POLYHESIVE ADULT PATIENT RETURN ELECTRODE: Brand: VALLEYLAB

## (undated) DEVICE — (D)PREP SKN CHLRAPRP APPL 26ML -- CONVERT TO ITEM 371833

## (undated) DEVICE — SUTURE VCRL SZ 1 L27IN ABSRB UD L36MM CP-1 1/2 CIR REV CUT J268H

## (undated) DEVICE — 3000CC GUARDIAN II: Brand: GUARDIAN

## (undated) DEVICE — COVER US PRB L183CM DIA15.2-7.6CM CLR POLY TAPR ACCORDION

## (undated) DEVICE — SYR 50ML LR LCK 1ML GRAD NSAF --

## (undated) DEVICE — CATHETER DIAG AD 6FR L110CM 0.038IN COR POLYUR PGTL W/ 6

## (undated) DEVICE — SUTURE VCRL SZ 2-0 L27IN ABSRB UD L36MM CP-1 1/2 CIR REV J266H

## (undated) DEVICE — (D)SYR 10ML 1/5ML GRAD NSAF -- PKGING CHANGE USE ITEM 338027

## (undated) DEVICE — SOLUTION IRRIG 3000ML 0.9% SOD CHL FLX CONT 0797208] ICU MEDICAL INC]

## (undated) DEVICE — SOLUTION IV 1000ML 0.9% SOD CHL

## (undated) DEVICE — PRECISION FALCON OSCILLATING TIP SAW CARTRIDGE: Brand: PRECISION FALCON

## (undated) DEVICE — BLADE SAW PAT RMR PILT H 51MM --

## (undated) DEVICE — INTRODUCER SHTH 16FR L30CM ID5.3MM GWIRE 0.038IN HYDRPHLC

## (undated) DEVICE — SYRINGE CATH TIP 50ML

## (undated) DEVICE — Z DISCONTINUED USE 2744636  DRESSING AQUACEL 14 IN ALG W3.5XL14IN POLYUR FLM CVR W/ HYDRCOLL

## (undated) DEVICE — SOLUTION IRRIG 1000ML H2O STRL BLT

## (undated) DEVICE — SHEATH INTRO 10FR L10CM DIL L2.5CM PERIPH W/ MINI S STL SPR

## (undated) DEVICE — DEVICE CLOSURE PERCLOSE PROSTYLE

## (undated) DEVICE — SIZER SURG TIB KT DISP TRIATHLON PRECIS